# Patient Record
Sex: FEMALE | Race: WHITE | NOT HISPANIC OR LATINO | Employment: OTHER | URBAN - NONMETROPOLITAN AREA
[De-identification: names, ages, dates, MRNs, and addresses within clinical notes are randomized per-mention and may not be internally consistent; named-entity substitution may affect disease eponyms.]

---

## 2017-01-09 ENCOUNTER — OFFICE VISIT (OUTPATIENT)
Dept: FAMILY MEDICINE | Facility: OTHER | Age: 77
End: 2017-01-09
Attending: FAMILY MEDICINE
Payer: COMMERCIAL

## 2017-01-09 VITALS
DIASTOLIC BLOOD PRESSURE: 82 MMHG | HEART RATE: 70 BPM | RESPIRATION RATE: 14 BRPM | TEMPERATURE: 97.7 F | BODY MASS INDEX: 22.46 KG/M2 | SYSTOLIC BLOOD PRESSURE: 130 MMHG | WEIGHT: 115 LBS

## 2017-01-09 DIAGNOSIS — Z78.0 POST-MENOPAUSE: ICD-10-CM

## 2017-01-09 DIAGNOSIS — M41.35 THORACOGENIC SCOLIOSIS OF THORACOLUMBAR REGION: Primary | ICD-10-CM

## 2017-01-09 PROCEDURE — 72072 X-RAY EXAM THORAC SPINE 3VWS: CPT | Mod: TC

## 2017-01-09 PROCEDURE — 72100 X-RAY EXAM L-S SPINE 2/3 VWS: CPT | Mod: TC

## 2017-01-09 PROCEDURE — 99214 OFFICE O/P EST MOD 30 MIN: CPT | Performed by: FAMILY MEDICINE

## 2017-01-09 PROCEDURE — 99212 OFFICE O/P EST SF 10 MIN: CPT

## 2017-01-09 ASSESSMENT — ANXIETY QUESTIONNAIRES
7. FEELING AFRAID AS IF SOMETHING AWFUL MIGHT HAPPEN: NOT AT ALL
2. NOT BEING ABLE TO STOP OR CONTROL WORRYING: NOT AT ALL
6. BECOMING EASILY ANNOYED OR IRRITABLE: NOT AT ALL
3. WORRYING TOO MUCH ABOUT DIFFERENT THINGS: NOT AT ALL
1. FEELING NERVOUS, ANXIOUS, OR ON EDGE: NOT AT ALL
5. BEING SO RESTLESS THAT IT IS HARD TO SIT STILL: NOT AT ALL
GAD7 TOTAL SCORE: 0

## 2017-01-09 ASSESSMENT — PAIN SCALES - GENERAL: PAINLEVEL: NO PAIN (0)

## 2017-01-09 ASSESSMENT — PATIENT HEALTH QUESTIONNAIRE - PHQ9: 5. POOR APPETITE OR OVEREATING: NOT AT ALL

## 2017-01-09 NOTE — NURSING NOTE
Chief Complaint   Patient presents with     Scoliosis       Initial /82 mmHg  Pulse 70  Temp(Src) 97.7  F (36.5  C)  Resp 14  Wt 115 lb (52.164 kg) Estimated body mass index is 22.46 kg/(m^2) as calculated from the following:    Height as of 4/22/16: 5' (1.524 m).    Weight as of this encounter: 115 lb (52.164 kg).  BP completed using cuff size: jim Padgett

## 2017-01-09 NOTE — PROGRESS NOTES
SUBJECTIVE:  Demi Narayan, 76 year old, female is seen with scoliosis.  She has developed progressive spinal deformity over the last several years and this has been progressing.  She has a history of osteoarthritis and is postmenopausal.  There ar no traumas.  Danielle has a history of osteoarthritis and has had symptoms of sciatica.  She has been going Occupational Therapy Chiropractic with minimal improvement.  No recent DEXA scan or plain x rays of the thoracolumbar spine.    Denies fever, headache, visual disturbance, dizziness, focal weakness, numbness, tingling, paresthesias, tremor, or gait disturbance.        Current Outpatient Prescriptions   Medication Sig Dispense Refill     Pantoprazole Sodium (PROTONIX PO) Take 40 mg by mouth every morning (before breakfast)       traMADol (ULTRAM) 50 MG tablet TAKE ONE TO TWO TABLETS BY MOUTH EVERY 6 TO 8 HOURS AS NEEDED 120 tablet 0     PREMARIN 0.3 MG tablet TAKE ONE TABLET BY MOUTH ONCE DAILY 90 tablet 1     ciprofloxacin (CIPRO) 500 MG tablet TAKE TWO TABLETS BY MOUTH EVERY DAY AS NEEDED FRO DIVERTICULITIS 20 tablet 0     niacin 500 MG tablet Take 1 tablet by mouth daily.       aspirin 325 MG tablet Take 325 mg by mouth At Bedtime.       GLUCOSAMINE SULFATE PO Take  by mouth daily.       Multiple Vitamins-Minerals (MULTIVITAL PO) Take 1 tablet by mouth daily.       Calcium Carbonate-Vitamin D (CALCIUM + D PO) Take 600 mg by mouth.       Omega-3 Fatty Acids (OMEGA-3 FISH OIL PO) Take  by mouth daily.        No Known Allergies    Past Medical History   Diagnosis Date     Gastro-oesophageal reflux disease      Osteoarthritis      Diverticulitis      Diverticulitis, recurrent 2/6/2002     bowel resection     Dyslipidemia 6/20/2006     Fibrocystic breast disease 7/12/2011     Chronic/recurrent back pain 7/12/2011     Hormone replacement therapy, postmenopausal 7/12/2011     Hiatal hernia 2/10/2012     GERD 2/10/2012     Past Surgical History   Procedure Laterality  Date     Ent surgery       tonsilectomy     Gyn surgery       hysterectomy with bladder and rectal repair     Abdomen surgery       colon removed 2nd to diverticulitis     Appendectomy       Endoscopy upper with pancreatic stimulation       ------------other-------------       colonoscopy with biopsy - diverticulitis, hemorrhoids     ------------other-------------  4/19/1994     sigmoidoscopy - abdominal pain, diverticula     Egd with biopsy  2011     [facility] with ovarian preservation       Diverticulitis       bowel resection     Arthroscopy shoulder  11/20/2013     Procedure: ARTHROSCOPY SHOULDER;  Right Shoulder Arthroscopy Sub-Acromial Decompression Rotator Cuff Repair;  Surgeon: Lenny Trammell MD;  Location: [facility]     Orthopedic surgery  11/20/2013     right rotator cuff surgery     [facility]     Endoscopy upper, colonoscopy, combined  7/18/2014     Procedure: COMBINED ENDOSCOPY UPPER, COLONOSCOPY;  Surgeon: Israel Feliciano MD;  Location: [facility] OR     Family History   Problem Relation Age of Onset     CEREBROVASCULAR DISEASE Father      CVA     HEART DISEASE Father      heart disease     Hypertension Father      Myocardial Infarction Father      myocardial infarction     CEREBROVASCULAR DISEASE Mother      CVA     DIABETES Mother      HEART DISEASE Mother      heart disease     Hypertension Mother      HEART DISEASE Brother      heart disease     Hypertension Brother      Social History     Social History     Marital Status:      Spouse Name: N/A     Number of Children: N/A     Years of Education: N/A     Occupational History     Not on file.     Social History Main Topics     Smoking status: Former Smoker     Types: Cigarettes     Quit date: 05/06/1968     Smokeless tobacco: Never Used      Comment: no passive smoke exposure     Alcohol Use: 0.5 oz/week     1 Glasses of wine per week      Comment: daily with dinner     Drug Use: No     Sexual  Activity:     Partners: Male     Birth Control/ Protection: None     Other Topics Concern      Service No     Blood Transfusions Yes     Permits if needed     Caffeine Concern Yes     4 cups coffee daily     Occupational Exposure No     Hobby Hazards No     Sleep Concern No     Stress Concern No     Weight Concern No     Special Diet No     Back Care No     Exercise No     Seat Belt Yes     Self-Exams Yes     Parent/Sibling W/ Cabg, Mi Or Angioplasty Before 65f 55m? Yes     Father     Social History Narrative         Review Of Systems  Constitutional, HEENT, cardiovascular, pulmonary, gi and gu systems are negative, except as otherwise noted.    OBJECTIVE:  Vitals: B/P: 130/82, T: 97.7, P: 70, R: 14    Exam:  Physical Exam   Constitutional: She is oriented to person, place, and time. She appears well-developed and well-nourished. No distress.   HENT:   Head: Normocephalic.   Eyes: Conjunctivae and EOM are normal. Pupils are equal, round, and reactive to light.   Musculoskeletal:        Thoracic back: She exhibits decreased range of motion and deformity.        Lumbar back: She exhibits decreased range of motion and deformity. She exhibits no bony tenderness.   There is obvious right lumbar scoliosis with rotational deformity.   Neurological: She is alert and oriented to person, place, and time. She displays normal reflexes.   Skin: Skin is warm and dry.   Psychiatric: She has a normal mood and affect.     Other exam not repeated    Labs:  Office Visit on 08/05/2016   Component Date Value Ref Range Status     Lyme Disease Antibodies Serum 08/05/2016 2.51* 0.00 - 0.89 Final    Comment: Positive, Presence of detectable Borrelia burgdorferi antibodies. Sample will   be   sent to [facility] for a Western Blot assay for confirmation.       Lab Scanned Result 08/05/2016 LYME CONF IGG,IGM,WB-Scanned   Final-Edited       ASSESSMENT/PLAN:  Thoracogenic scoliosis of thoracolumbar region  X rays reviewed and show prodominant  lumbar rotoscoliosis with advanced degenerative changes.  Discussed etiologies.  CT scheduled to rule out compression fractures which may be amenable to treatment.  DEXA to assess bone mineral density.  Follow up post testing.  - Pantoprazole Sodium (PROTONIX PO); Take 40 mg by mouth every morning (before breakfast)  - XR THORACIC SPINE 3 VW (Clinic Performed)  - XR LUMBAR SPINE 2/3 VIEWS (Clinic Performed); Future  - XR LUMBAR SPINE 2/3 VIEWS (Clinic Performed)  - CT Lumbar Spine w/o Contrast; Future  - DX Hip/Pelvis/Spine  - CT Lumbar Spine w/o Contrast            Virgil Mackay MD

## 2017-01-09 NOTE — MR AVS SNAPSHOT
"              After Visit Summary   1/9/2017    Demi Narayan    MRN: [id number]           Patient Information     Date Of Birth          1940        Visit Information        Provider Department      1/9/2017 10:00 AM Virgil Mackay MD [facility] [place]        Today's Diagnoses     Thoracogenic scoliosis of thoracolumbar region    -  1       Care Instructions    Radiology will call to schedule CT and DEXA scans.          Follow-ups after your visit        Follow-up notes from your care team     Return in about 1 week (around 1/16/2017) for Test Results.      Your next 10 appointments already scheduled     Apr 24, 2017  9:00 AM   (Arrive by 8:40 AM)   PHYSICAL with Virgil Mackay MD   [facility] [place] ([facility])    [address]  [place] [address] [phone]              Who to contact     If you have questions or need follow up information about today's clinic visit or your schedule please contact [facility] [place] directly at [phone].  Normal or non-critical lab and imaging results will be communicated to you by Adnexushart, letter or phone within 4 business days after the clinic has received the results. If you do not hear from us within 7 days, please contact the clinic through Adnexushart or phone. If you have a critical or abnormal lab result, we will notify you by phone as soon as possible.  Submit refill requests through Medypal or call your pharmacy and they will forward the refill request to us. Please allow 3 business days for your refill to be completed.          Additional Information About Your Visit        Adnexushart Information     Medypal lets you send messages to your doctor, view your test results, renew your prescriptions, schedule appointments and more. To sign up, go to [email or web address] . Click on \"Log in\" on the left side of the screen, which will take you to the Welcome page. Then click on \"Sign up Now\" on the right side of the page. "     You will be asked to enter the access code listed below, as well as some personal information. Please follow the directions to create your username and password.     Your access code is: [email or web address]  Expires: 4/10/2017  5:58 AM     Your access code will  in 90 days. If you need help or a new code, please call your [place] clinic or [phone].        Care EveryWhere ID     This is your Care EveryWhere ID. This could be used by other organizations to access your [place] medical records  [id number]        Your Vitals Were     Pulse Temperature Respirations             70 97.7  F (36.5  C) 14          Blood Pressure from Last 3 Encounters:   17 130/82   16 132/66   16 118/78    Weight from Last 3 Encounters:   17 115 lb (52.164 kg)   16 115 lb (52.164 kg)   16 111 lb (50.349 kg)              We Performed the Following     CT Lumbar Spine w/o Contrast     DX Hip/Pelvis/Spine     XR LUMBAR SPINE 2/3 VIEWS (Clinic Performed)     XR THORACIC SPINE 3 VW (Clinic Performed)          Today's Medication Changes          These changes are accurate as of: 17 11:59 PM.  If you have any questions, ask your nurse or doctor.               Stop taking these medicines if you haven't already. Please contact your care team if you have questions.     calcium carbonate 500 MG chewable tablet   Commonly known as:  TUMS   Stopped by:  Virgil Mackay MD           carBAMazepine 200 MG tablet   Commonly known as:  TEGretol   Stopped by:  Virgil Mackay MD           oxyCODONE-acetaminophen 5-325 MG per tablet   Commonly known as:  PERCOCET   Stopped by:  Virgil Mackay MD                    Primary Care Provider Office Phone # Fax #    Virgil Mackay [phone]       [facility] [address]  [place] [address]        Thank you!     Thank you for choosing [facility]  for your care. Our goal is always to provide you with excellent care.  Hearing back from our patients is one way we can continue to improve our services. Please take a few minutes to complete the written survey that you may receive in the mail after your visit with us. Thank you!             Your Updated Medication List - Protect others around you: Learn how to safely use, store and throw away your medicines at www.disposemymeds.org.          This list is accurate as of: 1/9/17 11:59 PM.  Always use your most recent med list.                   Brand Name Dispense Instructions for use    aspirin 325 MG tablet      Take 325 mg by mouth At Bedtime.       CALCIUM + D PO      Take 600 mg by mouth.       ciprofloxacin 500 MG tablet    CIPRO    20 tablet    TAKE TWO TABLETS BY MOUTH EVERY DAY AS NEEDED FRO DIVERTICULITIS       GLUCOSAMINE SULFATE PO      Take  by mouth daily.       MULTIVITAL PO      Take 1 tablet by mouth daily.       niacin 500 MG tablet      Take 1 tablet by mouth daily.       OMEGA-3 FISH OIL PO      Take  by mouth daily.       PREMARIN 0.3 MG tablet   Generic drug:  estrogens (conjugated)     90 tablet    TAKE ONE TABLET BY MOUTH ONCE DAILY       PROTONIX PO      Take 40 mg by mouth every morning (before breakfast)       traMADol 50 MG tablet    ULTRAM    120 tablet    TAKE ONE TO TWO TABLETS BY MOUTH EVERY 6 TO 8 HOURS AS NEEDED

## 2017-01-10 ASSESSMENT — PATIENT HEALTH QUESTIONNAIRE - PHQ9: SUM OF ALL RESPONSES TO PHQ QUESTIONS 1-9: 0

## 2017-01-10 ASSESSMENT — ANXIETY QUESTIONNAIRES: GAD7 TOTAL SCORE: 0

## 2017-01-19 DIAGNOSIS — S33.8XXD SPRAIN OF OTHER PARTS OF LUMBAR SPINE AND PELVIS, SUBSEQUENT ENCOUNTER: Primary | ICD-10-CM

## 2017-01-20 RX ORDER — TRAMADOL HYDROCHLORIDE 50 MG/1
TABLET ORAL
Qty: 120 TABLET | Refills: 0 | Status: SHIPPED | OUTPATIENT
Start: 2017-01-20 | End: 2017-02-21

## 2017-01-25 ENCOUNTER — HOSPITAL ENCOUNTER (OUTPATIENT)
Dept: CT IMAGING | Facility: HOSPITAL | Age: 77
End: 2017-01-25
Attending: FAMILY MEDICINE
Payer: COMMERCIAL

## 2017-01-25 PROCEDURE — 72131 CT LUMBAR SPINE W/O DYE: CPT | Mod: TC

## 2017-02-03 ENCOUNTER — OFFICE VISIT (OUTPATIENT)
Dept: FAMILY MEDICINE | Facility: OTHER | Age: 77
End: 2017-02-03
Attending: FAMILY MEDICINE
Payer: COMMERCIAL

## 2017-02-03 ENCOUNTER — HOSPITAL ENCOUNTER (OUTPATIENT)
Dept: GENERAL RADIOLOGY | Facility: HOSPITAL | Age: 77
Discharge: HOME | End: 2017-02-03
Attending: FAMILY MEDICINE | Admitting: FAMILY MEDICINE
Payer: COMMERCIAL

## 2017-02-03 VITALS
SYSTOLIC BLOOD PRESSURE: 118 MMHG | OXYGEN SATURATION: 98 % | HEART RATE: 78 BPM | RESPIRATION RATE: 18 BRPM | WEIGHT: 115 LBS | BODY MASS INDEX: 22.46 KG/M2 | DIASTOLIC BLOOD PRESSURE: 64 MMHG

## 2017-02-03 DIAGNOSIS — M54.50 CHRONIC BILATERAL LOW BACK PAIN WITHOUT SCIATICA: Primary | ICD-10-CM

## 2017-02-03 DIAGNOSIS — G89.29 CHRONIC BILATERAL LOW BACK PAIN WITHOUT SCIATICA: Primary | ICD-10-CM

## 2017-02-03 DIAGNOSIS — M41.25 OTHER IDIOPATHIC SCOLIOSIS, THORACOLUMBAR REGION: ICD-10-CM

## 2017-02-03 PROCEDURE — 99214 OFFICE O/P EST MOD 30 MIN: CPT | Performed by: FAMILY MEDICINE

## 2017-02-03 PROCEDURE — 99212 OFFICE O/P EST SF 10 MIN: CPT | Mod: 25

## 2017-02-03 PROCEDURE — 77080 DXA BONE DENSITY AXIAL: CPT | Mod: TC

## 2017-02-03 NOTE — NURSING NOTE
Chief Complaint   Patient presents with     Results     follow up CT scan 1/25/17       Initial /64 mmHg  Pulse 78  Resp 18  Wt 115 lb (52.164 kg)  SpO2 98% Estimated body mass index is 22.46 kg/(m^2) as calculated from the following:    Height as of 4/22/16: 5' (1.524 m).    Weight as of this encounter: 115 lb (52.164 kg).  BP completed using cuff size: jim Ramirez

## 2017-02-03 NOTE — PROGRESS NOTES
SUBJECTIVE:  Demi Narayan, 76 year old, female is seen ***    Current Outpatient Prescriptions   Medication Sig Dispense Refill     traMADol (ULTRAM) 50 MG tablet TAKE ONE TO TWO TABLETS BY MOUTH EVERY 6 TO 8 HOURS AS NEEDED 120 tablet 0     Pantoprazole Sodium (PROTONIX PO) Take 40 mg by mouth every morning (before breakfast)       PREMARIN 0.3 MG tablet TAKE ONE TABLET BY MOUTH ONCE DAILY 90 tablet 1     ciprofloxacin (CIPRO) 500 MG tablet TAKE TWO TABLETS BY MOUTH EVERY DAY AS NEEDED FRO DIVERTICULITIS 20 tablet 0     niacin 500 MG tablet Take 1 tablet by mouth daily.       aspirin 325 MG tablet Take 325 mg by mouth At Bedtime.       GLUCOSAMINE SULFATE PO Take  by mouth daily.       Multiple Vitamins-Minerals (MULTIVITAL PO) Take 1 tablet by mouth daily.       Calcium Carbonate-Vitamin D (CALCIUM + D PO) Take 600 mg by mouth.       Omega-3 Fatty Acids (OMEGA-3 FISH OIL PO) Take  by mouth daily.        No Known Allergies    Past Medical History   Diagnosis Date     Gastro-oesophageal reflux disease      Osteoarthritis      Diverticulitis      Diverticulitis, recurrent 2/6/2002     bowel resection     Dyslipidemia 6/20/2006     Fibrocystic breast disease 7/12/2011     Chronic/recurrent back pain 7/12/2011     Hormone replacement therapy, postmenopausal 7/12/2011     Hiatal hernia 2/10/2012     GERD 2/10/2012     Past Surgical History   Procedure Laterality Date     Ent surgery       tonsilectomy     Gyn surgery       hysterectomy with bladder and rectal repair     Abdomen surgery       colon removed 2nd to diverticulitis     Appendectomy       Endoscopy upper with pancreatic stimulation       ------------other-------------       colonoscopy with biopsy - diverticulitis, hemorrhoids     ------------other-------------  4/19/1994     sigmoidoscopy - abdominal pain, diverticula     Egd with biopsy  2011     [facility] with ovarian preservation       Diverticulitis       bowel resection     Arthroscopy shoulder   11/20/2013     Procedure: ARTHROSCOPY SHOULDER;  Right Shoulder Arthroscopy Sub-Acromial Decompression Rotator Cuff Repair;  Surgeon: Lenny Trammell MD;  Location: [facility] OR     Orthopedic surgery  11/20/2013     right rotator cuff surgery     [facility]     Endoscopy upper, colonoscopy, combined  7/18/2014     Procedure: COMBINED ENDOSCOPY UPPER, COLONOSCOPY;  Surgeon: Israel Feliciano MD;  Location: [facility] OR     Family History   Problem Relation Age of Onset     CEREBROVASCULAR DISEASE Father      CVA     HEART DISEASE Father      heart disease     Hypertension Father      Myocardial Infarction Father      myocardial infarction     CEREBROVASCULAR DISEASE Mother      CVA     DIABETES Mother      HEART DISEASE Mother      heart disease     Hypertension Mother      HEART DISEASE Brother      heart disease     Hypertension Brother      Social History     Social History     Marital Status:      Spouse Name: N/A     Number of Children: N/A     Years of Education: N/A     Occupational History     Not on file.     Social History Main Topics     Smoking status: Former Smoker     Types: Cigarettes     Quit date: 05/06/1968     Smokeless tobacco: Never Used      Comment: no passive smoke exposure     Alcohol Use: 0.5 oz/week     1 Glasses of wine per week      Comment: daily with dinner     Drug Use: No     Sexual Activity:     Partners: Male     Birth Control/ Protection: None     Other Topics Concern      Service No     Blood Transfusions Yes     Permits if needed     Caffeine Concern Yes     4 cups coffee daily     Occupational Exposure No     Hobby Hazards No     Sleep Concern No     Stress Concern No     Weight Concern No     Special Diet No     Back Care No     Exercise No     Seat Belt Yes     Self-Exams Yes     Parent/Sibling W/ Cabg, Mi Or Angioplasty Before 65f 55m? Yes     Father     Social History Narrative         Review Of Systems  {ROS - pick  list:[id number]}    OBJECTIVE:  Vitals: B/P: 118/64, T: Data Unavailable, P: 78, R: 18    Exam:  Physical Exam  Other exam not repeated    Labs:  Office Visit on 08/05/2016   Component Date Value Ref Range Status     Lyme Disease Antibodies Serum 08/05/2016 2.51* 0.00 - 0.89 Final    Comment: Positive, Presence of detectable Borrelia burgdorferi antibodies. Sample will   be   sent to [facility] for a Western Blot assay for confirmation.       Lab Scanned Result 08/05/2016 LYME CONF IGG,IGM,WB-Scanned   Final-Edited       ASSESSMENT/PLAN:  ***          Virgil Mackay MD

## 2017-02-03 NOTE — MR AVS SNAPSHOT
After Visit Summary   2/3/2017    Demi Narayan    MRN: [id number]           Patient Information     Date Of Birth          1940        Visit Information        Provider Department      2/3/2017 11:20 AM Virgil Mackay MD [facility] [place]        Today's Diagnoses     Low back pain, chronic    -  1     Scoliosis, throracolumbar           Care Instructions    Appointment with [facility]  scheduled for evaluation and recommendations for further management.         Follow-ups after your visit        Additional Services     SPINE SURGERY REFERRAL       Please choose Medical Spine Specialist (unless patient was seen by a Medical Spine Specialist within the past 6 months).  Surgical Evaluation is advised if the patient presents with one or more of the following red flags:     **Cauda Equina Syndrome  **Evidence of Spinal Tumor  **Fracture  **Infection  **Loss of Bowel or Bladder Control  **Sudden or Progressive Weakness  **Any other documented emergent neurological condition resulting from a Lumbar Spinal Condition.    You have been referred to: [facility]      Please be aware that coverage of these services is subject to the terms and limitations of your health insurance plan.  Call member services at your health plan with any benefit or coverage questions.      Please bring the following to your appointment:    **Any x-rays, CTs or MRIs which have been performed.  Contact the facility where they were done to arrange for  prior to your scheduled appointment.    **List of current medications   **This referral request   **Any documents/labs given to you regarding this referral                  Follow-up notes from your care team     Return if symptoms worsen or fail to improve.      Your next 10 appointments already scheduled     Apr 24, 2017  9:00 AM   (Arrive by 8:40 AM)   PHYSICAL with MD Nico Tafoya ([facility] [place]  [facility])    [place] [address]   [phone]              Who to contact     If you have questions or need follow up information about today's clinic visit or your schedule please contact [facility] directly at [phone].  Normal or non-critical lab and imaging results will be communicated to you by MyChart, letter or phone within 4 business days after the clinic has received the results. If you do not hear from us within 7 days, please contact the clinic through MyChart or phone. If you have a critical or abnormal lab result, we will notify you by phone as soon as possible.  Submit refill requests through hdtMEDIA or call your pharmacy and they will forward the refill request to us. Please allow 3 business days for your refill to be completed.          Additional Information About Your Visit        MyChart Information     [email or web address] lets you send messages to your doctor, view your test results, renew your prescriptions, schedule appointments and more. To sign up, go to [email or web address] . Click on \"Log in\" on the left side of the screen, which will take you to the Welcome page. Then click on \"Sign up Now\" on the right side of the page.     You will be asked to enter the access code listed below, as well as some personal information. Please follow the directions to create your username and password.     Your access code is: [email or web address]  Expires: 4/10/2017  5:58 AM     Your access code will  in 90 days. If you need help or a new code, please call your [facility] or [phone].        Care EveryWhere ID     This is your Care EveryWhere ID. This could be used by other organizations to access your [place] medical records  [id number]        Your Vitals Were     Pulse Respirations Pulse Oximetry             78 18 98%          Blood Pressure from Last 3 Encounters:   17 118/64   17 130/82   16 132/66    Weight from Last 3 Encounters:   17 115 lb " (52.164 kg)   01/09/17 115 lb (52.164 kg)   08/05/16 115 lb (52.164 kg)              We Performed the Following     SPINE SURGERY REFERRAL        Primary Care Provider Office Phone # Fax #    Virgil Mackay [phone]       [facility] [address] [place] [address]        Thank you!     Thank you for choosing [facility]  for your care. Our goal is always to provide you with excellent care. Hearing back from our patients is one way we can continue to improve our services. Please take a few minutes to complete the written survey that you may receive in the mail after your visit with us. Thank you!             Your Updated Medication List - Protect others around you: Learn how to safely use, store and throw away your medicines at www.disposemymeds.org.          This list is accurate as of: 2/3/17 11:59 PM.  Always use your most recent med list.                   Brand Name Dispense Instructions for use    aspirin 325 MG tablet      Take 325 mg by mouth At Bedtime.       CALCIUM + D PO      Take 600 mg by mouth.       ciprofloxacin 500 MG tablet    CIPRO    20 tablet    TAKE TWO TABLETS BY MOUTH EVERY DAY AS NEEDED FRO DIVERTICULITIS       GLUCOSAMINE SULFATE PO      Take  by mouth daily.       MULTIVITAL PO      Take 1 tablet by mouth daily.       niacin 500 MG tablet      Take 1 tablet by mouth daily.       OMEGA-3 FISH OIL PO      Take  by mouth daily.       PREMARIN 0.3 MG tablet   Generic drug:  estrogens (conjugated)     90 tablet    TAKE ONE TABLET BY MOUTH ONCE DAILY       PROTONIX PO      Take 40 mg by mouth every morning (before breakfast)       traMADol 50 MG tablet    ULTRAM    120 tablet    TAKE ONE TO TWO TABLETS BY MOUTH EVERY 6 TO 8 HOURS AS NEEDED

## 2017-02-05 PROBLEM — M41.25 OTHER IDIOPATHIC SCOLIOSIS, THORACOLUMBAR REGION: Status: ACTIVE | Noted: 2017-02-05

## 2017-02-05 NOTE — PROGRESS NOTES
SUBJECTIVE:  Demi Narayan, 76 year old, female is seen with scoliosis.  She has developed progressive spinal deformity over the last several years and this has been progressing.  She has a history of osteoarthritis and is postmenopausal.  There ar no traumas.  Danielle has a history of osteoarthritis and has had symptoms of bilateral sciatica.  She has been going to Chiropractic with minimal improvement.  CT lumbar spine was performed which showed:    1.  SCOLIOTIC CURVATURE OF THE THORACOLUMBAR SPINE, CONVEX RIGHT WITH  THE APEX AT L2 OF APPROXIMATELY 20 DEGREES.     2.  OTHERWISE, PROMINENT DEGENERATIVE CHANGES CONTRIBUTE TO SEVERE  BILATERAL FORAMINAL STENOSIS AT L5-S1, RESULTING IN BILATERAL L5  GANGLIONIC COMPRESSION, ASYMMETRICALLY WORSE ON THE RIGHT.     3.  LARGE RIGHT LATERAL DISK OSTEOPHYTE COMPLEX CONTRIBUTES TO  MODERATELY SEVERE RIGHT FORAMINAL STENOSIS AT L4-L5 WITH RIGHT L4  GANGLIONIC IMPINGEMENT.     4.  CHRONIC BULGE AND ENDPLATE SPURRING CONTRIBUTE TO MODERATE  BILATERAL FORAMINAL STENOSIS AT L3-L4.     5.  MODERATE LEFT FORAMINAL STENOSIS AT L2-L3 SECONDARY TO DISK  OSTEOPHYTE COMPLEX AND HYPERTROPHIC CHANGES OF THE LEFT FACET JOINT.    In addition, She underwent DEXA scan which showed:    THE PATIENT IS OSTEOPENIC AND FRACTURE RISK IS MODERATE.    There was no evidence of compression fractures.  This is reviewed in detail.    Denies fever, headache, visual disturbance, dizziness, focal weakness, numbness, tingling, paresthesias, tremor, or gait disturbance.        Current Outpatient Prescriptions   Medication Sig Dispense Refill     traMADol (ULTRAM) 50 MG tablet TAKE ONE TO TWO TABLETS BY MOUTH EVERY 6 TO 8 HOURS AS NEEDED 120 tablet 0     Pantoprazole Sodium (PROTONIX PO) Take 40 mg by mouth every morning (before breakfast)       PREMARIN 0.3 MG tablet TAKE ONE TABLET BY MOUTH ONCE DAILY 90 tablet 1     ciprofloxacin (CIPRO) 500 MG tablet TAKE TWO TABLETS BY MOUTH EVERY DAY AS NEEDED FRO  DIVERTICULITIS 20 tablet 0     niacin 500 MG tablet Take 1 tablet by mouth daily.       aspirin 325 MG tablet Take 325 mg by mouth At Bedtime.       GLUCOSAMINE SULFATE PO Take  by mouth daily.       Multiple Vitamins-Minerals (MULTIVITAL PO) Take 1 tablet by mouth daily.       Calcium Carbonate-Vitamin D (CALCIUM + D PO) Take 600 mg by mouth.       Omega-3 Fatty Acids (OMEGA-3 FISH OIL PO) Take  by mouth daily.        No Known Allergies    Past Medical History   Diagnosis Date     Gastro-oesophageal reflux disease      Osteoarthritis      Diverticulitis      Diverticulitis, recurrent 2/6/2002     bowel resection     Dyslipidemia 6/20/2006     Fibrocystic breast disease 7/12/2011     Chronic/recurrent back pain 7/12/2011     Hormone replacement therapy, postmenopausal 7/12/2011     Hiatal hernia 2/10/2012     GERD 2/10/2012     Past Surgical History   Procedure Laterality Date     Ent surgery       tonsilectomy     Gyn surgery       hysterectomy with bladder and rectal repair     Abdomen surgery       colon removed 2nd to diverticulitis     Appendectomy       Endoscopy upper with pancreatic stimulation       ------------other-------------       colonoscopy with biopsy - diverticulitis, hemorrhoids     ------------other-------------  4/19/1994     sigmoidoscopy - abdominal pain, diverticula     Egd with biopsy  2011     Tvh with ovarian preservation       Diverticulitis       bowel resection     Arthroscopy shoulder  11/20/2013     Procedure: ARTHROSCOPY SHOULDER;  Right Shoulder Arthroscopy Sub-Acromial Decompression Rotator Cuff Repair;  Surgeon: Lenny Trammell MD;  Location: [facility]     Orthopedic surgery  11/20/2013     right rotator cuff surgery     [facility]     Endoscopy upper, colonoscopy, combined  7/18/2014     Procedure: COMBINED ENDOSCOPY UPPER, COLONOSCOPY;  Surgeon: Israel Feliciano MD;  Location: [facility]     Family History   Problem Relation Age of  Onset     CEREBROVASCULAR DISEASE Father      CVA     HEART DISEASE Father      heart disease     Hypertension Father      Myocardial Infarction Father      myocardial infarction     CEREBROVASCULAR DISEASE Mother      CVA     DIABETES Mother      HEART DISEASE Mother      heart disease     Hypertension Mother      HEART DISEASE Brother      heart disease     Hypertension Brother      Social History     Social History     Marital Status:      Spouse Name: N/A     Number of Children: N/A     Years of Education: N/A     Occupational History     Not on file.     Social History Main Topics     Smoking status: Former Smoker     Types: Cigarettes     Quit date: 05/06/1968     Smokeless tobacco: Never Used      Comment: no passive smoke exposure     Alcohol Use: 0.5 oz/week     1 Glasses of wine per week      Comment: daily with dinner     Drug Use: No     Sexual Activity:     Partners: Male     Birth Control/ Protection: None     Other Topics Concern      Service No     Blood Transfusions Yes     Permits if needed     Caffeine Concern Yes     4 cups coffee daily     Occupational Exposure No     Hobby Hazards No     Sleep Concern No     Stress Concern No     Weight Concern No     Special Diet No     Back Care No     Exercise No     Seat Belt Yes     Self-Exams Yes     Parent/Sibling W/ Cabg, Mi Or Angioplasty Before 65f 55m? Yes     Father     Social History Narrative         Review Of Systems  Constitutional, HEENT, cardiovascular, pulmonary, gi and gu systems are negative, except as otherwise noted.    OBJECTIVE:  Vitals: B/P: 130/82, T: 97.7, P: 70, R: 14    Exam:  Physical Exam   Constitutional: She is oriented to person, place, and time. She appears well-developed and well-nourished. No distress.   HENT:   Head: Normocephalic.   Eyes: Conjunctivae and EOM are normal. Pupils are equal, round, and reactive to light.   Musculoskeletal:        Thoracic back: She exhibits decreased range of motion and  deformity.        Lumbar back: She exhibits decreased range of motion and deformity. She exhibits no bony tenderness.   There is obvious right lumbar scoliosis with rotational deformity.   Neurological: She is alert and oriented to person, place, and time. She displays normal reflexes.   Skin: Skin is warm and dry.   Psychiatric: She has a normal mood and affect.     Other exam not repeated    Labs:  Office Visit on 08/05/2016   Component Date Value Ref Range Status     Lyme Disease Antibodies Serum 08/05/2016 2.51* 0.00 - 0.89 Final    Comment: Positive, Presence of detectable Borrelia burgdorferi antibodies. Sample will   be   sent to [facility] for a Western Blot assay for confirmation.       Lab Scanned Result 08/05/2016 LYME CONF IGG,IGM,WB-Scanned   Final-Edited       ASSESSMENT/PLAN:  Low back pain, chronic  Discussed potential etiologies  Her symptoms are intermittent and controlled.  - SPINE SURGERY REFERRAL    Scoliosis, throracolumbar  I am concerned about the degree and relatively rapid progression of her scoliosis and height loss.  Appointment with [facility]  scheduled for evaluation and recommendations for further management.   - SPINE SURGERY REFERRAL            Virgil Mackay MD

## 2017-02-05 NOTE — PATIENT INSTRUCTIONS
Appointment with [facility]  scheduled for evaluation and recommendations for further management.

## 2017-02-21 DIAGNOSIS — S33.8XXD SPRAIN OF OTHER PARTS OF LUMBAR SPINE AND PELVIS, SUBSEQUENT ENCOUNTER: ICD-10-CM

## 2017-02-21 RX ORDER — TRAMADOL HYDROCHLORIDE 50 MG/1
TABLET ORAL
Qty: 120 TABLET | Refills: 0 | Status: SHIPPED | OUTPATIENT
Start: 2017-02-21 | End: 2017-03-23

## 2017-03-20 ENCOUNTER — TELEPHONE (OUTPATIENT)
Dept: FAMILY MEDICINE | Facility: OTHER | Age: 77
End: 2017-03-20

## 2017-03-20 DIAGNOSIS — Z79.890 HORMONE REPLACEMENT THERAPY (HRT): ICD-10-CM

## 2017-03-20 NOTE — TELEPHONE ENCOUNTER
Reason for call:  Medication    1. Medication Name? Premarin 0.3mg tablets  2. Is this request for a refill? Yes  3. What Pharmacy do you use? [facility]  4. Have you contacted your pharmacy? No    5. If yes, when?  (Please note that the turn-around-time for prescriptions is 72 business hours; I am sending your request at this time. SEND TO  [facility]  )  Description: Patient's insurance will not be covering medication any longer and recommended Estrabiol tablets. Medication will need a PA. Patient has enough medication for 10 days.  Was an appointment offered for this a call? No   Preferred method for responding to this messageTelephone Call [phone]  If we cannot reach you directly, may we leave a detailed response at the number you provided? Yes  Can this message wait until your PCP/Provider returns if not available today? Yes

## 2017-03-21 RX ORDER — ESTRADIOL 0.5 MG/1
0.5 TABLET ORAL DAILY
Qty: 90 TABLET | Refills: 1 | Status: SHIPPED | OUTPATIENT
Start: 2017-03-21 | End: 2017-10-10

## 2017-03-21 NOTE — TELEPHONE ENCOUNTER
Patient's insurance will not be covering Premarin 0.3mg medication any longer and recommended Estradiol. Please send new prescription to the pharmacy.

## 2017-03-23 DIAGNOSIS — S33.8XXD SPRAIN OF OTHER PARTS OF LUMBAR SPINE AND PELVIS, SUBSEQUENT ENCOUNTER: ICD-10-CM

## 2017-03-23 RX ORDER — TRAMADOL HYDROCHLORIDE 50 MG/1
TABLET ORAL
Qty: 120 TABLET | Refills: 0 | Status: SHIPPED | OUTPATIENT
Start: 2017-03-23 | End: 2017-05-02

## 2017-03-23 NOTE — TELEPHONE ENCOUNTER
tramadol      Last Written Prescription Date: 2/21/17  Last Fill Quantity: 120,  # refills: 0   Last Office Visit with G, P or [facility] prescribing provider: 2/3/17

## 2017-04-04 ENCOUNTER — OFFICE VISIT (OUTPATIENT)
Dept: FAMILY MEDICINE | Facility: OTHER | Age: 77
End: 2017-04-04
Attending: FAMILY MEDICINE
Payer: COMMERCIAL

## 2017-04-04 VITALS
OXYGEN SATURATION: 99 % | DIASTOLIC BLOOD PRESSURE: 70 MMHG | HEART RATE: 88 BPM | TEMPERATURE: 98.5 F | RESPIRATION RATE: 16 BRPM | SYSTOLIC BLOOD PRESSURE: 124 MMHG | WEIGHT: 109 LBS | BODY MASS INDEX: 21.29 KG/M2
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
## 2017-04-04 PROCEDURE — 99213 OFFICE O/P EST LOW 20 MIN: CPT | Performed by: FAMILY MEDICINE

## 2017-04-04 PROCEDURE — 99212 OFFICE O/P EST SF 10 MIN: CPT

## 2017-04-04 RX ORDER — AZITHROMYCIN 250 MG/1
TABLET, FILM COATED ORAL
Qty: 6 TABLET | Refills: 0 | Status: SHIPPED | OUTPATIENT
Start: 2017-04-04 | End: 2017-04-25

## 2017-04-04 ASSESSMENT — PAIN SCALES - GENERAL: PAINLEVEL: MILD PAIN (2)

## 2017-04-04 NOTE — MR AVS SNAPSHOT
"              After Visit Summary   4/4/2017    Demi Narayan    MRN: [id number]           Patient Information     Date Of Birth          1940        Visit Information        Provider Department      4/4/2017 3:00 PM Virgil Mackay MD [facility]        Today's Diagnoses     Tick bite, initial encounter    -  1      Care Instructions    Take azithromycin (Zithromax) as directed.        Follow-ups after your visit        Follow-up notes from your care team     Return if symptoms worsen or fail to improve.      Your next 10 appointments already scheduled     Apr 25, 2017  9:00 AM CDT   (Arrive by 8:45 AM)   PHYSICAL with Virgil Mackay MD   [facility] ([facility])    [address]  [place] [address]   [phone]              Who to contact     If you have questions or need follow up information about today's clinic visit or your schedule please contact [facility] [place] directly at [phone].  Normal or non-critical lab and imaging results will be communicated to you by MyChart, letter or phone within 4 business days after the clinic has received the results. If you do not hear from us within 7 days, please contact the clinic through [employer] or phone. If you have a critical or abnormal lab result, we will notify you by phone as soon as possible.  Submit refill requests through [employer] or call your pharmacy and they will forward the refill request to us. Please allow 3 business days for your refill to be completed.          Additional Information About Your Visit        [employer] Information     [employer] lets you send messages to your doctor, view your test results, renew your prescriptions, schedule appointments and more. To sign up, go to [email or web address] . Click on \"Log in\" on the left side of the screen, which will take you to the Welcome page. Then click on \"Sign up Now\" on the right side of the page.     You will be asked to enter the access " code listed below, as well as some personal information. Please follow the directions to create your username and password.     Your access code is: [email or web address]  Expires: 4/10/2017  6:58 AM     Your access code will  in 90 days. If you need help or a new code, please call your [facility] or [phone].        Care EveryWhere ID     This is your Care EveryWhere ID. This could be used by other organizations to access your [place] medical records  [id number]        Your Vitals Were     Pulse Temperature Respirations Pulse Oximetry BMI (Body Mass Index)       88 98.5  F (36.9  C) (Tympanic) 16 99% 21.29 kg/m2        Blood Pressure from Last 3 Encounters:   17 124/70   17 118/64   17 130/82    Weight from Last 3 Encounters:   17 109 lb (49.4 kg)   17 115 lb (52.2 kg)   17 115 lb (52.2 kg)              Today, you had the following     No orders found for display         Today's Medication Changes          These changes are accurate as of: 17 11:59 PM.  If you have any questions, ask your nurse or doctor.               Start taking these medicines.        Dose/Directions    azithromycin 250 MG tablet   Commonly known as:  ZITHROMAX   Used for:  Tick bite, initial encounter   Started by:  Virgil Mackay MD        Two tablets first day, then one tablet daily for four days.   Quantity:  6 tablet   Refills:  0            Where to get your medicines      These medications were sent to [facility] - [place] - [address]  [address], [place] [address]     Phone:  [phone]     azithromycin 250 MG tablet                Primary Care Provider Office Phone # Fax #    Virgil Mackay [phone]       [facility] [address] [place] [address]  [place] [address]        Thank you!     Thank you for choosing [facility]  for your care. Our goal is always to provide you with excellent care. Hearing back from our patients is one way we  can continue to improve our services. Please take a few minutes to complete the written survey that you may receive in the mail after your visit with us. Thank you!             Your Updated Medication List - Protect others around you: Learn how to safely use, store and throw away your medicines at www.disposemymeds.org.          This list is accurate as of: 4/4/17 11:59 PM.  Always use your most recent med list.                   Brand Name Dispense Instructions for use    aspirin 325 MG tablet      Take 325 mg by mouth At Bedtime.       azithromycin 250 MG tablet    ZITHROMAX    6 tablet    Two tablets first day, then one tablet daily for four days.       CALCIUM + D PO      Take 600 mg by mouth.       ciprofloxacin 500 MG tablet    CIPRO    20 tablet    TAKE TWO TABLETS BY MOUTH EVERY DAY AS NEEDED FRO DIVERTICULITIS       estradiol 0.5 MG tablet    ESTRACE    90 tablet    Take 1 tablet (0.5 mg) by mouth daily       GLUCOSAMINE SULFATE PO      Take  by mouth daily.       MULTIVITAL PO      Take 1 tablet by mouth daily.       niacin 500 MG tablet      Take 1 tablet by mouth daily.       OMEGA-3 FISH OIL PO      Take  by mouth daily.       PREMARIN 0.3 MG tablet   Generic drug:  estrogens (conjugated)     90 tablet    TAKE ONE TABLET BY MOUTH ONCE DAILY       PROTONIX PO      Take 40 mg by mouth every morning (before breakfast)       traMADol 50 MG tablet    ULTRAM    120 tablet    TAKE ONE TO TWO TABLETS BY MOUTH EVERY 6 TO 8 HOURS AS NEEDED

## 2017-04-04 NOTE — PROGRESS NOTES
SUBJECTIVE:  Demi Narayan, 76 year old, female is seen with tick bite.  Present over the last few days.  Located on the arm region.  Some associated tenderness and erythema.  She brings the tick in for evaluation.  Danielle has a previous history of Lyme disease.    Denies fever, headache, visual disturbance, dizziness, focal weakness, numbness, tingling, paresthesias, tremor, or gait disturbance.      Current Outpatient Prescriptions   Medication Sig Dispense Refill     traMADol (ULTRAM) 50 MG tablet TAKE ONE TO TWO TABLETS BY MOUTH EVERY 6 TO 8 HOURS AS NEEDED 120 tablet 0     estradiol (ESTRACE) 0.5 MG tablet Take 1 tablet (0.5 mg) by mouth daily 90 tablet 1     Pantoprazole Sodium (PROTONIX PO) Take 40 mg by mouth every morning (before breakfast)       PREMARIN 0.3 MG tablet TAKE ONE TABLET BY MOUTH ONCE DAILY 90 tablet 1     ciprofloxacin (CIPRO) 500 MG tablet TAKE TWO TABLETS BY MOUTH EVERY DAY AS NEEDED FRO DIVERTICULITIS 20 tablet 0     niacin 500 MG tablet Take 1 tablet by mouth daily.       aspirin 325 MG tablet Take 325 mg by mouth At Bedtime.       GLUCOSAMINE SULFATE PO Take  by mouth daily.       Multiple Vitamins-Minerals (MULTIVITAL PO) Take 1 tablet by mouth daily.       Calcium Carbonate-Vitamin D (CALCIUM + D PO) Take 600 mg by mouth.       Omega-3 Fatty Acids (OMEGA-3 FISH OIL PO) Take  by mouth daily.        No Known Allergies    Past Medical History:   Diagnosis Date     Chronic/recurrent back pain 7/12/2011     Diverticulitis      Diverticulitis, recurrent 2/6/2002    bowel resection     Dyslipidemia 6/20/2006     Fibrocystic breast disease 7/12/2011     Gastro-oesophageal reflux disease      GERD 2/10/2012     Hiatal hernia 2/10/2012     Hormone replacement therapy, postmenopausal 7/12/2011     Osteoarthritis      Past Surgical History:   Procedure Laterality Date     ------------OTHER-------------      colonoscopy with biopsy - diverticulitis, hemorrhoids     ------------OTHER-------------   4/19/1994    sigmoidoscopy - abdominal pain, diverticula     ABDOMEN SURGERY      colon removed 2nd to diverticulitis     APPENDECTOMY       ARTHROSCOPY SHOULDER  11/20/2013    Procedure: ARTHROSCOPY SHOULDER;  Right Shoulder Arthroscopy Sub-Acromial Decompression Rotator Cuff Repair;  Surgeon: Lenny Trammell MD;  Location[facility]     [facility]     Divert[facility]itis      bowel resection     EGD with biopsy  2011     ENDOSCOPY UPPER WITH PANCREATIC STIMULATION       ENDOSCOPY UPPER, COLONOSCOPY, COMBINED  7/18/2014    Procedure: COMBINED ENDOSCOPY UPPER, COLONOSCOPY;  Surgeon: Israel Feliciano MD;  Location: [facility]     ENT SURGERY      tonsilectomy     GYN SURGERY      hysterectomy with bladder and rectal repair     ORTHOPEDIC SURGERY  11/20/2013    right rotator cuff surgery     TVH with ovarian preservation       Family History   Problem Relation Age of Onset     CEREBROVASCULAR DISEASE Father      CVA     HEART DISEASE Father      heart disease     Hypertension Father      Myocardial Infarction Father      myocardial infarction     CEREBROVASCULAR DISEASE Mother      CVA     DIABETES Mother      HEART DISEASE Mother      heart disease     Hypertension Mother      HEART DISEASE Brother      heart disease     Hypertension Brother      Social History     Social History     Marital status:      Spouse name: N/A     Number of children: N/A     Years of education: N/A     Occupational History     Not on file.     Social History Main Topics     Smoking status: Former Smoker     Types: Cigarettes     Quit date: 5/6/1968     Smokeless tobacco: Never Used      Comment: no passive smoke exposure     Alcohol use 0.5 oz/week     1 Glasses of wine per week      Comment: daily with dinner     Drug use: No     Sexual activity: Yes     Partners: Male     Birth control/ protection: None     Other Topics Concern      Service No     Blood Transfusions Yes      Permits if needed     Caffeine Concern Yes     4 cups coffee daily     Occupational Exposure No     Hobby Hazards No     Sleep Concern No     Stress Concern No     Weight Concern No     Special Diet No     Back Care No     Exercise No     Seat Belt Yes     Self-Exams Yes     Parent/Sibling W/ Cabg, Mi Or Angioplasty Before 65f 55m? Yes     Father     Social History Narrative         Review Of Systems  Constitutional, HEENT, cardiovascular, pulmonary, gi and gu systems are negative, except as otherwise noted.    OBJECTIVE:  Vitals: B/P: 124/70, T: 98.5, P: 88, R: 16    Exam:  Physical Exam   Constitutional: She is oriented to person, place, and time. She appears well-developed and well-nourished. No distress.   Neurological: She is alert and oriented to person, place, and time.   Skin: Skin is warm and dry.   There is an area of tenderness and mild erythema noted inner upper arm.   Psychiatric: She has a normal mood and affect.     Other exam not repeated    Labs:  Office Visit on 08/05/2016   Component Date Value Ref Range Status     Lyme Disease Antibodies Serum 08/05/2016 2.51* 0.00 - 0.89 Final    Comment: Positive, Presence of detectable Borrelia burgdorferi antibodies. Sample will   be   sent to [facility] for a Western Blot assay for confirmation.       Lab Scanned Result 08/05/2016 LYME CONF IGG,IGM,WB-Scanned   Final-Edited       ASSESSMENT/PLAN:  Tick bite, initial encounter  Not deer tick.  Associated cellulitis.  Azithromycin (Zithromax) as directed.  Warm packs.  Follow up with persistent symptoms.  - azithromycin (ZITHROMAX) 250 MG tablet; Two tablets first day, then one tablet daily for four days.            Virgil Mackay MD

## 2017-04-04 NOTE — NURSING NOTE
Chief Complaint   Patient presents with     Insect Bites     On left upper breast that she notied saturday.        Initial /70 (BP Location: Left arm, Patient Position: Chair, Cuff Size: Adult Regular)  Pulse 88  Temp 98.5  F (36.9  C) (Tympanic)  Resp 16  Wt 109 lb (49.4 kg)  SpO2 99%  BMI 21.29 kg/m2 Estimated body mass index is 21.29 kg/(m^2) as calculated from the following:    Height as of 4/22/16: 5' (1.524 m).    Weight as of this encounter: 109 lb (49.4 kg).  Medication Reconciliation: complete   Amber Mello

## 2017-04-13 ENCOUNTER — TRANSFERRED RECORDS (OUTPATIENT)
Dept: HEALTH INFORMATION MANAGEMENT | Facility: HOSPITAL | Age: 77
End: 2017-04-13

## 2017-04-21 ENCOUNTER — HOSPITAL ENCOUNTER (OUTPATIENT)
Dept: PHYSICAL THERAPY | Facility: HOSPITAL | Age: 77
Setting detail: THERAPIES SERIES
End: 2017-04-21
Attending: SPECIALIST
Payer: COMMERCIAL

## 2017-04-21 PROCEDURE — 97162 PT EVAL MOD COMPLEX 30 MIN: CPT | Mod: GP

## 2017-04-21 PROCEDURE — 40000718 ZZHC STATISTIC PT DEPARTMENT ORTHO VISIT

## 2017-04-21 NOTE — PROGRESS NOTES
04/21/17 1500   Oswestry Disability Index (TERESSA   [employer] 1980 Version 2.1a, All rights reserved)   Section 1 - Pain intensity 1   Section 2 - Personal care (washing, dressing, etc.)  0   Section 3 - Lifting 0   Section 4 - Walking 1   Section 5 - Sitting 2   Section 6 - Standing 2   Section 7 - Sleeping 2   Section 8 - Sex life (if applicable) 0   Section 9 - Social life 0   Section 10 - Traveling 1   Sum 9   Count 10   Oswestry Score (%) 18 %

## 2017-04-22 NOTE — PROGRESS NOTES
04/21/17 1335   General Information   Type of Visit Initial OP Ortho PT Evaluation   Start of Care Date 04/21/17   Referring Physician Dr. Kade Phipps   Patient/Family Goals Statement arrest condition at present. Improvement if possible   Orders Other   Orders Comment Abdominal/gluteal strengthening, aerobic conditioning, Back school, extension program, home program-develop, isometrics, lifting technique/posture, patient education, spine stabilization, strengthening-lower extremity, trunk strengthening. 1-2x/week for 4-6 weeks.    Date of Order 04/13/17   Insurance Type are   Insurance Comments/Visits Authorized Eval   Medical Diagnosis Scoliosis acquired   Surgical/Medical history reviewed Yes   General Information Comments PMH:post menopause, osteopenia, LBP chronic, OA multiple joints   Body Part(s)   Body Part(s) Lumbar Spine/SI   Presentation and Etiology   Pertinent history of current problem (include personal factors and/or comorbidities that impact the POC) Patient reports that prior to 5-7 years ago patient felt that she was standing up tall without deviation to side. Over time she notes more and more curving of the spine. Has history of sciatica mainly L LE, but ocassionally R LE. Patient saw Dr. Kade Phipps and surgical options were discussed but patient wants to pursue conservative management. Orders sent for PT services. Patient has 20 degree covex R curvature with apex at L2. Also has osteopenia. Overall, patient is very active and healthy. Patient perform cleaning and cooking tasks including vaccuming, dishes, making beds and meal prep. Patient exercises regularly with a floor exercise program. Patient volunteers with elementary. Goes on outdoor walks with  and dogs regularly. Patient does not want to slow down and she also does not want scoliosis to keep progressing. Patient has pain that limits and wakes her when sleeping. Patient has pain with prolonged sitting and standing. Patient keeps  "moving to decrease pain. When patient experiences L \"sciatica\" symptoms go from LB to anterior L thigh and knee occasionally all the day to the foot. Patient denies shoulder problems.    Impairments A. Pain;F. Decreased strength and endurance;H. Impaired gait   Functional Limitations perform activities of daily living;perform desired leisure / sports activities   Symptom Location LB with occasionally LE radicular symptoms.    How/Where did it occur From insidious onset   Onset date of current episode/exacerbation (5-7 years ago by patient report)   Chronicity Chronic   Pain rating (0-10 point scale) Best (/10);Worst (/10)   Best (/10) 2   Worst (/10) 8   Pain quality C. Aching;D. Burning   Frequency of pain/symptoms B. Intermittent   Pain/symptoms exacerbated by A. Sitting;K. Home tasks  (sleeping, prolonged standing )   Pain/symptoms eased by (Tramadol)   Progression of symptoms since onset: Worsened   Current / Previous Interventions   Diagnostic Tests: CT scan;Bone Scan   CT Results Results   CT results Scoliotic curvature of thoracolumbar spine convex R apex L2. 20 degrees. Bilateral foraminal stenosis L5-S1, R L4 formainal stenosis.   Bone Scan Results Results   Bone Scan results Osteopenia, moderate risk for fracture   Prior Level of Function   Functional Level Prior Comment Patient enjoys gardening, walks outdoors with  and dogs, cooking and cleaning tasks and performs recreational exercises.    Current Level of Function   Patient role/employment history F. Retired   Fall Risk Screen   Per patient - Fall 2 or more times in past year? No   Per patient - Fall with injury in past year? No   Is patient a fall risk? No   Lumbar Spine/SI Objective Findings   Observation Significant scoliosis   Posture Per CT patient has a convex R scoliotic curve with apex at L2. Note left lateral shift present as body compensates for curvature. Elevated L iliac crest present. Long L LE in supine compared to R. Promient L " lateral hip position from neutral.    Gait/Locomotion Mildly antalgic.    Balance/Proprioception (Single Leg Stance) Good single leg balance. Patient volunteers to perform bilateral single leg squats. Good tolerance noted. Genu valgus tendency on L LE.    Lumbar ROM Comment Functional ROM present. Structural changes prevent true normal ROM. Rotation and lateral flexion limitations present.    Transversus Abdominus Strength (Dashawn Leg Lowering-deg) Grade 2/5 for abdominal endurance   Hip Flexion (L2) Strength 5   Hip Abduction Strength 5   Hip Adduction Strength 5   Hip Extension Strength 5   Knee Flexion Strength 5   Knee Extension (L3) Strength 5   Ankle Dorsiflexion (L4) Strength 5   Ankle Plantar Flexion (S1) Strength 5   Lumbar/Hip/Knee/Foot Strength Comments Full and symmetrical stregnth present. Patient voulteers to perform floor transfer to stand. Execellent functional LE mobility and strength appreciated.   Hamstring Flexibility No significant HS tightness noted   Quadricep Flexibility No significant tightness appreciated.    Piriformis Flexibility No significant tightness appreciated.    SLR Positive L LE. SLR on R also produced symptoms on L.    Palpation Curvature of lumbar segments appreciated translating up thoracic segments as well.    Planned Therapy Interventions   Planned Therapy Interventions balance training;ROM;strengthening;neuromuscular re-education   Planned Therapy Interventions Comment Per orders and manual techniques to address somatic dyfunction including leg lengths   Clinical Impression   Criteria for Skilled Therapeutic Interventions Met yes, treatment indicated   PT Diagnosis Patient presents with significant scoliosis creating pain and internmittent sciatic symptoms.    Influenced by the following impairments Pain with sleep, prolonged walking, prolonged sitting, prolonged standing.    Functional limitations due to impairments Pain, scoliosis, core weakness, postural dysfunction,  leg length discrepancy.    Clinical Presentation Evolving/Changing   Clinical Presentation Rationale Possibility of progression of scoliotic curvature'   Clinical Decision Making (Complexity) Moderate complexity   Therapy Frequency (1-2x/week)   Predicted Duration of Therapy Intervention (days/wks) 6 weeks   Risk & Benefits of therapy have been explained Yes   Patient, Family & other staff in agreement with plan of care Yes   Clinical Impression Comments Trialed abdominal exercise #1 wood chop. Patient tolerated very well with good engagement. Discussed activity modification to decrease strong stressors on LB including avoiding shoveling, double leg jumps (patient hopes to return to gentle hopping jump rope which should be OK for patient to perform). Suggested use of hiking stick for long distance ambulation to decrease stressors to back and maintain a more upright position.     Education Assessment   Barriers to Learning No barriers   ORTHO GOALS   PT Ortho Eval Goals 1;2;3   Ortho Goal 1   Goal Identifier STG 1   Goal Description Patient will be independent and compliant with HEP.    Target Date 05/05/17   Ortho Goal 2   Goal Identifier LTG 1   Goal Description Patient will be able to consistently sleep throughout the night without significant limitation from pain in LB or LE symptoms.    Target Date 06/02/17   Ortho Goal 3   Goal Identifier LTG 2   Goal Description Patient will be able to perform household and outdoor activities of bending, lifting and twisting with good core engagement and good mechanics with less sypmtoms of back pain or LE symptoms during or after tasks.   Target Date 06/02/17   Total Evaluation Time   Total Evaluation Time 45   I certify the need for these services furnished under this plan of treatment and while under my care. (Physician co-signature of this document indicates review and certification of the therapy plan).      _____________________________     __________________________     ____________  Physician's Signature                 Date               Time

## 2017-04-25 ENCOUNTER — OFFICE VISIT (OUTPATIENT)
Dept: FAMILY MEDICINE | Facility: OTHER | Age: 77
End: 2017-04-25
Attending: FAMILY MEDICINE
Payer: COMMERCIAL

## 2017-04-25 ENCOUNTER — APPOINTMENT (OUTPATIENT)
Dept: LAB | Facility: OTHER | Age: 77
End: 2017-04-25
Attending: FAMILY MEDICINE
Payer: COMMERCIAL

## 2017-04-25 VITALS
OXYGEN SATURATION: 99 % | RESPIRATION RATE: 20 BRPM | BODY MASS INDEX: 22.26 KG/M2 | DIASTOLIC BLOOD PRESSURE: 72 MMHG | TEMPERATURE: 97 F | HEART RATE: 82 BPM | SYSTOLIC BLOOD PRESSURE: 110 MMHG | WEIGHT: 114 LBS

## 2017-04-25 DIAGNOSIS — M15.0 PRIMARY OSTEOARTHRITIS INVOLVING MULTIPLE JOINTS: ICD-10-CM

## 2017-04-25 DIAGNOSIS — K21.9 GASTROESOPHAGEAL REFLUX DISEASE WITHOUT ESOPHAGITIS: ICD-10-CM

## 2017-04-25 DIAGNOSIS — Z00.00 ROUTINE GENERAL MEDICAL EXAMINATION AT A HEALTH CARE FACILITY: Primary | ICD-10-CM

## 2017-04-25 DIAGNOSIS — E78.2 MIXED HYPERLIPIDEMIA: ICD-10-CM

## 2017-04-25 LAB
ALBUMIN SERPL-MCNC: 3.4 G/DL (ref 3.4–5)
ALP SERPL-CCNC: 52 U/L (ref 40–150)
ALT SERPL W P-5'-P-CCNC: 22 U/L (ref 0–50)
ANION GAP SERPL CALCULATED.3IONS-SCNC: 8 MMOL/L (ref 3–14)
AST SERPL W P-5'-P-CCNC: 19 U/L (ref 0–45)
BASOPHILS # BLD AUTO: 0 10E9/L (ref 0–0.2)
BASOPHILS NFR BLD AUTO: 0.6 %
BILIRUB SERPL-MCNC: 0.4 MG/DL (ref 0.2–1.3)
BUN SERPL-MCNC: 13 MG/DL (ref 7–30)
CALCIUM SERPL-MCNC: 8.9 MG/DL (ref 8.5–10.1)
CHLORIDE SERPL-SCNC: 106 MMOL/L (ref 94–109)
CHOLEST SERPL-MCNC: 239 MG/DL
CO2 SERPL-SCNC: 29 MMOL/L (ref 20–32)
CREAT SERPL-MCNC: 0.8 MG/DL (ref 0.52–1.04)
DIFFERENTIAL METHOD BLD: NORMAL
EOSINOPHIL # BLD AUTO: 0.1 10E9/L (ref 0–0.7)
EOSINOPHIL NFR BLD AUTO: 1.3 %
ERYTHROCYTE [DISTWIDTH] IN BLOOD BY AUTOMATED COUNT: 13.2 % (ref 10–15)
GFR SERPL CREATININE-BSD FRML MDRD: 69 ML/MIN/1.7M2
GLUCOSE SERPL-MCNC: 85 MG/DL (ref 70–99)
HCT VFR BLD AUTO: 42.4 % (ref 35–47)
HDLC SERPL-MCNC: 88 MG/DL
HGB BLD-MCNC: 14.5 G/DL (ref 11.7–15.7)
IMM GRANULOCYTES # BLD: 0 10E9/L (ref 0–0.4)
IMM GRANULOCYTES NFR BLD: 0.3 %
LDLC SERPL CALC-MCNC: 116 MG/DL
LYMPHOCYTES # BLD AUTO: 2.7 10E9/L (ref 0.8–5.3)
LYMPHOCYTES NFR BLD AUTO: 41.9 %
MCH RBC QN AUTO: 29.8 PG (ref 26.5–33)
MCHC RBC AUTO-ENTMCNC: 34.2 G/DL (ref 31.5–36.5)
MCV RBC AUTO: 87 FL (ref 78–100)
MONOCYTES # BLD AUTO: 0.5 10E9/L (ref 0–1.3)
MONOCYTES NFR BLD AUTO: 7.4 %
NEUTROPHILS # BLD AUTO: 3.1 10E9/L (ref 1.6–8.3)
NEUTROPHILS NFR BLD AUTO: 48.5 %
NONHDLC SERPL-MCNC: 151 MG/DL
NRBC # BLD AUTO: 0 10*3/UL
NRBC BLD AUTO-RTO: 0 /100
PLATELET # BLD AUTO: 252 10E9/L (ref 150–450)
POTASSIUM SERPL-SCNC: 3.8 MMOL/L (ref 3.4–5.3)
PROT SERPL-MCNC: 7 G/DL (ref 6.8–8.8)
RBC # BLD AUTO: 4.87 10E12/L (ref 3.8–5.2)
SODIUM SERPL-SCNC: 143 MMOL/L (ref 133–144)
TRIGL SERPL-MCNC: 177 MG/DL
TSH SERPL DL<=0.005 MIU/L-ACNC: 1.29 MU/L (ref 0.4–4)
WBC # BLD AUTO: 6.4 10E9/L (ref 4–11)

## 2017-04-25 PROCEDURE — 80053 COMPREHEN METABOLIC PANEL: CPT | Performed by: FAMILY MEDICINE

## 2017-04-25 PROCEDURE — 80061 LIPID PANEL: CPT | Performed by: FAMILY MEDICINE

## 2017-04-25 PROCEDURE — 82306 VITAMIN D 25 HYDROXY: CPT | Performed by: FAMILY MEDICINE

## 2017-04-25 PROCEDURE — 99000 SPECIMEN HANDLING OFFICE-LAB: CPT | Performed by: FAMILY MEDICINE

## 2017-04-25 PROCEDURE — 99397 PER PM REEVAL EST PAT 65+ YR: CPT | Performed by: FAMILY MEDICINE

## 2017-04-25 PROCEDURE — 84443 ASSAY THYROID STIM HORMONE: CPT | Performed by: FAMILY MEDICINE

## 2017-04-25 PROCEDURE — 85025 COMPLETE CBC W/AUTO DIFF WBC: CPT | Performed by: FAMILY MEDICINE

## 2017-04-25 PROCEDURE — 36415 COLL VENOUS BLD VENIPUNCTURE: CPT | Performed by: FAMILY MEDICINE

## 2017-04-25 ASSESSMENT — PAIN SCALES - GENERAL: PAINLEVEL: MILD PAIN (3)

## 2017-04-25 ASSESSMENT — ANXIETY QUESTIONNAIRES
GAD7 TOTAL SCORE: 0
5. BEING SO RESTLESS THAT IT IS HARD TO SIT STILL: NOT AT ALL
7. FEELING AFRAID AS IF SOMETHING AWFUL MIGHT HAPPEN: NOT AT ALL
2. NOT BEING ABLE TO STOP OR CONTROL WORRYING: NOT AT ALL
1. FEELING NERVOUS, ANXIOUS, OR ON EDGE: NOT AT ALL
6. BECOMING EASILY ANNOYED OR IRRITABLE: NOT AT ALL
3. WORRYING TOO MUCH ABOUT DIFFERENT THINGS: NOT AT ALL
IF YOU CHECKED OFF ANY PROBLEMS ON THIS QUESTIONNAIRE, HOW DIFFICULT HAVE THESE PROBLEMS MADE IT FOR YOU TO DO YOUR WORK, TAKE CARE OF THINGS AT HOME, OR GET ALONG WITH OTHER PEOPLE: NOT DIFFICULT AT ALL

## 2017-04-25 ASSESSMENT — PATIENT HEALTH QUESTIONNAIRE - PHQ9: 5. POOR APPETITE OR OVEREATING: NOT AT ALL

## 2017-04-25 NOTE — MR AVS SNAPSHOT
"              After Visit Summary   4/25/2017    Demi Narayan    MRN: [id number]           Patient Information     Date Of Birth          1940        Visit Information        Provider Department      4/25/2017 9:00 AM Virgil Mackay MD [facility]        Today's Diagnoses     Comprehensive Medical Examination    -  1    Dyslipidemia        GERD (gastroesophageal reflux)        Primary osteoarthritis involving multiple joints          Care Instructions    Continue same medications.          Follow-ups after your visit        Follow-up notes from your care team     Return in about 1 year (around 4/25/2018) for Comprehensive Exam.      Your next 10 appointments already scheduled     May 02, 2017 10:00 AM CDT   Treatment with Madison Lucero PT   [facility] ([facility] )    [address] [place] [id number]   [phone]              Who to contact     If you have questions or need follow up information about today's clinic visit or your schedule please contact [facility] directly at [phone].  Normal or non-critical lab and imaging results will be communicated to you by MyChart, letter or phone within 4 business days after the clinic has received the results. If you do not hear from us within 7 days, please contact the clinic through [facility] or phone. If you have a critical or abnormal lab result, we will notify you by phone as soon as possible.  Submit refill requests through [facility] or call your pharmacy and they will forward the refill request to us. Please allow 3 business days for your refill to be completed.          Additional Information About Your Visit        [facility]ulfill[employer] Information     [employer] lets you send messages to your doctor, view your test results, renew your prescriptions, schedule appointments and more. To sign up, go to [email or web address] . Click on \"Log in\" on the left side of the screen, which will take you to the Welcome " "page. Then click on \"Sign up Now\" on the right side of the page.     You will be asked to enter the access code listed below, as well as some personal information. Please follow the directions to create your username and password.     Your access code is: [email or web address]  Expires: 2017  6:15 AM     Your access code will  in 90 days. If you need help or a new code, please call your [place] clinic or [phone].        Care EveryWhere ID     This is your Care EveryWhere ID. This could be used by other organizations to access your [place] medical records  [id number]        Your Vitals Were     Pulse Temperature Respirations Pulse Oximetry BMI (Body Mass Index)       82 97  F (36.1  C) (Tympanic) 20 99% 22.26 kg/m2        Blood Pressure from Last 3 Encounters:   17 110/72   17 124/70   17 118/64    Weight from Last 3 Encounters:   17 114 lb (51.7 kg)   17 109 lb (49.4 kg)   17 115 lb (52.2 kg)              We Performed the Following     CBC with platelets differential     Comprehensive metabolic panel     Lipid Profile     TSH with free T4 reflex     Vitamin D Deficiency        Primary Care Provider Office Phone # Fax #    Virgil Mackay [phone]       [facility] [address] [facility] [address]        Thank you!     Thank you for choosing [facility]  for your care. Our goal is always to provide you with excellent care. Hearing back from our patients is one way we can continue to improve our services. Please take a few minutes to complete the written survey that you may receive in the mail after your visit with us. Thank you!             Your Updated Medication List - Protect others around you: Learn how to safely use, store and throw away your medicines at www.disposemymeds.org.          This list is accurate as of: 17 11:59 PM.  Always use your most recent med list.                   Brand Name Dispense Instructions for " use    aspirin 325 MG tablet      Take 325 mg by mouth At Bedtime.       CALCIUM + D PO      Take 600 mg by mouth.       ciprofloxacin 500 MG tablet    CIPRO    20 tablet    TAKE TWO TABLETS BY MOUTH EVERY DAY AS NEEDED FRO DIVERTICULITIS       estradiol 0.5 MG tablet    ESTRACE    90 tablet    Take 1 tablet (0.5 mg) by mouth daily       GLUCOSAMINE SULFATE PO      Take  by mouth daily.       MULTIVITAL PO      Take 1 tablet by mouth daily.       niacin 500 MG tablet      Take 1 tablet by mouth daily.       OMEGA-3 FISH OIL PO      Take  by mouth daily.       PREMARIN 0.3 MG tablet   Generic drug:  estrogens (conjugated)     90 tablet    TAKE ONE TABLET BY MOUTH ONCE DAILY       PROTONIX PO      Take 40 mg by mouth every morning (before breakfast)       traMADol 50 MG tablet    ULTRAM    120 tablet    TAKE ONE TO TWO TABLETS BY MOUTH EVERY 6 TO 8 HOURS AS NEEDED

## 2017-04-25 NOTE — LETTER
[facility] [place]  [address] [place] [address]  [phone]      April 26, 2017      Demi Narayan  [address] [place] [id number]        Dear Demi,    Below are the results of your current lab tests:  Results for orders placed or performed in visit on 04/25/17   CBC with platelets differential   Result Value Ref Range    WBC 6.4 4.0 - 11.0 10e9/L    RBC Count 4.87 3.8 - 5.2 10e12/L    Hemoglobin 14.5 11.7 - 15.7 g/dL    Hematocrit 42.4 35.0 - 47.0 %    MCV 87 78 - 100 fl    MCH 29.8 26.5 - 33.0 pg    MCHC 34.2 31.5 - 36.5 g/dL    RDW 13.2 10.0 - 15.0 %    Platelet Count 252 150 - 450 10e9/L    Diff Method Automated Method     % Neutrophils 48.5 %    % Lymphocytes 41.9 %    % Monocytes 7.4 %    % Eosinophils 1.3 %    % Basophils 0.6 %    % Immature Granulocytes 0.3 %    Nucleated RBCs 0 0 /100    Absolute Neutrophil 3.1 1.6 - 8.3 10e9/L    Absolute Lymphocytes 2.7 0.8 - 5.3 10e9/L    Absolute Monocytes 0.5 0.0 - 1.3 10e9/L    Absolute Eosinophils 0.1 0.0 - 0.7 10e9/L    Absolute Basophils 0.0 0.0 - 0.2 10e9/L    Abs Immature Granulocytes 0.0 0 - 0.4 10e9/L    Absolute Nucleated RBC 0.0    Lipid Profile   Result Value Ref Range    Cholesterol 239 (H) <200 mg/dL    Triglycerides 177 (H) <150 mg/dL    HDL Cholesterol 88 >49 mg/dL    LDL Cholesterol Calculated 116 (H) <100 mg/dL    Non HDL Cholesterol 151 (H) <130 mg/dL   Comprehensive metabolic panel   Result Value Ref Range    Sodium 143 133 - 144 mmol/L    Potassium 3.8 3.4 - 5.3 mmol/L    Chloride 106 94 - 109 mmol/L    Carbon Dioxide 29 20 - 32 mmol/L    Anion Gap 8 3 - 14 mmol/L    Glucose 85 70 - 99 mg/dL    Urea Nitrogen 13 7 - 30 mg/dL    Creatinine 0.80 0.52 - 1.04 mg/dL    GFR Estimate 69 >60 mL/min/1.7m2    GFR Estimate If Black 84 >60 mL/min/1.7m2    Calcium 8.9 8.5 - 10.1 mg/dL    Bilirubin Total 0.4 0.2 - 1.3 mg/dL    Albumin 3.4 3.4 - 5.0 g/dL    Protein Total 7.0 6.8 - 8.8 g/dL    Alkaline Phosphatase 52 40 - 150 U/L    ALT  22 0 - 50 U/L    AST 19 0 - 45 U/L   TSH with free T4 reflex   Result Value Ref Range    TSH 1.29 0.40 - 4.00 mU/L   Vitamin D Deficiency   Result Value Ref Range    Vitamin D Deficiency screening 41 20 - 75 ug/L     If you have any questions or concerns, please contact myself or my nurse at [phone].      Sincerely,      Virgil Mackay MD

## 2017-04-25 NOTE — PROGRESS NOTES
SUBJECTIVE:  Demi Narayan, 76 year old, female is seen for Comprehensive Medical Examination and review of medical problems.  She generally feels well.    Denies fever, shaking, chills, dysphagia, change in appetite, weight loss, nausea, vomiting, diarrhea, melena, or hematochezia.  No change in bowel habits.      Current Outpatient Prescriptions   Medication Sig Dispense Refill     azithromycin (ZITHROMAX) 250 MG tablet Two tablets first day, then one tablet daily for four days. 6 tablet 0     traMADol (ULTRAM) 50 MG tablet TAKE ONE TO TWO TABLETS BY MOUTH EVERY 6 TO 8 HOURS AS NEEDED 120 tablet 0     estradiol (ESTRACE) 0.5 MG tablet Take 1 tablet (0.5 mg) by mouth daily 90 tablet 1     Pantoprazole Sodium (PROTONIX PO) Take 40 mg by mouth every morning (before breakfast)       PREMARIN 0.3 MG tablet TAKE ONE TABLET BY MOUTH ONCE DAILY 90 tablet 1     ciprofloxacin (CIPRO) 500 MG tablet TAKE TWO TABLETS BY MOUTH EVERY DAY AS NEEDED FRO DIVERTICULITIS 20 tablet 0     niacin 500 MG tablet Take 1 tablet by mouth daily.       aspirin 325 MG tablet Take 325 mg by mouth At Bedtime.       GLUCOSAMINE SULFATE PO Take  by mouth daily.       Multiple Vitamins-Minerals (MULTIVITAL PO) Take 1 tablet by mouth daily.       Calcium Carbonate-Vitamin D (CALCIUM + D PO) Take 600 mg by mouth.       Omega-3 Fatty Acids (OMEGA-3 FISH OIL PO) Take  by mouth daily.        No Known Allergies    Past Medical History:   Diagnosis Date     Chronic/recurrent back pain 7/12/2011     Diverticulitis      Diverticulitis, recurrent 2/6/2002    bowel resection     Dyslipidemia 6/20/2006     Fibrocystic breast disease 7/12/2011     Gastro-oesophageal reflux disease      GERD 2/10/2012     Hiatal hernia 2/10/2012     Hormone replacement therapy, postmenopausal 7/12/2011     Osteoarthritis      Past Surgical History:   Procedure Laterality Date     ------------OTHER-------------      colonoscopy with biopsy - diverticulitis, hemorrhoids      ------------OTHER-------------  4/19/1994    sigmoidoscopy - abdominal pain, diverticula     ABDOMEN SURGERY      colon removed 2nd to diverticulitis     APPENDECTOMY       ARTHROSCOPY SHOULDER  11/20/2013    Procedure: ARTHROSCOPY SHOULDER;  Right Shoulder Arthroscopy Sub-Acromial Decompression Rotator Cuff Repair;  Surgeon: Lenny Trammell MD;  Location: [facility]     [facility]     Diverticulitis      bowel resection     EGD with biopsy  2011     ENDOSCOPY UPPER WITH PANCREATIC STIMULATION       ENDOSCOPY UPPER, COLONOSCOPY, COMBINED  7/18/2014    Procedure: COMBINED ENDOSCOPY UPPER, COLONOSCOPY;  Surgeon: Israel Feliciano MD;  Location: [facility]     ENT SURGERY      tonsilectomy     GYN SURGERY      hysterectomy with bladder and rectal repair     ORTHOPEDIC SURGERY  11/20/2013    right rotator cuff surgery     TVH with ovarian preservation       Family History   Problem Relation Age of Onset     CEREBROVASCULAR DISEASE Father      CVA     HEART DISEASE Father      heart disease     Hypertension Father      Myocardial Infarction Father      myocardial infarction     CEREBROVASCULAR DISEASE Mother      CVA     DIABETES Mother      HEART DISEASE Mother      heart disease     Hypertension Mother      HEART DISEASE Brother      heart disease     Hypertension Brother      Social History     Social History     Marital status:      Spouse name: N/A     Number of children: N/A     Years of education: N/A     Occupational History     Not on file.     Social History Main Topics     Smoking status: Former Smoker     Types: Cigarettes     Quit date: 5/6/1968     Smokeless tobacco: Never Used      Comment: no passive smoke exposure     Alcohol use 0.5 oz/week     1 Glasses of wine per week      Comment: daily with dinner     Drug use: No     Sexual activity: Yes     Partners: Male     Birth control/ protection: None     Other Topics Concern      Service No      Blood Transfusions Yes     Permits if needed     Caffeine Concern Yes     4 cups coffee daily     Occupational Exposure No     Hobby Hazards No     Sleep Concern No     Stress Concern No     Weight Concern No     Special Diet No     Back Care No     Exercise No     Seat Belt Yes     Self-Exams Yes     Parent/Sibling W/ Cabg, Mi Or Angioplasty Before 65f 55m? Yes     Father     Social History Narrative         Review Of Systems  Constitutional, neuro, ENT, endocrine, pulmonary, cardiac, gastrointestinal, genitourinary, musculoskeletal, integument and psychiatric systems are negative, except as otherwise noted.    OBJECTIVE:  Vitals: B/P: 128/68, T: 97.6, P: 81, R: 20    Exam:  Physical Exam   Constitutional: She is oriented to person, place, and time. She appears well-developed and well-nourished. No distress.   HENT:   Head: Normocephalic.   Right Ear: Hearing, tympanic membrane, external ear and ear canal normal.   Left Ear: Hearing, tympanic membrane, external ear and ear canal normal.   Nose: Nose normal.   Mouth/Throat: Oropharynx is clear and moist.   Eyes: Conjunctivae and EOM are normal. Pupils are equal, round, and reactive to light.   Neck: Neck supple. No JVD present. No thyromegaly present.   Cardiovascular: Normal rate, regular rhythm and intact distal pulses.  Exam reveals no gallop and no friction rub.    No murmur heard.  Pulmonary/Chest: Effort normal and breath sounds normal.   Abdominal: Soft. Bowel sounds are normal. She exhibits no mass. There is tenderness. Hernia confirmed negative in the right inguinal area and confirmed negative in the left inguinal area.   Genitourinary: Rectum normal, vagina normal and uterus normal. No breast swelling, tenderness or bleeding. Right adnexum displays no mass. Left adnexum displays no mass.   Musculoskeletal: Normal range of motion. She exhibits no edema.   Lymphadenopathy:     She has no cervical adenopathy.   Neurological: She is alert and oriented to  person, place, and time. She has normal reflexes.   Skin: Skin is warm and dry.   Psychiatric: She has a normal mood and affect.         Labs:  Orders Only on 04/22/2016   Component Date Value Ref Range Status     Sodium 04/22/2016 141  133 - 144 mmol/L Final     Potassium 04/22/2016 4.1  3.4 - 5.3 mmol/L Final     Chloride 04/22/2016 106  94 - 109 mmol/L Final     Carbon Dioxide 04/22/2016 29  20 - 32 mmol/L Final     Anion Gap 04/22/2016 6  3 - 14 mmol/L Final     Glucose 04/22/2016 84  70 - 99 mg/dL Final     Urea Nitrogen 04/22/2016 13  7 - 30 mg/dL Final     Creatinine 04/22/2016 0.88  0.52 - 1.04 mg/dL Final     GFR Estimate 04/22/2016 63  >60 mL/min/1.7m2 Final    Non  GFR Calc     GFR Estimate If Black 04/22/2016 76  >60 mL/min/1.7m2 Final    African American GFR Calc     Calcium 04/22/2016 8.8  8.5 - 10.1 mg/dL Final     Bilirubin Total 04/22/2016 0.3  0.2 - 1.3 mg/dL Final     Albumin 04/22/2016 3.6  3.4 - 5.0 g/dL Final     Protein Total 04/22/2016 7.3  6.8 - 8.8 g/dL Final     Alkaline Phosphatase 04/22/2016 51  40 - 150 U/L Final     ALT 04/22/2016 22  0 - 50 U/L Final     AST 04/22/2016 17  0 - 45 U/L Final     Cholesterol 04/22/2016 218* <200 mg/dL Final    Desirable:       <200 mg/dl     Triglycerides 04/22/2016 214* <150 mg/dL Final    Comment: Borderline high:  150-199 mg/dl   High:             200-499 mg/dl   Very high:       >499 mg/dl       HDL Cholesterol 04/22/2016 84  >49 mg/dL Final     LDL Cholesterol Calculated 04/22/2016 91  <100 mg/dL Final    Desirable:       <100 mg/dl     Non HDL Cholesterol 04/22/2016 134* <130 mg/dL Final    Comment: Above Desirable:  130-159 mg/dl   Borderline high:  160-189 mg/dl   High:             190-219 mg/dl   Very high:       >219 mg/dl       WBC 04/22/2016 6.0  4.0 - 11.0 10e9/L Final     RBC Count 04/22/2016 5.01  3.8 - 5.2 10e12/L Final     Hemoglobin 04/22/2016 14.9  11.7 - 15.7 g/dL Final     Hematocrit 04/22/2016 43.5  35.0 - 47.0 %  Final     MCV 04/22/2016 87  78 - 100 fl Final     MCH 04/22/2016 29.7  26.5 - 33.0 pg Final     MCHC 04/22/2016 34.3  31.5 - 36.5 g/dL Final     RDW 04/22/2016 14.1  10.0 - 15.0 % Final     Platelet Count 04/22/2016 273  150 - 450 10e9/L Final     Diff Method 04/22/2016 Automated Method   Final     % Neutrophils 04/22/2016 42.6   Final     % Lymphocytes 04/22/2016 47.9   Final     % Monocytes 04/22/2016 7.8   Final     % Eosinophils 04/22/2016 1.2   Final     % Basophils 04/22/2016 0.3   Final     % Immature Granulocytes 04/22/2016 0.2   Final     Nucleated RBCs 04/22/2016 0  0 /100 Final     Absolute Neutrophil 04/22/2016 2.6  1.6 - 8.3 10e9/L Final     Absolute Lymphocytes 04/22/2016 2.9  0.8 - 5.3 10e9/L Final     Absolute Monocytes 04/22/2016 0.5  0.0 - 1.3 10e9/L Final     Absolute Eosinophils 04/22/2016 0.1  0.0 - 0.7 10e9/L Final     Absolute Basophils 04/22/2016 0.0  0.0 - 0.2 10e9/L Final     Abs Immature Granulocytes 04/22/2016 0.0  0 - 0.4 10e9/L Final     Absolute Nucleated RBC 04/22/2016 0.0   Final     TSH 04/22/2016 1.72  0.40 - 4.00 mU/L Final     Color Urine 04/22/2016 Yellow   Final     Appearance Urine 04/22/2016 Clear   Final     Glucose Urine 04/22/2016 Negative  NEG mg/dL Final     Bilirubin Urine 04/22/2016 Negative  NEG Final     Ketones Urine 04/22/2016 Negative  NEG mg/dL Final     Specific Gravity Urine 04/22/2016 1.014  1.003 - 1.035 Final     Blood Urine 04/22/2016 Negative  NEG Final     pH Urine 04/22/2016 6.5  4.7 - 8.0 pH Final     Protein Albumin Urine 04/22/2016 Negative  NEG mg/dL Final     Urobilinogen mg/dL 04/22/2016 Normal  0.0 - 2.0 mg/dL Final     Nitrite Urine 04/22/2016 Negative  NEG Final     Leukocyte Esterase Urine 04/22/2016 Negative  NEG Final     Source 04/22/2016 Midstream Urine   Final     WBC Urine 04/22/2016 1  0 - 2 /HPF Final     RBC Urine 04/22/2016 1  0 - 2 /HPF Final     Squamous Epithelial /HPF Urine 04/22/2016 4* 0 - 1 /HPF Final     Mucous Urine  04/22/2016 Present* NEG /LPF Final       ASSESSMENT/PLAN:  Comprehensive Medical Examination  General medical exam completed.  Breast exam performed.  Pap deferred. Mammogram recommended. Screening labs drawn.  Colonoscopy and immunizations reviewed.  Discussed the importance of monthly breast self exam, aspirin use, and osteoporosis prevention . Follow up 1 year.      Dyslipidemia  Fasting labs reviewed.  Goals discussed.  Discussed the importance of diet, exercise, and weight reduction.  Follow up 12 months.      GERD (gastroesophageal reflux)  Discussed addition of proton pump inhibitor .     Primary osteoarthritis involving multiple joints  Continue symptomatic therapy            Virgil Mackay MD

## 2017-04-25 NOTE — NURSING NOTE
Chief Complaint   Patient presents with     Physical       Initial /72 (BP Location: Left arm, Cuff Size: Adult Large)  Pulse 82  Temp 97  F (36.1  C) (Tympanic)  Resp 20  Wt 114 lb (51.7 kg)  SpO2 99%  BMI 22.26 kg/m2 Estimated body mass index is 22.26 kg/(m^2) as calculated from the following:    Height as of 4/22/16: 5' (1.524 m).    Weight as of this encounter: 114 lb (51.7 kg).  Medication Reconciliation: complete   Fauzia Ramirez

## 2017-04-26 LAB — DEPRECATED CALCIDIOL+CALCIFEROL SERPL-MC: 41 UG/L (ref 20–75)

## 2017-04-26 ASSESSMENT — ANXIETY QUESTIONNAIRES: GAD7 TOTAL SCORE: 0

## 2017-04-26 ASSESSMENT — PATIENT HEALTH QUESTIONNAIRE - PHQ9: SUM OF ALL RESPONSES TO PHQ QUESTIONS 1-9: 3

## 2017-05-02 ENCOUNTER — HOSPITAL ENCOUNTER (OUTPATIENT)
Dept: PHYSICAL THERAPY | Facility: HOSPITAL | Age: 77
Setting detail: THERAPIES SERIES
End: 2017-05-02
Attending: SPECIALIST
Payer: COMMERCIAL

## 2017-05-02 DIAGNOSIS — S33.8XXD SPRAIN OF OTHER PARTS OF LUMBAR SPINE AND PELVIS, SUBSEQUENT ENCOUNTER: ICD-10-CM

## 2017-05-02 PROCEDURE — 40000718 ZZHC STATISTIC PT DEPARTMENT ORTHO VISIT

## 2017-05-02 PROCEDURE — 97112 NEUROMUSCULAR REEDUCATION: CPT | Mod: GP

## 2017-05-02 PROCEDURE — 97110 THERAPEUTIC EXERCISES: CPT | Mod: GP

## 2017-05-04 ENCOUNTER — TELEPHONE (OUTPATIENT)
Dept: FAMILY MEDICINE | Facility: OTHER | Age: 77
End: 2017-05-04

## 2017-05-04 DIAGNOSIS — K57.32 DIVERTICULITIS OF COLON: Primary | ICD-10-CM

## 2017-05-04 RX ORDER — TRAMADOL HYDROCHLORIDE 50 MG/1
TABLET ORAL
Qty: 120 TABLET | Refills: 0 | Status: SHIPPED | OUTPATIENT
Start: 2017-05-04 | End: 2017-06-08

## 2017-05-04 NOTE — TELEPHONE ENCOUNTER
Tramadol      Last Written Prescription Date:  3/23/17  Last Fill Quantity: 120,   # refills: 0  Last [facility] with [facility] prescribing provider: 4/25/17  Future Office visit:       Routing refill request to provider for review/approval because:  Drug not on the [facility] refill protocol or controlled substance

## 2017-05-04 NOTE — TELEPHONE ENCOUNTER
3:29 PM    Reason for Call: Phone Call    Description: Pt stated at her appt (04/25/17) provider stated he would send Cipro into pharmacy. They do not have this yet. Pt uses [facility] in [place]. If you have any questions please call her back at [phone]    Was an appointment offered for this call? No    Preferred method for responding to this message: Telephone Call    If we cannot reach you directly, may we leave a detailed response at the number you provided? Yes    Can this message wait until your PCP/provider returns, if available today? YES, pt aware provider out today    Nataliya Negrete

## 2017-05-05 ENCOUNTER — HOSPITAL ENCOUNTER (OUTPATIENT)
Dept: PHYSICAL THERAPY | Facility: HOSPITAL | Age: 77
Setting detail: THERAPIES SERIES
End: 2017-05-05
Attending: FAMILY MEDICINE
Payer: COMMERCIAL

## 2017-05-05 PROCEDURE — 97112 NEUROMUSCULAR REEDUCATION: CPT | Mod: GP

## 2017-05-05 PROCEDURE — 97110 THERAPEUTIC EXERCISES: CPT | Mod: GP

## 2017-05-05 PROCEDURE — 40000718 ZZHC STATISTIC PT DEPARTMENT ORTHO VISIT

## 2017-05-05 RX ORDER — CIPROFLOXACIN 500 MG/1
500 TABLET, FILM COATED ORAL 2 TIMES DAILY
Qty: 20 TABLET | Refills: 0 | Status: SHIPPED | OUTPATIENT
Start: 2017-05-05 | End: 2017-06-30

## 2017-05-12 ENCOUNTER — HOSPITAL ENCOUNTER (OUTPATIENT)
Dept: PHYSICAL THERAPY | Facility: HOSPITAL | Age: 77
Setting detail: THERAPIES SERIES
End: 2017-05-12
Attending: FAMILY MEDICINE
Payer: COMMERCIAL

## 2017-05-12 PROCEDURE — 40000718 ZZHC STATISTIC PT DEPARTMENT ORTHO VISIT

## 2017-05-12 PROCEDURE — 97112 NEUROMUSCULAR REEDUCATION: CPT | Mod: GP

## 2017-05-19 ENCOUNTER — HOSPITAL ENCOUNTER (OUTPATIENT)
Dept: PHYSICAL THERAPY | Facility: HOSPITAL | Age: 77
Setting detail: THERAPIES SERIES
End: 2017-05-19
Attending: FAMILY MEDICINE
Payer: COMMERCIAL

## 2017-05-19 PROCEDURE — 97110 THERAPEUTIC EXERCISES: CPT | Mod: GP

## 2017-05-19 PROCEDURE — 97530 THERAPEUTIC ACTIVITIES: CPT | Mod: GP

## 2017-05-19 PROCEDURE — 40000718 ZZHC STATISTIC PT DEPARTMENT ORTHO VISIT

## 2017-05-26 ENCOUNTER — HOSPITAL ENCOUNTER (OUTPATIENT)
Dept: PHYSICAL THERAPY | Facility: HOSPITAL | Age: 77
Setting detail: THERAPIES SERIES
End: 2017-05-26
Attending: FAMILY MEDICINE
Payer: COMMERCIAL

## 2017-05-26 PROCEDURE — 40000268 ZZH STATISTIC NO CHARGES

## 2017-05-26 NOTE — PROGRESS NOTES
05/26/17 1100   Oswestry Disability Index (TERESSA   [employer] 1980 Version 2.1a, All rights reserved)   Section 1 - Pain intensity 2   Section 2 - Personal care (washing, dressing, etc.)  0   Section 3 - Lifting 0   Section 4 - Walking 2   Section 5 - Sitting 0   Section 6 - Standing 3   Section 7 - Sleeping 1   Section 8 - Sex life (if applicable) 0   Section 9 - Social life 0   Section 10 - Traveling 2   Sum 10   Count 10   Oswestry Score (%) 20 %

## 2017-06-08 DIAGNOSIS — S33.8XXD SPRAIN OF OTHER PARTS OF LUMBAR SPINE AND PELVIS, SUBSEQUENT ENCOUNTER: ICD-10-CM

## 2017-06-09 RX ORDER — TRAMADOL HYDROCHLORIDE 50 MG/1
TABLET ORAL
Qty: 120 TABLET | Refills: 0 | Status: SHIPPED | OUTPATIENT
Start: 2017-06-09 | End: 2017-07-19

## 2017-06-09 NOTE — TELEPHONE ENCOUNTER
Tramadol      Last Written Prescription Date:  5/4/17  Last Fill Quantity: 120,   # refills: 0  Last Office Visit with [facility] prescribing provider: 4/25/17  Future Office visit:       Routing refill request to provider for review/approval because:  Drug not on the [facility] refill protocol or controlled substance

## 2017-06-30 ENCOUNTER — OFFICE VISIT (OUTPATIENT)
Dept: FAMILY MEDICINE | Facility: OTHER | Age: 77
End: 2017-06-30
Attending: FAMILY MEDICINE
Payer: COMMERCIAL

## 2017-06-30 VITALS
RESPIRATION RATE: 19 BRPM | WEIGHT: 114 LBS | SYSTOLIC BLOOD PRESSURE: 124 MMHG | OXYGEN SATURATION: 98 % | BODY MASS INDEX: 22.26 KG/M2 | HEART RATE: 75 BPM | DIASTOLIC BLOOD PRESSURE: 72 MMHG

## 2017-06-30 DIAGNOSIS — R10.13 ABDOMINAL PAIN, EPIGASTRIC: ICD-10-CM

## 2017-06-30 DIAGNOSIS — G89.4 CHRONIC PAIN SYNDROME: Primary | ICD-10-CM

## 2017-06-30 PROCEDURE — 80307 DRUG TEST PRSMV CHEM ANLYZR: CPT | Mod: ZL | Performed by: FAMILY MEDICINE

## 2017-06-30 PROCEDURE — 99212 OFFICE O/P EST SF 10 MIN: CPT

## 2017-06-30 PROCEDURE — 99214 OFFICE O/P EST MOD 30 MIN: CPT | Performed by: FAMILY MEDICINE

## 2017-06-30 PROCEDURE — 99000 SPECIMEN HANDLING OFFICE-LAB: CPT | Performed by: FAMILY MEDICINE

## 2017-06-30 ASSESSMENT — PAIN SCALES - GENERAL: PAINLEVEL: NO PAIN (0)

## 2017-06-30 NOTE — LETTER
[facility]    06/30/17    Patient: Demi Narayan  YOB: 1940  Medical Record Number: [id number]                                                                  Controlled Substance Agreement  I understand that my care provider has prescribed controlled substances (narcotics, tranquilizers, and/or stimulants) to help manage my condition(s).  I am taking this medicine to help me function or work.  I know that this is strong medicine.  It could have serious side effects and even cause a dependency on the drug.  If I stop these medicines suddenly, I could have severe withdrawal symptoms.    The risks, benefits, and side effects of these medication(s) were explained to me.  I agree that:  1. I will take part in other treatments as advised by my provider.  This may be psychiatry or counseling, physical therapy, behavioral therapy, group treatment, or a referral to a pain clinic.  I will reduce or stop my medicine when my provider tells me to do so.   2. I will take my medicines as prescribed.  I will not change the dose or schedule unless my provider tells me to.  There will be no refills if I  run out early.   I may be contacted at any time without warning and asked to complete a drug test or pill count.   3. I will keep all my appointments at the clinic.  If I miss appointments or fail to follow instructions, my provider may stop my medicine.  4. I will not ask other providers to prescribe controlled substances. And I will not accept controlled substances from other people. If I need another prescribed controlled substance for a new reason, I will notify my provider within one business day.  5. If I enroll in the [place] Medical Marijuana program, I will tell my provider.  I will also sign an agreement to share my medical records with my provider.  6. I will use one pharmacy to fill all of my controlled substance prescriptions.  If my prescription is mailed to my pharmacy, it may take 5 to  7 days for my medicine to be ready.  7. I understand that my provider, clinic care team, and pharmacy can track controlled substance prescriptions from other providers through a central database (prescription monitoring program).  8. I will bring in my list of medications (or my medicine bottles) each time I come to the clinic.  REV- 04/2016                                                                                                                                            Page 1 of 2      [facility]    06/30/17    Patient: Demi Narayan  YOB: 1940  Medical Record Number: [id number]    9. Refills of controlled substances will be made only during office hours.  It is up to me to make sure that I do not run out of my medicines on weekends or holidays.    10. I am responsible for my prescriptions.  If the medicine is lost or stolen, it will not be replaced.   I also agree not to share these medicines with anyone.  11. I agree to not use ANY illegal or recreational drugs.  This includes marijuana, cocaine, bath salts or other drugs.  I agree not to use alcohol unless my provider says I may.  I agree to give urine samples whenever asked.  If I fail to give a urine sample, the provider may stop my medicine.     12. I will tell my nurse or provider right away if I become pregnant or have a new medical problem treated outside of [facility].  13. I understand that this medicine can affect my thinking and judgment.  It may be unsafe for me to drive, use machinery and do dangerous tasks.  I will not do any of these things until I know how the medicine affects me.  If my dose changes, I will wait to see how it affects me.  I will contact my provider if I have concerns about medicine side effects.  I understand that if I do not follow any of the conditions above, my prescriptions or treatment may be stopped.    I agree that my provider, clinic care team, and pharmacy may work with any city,  state or federal law enforcement agency that investigates the misuse, sale, or other diversion of my controlled medicine. I will allow my provider to discuss my care with or share a copy of this agreement with any other treating provider, pharmacy or emergency room where I receive care.  I agree to give up (waive) any right of privacy or confidentiality with respect to these authorizations.   I have read this agreement and have asked questions about anything I did not understand.   ___________________________________    ___________________________  Patient Signature                                                           Date and Time  ___________________________________     ____________________________  Witness                                                                            Date and Time  ___________________________________  Virgil Mackay MD  REV-  04/2016                                                                                                                                                                 Page 2 of 2

## 2017-06-30 NOTE — MR AVS SNAPSHOT
After Visit Summary   6/30/2017    Demi Narayan    MRN: [id number]           Patient Information     Date Of Birth          1940        Visit Information        Provider Department      6/30/2017 10:40 AM Virgil Mackay MD [facility]        Today's Diagnoses     Chronic pain syndrome    -  1    Abdominal pain, epigastric          Care Instructions    Radiology will call to schedule CT scan abdomen and pelvis.  Appointment with General Surgery scheduled for evaluation and recommendations for further management. .            Follow-ups after your visit        Additional Services     GENERAL SURG ADULT REFERRAL       Your provider has referred you to: [facility]: [facility] [phone]   [email or web address]    Please be aware that coverage of these services is subject to the terms and limitations of your health insurance plan.  Call member services at your health plan with any benefit or coverage questions.      Please bring the following with you to your appointment:    (1) Any X-Rays, CTs or MRIs which have been performed.  Contact the facility where they were done to arrange for  prior to your scheduled appointment.   (2) List of current medications   (3) This referral request   (4) Any documents/labs given to you for this referral                  Follow-up notes from your care team     Return in about 3 months (around 9/30/2017) for Follow Up Visit.      Your next 10 appointments already scheduled     Jul 11, 2017 10:00 AM CDT   LAB with [facility]    [address] [place] [id number]   [phone]           Patient must bring picture ID.  Patient should be prepared to give a urine specimen  Please do not eat 10-12 hours before your appointment if you are coming in fasting for labs on lipids, cholesterol, or glucose (sugar).  Pregnant  women should follow their Care Team instructions. Water with medications is okay. Do not drink coffee or other fluids.   If you have concerns about taking  your medications, please ask at office or if scheduling via [employer], send a message by clicking on Secure Messaging, Message Your Care Team.            Jul 11, 2017 11:00 AM CDT   [facility]   [facility] )    [address] [id number] [phone]           Patient must bring picture ID.  Patient should be prepared to give a urine specimen  Please do not eat 10-12 hours before your appointment if you are coming in fasting for labs on lipids, cholesterol, or glucose (sugar).  Pregnant women should follow their Care Team instructions. Water with medications is okay. Do not drink coffee or other fluids.   If you have concerns about taking  your medications, please ask at office or if scheduling via Swarm64, send a message by clicking on Secure Messaging, Message Your Care Team.            Jul 11, 2017 12:30 PM CDT   Radiology with [facility]   [facility] ([facility] )    [address] [place] [id number] [phone]              Future tests that were ordered for you today     Open Future Orders        Priority Expected Expires Ordered    Creatinine STAT  7/3/2018 7/3/2017            Who to contact     If you have questions or need follow up information about today's clinic visit or your schedule please contact [facility] ([facility]) directly at [phone].  Normal or non-critical lab and imaging results will be communicated to you by MyChart, letter or phone within 4 business days after the clinic has received the results. If you do not hear from us within 7 days, please contact the clinic through MyChart or phone. If you have a critical or abnormal lab result, we will notify you by phone as soon as possible.  Submit refill requests through Sw[employer] or call your pharmacy and  "they will forward the refill request to us. Please allow 3 business days for your refill to be completed.          Additional Information About Your Visit        Top[employer]ed Information     [employer] lets you send messages to your doctor, view your test results, renew your prescriptions, schedule appointments and more. To sign up, go to [email or web address] . Click on \"Log in\" on the left side of the screen, which will take you to the Welcome page. Then click on \"Sign up Now\" on the right side of the page.     You will be asked to enter the access code listed below, as well as some personal information. Please follow the directions to create your username and password.     Your access code is: [email or web address]  Expires: 2017  6:15 AM     Your access code will  in 90 days. If you need help or a new code, please call your [place] clinic or [phone].        Care EveryWhere ID     This is your [facility] EveryWhere ID. This could be used by other organizations to access your [place] medical records  [id number]        Your Vitals Were     Pulse Respirations Pulse Oximetry Breastfeeding? BMI (Body Mass Index)       75 19 98% No 22.26 kg/m2        Blood Pressure from Last 3 Encounters:   17 124/72   17 110/72   17 124/70    Weight from Last 3 Encounters:   17 114 lb (51.7 kg)   17 114 lb (51.7 kg)   17 109 lb (49.4 kg)              We Performed the Following     GENERAL SURG ADULT REFERRAL     Pain Drug Scr UR W Rptd Meds        Primary Care Provider Office Phone # Fax #    Virgil Mackay [phone]       [facility] [address] [facility] [address]        Equal Access to Services     CROW STEVENS : Davion Ordoñez, eliot henry, qaannel kaalmamarialuisa gary, maria luisa nixon. So [facility] [phone].    ATENCIÓN: Si habla español, tiene a maldonado disposición servicios gratuitos de asistencia lingüística. Llame al " [phone].    We comply with applicable federal civil rights laws and [place] laws. We do not discriminate on the basis of race, color, national origin, age, disability sex, sexual orientation or gender identity.            Thank you!     Thank you for choosing [facility]  for your care. Our goal is always to provide you with excellent care. Hearing back from our patients is one way we can continue to improve our services. Please take a few minutes to complete the written survey that you may receive in the mail after your visit with us. Thank you!             Your Updated Medication List - Protect others around you: Learn how to safely use, store and throw away your medicines at www.disposemymeds.org.          This list is accurate as of: 6/30/17 11:59 PM.  Always use your most recent med list.                   Brand Name Dispense Instructions for use Diagnosis    aspirin 325 MG tablet      Take 325 mg by mouth At Bedtime.        CALCIUM + D PO      Take 600 mg by mouth.        ciprofloxacin 500 MG tablet    CIPRO    20 tablet    TAKE TWO TABLETS BY MOUTH EVERY DAY AS NEEDED FRO DIVERTICULITIS    Diverticulitis       estradiol 0.5 MG tablet    ESTRACE    90 tablet    Take 1 tablet (0.5 mg) by mouth daily    Hormone replacement therapy (HRT)       GLUCOSAMINE SULFATE PO      Take  by mouth daily.        MULTIVITAL PO      Take 1 tablet by mouth daily.        niacin 500 MG tablet      Take 1 tablet by mouth daily.        OMEGA-3 FISH OIL PO      Take  by mouth daily.        PROTONIX PO      Take 40 mg by mouth every morning (before breakfast)    Thoracogenic scoliosis of thoracolumbar region, Post-menopause       traMADol 50 MG tablet    ULTRAM    120 tablet    TAKE ONE TO TWO TABLETS BY MOUTH EVERY 6 TO 8 HOURS AS NEEDED    Sprain of other parts of lumbar spine and pelvis, subsequent encounter

## 2017-06-30 NOTE — PROGRESS NOTES
SUBJECTIVE:  Demi Narayan, 77 year old, female is seen with abdominal pain and fullness.  Present over the last several months and progressively worsening.  Located in the epigastrium and associated with a sense of bloating.  She has had previous EGD and has had a small hiatal hernia.  In addition, Danielle has had diverticulitis and underwent resection.  Denies fever, shaking, chills, dysphagia, change in appetite, weight loss, nausea, vomiting, diarrhea, melena, or hematochezia.  No change in bowel habits.     Follow up chronic low back pain and osteoarthritis.  Her symptoms are controlled with the current regimen of tramadol and acetaminophen.  Opioid use and chronic pain are reviewed in detail.      Current Outpatient Prescriptions   Medication Sig Dispense Refill     traMADol (ULTRAM) 50 MG tablet TAKE ONE TO TWO TABLETS BY MOUTH EVERY 6 TO 8 HOURS AS NEEDED 120 tablet 0     estradiol (ESTRACE) 0.5 MG tablet Take 1 tablet (0.5 mg) by mouth daily 90 tablet 1     Pantoprazole Sodium (PROTONIX PO) Take 40 mg by mouth every morning (before breakfast)       niacin 500 MG tablet Take 1 tablet by mouth daily.       aspirin 325 MG tablet Take 325 mg by mouth At Bedtime.       GLUCOSAMINE SULFATE PO Take  by mouth daily.       Multiple Vitamins-Minerals (MULTIVITAL PO) Take 1 tablet by mouth daily.       Calcium Carbonate-Vitamin D (CALCIUM + D PO) Take 600 mg by mouth.       Omega-3 Fatty Acids (OMEGA-3 FISH OIL PO) Take  by mouth daily.       ciprofloxacin (CIPRO) 500 MG tablet TAKE TWO TABLETS BY MOUTH EVERY DAY AS NEEDED FRO DIVERTICULITIS (Patient not taking: Reported on 4/25/2017) 20 tablet 0      No Known Allergies    Past Medical History:   Diagnosis Date     Chronic/recurrent back pain 7/12/2011     Diverticulitis      Diverticulitis, recurrent 2/6/2002    bowel resection     Dyslipidemia 6/20/2006     Fibrocystic breast disease 7/12/2011     Gastro-oesophageal reflux disease      GERD 2/10/2012     Hiatal  hernia 2/10/2012     Hormone replacement therapy, postmenopausal 7/12/2011     Osteoarthritis      Past Surgical History:   Procedure Laterality Date     ------------OTHER-------------      colonoscopy with biopsy - diverticulitis, hemorrhoids     ------------OTHER-------------  4/19/1994    sigmoidoscopy - abdominal pain, diverticula     ABDOMEN SURGERY      colon removed 2nd to diverticulitis     APPENDECTOMY       ARTHROSCOPY SHOULDER  11/20/2013    Procedure: ARTHROSCOPY SHOULDER;  Right Shoulder Arthroscopy Sub-Acromial Decompression Rotator Cuff Repair;  Surgeon: Lenny Trammell MD;  [facility]     [facility]     Diverticulitis      bowel resection     EGD with biopsy  2011     ENDOSCOPY UPPER WITH PANCREATIC STIMULATION       ENDOSCOPY UPPER, COLONOSCOPY, COMBINED  7/18/2014    Procedure: COMBINED ENDOSCOPY UPPER, COLONOSCOPY;  Surgeon: Israel Feliciano MD;  Location: [facility]     ENT SURGERY      tonsilectomy     GYN SURGERY      hysterectomy with bladder and rectal repair     ORTHOPEDIC SURGERY  11/20/2013    right rotator cuff surgery     TVH with ovarian preservation       Family History   Problem Relation Age of Onset     CEREBROVASCULAR DISEASE Father      CVA     HEART DISEASE Father      heart disease     Hypertension Father      Myocardial Infarction Father      myocardial infarction     CEREBROVASCULAR DISEASE Mother      CVA     DIABETES Mother      HEART DISEASE Mother      heart disease     Hypertension Mother      HEART DISEASE Brother      heart disease     Hypertension Brother      Social History     Social History     Marital status:      Spouse name: N/A     Number of children: N/A     Years of education: N/A     Occupational History     Not on file.     Social History Main Topics     Smoking status: Former Smoker     Types: Cigarettes     Quit date: 5/6/1968     Smokeless tobacco: Never Used      Comment: no passive smoke  exposure     Alcohol use 0.5 oz/week     1 Glasses of wine per week      Comment: daily with dinner     Drug use: No     Sexual activity: Yes     Partners: Male     Birth control/ protection: None     Other Topics Concern      Service No     Blood Transfusions Yes     Permits if needed     Caffeine Concern Yes     4 cups coffee daily     Occupational Exposure No     Hobby Hazards No     Sleep Concern No     Stress Concern No     Weight Concern No     Special Diet No     Back Care No     Exercise No     Seat Belt Yes     Self-Exams Yes     Parent/Sibling W/ Cabg, Mi Or Angioplasty Before 65f 55m? Yes     Father     Social History Narrative         Review Of Systems  Constitutional, HEENT, cardiovascular, pulmonary, gi and gu systems are negative, except as otherwise noted.    OBJECTIVE:  Vitals: B/P: 124/72, T: Data Unavailable, P: 75, R: 19    Exam:  Physical Exam   Constitutional: She is oriented to person, place, and time. She appears well-developed and well-nourished. No distress.   Abdominal: Soft. Bowel sounds are normal. She exhibits no distension, no abdominal bruit, no ascites and no mass. There is no hepatosplenomegaly. There is tenderness in the epigastric area. There is no CVA tenderness, no tenderness at McBurney's point and negative Reeder's sign. No hernia.   Neurological: She is alert and oriented to person, place, and time.   Psychiatric: She has a normal mood and affect.     Other exam not repeated    Labs:  Office Visit on 04/25/2017   Component Date Value Ref Range Status     WBC 04/25/2017 6.4  4.0 - 11.0 10e9/L Final     RBC Count 04/25/2017 4.87  3.8 - 5.2 10e12/L Final     Hemoglobin 04/25/2017 14.5  11.7 - 15.7 g/dL Final     Hematocrit 04/25/2017 42.4  35.0 - 47.0 % Final     MCV 04/25/2017 87  78 - 100 fl Final     MCH 04/25/2017 29.8  26.5 - 33.0 pg Final     MCHC 04/25/2017 34.2  31.5 - 36.5 g/dL Final     RDW 04/25/2017 13.2  10.0 - 15.0 % Final     Platelet Count 04/25/2017 252   150 - 450 10e9/L Final     Diff Method 04/25/2017 Automated Method   Final     % Neutrophils 04/25/2017 48.5  % Final     % Lymphocytes 04/25/2017 41.9  % Final     % Monocytes 04/25/2017 7.4  % Final     % Eosinophils 04/25/2017 1.3  % Final     % Basophils 04/25/2017 0.6  % Final     % Immature Granulocytes 04/25/2017 0.3  % Final     Nucleated RBCs 04/25/2017 0  0 /100 Final     Absolute Neutrophil 04/25/2017 3.1  1.6 - 8.3 10e9/L Final     Absolute Lymphocytes 04/25/2017 2.7  0.8 - 5.3 10e9/L Final     Absolute Monocytes 04/25/2017 0.5  0.0 - 1.3 10e9/L Final     Absolute Eosinophils 04/25/2017 0.1  0.0 - 0.7 10e9/L Final     Absolute Basophils 04/25/2017 0.0  0.0 - 0.2 10e9/L Final     Abs Immature Granulocytes 04/25/2017 0.0  0 - 0.4 10e9/L Final     Absolute Nucleated RBC 04/25/2017 0.0   Final     Cholesterol 04/25/2017 239* <200 mg/dL Final    Desirable:       <200 mg/dl     Triglycerides 04/25/2017 177* <150 mg/dL Final    Comment: Borderline high:  150-199 mg/dl   High:             200-499 mg/dl   Very high:       >499 mg/dl   Fasting specimen       HDL Cholesterol 04/25/2017 88  >49 mg/dL Final     LDL Cholesterol Calculated 04/25/2017 116* <100 mg/dL Final    Comment: Above desirable:  100-129 mg/dl   Borderline High:  130-159 mg/dL   High:             160-189 mg/dL   Very high:       >189 mg/dl       Non HDL Cholesterol 04/25/2017 151* <130 mg/dL Final    Comment: Above Desirable:  130-159 mg/dl   Borderline high:  160-189 mg/dl   High:             190-219 mg/dl   Very high:       >219 mg/dl       Sodium 04/25/2017 143  133 - 144 mmol/L Final     Potassium 04/25/2017 3.8  3.4 - 5.3 mmol/L Final     Chloride 04/25/2017 106  94 - 109 mmol/L Final     Carbon Dioxide 04/25/2017 29  20 - 32 mmol/L Final     Anion Gap 04/25/2017 8  3 - 14 mmol/L Final     Glucose 04/25/2017 85  70 - 99 mg/dL Final    Fasting specimen     Urea Nitrogen 04/25/2017 13  7 - 30 mg/dL Final     Creatinine 04/25/2017 0.80  0.52 -  1.04 mg/dL Final     GFR Estimate 04/25/2017 69  >60 mL/min/1.7m2 Final    Non  GFR Calc     GFR Estimate If Black 04/25/2017 84  >60 mL/min/1.7m2 Final    African American GFR Calc     Calcium 04/25/2017 8.9  8.5 - 10.1 mg/dL Final     Bilirubin Total 04/25/2017 0.4  0.2 - 1.3 mg/dL Final     Albumin 04/25/2017 3.4  3.4 - 5.0 g/dL Final     Protein Total 04/25/2017 7.0  6.8 - 8.8 g/dL Final     Alkaline Phosphatase 04/25/2017 52  40 - 150 U/L Final     ALT 04/25/2017 22  0 - 50 U/L Final     AST 04/25/2017 19  0 - 45 U/L Final     TSH 04/25/2017 1.29  0.40 - 4.00 mU/L Final     Vitamin D Deficiency screening 04/25/2017 41  20 - 75 ug/L Final    Comment: Season, race, dietary intake, and treatment affect the concentration of   25-hydroxy-Vitamin D. Values may decrease during winter months and increase   during summer months. Values 20-29 ug/L may indicate Vitamin D insufficiency   and values <20 ug/L may indicate Vitamin D deficiency.   Vitamin D determination is routinely performed by an immunoassay specific for   25 hydroxyvitamin D3.  If an individual is on vitamin D2 (ergocalciferol)   supplementation, please specify 25 OH vitamin D2 and D3 level determination   by   LCMSMS test [id number].         ASSESSMENT/PLAN:  Abdominal pain, epigastric  Reviewed previous EGD and GI consultation from 07/2014.  Suspect worsening hiatal hernia and GERD.  CT abdomen and pelvis scheduled.  Appointment with General Surgery scheduled for evaluation and recommendations for further management.   - CT Abdomen Pelvis w/o & w Contrast; Future  - CT Abdomen Pelvis w/o & w Contrast  - GENERAL SURG ADULT REFERRAL    Chronic pain syndrome  Reviewed pain contract and this is signed.  Urine drug screen taken.  Follow up 3 months.  - Pain Drug Scr UR W Rptd Meds            Virgil Mackay MD

## 2017-06-30 NOTE — NURSING NOTE
Chief Complaint   Patient presents with     Gastric Problem     doesn't have heart burn, feels like she is having muscle cramping where her stomach is.        Initial /72  Pulse 75  Resp 19  Wt 114 lb (51.7 kg)  SpO2 98%  Breastfeeding? No  BMI 22.26 kg/m2 Estimated body mass index is 22.26 kg/(m^2) as calculated from the following:    Height as of 4/22/16: 5' (1.524 m).    Weight as of this encounter: 114 lb (51.7 kg).  Medication Reconciliation: rodríguez Ramirez

## 2017-07-03 DIAGNOSIS — Z01.812 BLOOD TESTS PRIOR TO TREATMENT OR PROCEDURE: Primary | ICD-10-CM

## 2017-07-03 PROBLEM — G89.4 CHRONIC PAIN SYNDROME: Status: ACTIVE | Noted: 2017-07-03

## 2017-07-04 NOTE — PATIENT INSTRUCTIONS
Radiology will call to schedule CT scan abdomen and pelvis.  Appointment with General Surgery scheduled for evaluation and recommendations for further management. .

## 2017-07-09 LAB — PAIN DRUG SCR UR W RPTD MEDS: NORMAL

## 2017-07-10 DIAGNOSIS — R10.13 ABDOMINAL PAIN, EPIGASTRIC: Primary | ICD-10-CM

## 2017-07-11 ENCOUNTER — HOSPITAL ENCOUNTER (OUTPATIENT)
Dept: CT IMAGING | Facility: HOSPITAL | Age: 77
Discharge: HOME | End: 2017-07-11
Attending: FAMILY MEDICINE | Admitting: FAMILY MEDICINE
Payer: COMMERCIAL

## 2017-07-11 DIAGNOSIS — Z01.812 BLOOD TESTS PRIOR TO TREATMENT OR PROCEDURE: ICD-10-CM

## 2017-07-11 LAB
CREAT SERPL-MCNC: 0.84 MG/DL (ref 0.52–1.04)
GFR SERPL CREATININE-BSD FRML MDRD: 66 ML/MIN/1.7M2

## 2017-07-11 PROCEDURE — 74177 CT ABD & PELVIS W/CONTRAST: CPT | Mod: TC

## 2017-07-11 PROCEDURE — 36415 COLL VENOUS BLD VENIPUNCTURE: CPT | Performed by: FAMILY MEDICINE

## 2017-07-11 PROCEDURE — 82565 ASSAY OF CREATININE: CPT | Performed by: FAMILY MEDICINE

## 2017-07-11 RX ORDER — IOPAMIDOL 612 MG/ML
100 INJECTION, SOLUTION INTRAVASCULAR ONCE
Status: COMPLETED | OUTPATIENT
Start: 2017-07-11 | End: 2017-07-11

## 2017-07-11 RX ADMIN — IOPAMIDOL 100 ML: 612 INJECTION, SOLUTION INTRAVASCULAR at 12:41

## 2017-07-11 RX ADMIN — DIATRIZOATE MEGLUMINE AND DIATRIZOATE SODIUM 30 ML: 660; 100 SOLUTION ORAL; RECTAL at 12:41

## 2017-07-18 ENCOUNTER — TELEPHONE (OUTPATIENT)
Dept: FAMILY MEDICINE | Facility: OTHER | Age: 77
End: 2017-07-18

## 2017-07-18 DIAGNOSIS — R93.5 ABNORMAL CT OF THE ABDOMEN: Primary | ICD-10-CM

## 2017-07-18 DIAGNOSIS — K21.9 GASTROESOPHAGEAL REFLUX DISEASE WITHOUT ESOPHAGITIS: ICD-10-CM

## 2017-07-18 DIAGNOSIS — K44.9 DIAPHRAGMATIC HERNIA WITHOUT OBSTRUCTION AND WITHOUT GANGRENE: ICD-10-CM

## 2017-07-19 DIAGNOSIS — S33.8XXD SPRAIN OF OTHER PARTS OF LUMBAR SPINE AND PELVIS, SUBSEQUENT ENCOUNTER: ICD-10-CM

## 2017-07-20 RX ORDER — TRAMADOL HYDROCHLORIDE 50 MG/1
TABLET ORAL
Qty: 120 TABLET | Refills: 0 | Status: SHIPPED | OUTPATIENT
Start: 2017-07-20 | End: 2017-08-29

## 2017-07-20 NOTE — TELEPHONE ENCOUNTER
Tramadol       Last Written Prescription Date:  6/9/2017  Last Fill Quantity: 120,   # refills: 0  Last Office Visit with [facility] provider: 6/30/2017  Future Office visit:       Routing refill request to provider for review/approval because:  Drug not on the [facility] refill protocol or controlled substance

## 2017-08-03 DIAGNOSIS — Z12.31 VISIT FOR SCREENING MAMMOGRAM: Primary | ICD-10-CM

## 2017-08-14 ENCOUNTER — OFFICE VISIT (OUTPATIENT)
Dept: SURGERY | Facility: OTHER | Age: 77
End: 2017-08-14
Attending: FAMILY MEDICINE
Payer: COMMERCIAL

## 2017-08-14 VITALS
HEART RATE: 79 BPM | HEIGHT: 60 IN | SYSTOLIC BLOOD PRESSURE: 123 MMHG | BODY MASS INDEX: 22.38 KG/M2 | DIASTOLIC BLOOD PRESSURE: 73 MMHG | OXYGEN SATURATION: 97 % | TEMPERATURE: 96.8 F | WEIGHT: 114 LBS | RESPIRATION RATE: 18 BRPM

## 2017-08-14 DIAGNOSIS — K21.9 GASTROESOPHAGEAL REFLUX DISEASE WITHOUT ESOPHAGITIS: ICD-10-CM

## 2017-08-14 DIAGNOSIS — R93.5 ABNORMAL CT OF THE ABDOMEN: ICD-10-CM

## 2017-08-14 DIAGNOSIS — K44.9 DIAPHRAGMATIC HERNIA WITHOUT OBSTRUCTION AND WITHOUT GANGRENE: ICD-10-CM

## 2017-08-14 PROCEDURE — 99203 OFFICE O/P NEW LOW 30 MIN: CPT | Performed by: SURGERY

## 2017-08-14 PROCEDURE — 99212 OFFICE O/P EST SF 10 MIN: CPT

## 2017-08-14 ASSESSMENT — PAIN SCALES - GENERAL: PAINLEVEL: NO PAIN (0)

## 2017-08-14 NOTE — NURSING NOTE
Chief Complaint   Patient presents with     Consult     EGD consult/ hx of hernia/ causing pain       Initial /73 (BP Location: Right arm, Patient Position: Chair, Cuff Size: Adult Regular)  Pulse 79  Temp 96.8  F (36  C) (Tympanic)  Resp 18  Ht 5' (1.524 m)  Wt 114 lb (51.7 kg)  SpO2 97%  BMI 22.26 kg/m2 Estimated body mass index is 22.26 kg/(m^2) as calculated from the following:    Height as of this encounter: 5' (1.524 m).    Weight as of this encounter: 114 lb (51.7 kg).  Medication Reconciliation: rodríguez Ambrosio

## 2017-08-14 NOTE — MR AVS SNAPSHOT
After Visit Summary   8/14/2017    Demi Narayan    MRN: [id number]           Patient Information     Date Of Birth          1940        Visit Information        Provider Department      8/14/2017 3:30 PM Gallito Alvarado, DO [facility]        Today's Diagnoses     Abnormal CT of the abdomen        Gastroesophageal reflux disease without esophagitis        Diaphragmatic hernia without obstruction and without gangrene          Care Instructions    Thank you for allowing Dr Alvarado and our surgical team to participate in your care.  If you have a scheduling or an appointment question please contact Dari, our Health Unit Coordinator, at her direct line [phone].   ALL nursing questions or concerns can be directed to your surgical nurse at: [phone]    You are scheduled for a:  Esophagogastroduodenoscopy with possible biopsy.  Your procedure date is:     Nothing to eat or drink after midnight the night prior to your procedure.      HOW TO PREPARE-        You need a friend or family member available to drive you home AND stay with you for 4 hours after you leave the hospital. You will not be allowed to drive yourself. IF you need to take a taxi or the bus you MUST have a responsible person to ride with you. YOUR PROCEDURE WILL BE CANCELLED IF YOU DO NOT HAVE A RESPONSIBLE ADULT TO DRIVE YOU HOME.       You need to call our Surgery Education Nurses 1-2 weeks prior to your surgery date at [phone] or toll free [phone]. Please have you medication and allergy lists ready.       Stop your aspirin or other NSAIDs(Ibuprofen, Motrin, Aleve, Celebrex, Naproxen, etc...) 7 days before your surgery.      Hospital admitting will call you the day before your surgery with your arrival time. If you are scheduled on a Monday admitting will call you the Friday before.      Please call your primary care physician if you should become ill within 24 hours of scheduled  "surgery. (ex.vomiting, diarrhea, fever)            Follow-ups after your visit        Who to contact     If you have questions or need follow up information about today's clinic visit or your schedule please contact [facility] directly at [phone].  Normal or non-critical lab and imaging results will be communicated to you by MyChart, letter or phone within 4 business days after the clinic has received the results. If you do not hear from us within 7 days, please contact the clinic through Blippar[facility] or phone. If you have a critical or abnormal lab result, we will notify you by phone as soon as possible.  Submit refill requests through Medbox or call your pharmacy and they will forward the refill request to us. Please allow 3 business days for your refill to be completed.          Additional Information About Your Visit        MyCharMaster Route Information     Medbox lets you send messages to your doctor, view your test results, renew your prescriptions, schedule appointments and more. To sign up, go to [email or web address] . Click on \"Log in\" on the left side of the screen, which will take you to the Welcome page. Then click on \"Sign up Now\" on the right side of the page.     You will be asked to enter the access code listed below, as well as some personal information. Please follow the directions to create your username and password.     Your access code is: [email or web address]  Expires: 2017  3:31 PM     Your access code will  in 90 days. If you need help or a new code, please call your [facility] or [phone].        Care EveryWhere ID     This is your Care EveryWhere ID. This could be used by other organizations to access your [place] medical records  [id number]        Your Vitals Were     Pulse Temperature Respirations Height Pulse Oximetry BMI (Body Mass Index)    79 96.8  F (36  C) (Tympanic) 18 5' (1.524 m) 97% 22.26 kg/m2       Blood Pressure from Last 3 Encounters:   17 " 123/73   06/30/17 124/72   04/25/17 110/72    Weight from Last 3 Encounters:   08/14/17 114 lb (51.7 kg)   06/30/17 114 lb (51.7 kg)   04/25/17 114 lb (51.7 kg)              Today, you had the following     No orders found for display       Primary Care Provider Office Phone # Fax #    Virgil Mackay [phone]       [facility] [address] [address]        Equal Access to Services     [facility]ISSE : Hadii dru ku hadasho Soomaali, waaxda luqadaha, qaybta kaalmada adeegyada, maria luisa cespedes . So [facility] [phone].    ATENCIÓN: Si habla español, tiene a maldonado disposición servicios gratuitos de asistencia lingüística. Issac al [phone].    We comply with applicable federal civil rights laws and [place] laws. We do not discriminate on the basis of race, color, national origin, age, disability sex, sexual orientation or gender identity.            Thank you!     Thank you for choosing [facility]  for your care. Our goal is always to provide you with excellent care. Hearing back from our patients is one way we can continue to improve our services. Please take a few minutes to complete the written survey that you may receive in the mail after your visit with us. Thank you!             Your Updated Medication List - Protect others around you: Learn how to safely use, store and throw away your medicines at www.disposemymeds.org.          This list is accurate as of: 8/14/17  3:31 PM.  Always use your most recent med list.                   Brand Name Dispense Instructions for use Diagnosis    aspirin 325 MG tablet      Take 325 mg by mouth At Bedtime.        CALCIUM + D PO      Take 600 mg by mouth.        ciprofloxacin 500 MG tablet    CIPRO    20 tablet    TAKE TWO TABLETS BY MOUTH EVERY DAY AS NEEDED FRO DIVERTICULITIS    Diverticulitis       estradiol 0.5 MG tablet    ESTRACE    90 tablet    Take 1 tablet (0.5 mg) by mouth daily    Hormone  replacement therapy (HRT)       GLUCOSAMINE SULFATE PO      Take  by mouth daily.        MULTIVITAL PO      Take 1 tablet by mouth daily.        niacin 500 MG tablet      Take 1 tablet by mouth daily.        OMEGA-3 FISH OIL PO      Take  by mouth daily.        PROTONIX PO      Take 40 mg by mouth every morning (before breakfast)    Thoracogenic scoliosis of thoracolumbar region, Post-menopause       traMADol 50 MG tablet    ULTRAM    120 tablet    TAKE ONE TO TWO TABLETS BY MOUTH EVERY 6 TO 8 HOURS AS NEEDED    Sprain of other parts of lumbar spine and pelvis, subsequent encounter

## 2017-08-14 NOTE — PATIENT INSTRUCTIONS
Thank you for allowing Dr Alvarado and our surgical team to participate in your care.  If you have a scheduling or an appointment question please contact Dari, our Health Unit Coordinator, at her direct line [phone].   ALL nursing questions or concerns can be directed to your surgical nurse at: [phone]    You are scheduled for a:  Esophagogastroduodenoscopy with possible biopsy.  Your procedure date is: Wednesday, September 27, 2017    Nothing to eat or drink after midnight the night prior to your procedure.      HOW TO PREPARE-        You need a friend or family member available to drive you home AND stay with you for 4 hours after you leave the hospital. You will not be allowed to drive yourself. IF you need to take a taxi or the bus you MUST have a responsible person to ride with you. YOUR PROCEDURE WILL BE CANCELLED IF YOU DO NOT HAVE A RESPONSIBLE ADULT TO DRIVE YOU HOME.       You need to call our Surgery Education Nurses 1-2 weeks prior to your surgery date at [phone] or toll free [phone]. Please have you medication and allergy lists ready.       Stop your aspirin or other NSAIDs(Ibuprofen, Motrin, Aleve, Celebrex, Naproxen, etc...) 7 days before your surgery.      Hospital admitting will call you the day before your surgery with your arrival time. If you are scheduled on a Monday admitting will call you the Friday before.      Please call your primary care physician if you should become ill within 24 hours of scheduled surgery. (ex.vomiting, diarrhea, fever)

## 2017-08-29 DIAGNOSIS — S33.8XXD SPRAIN OF OTHER PARTS OF LUMBAR SPINE AND PELVIS, SUBSEQUENT ENCOUNTER: ICD-10-CM

## 2017-08-29 RX ORDER — TRAMADOL HYDROCHLORIDE 50 MG/1
TABLET ORAL
Qty: 120 TABLET | Refills: 0 | Status: SHIPPED | OUTPATIENT
Start: 2017-08-29 | End: 2017-10-02

## 2017-08-29 NOTE — TELEPHONE ENCOUNTER
Controlled Substance Refill Request for Ultram  Problem List Complete:  Yes  Details  Code: G89.4  Noted: 07/03/2017    Change Dx  Resolve        Overview  Edited:  Nicole Joseph RN  8/22/2017  Patient is followed by Virgil Mackay MD for ongoing prescription of pain medication. All refills should only be approved by this provider, or covering partner.     Medication(s): Ultram 50mg.   Maximum quantity per month: #120  Clinic visit frequency required: Q 3 months      Controlled substance agreement:  Encounter-Level CSA - 06/30/2017:            Controlled Substance Agreement - Scan on 7/5/2017 4:11 PM : CONTROLLED SUBSTANCE AGREEMENT (below)            Pain Clinic evaluation in the past: No     DIRE Total Score(s):  No flowsheet data found.     Last [facility] website verification: done on 5.4.17                 checked in past 6 months?  Yes 5.4.17

## 2017-08-29 NOTE — TELEPHONE ENCOUNTER
Tramadol      Last Written Prescription Date:  7/20/17  Last Fill Quantity: 120,   # refills: 0  Last Office Visit with [facility] prescribing provider: 6/30/17  Future Office visit:       Routing refill request to provider for review/approval because:  Drug not on the [facility] refill protocol or controlled substance

## 2017-09-11 ENCOUNTER — TELEPHONE (OUTPATIENT)
Dept: FAMILY MEDICINE | Facility: OTHER | Age: 77
End: 2017-09-11

## 2017-09-11 DIAGNOSIS — A69.20 LYME DISEASE: Primary | ICD-10-CM

## 2017-09-11 DIAGNOSIS — A69.20 LYME DISEASE: ICD-10-CM

## 2017-09-11 PROCEDURE — 99000 SPECIMEN HANDLING OFFICE-LAB: CPT | Performed by: FAMILY MEDICINE

## 2017-09-11 PROCEDURE — 86618 LYME DISEASE ANTIBODY: CPT | Mod: ZL | Performed by: FAMILY MEDICINE

## 2017-09-11 PROCEDURE — 36415 COLL VENOUS BLD VENIPUNCTURE: CPT | Mod: ZL | Performed by: FAMILY MEDICINE

## 2017-09-11 NOTE — TELEPHONE ENCOUNTER
8:46 AM    Reason for Call: Phone Call    Description: PT called and said she was seen last year for Lyme's Disease, and she is wondering if orders can be put in so she can be tested for this again, she said she has all the same symptoms as last time.   Please call her back and let her know if this is something she can do    Was an appointment offered for this call? No, Nothing available today    Preferred method for responding to this message: Telephone Call  What is your phone number ? [phone]    If we cannot reach you directly, may we leave a detailed response at the number you provided? Yes    Can this message wait until your PCP/provider returns, if available today? PCP is in    Gretchen Barahona

## 2017-09-13 LAB — B BURGDOR IGG+IGM SER QL: 0.32 (ref 0–0.89)

## 2017-09-14 ENCOUNTER — TELEPHONE (OUTPATIENT)
Dept: FAMILY MEDICINE | Facility: OTHER | Age: 77
End: 2017-09-14

## 2017-09-14 NOTE — TELEPHONE ENCOUNTER
10:55 AM    Reason for Call: Phone Call    Description: Patient would like the results of the Lyme's Disease that was performed on 9-11-17. Patient is aware that Dr Mackay is out of office.    Was an appointment offered for this call? Yes  If yes : Appointment type              Date    Preferred method for responding to this message: Telephone Call [phone]  What is your phone number ?    If we cannot reach you directly, may we leave a detailed response at the number you provided? Yes    Can this message wait until your PCP/provider returns, if available today? YES    Carolyn Wallis

## 2017-09-22 ENCOUNTER — OFFICE VISIT (OUTPATIENT)
Dept: FAMILY MEDICINE | Facility: OTHER | Age: 77
End: 2017-09-22
Attending: FAMILY MEDICINE
Payer: COMMERCIAL

## 2017-09-22 VITALS
RESPIRATION RATE: 18 BRPM | WEIGHT: 111 LBS | HEART RATE: 78 BPM | OXYGEN SATURATION: 98 % | SYSTOLIC BLOOD PRESSURE: 122 MMHG | TEMPERATURE: 97.5 F | DIASTOLIC BLOOD PRESSURE: 70 MMHG | BODY MASS INDEX: 21.68 KG/M2

## 2017-09-22 DIAGNOSIS — G89.29 CHRONIC BILATERAL LOW BACK PAIN WITH LEFT-SIDED SCIATICA: Primary | ICD-10-CM

## 2017-09-22 DIAGNOSIS — M54.42 CHRONIC BILATERAL LOW BACK PAIN WITH LEFT-SIDED SCIATICA: Primary | ICD-10-CM

## 2017-09-22 DIAGNOSIS — Z23 NEED FOR PROPHYLACTIC VACCINATION AND INOCULATION AGAINST COMBINATIONS OF DISEASE: ICD-10-CM

## 2017-09-22 PROCEDURE — 99214 OFFICE O/P EST MOD 30 MIN: CPT | Performed by: FAMILY MEDICINE

## 2017-09-22 PROCEDURE — 99212 OFFICE O/P EST SF 10 MIN: CPT | Mod: 25

## 2017-09-22 PROCEDURE — 90670 PCV13 VACCINE IM: CPT | Performed by: FAMILY MEDICINE

## 2017-09-22 PROCEDURE — G0009 ADMIN PNEUMOCOCCAL VACCINE: HCPCS | Performed by: FAMILY MEDICINE

## 2017-09-22 PROCEDURE — 90471 IMMUNIZATION ADMIN: CPT | Performed by: FAMILY MEDICINE

## 2017-09-22 RX ORDER — METHYLPREDNISOLONE 4 MG
TABLET, DOSE PACK ORAL
Qty: 21 TABLET | Refills: 0 | Status: SHIPPED | OUTPATIENT
Start: 2017-09-22 | End: 2017-10-03

## 2017-09-22 ASSESSMENT — ANXIETY QUESTIONNAIRES
4. TROUBLE RELAXING: NOT AT ALL
3. WORRYING TOO MUCH ABOUT DIFFERENT THINGS: NOT AT ALL
7. FEELING AFRAID AS IF SOMETHING AWFUL MIGHT HAPPEN: NOT AT ALL
6. BECOMING EASILY ANNOYED OR IRRITABLE: NOT AT ALL
GAD7 TOTAL SCORE: 0
2. NOT BEING ABLE TO STOP OR CONTROL WORRYING: NOT AT ALL
5. BEING SO RESTLESS THAT IT IS HARD TO SIT STILL: NOT AT ALL
1. FEELING NERVOUS, ANXIOUS, OR ON EDGE: NOT AT ALL

## 2017-09-22 ASSESSMENT — PAIN SCALES - GENERAL: PAINLEVEL: SEVERE PAIN (6)

## 2017-09-22 ASSESSMENT — PATIENT HEALTH QUESTIONNAIRE - PHQ9: SUM OF ALL RESPONSES TO PHQ QUESTIONS 1-9: 0

## 2017-09-22 NOTE — PROGRESS NOTES
SUBJECTIVE:  Demi Narayan, 77 year old, female is seen in follow up Lyme disease.  She developed recurrent hip pain bilaterally and now associated left sciatica.  This has worsened.  Danielle has a history of osteoarthritis as well as scoliosis.    Denies bowel dysfunction, bladder dysfunction, saddle anesthesia, nocturnal symptoms, focal weakness,  or trauma.      Current Outpatient Prescriptions   Medication Sig Dispense Refill     traMADol (ULTRAM) 50 MG tablet TAKE ONE TO TWO TABLETS BY MOUTH EVERY 6 TO 8 HOURS AS NEEDED 120 tablet 0     estradiol (ESTRACE) 0.5 MG tablet Take 1 tablet (0.5 mg) by mouth daily 90 tablet 1     Pantoprazole Sodium (PROTONIX PO) Take 40 mg by mouth every morning (before breakfast)       ciprofloxacin (CIPRO) 500 MG tablet TAKE TWO TABLETS BY MOUTH EVERY DAY AS NEEDED FRO DIVERTICULITIS 20 tablet 0     niacin 500 MG tablet Take 1 tablet by mouth daily.       aspirin 325 MG tablet Take 325 mg by mouth At Bedtime.       GLUCOSAMINE SULFATE PO Take  by mouth daily.       Multiple Vitamins-Minerals (MULTIVITAL PO) Take 1 tablet by mouth daily.       Calcium Carbonate-Vitamin D (CALCIUM + D PO) Take 600 mg by mouth.       Omega-3 Fatty Acids (OMEGA-3 FISH OIL PO) Take  by mouth daily.        No Known Allergies    Past Medical History:   Diagnosis Date     Chronic/recurrent back pain 7/12/2011     Diverticulitis      Diverticulitis, recurrent 2/6/2002    bowel resection     Dyslipidemia 6/20/2006     Fibrocystic breast disease 7/12/2011     Gastro-oesophageal reflux disease      GERD 2/10/2012     Hiatal hernia 2/10/2012     Hormone replacement therapy, postmenopausal 7/12/2011     Osteoarthritis      Past Surgical History:   Procedure Laterality Date     ------------OTHER-------------      colonoscopy with biopsy - diverticulitis, hemorrhoids     ------------OTHER-------------  4/19/1994    sigmoidoscopy - abdominal pain, diverticula     ABDOMEN SURGERY      colon removed 2nd to  diverticulitis     APPENDECTOMY       ARTHROSCOPY SHOULDER  11/20/2013    Procedure: ARTHROSCOPY SHOULDER;  Right Shoulder Arthroscopy Sub-Acromial Decompression Rotator Cuff Repair;  Surgeon: Lenny Trammell MD;  Location: [facility] OR     [facility]     Diverticulitis      bowel resection     EGD with biopsy  2011     ENDOSCOPY UPPER WITH PANCREATIC STIMULATION       ENDOSCOPY UPPER, COLONOSCOPY, COMBINED  7/18/2014    Procedure: COMBINED ENDOSCOPY UPPER, COLONOSCOPY;  Surgeon: Israel Feliciano MD;  Location: [facility] OR     ENT SURGERY      tonsilectomy     GYN SURGERY      hysterectomy with bladder and rectal repair     ORTHOPEDIC SURGERY  11/20/2013    right rotator cuff surgery     TVH with ovarian preservation       Family History   Problem Relation Age of Onset     CEREBROVASCULAR DISEASE Father      CVA     HEART DISEASE Father      heart disease     Hypertension Father      Myocardial Infarction Father      myocardial infarction     CEREBROVASCULAR DISEASE Mother      CVA     DIABETES Mother      HEART DISEASE Mother      heart disease     Hypertension Mother      HEART DISEASE Brother      heart disease     Hypertension Brother      Social History     Social History     Marital status:      Spouse name: N/A     Number of children: N/A     Years of education: N/A     Occupational History     Not on file.     Social History Main Topics     Smoking status: Former Smoker     Types: Cigarettes     Quit date: 5/6/1968     Smokeless tobacco: Never Used      Comment: no passive smoke exposure     Alcohol use 0.5 oz/week     1 Glasses of wine per week      Comment: daily with dinner     Drug use: No     Sexual activity: Yes     Partners: Male     Birth control/ protection: None     Other Topics Concern      Service No     Blood Transfusions Yes     Permits if needed     Caffeine Concern Yes     4 cups coffee daily     Occupational Exposure No     Hobby  Hazards No     Sleep Concern No     Stress Concern No     Weight Concern No     Special Diet No     Back Care No     Exercise No     Seat Belt Yes     Self-Exams Yes     Parent/Sibling W/ Cabg, Mi Or Angioplasty Before 65f 55m? Yes     Father     Social History Narrative         Review Of Systems  Constitutional, HEENT, cardiovascular, pulmonary, gi and gu systems are negative, except as otherwise noted.      OBJECTIVE:/70 (BP Location: Right arm, Patient Position: Sitting, Cuff Size: Adult Regular)  Pulse 78  Temp 97.5  F (36.4  C) (Tympanic)  Resp 18  Wt 111 lb (50.3 kg)  SpO2 98%  BMI 21.68 kg/m2      Exam:  Physical Exam   Constitutional: She is oriented to person, place, and time. She appears well-developed and well-nourished. No distress.   Musculoskeletal: Normal range of motion. She exhibits no edema.   Tenderness noted over the paralumbar musculature.  Straight leg raising negative.   Neurological: She is alert and oriented to person, place, and time. She has normal strength. She displays no atrophy. No sensory deficit.   Reflex Scores:       Patellar reflexes are 2+ on the right side and 2+ on the left side.       Achilles reflexes are 2+ on the right side and 2+ on the left side.  Skin: Skin is warm and dry.   Psychiatric: She has a normal mood and affect.     Other exam not repeated    Labs:  Orders Only on 09/11/2017   Component Date Value Ref Range Status     Lyme Disease Antibodies Serum 09/11/2017 0.32  0.00 - 0.89 Final    Comment: Negative, Absence of detectable Borrelia burdorferi antibodies. A negative   result does not exclude the possibility of Borrelia burgdorferi infection. If   early Lyme disease is suspected, a second sample should be collected and   tested 2 to 4 weeks later.         ASSESSMENT/PLAN:  Chronic bilateral low back pain with left-sided sciatica  Suspect radiculopathy.  MRI lumbar spine scheduled.  Medrol dosepak as written.  Follow up post testing.  - MR Lumbar  Spine w/o Contrast; Future  - methylPREDNISolone (MEDROL DOSEPAK) 4 MG tablet; Follow package instructions  - MR Lumbar Spine w/o Contrast    Need for prophylactic vaccination and inoculation against combinations of disease  Vaccine updated.  - PNEUMOCOCCAL CONJ VACCINE 13 VALENT IM  - ADMIN 1st VACCINE            Virgil Mackay MD      Body mass index is 21.68 kg/(m^2).  Weight management plan noted, stable and monitoring

## 2017-09-22 NOTE — NURSING NOTE
Chief Complaint   Patient presents with     Imm/Inj     wants to discuss cortisone injection and getting pneumonia vaccine       Initial /70 (BP Location: Right arm, Patient Position: Sitting, Cuff Size: Adult Regular)  Pulse 78  Temp 97.5  F (36.4  C) (Tympanic)  Resp 18  Wt 111 lb (50.3 kg)  SpO2 98%  BMI 21.68 kg/m2 Estimated body mass index is 21.68 kg/(m^2) as calculated from the following:    Height as of 8/14/17: 5' (1.524 m).    Weight as of this encounter: 111 lb (50.3 kg).  Medication Reconciliation: complete   Tatiana Griggs LPN

## 2017-09-22 NOTE — PROGRESS NOTES
Outpatient Physical Therapy Discharge Note     Patient: Demi Narayan  : 1940    Beginning/End Dates of Reporting Period:  17 to 2017    Referring Provider: Dr. Kade Phipps    Therapy Diagnosis: scoliosis acquired     Client Self Report: Patient reports that she has not been having much limitation with pain with daily tasks and recreational walking. Most of patient's symptoms occur when she first wakes in morning and seems more related to arthritis than positioning. Patient states wall sit exercise causes some issues with her hiatal herniation. Has stopped performing this exercise.     Goals:  Goal Identifier STG 1   Goal Description Patient will be independent and compliant with HEP.    Target Date 17   Date Met      Progress:     Goal Identifier LTG 1   Goal Description Patient will be able to consistently sleep throughout the night without significant limitation from pain in LB or LE symptoms.    Target Date 17   Date Met      Progress:     Goal Identifier LTG 2   Goal Description Patient will be able to perform household and outdoor activities of bending, lifting and twisting with good core engagement and good mechanics with less sypmtoms of back pain or LE symptoms during or after tasks.   Target Date 17   Date Met      Progress:     Goal Identifier     Goal Description     Target Date     Date Met      Progress:     Goal Identifier     Goal Description     Target Date     Date Met      Progress:     Goal Identifier     Goal Description     Target Date     Date Met      Progress:     Goal Identifier     Goal Description     Target Date     Date Met      Progress:     Goal Identifier     Goal Description     Target Date     Date Met      Progress:     Progress Toward Goals:   Progress this reporting period: Patient states that she has not noticed improvement in symptoms with exercises. Discussed this further and noted that patient is having pain with prolonged sitting and  when waking in the morning. These symptoms are very likely related to the degenerative changes and scoliosis in the thoracolumbar segments. Exercises have been initiated for core stabilization and LE strengthening. Patient is able to perform these appropriately. In all other respects, patient is very strong in terms of strength. Patient is still performing on toes push ups, regular walking and a vigorous exercise regiment. Patient has reached maximum benefit from PT services. Recommend continued performance of exercises given in PT except wall sit as patient felt this was aggravating to her. Discussed hold on PT services with patient. Patient's TERESSA score actually worsened compared to date of eval, but patient reports that she does not feel that her symptoms worsened. Just that she was paying more attention to the survey this time. Patient in agreement with hold on PT services.             Plan:  Discharge from therapy.    Discharge:    Reason for Discharge: No further expectation of progress.    Equipment Issued: HEP    Discharge Plan: Patient to continue home program.

## 2017-09-22 NOTE — MR AVS SNAPSHOT
After Visit Summary   9/22/2017    Demi Narayan    MRN: [id number]           Patient Information     Date Of Birth          1940        Visit Information        Provider Department      9/22/2017 11:00 AM Virgil Mackay MD [facility]        Today's Diagnoses     Chronic bilateral low back pain with left-sided sciatica    -  1    Need for prophylactic vaccination and inoculation against combinations of disease          Care Instructions    Radiology will call to schedule MRI lumbar spine.            Follow-ups after your visit        Follow-up notes from your care team     Return in about 1 week (around 9/29/2017) for Test Results.      Your next 10 appointments already scheduled     Sep 27, 2017   Procedure with Glalito Alvarado, DO   [facility] ([facility] )    [address] [address] [phone]            Sep 29, 2017  1:15 PM CDT   Radiology with [facility] MRI   [facility] ([facility] )    [address] [address] [phone]              Who to contact     If you have questions or need follow up information about today's clinic visit or your schedule please contact [facility] directly at [phone].  Normal or non-critical lab and imaging results will be communicated to you by MyChart, letter or phone within 4 business days after the clinic has received the results. If you do not hear from us within 7 days, please contact the clinic through MyChart or phone. If you have a critical or abnormal lab result, we will notify you by phone as soon as possible.  Submit refill requests through [employer] or call your pharmacy and they will forward the refill request to us. Please allow 3 business days for your refill to be completed.          Additional Information About Your Visit        MyChart Information     [employer] lets you send messages to your doctor, view your test results, renew your  "prescriptions, schedule appointments and more. To sign up, go to [email or web address] . Click on \"Log in\" on the left side of the screen, which will take you to the Welcome page. Then click on \"Sign up Now\" on the right side of the page.     You will be asked to enter the access code listed below, as well as some personal information. Please follow the directions to create your username and password.     Your access code is: [email or web address]  Expires: 2017  3:31 PM     Your access code will  in 90 days. If you need help or a new code, please call your [facility] clinic or [phone].        Care EveryWhere ID     This is your Care EveryWhere ID. This could be used by other organizations to access your [facility] medical records  [id number]        Your Vitals Were     Pulse Temperature Respirations Pulse Oximetry BMI (Body Mass Index)       78 97.5  F (36.4  C) (Tympanic) 18 98% 21.68 kg/m2        Blood Pressure from Last 3 Encounters:   17 122/70   17 123/73   17 124/72    Weight from Last 3 Encounters:   17 111 lb (50.3 kg)   17 114 lb (51.7 kg)   17 114 lb (51.7 kg)              We Performed the Following     ADMIN 1st VACCINE     PNEUMOCOCCAL CONJ VACCINE 13 VALENT IM          Today's Medication Changes          These changes are accurate as of: 17 11:59 PM.  If you have any questions, ask your nurse or doctor.               Start taking these medicines.        Dose/Directions    methylPREDNISolone 4 MG tablet   Commonly known as:  MEDROL DOSEPAK   Used for:  Chronic bilateral low back pain with left-sided sciatica   Started by:  Virgil Mackay MD        Follow package instructions   Quantity:  21 tablet   Refills:  0            Where to get your medicines      These medications were sent to [id number] - [place] - [address]  [address] [address]     Phone:  [phone]     methylPREDNISolone 4 MG tablet                Primary " Care Provider Office Phone # Fax #    Virgil Mackay [phone]       [facility] [address] [facility] [address]        Equal Access to Services     CROW STVEENS : Hadii dru pablo katherineo Sodeenaali, waaxda luqadaha, qaybta kaalmada adeanthonyda, maria luisa larose laBunnykiet nixon. So [facility] [phone].    ATENCIÓN: Si habla español, tiene a maldonado disposición servicios gratuitos de asistencia lingüística. Llame al [phone].    We comply with applicable federal civil rights laws and [place] laws. We do not discriminate on the basis of race, color, national origin, age, disability sex, sexual orientation or gender identity.            Thank you!     Thank you for choosing [facility]  for your care. Our goal is always to provide you with excellent care. Hearing back from our patients is one way we can continue to improve our services. Please take a few minutes to complete the written survey that you may receive in the mail after your visit with us. Thank you!             Your Updated Medication List - Protect others around you: Learn how to safely use, store and throw away your medicines at www.disposemymeds.org.          This list is accurate as of: 9/22/17 11:59 PM.  Always use your most recent med list.                   Brand Name Dispense Instructions for use Diagnosis    aspirin 325 MG tablet      Take 325 mg by mouth At Bedtime.        CALCIUM + D PO      Take 600 mg by mouth.        ciprofloxacin 500 MG tablet    CIPRO    20 tablet    TAKE TWO TABLETS BY MOUTH EVERY DAY AS NEEDED FRO DIVERTICULITIS    Diverticulitis       estradiol 0.5 MG tablet    ESTRACE    90 tablet    Take 1 tablet (0.5 mg) by mouth daily    Hormone replacement therapy (HRT)       GLUCOSAMINE SULFATE PO      Take  by mouth daily.        methylPREDNISolone 4 MG tablet    MEDROL DOSEPAK    21 tablet    Follow package instructions    Chronic bilateral low back pain with left-sided sciatica        MULTIVITAL PO      Take 1 tablet by mouth daily.        niacin 500 MG tablet      Take 1 tablet by mouth daily.        OMEGA-3 FISH OIL PO      Take  by mouth daily.        PROTONIX PO      Take 40 mg by mouth every morning (before breakfast)    Thoracogenic scoliosis of thoracolumbar region, Post-menopause       traMADol 50 MG tablet    ULTRAM    120 tablet    TAKE ONE TO TWO TABLETS BY MOUTH EVERY 6 TO 8 HOURS AS NEEDED    Sprain of other parts of lumbar spine and pelvis, subsequent encounter

## 2017-09-23 ASSESSMENT — ANXIETY QUESTIONNAIRES: GAD7 TOTAL SCORE: 0

## 2017-09-27 ENCOUNTER — HOSPITAL ENCOUNTER (OUTPATIENT)
Facility: HOSPITAL | Age: 77
Discharge: HOME | End: 2017-09-27
Attending: SURGERY | Admitting: SURGERY
Payer: COMMERCIAL

## 2017-09-27 ENCOUNTER — ANESTHESIA (OUTPATIENT)
Dept: SURGERY | Facility: HOSPITAL | Age: 77
End: 2017-09-27
Payer: COMMERCIAL

## 2017-09-27 ENCOUNTER — ANESTHESIA EVENT (OUTPATIENT)
Dept: SURGERY | Facility: HOSPITAL | Age: 77
End: 2017-09-27
Payer: COMMERCIAL

## 2017-09-27 ENCOUNTER — SURGERY (OUTPATIENT)
Age: 77
End: 2017-09-27

## 2017-09-27 VITALS
BODY MASS INDEX: 21.4 KG/M2 | HEIGHT: 60 IN | DIASTOLIC BLOOD PRESSURE: 68 MMHG | OXYGEN SATURATION: 99 % | RESPIRATION RATE: 18 BRPM | WEIGHT: 109 LBS | SYSTOLIC BLOOD PRESSURE: 131 MMHG | TEMPERATURE: 96.9 F

## 2017-09-27 DIAGNOSIS — K14.8 TONGUE LESION: Primary | ICD-10-CM

## 2017-09-27 PROCEDURE — 46230 REMOVAL OF ANAL TAGS: CPT | Performed by: ANESTHESIOLOGY

## 2017-09-27 PROCEDURE — 99100 ANES PT EXTEME AGE<1 YR&>70: CPT | Performed by: NURSE ANESTHETIST, CERTIFIED REGISTERED

## 2017-09-27 PROCEDURE — 36000050 ZZH SURGERY LEVEL 2 1ST 30 MIN: Performed by: SURGERY

## 2017-09-27 PROCEDURE — 88305 TISSUE EXAM BY PATHOLOGIST: CPT | Mod: TC | Performed by: SURGERY

## 2017-09-27 PROCEDURE — 46230 REMOVAL OF ANAL TAGS: CPT | Performed by: NURSE ANESTHETIST, CERTIFIED REGISTERED

## 2017-09-27 PROCEDURE — 25000125 ZZHC RX 250: Performed by: NURSE ANESTHETIST, CERTIFIED REGISTERED

## 2017-09-27 PROCEDURE — 27210794 ZZH OR GENERAL SUPPLY STERILE: Performed by: SURGERY

## 2017-09-27 PROCEDURE — 25000128 H RX IP 250 OP 636: Performed by: ANESTHESIOLOGY

## 2017-09-27 PROCEDURE — 71000027 ZZH RECOVERY PHASE 2 EACH 15 MINS: Performed by: SURGERY

## 2017-09-27 PROCEDURE — 43239 EGD BIOPSY SINGLE/MULTIPLE: CPT | Performed by: SURGERY

## 2017-09-27 PROCEDURE — 40000305 ZZH STATISTIC PRE PROC ASSESS I: Performed by: SURGERY

## 2017-09-27 PROCEDURE — 25000128 H RX IP 250 OP 636: Performed by: NURSE ANESTHETIST, CERTIFIED REGISTERED

## 2017-09-27 PROCEDURE — 37000008 ZZH ANESTHESIA TECHNICAL FEE, 1ST 30 MIN: Performed by: SURGERY

## 2017-09-27 RX ORDER — DEXAMETHASONE SODIUM PHOSPHATE 4 MG/ML
4 INJECTION, SOLUTION INTRA-ARTICULAR; INTRALESIONAL; INTRAMUSCULAR; INTRAVENOUS; SOFT TISSUE EVERY 10 MIN PRN
Status: CANCELLED | OUTPATIENT
Start: 2017-09-27

## 2017-09-27 RX ORDER — ONDANSETRON 2 MG/ML
4 INJECTION INTRAMUSCULAR; INTRAVENOUS EVERY 30 MIN PRN
Status: CANCELLED | OUTPATIENT
Start: 2017-09-27

## 2017-09-27 RX ORDER — FENTANYL CITRATE 50 UG/ML
25-50 INJECTION, SOLUTION INTRAMUSCULAR; INTRAVENOUS
Status: CANCELLED | OUTPATIENT
Start: 2017-09-27

## 2017-09-27 RX ORDER — LABETALOL HYDROCHLORIDE 5 MG/ML
10 INJECTION, SOLUTION INTRAVENOUS
Status: CANCELLED | OUTPATIENT
Start: 2017-09-27

## 2017-09-27 RX ORDER — ONDANSETRON 4 MG/1
4 TABLET, ORALLY DISINTEGRATING ORAL EVERY 30 MIN PRN
Status: CANCELLED | OUTPATIENT
Start: 2017-09-27

## 2017-09-27 RX ORDER — NALOXONE HYDROCHLORIDE 0.4 MG/ML
.1-.4 INJECTION, SOLUTION INTRAMUSCULAR; INTRAVENOUS; SUBCUTANEOUS
Status: CANCELLED | OUTPATIENT
Start: 2017-09-27 | End: 2017-09-28

## 2017-09-27 RX ORDER — MEPERIDINE HYDROCHLORIDE 25 MG/ML
12.5 INJECTION INTRAMUSCULAR; INTRAVENOUS; SUBCUTANEOUS
Status: CANCELLED | OUTPATIENT
Start: 2017-09-27

## 2017-09-27 RX ORDER — SODIUM CHLORIDE, SODIUM LACTATE, POTASSIUM CHLORIDE, CALCIUM CHLORIDE 600; 310; 30; 20 MG/100ML; MG/100ML; MG/100ML; MG/100ML
INJECTION, SOLUTION INTRAVENOUS CONTINUOUS
Status: DISCONTINUED | OUTPATIENT
Start: 2017-09-27 | End: 2017-09-27 | Stop reason: HOSPADM

## 2017-09-27 RX ORDER — ALBUTEROL SULFATE 0.83 MG/ML
2.5 SOLUTION RESPIRATORY (INHALATION) EVERY 4 HOURS PRN
Status: CANCELLED | OUTPATIENT
Start: 2017-09-27

## 2017-09-27 RX ORDER — LIDOCAINE HYDROCHLORIDE 20 MG/ML
INJECTION, SOLUTION INFILTRATION; PERINEURAL PRN
Status: DISCONTINUED | OUTPATIENT
Start: 2017-09-27 | End: 2017-09-27

## 2017-09-27 RX ORDER — PROMETHAZINE HYDROCHLORIDE 25 MG/ML
12.5 INJECTION, SOLUTION INTRAMUSCULAR; INTRAVENOUS
Status: CANCELLED | OUTPATIENT
Start: 2017-09-27

## 2017-09-27 RX ORDER — FENTANYL CITRATE 50 UG/ML
INJECTION, SOLUTION INTRAMUSCULAR; INTRAVENOUS PRN
Status: DISCONTINUED | OUTPATIENT
Start: 2017-09-27 | End: 2017-09-27

## 2017-09-27 RX ORDER — SODIUM CHLORIDE, SODIUM LACTATE, POTASSIUM CHLORIDE, CALCIUM CHLORIDE 600; 310; 30; 20 MG/100ML; MG/100ML; MG/100ML; MG/100ML
INJECTION, SOLUTION INTRAVENOUS CONTINUOUS
Status: CANCELLED | OUTPATIENT
Start: 2017-09-27

## 2017-09-27 RX ORDER — HYDRALAZINE HYDROCHLORIDE 20 MG/ML
2.5-5 INJECTION INTRAMUSCULAR; INTRAVENOUS EVERY 10 MIN PRN
Status: CANCELLED | OUTPATIENT
Start: 2017-09-27

## 2017-09-27 RX ORDER — PROPOFOL 10 MG/ML
INJECTION, EMULSION INTRAVENOUS PRN
Status: DISCONTINUED | OUTPATIENT
Start: 2017-09-27 | End: 2017-09-27

## 2017-09-27 RX ADMIN — SODIUM CHLORIDE, POTASSIUM CHLORIDE, SODIUM LACTATE AND CALCIUM CHLORIDE: 600; 310; 30; 20 INJECTION, SOLUTION INTRAVENOUS at 09:04

## 2017-09-27 RX ADMIN — MIDAZOLAM HYDROCHLORIDE 1 MG: 1 INJECTION, SOLUTION INTRAMUSCULAR; INTRAVENOUS at 09:43

## 2017-09-27 RX ADMIN — PROPOFOL 10 MG: 10 INJECTION, EMULSION INTRAVENOUS at 09:55

## 2017-09-27 RX ADMIN — PROPOFOL 10 MG: 10 INJECTION, EMULSION INTRAVENOUS at 09:50

## 2017-09-27 RX ADMIN — FENTANYL CITRATE 50 MCG: 50 INJECTION, SOLUTION INTRAMUSCULAR; INTRAVENOUS at 09:43

## 2017-09-27 RX ADMIN — LIDOCAINE HYDROCHLORIDE 40 MG: 20 INJECTION, SOLUTION INFILTRATION; PERINEURAL at 09:47

## 2017-09-27 RX ADMIN — PROPOFOL 10 MG: 10 INJECTION, EMULSION INTRAVENOUS at 09:58

## 2017-09-27 RX ADMIN — PROPOFOL 20 MG: 10 INJECTION, EMULSION INTRAVENOUS at 09:48

## 2017-09-27 RX ADMIN — PROPOFOL 10 MG: 10 INJECTION, EMULSION INTRAVENOUS at 09:57

## 2017-09-27 RX ADMIN — PROPOFOL 10 MG: 10 INJECTION, EMULSION INTRAVENOUS at 09:59

## 2017-09-27 RX ADMIN — PROPOFOL 10 MG: 10 INJECTION, EMULSION INTRAVENOUS at 09:52

## 2017-09-27 RX ADMIN — PROPOFOL 10 MG: 10 INJECTION, EMULSION INTRAVENOUS at 09:49

## 2017-09-27 ASSESSMENT — LIFESTYLE VARIABLES: TOBACCO_USE: 1

## 2017-09-27 NOTE — ANESTHESIA CARE TRANSFER NOTE
Patient: Demi Narayan    Procedure(s):  UPPER ENDOSCOPY WITH BIOPSY AND POLYPECTOMY - Wound Class: II-Clean Contaminated    Diagnosis: ABNORMAL CT OF THE ABDOMEN, REFLUX  Diagnosis Additional Information: No value filed.    Anesthesia Type:   MAC     Note:  Airway :Nasal Cannula  Patient transferred to:Phase II        Vitals: (Last set prior to Anesthesia Care Transfer)    CRNA VITALS  9/27/2017 0935 - 9/27/2017 1009      9/27/2017             Resp Rate (set): 8                Electronically Signed By: FREEDOM Tovar CRNA  September 27, 2017  10:09 AM

## 2017-09-27 NOTE — BRIEF OP NOTE
[facility] - Brief Operative Note    Pre-operative diagnosis: GERD   Post-operative diagnosis Irregular GE junction, hiatal hernia, gastric polyp   Procedure: esophagogastroduodenoscopy   Surgeon: Gallito Alvarado DO   Anesthesia: Monitor Anesthesia Care    Estimated blood loss: 0   Blood transfusion: No transfusion was given during surgery   Drains: 0   Specimens: Duodenum, gastric antrum, gastric polyp, GE junction   Findings: Small type 1 hiatal hernia, GE junction 33cm, small gastric polyp near GE junction   Complications: None   Condition: Stable   Comments: Details included in dictated operative note.

## 2017-09-27 NOTE — OR NURSING
Took roll and liquids w/o nausea. spoke with pt and family.Patient and responsible adult given discharge instructions with no questions regarding instructions. [place] score 20 Pain level 0/10.  Discharged from unit via walking. Patient discharged to home.

## 2017-09-27 NOTE — H&P
Surgery Consult Clinic Note      RE: Demi Narayan  : 1940        Chief Complaint:  Heartburn    History of Present Illness:  Dr. Alvarado originally saw Mrs. Macias on 2017 for evaluation of bilious reflux, occasional sensation of meds getting stuck, excessive phlegm and consideration for esophagogastroduodenoscopy.  She has been treated for reflux with Protonix.  Admits changes in voice.   She doesn't use tobacco, drinks 3-4 cups of coffee a day, one glass of wine daily. She is here today to update her H&P.  Demi is scheduled for esophagogastroduodenoscopy on 2017, which is outside the 30 day anesthesia clearance guidelines.  This was done because of scheduling availability.  She specifically denies fevers, chills, nausea, vomiting, chest pain, shortness of breath, palpitations, sore throat, cough, generalized feeling ill.        Medical history:  Past Medical History:   Diagnosis Date     Chronic/recurrent back pain 2011     Diverticulitis      Diverticulitis, recurrent 2002    bowel resection     Dyslipidemia 2006     Fibrocystic breast disease 2011     Gastro-oesophageal reflux disease      GERD 2/10/2012     Hiatal hernia 2/10/2012     Hormone replacement therapy, postmenopausal 2011     Osteoarthritis        Surgical history:  Past Surgical History:   Procedure Laterality Date     ------------OTHER-------------      colonoscopy with biopsy - diverticulitis, hemorrhoids     ------------OTHER-------------  1994    sigmoidoscopy - abdominal pain, diverticula     ABDOMEN SURGERY      colon removed 2nd to diverticulitis     APPENDECTOMY       ARTHROSCOPY SHOULDER  2013    Procedure: ARTHROSCOPY SHOULDER;  Right Shoulder Arthroscopy Sub-Acromial Decompression Rotator Cuff Repair;  Surgeon: Lenny Trammell MD;  [facility]     [facility]     Divert[facility]itis      bowel resection     EGD with biopsy   2011     ENDOSCOPY UPPER WITH PANCREATIC STIMULATION       ENDOSCOPY UPPER, COLONOSCOPY, COMBINED  7/18/2014    Procedure: COMBINED ENDOSCOPY UPPER, COLONOSCOPY;  Surgeon: Israel Feliciano MD;  Location: [facility]     ENT SURGERY      tonsilectomy     GYN SURGERY      hysterectomy with bladder and rectal repair     ORTHOPEDIC SURGERY  11/20/2013    right rotator cuff surgery     TVH with ovarian preservation         Family history:  Family History   Problem Relation Age of Onset     CEREBROVASCULAR DISEASE Father      CVA     HEART DISEASE Father      heart disease     Hypertension Father      Myocardial Infarction Father      myocardial infarction     CEREBROVASCULAR DISEASE Mother      CVA     DIABETES Mother      HEART DISEASE Mother      heart disease     Hypertension Mother      HEART DISEASE Brother      heart disease     Hypertension Brother        Medications:  Current Outpatient Prescriptions   Medication Sig Dispense Refill     traMADol (ULTRAM) 50 MG tablet TAKE ONE TO TWO TABLETS BY MOUTH EVERY 6 TO 8 HOURS AS NEEDED 120 tablet 0     estradiol (ESTRACE) 0.5 MG tablet Take 1 tablet (0.5 mg) by mouth daily 90 tablet 1     Pantoprazole Sodium (PROTONIX PO) Take 40 mg by mouth every morning (before breakfast)         niacin 500 MG tablet Take 1 tablet by mouth daily.         aspirin 325 MG tablet Take 325 mg by mouth At Bedtime.         GLUCOSAMINE SULFATE PO Take  by mouth daily.         Multiple Vitamins-Minerals (MULTIVITAL PO) Take 1 tablet by mouth daily.         Calcium Carbonate-Vitamin D (CALCIUM + D PO) Take 600 mg by mouth.         Omega-3 Fatty Acids (OMEGA-3 FISH OIL PO) Take  by mouth daily.         ciprofloxacin (CIPRO) 500 MG tablet TAKE TWO TABLETS BY MOUTH EVERY DAY AS NEEDED FRO DIVERTICULITIS (Patient not taking: Reported on 8/14/2017) 20 tablet 0             Current Facility-Administered Medications:      lactated ringers infusion, , Intravenous, Continuous, Shawn Heaton,  MD  Allergies:  The patienthas No Known Allergies.  .  Social history:  Social History   Substance Use Topics     Smoking status: Former Smoker     Types: Cigarettes     Quit date: 5/6/1968     Smokeless tobacco: Never Used      Comment: no passive smoke exposure     Alcohol use 0.5 oz/week     1 Glasses of wine per week      Comment: daily with dinner     Marital status: .      Review of Systems:  /72  Temp 96.9  F (36.1  C) (Oral)  Resp 18  Ht 1.524 m (5')  Wt 49.4 kg (109 lb)  SpO2 97%  BMI 21.29 kg/m2    Constitutional: Negative for fever, chills and weight loss.   HENT: Negative for ear pain, nosebleeds, congestion, sore throat, tinnitus and ear discharge.    Eyes: Negative for blurred vision, double vision, photophobia and pain.   Respiratory: Negative for cough, hemoptysis, shortness of breath, wheezing and stridor.    Cardiovascular: Negative for chest pain, palpitations and orthopnea.   Gastrointestinal: Negative for heartburn, nausea, vomiting, abdominal pain and blood in stool.   Genitourinary: Negative for urgency, frequency and hematuria.   Musculoskeletal: Negative for myalgias, back pain and joint pain.   Neurological: Negative for tingling, speech change and headaches.   Endo/Heme/Allergies: Does not bruise/bleed easily.   Psychiatric/Behavioral: Negative for depression, suicidal ideas and hallucinations. The patient is not nervous/anxious.    Physical Examination:  There were no vitals taken for this visit.    General: AAOx4, NAD, WN/WD, ambulating without assistance  HEENT:NCAT, EOMI, PERRL Sclerae anicteric; Trachea mideline, no JVD  Chest:   Clear to auscultation bilaterally.  Cardiac: S1S2 , regular rate and rhythm without additional sounds  Abdomen: Soft, non-tender, non-distended  Extremities: Cursory exam unremarkable.  No peripheral edema noted.  Skin: Warm, dry, < 2 sec cap refill  Neuro: CN 2-12 grossly intact, no focal deficit, GCS 15  Psych: Pleasant, calm, asks  appropriate questions    Assessment/Plan:  #1 Esophagogastroduodenoscopy     The indications, risks, benefits and technical aspects of esophagogastroduodenoscopy were reviewed, her questions were asked and answered. Antral biopsy for histologic examination will be performed and the place of H. pylori in gastritis was discussed. Patient has been NPO after midnight and a request made to hold aspirin containing agents one week prior to ameliorate antiplatelet effect. Will proceed as scheduled.      Nicole Stern [facility]  [address] [place] [address]    Referring Provider:  No referring provider defined for this encounter.     Primary Care Provider:  Virgil Mackay

## 2017-09-27 NOTE — ANESTHESIA POSTPROCEDURE EVALUATION
Patient: Demi Narayan    Procedure(s):  UPPER ENDOSCOPY WITH BIOPSY AND POLYPECTOMY - Wound Class: II-Clean Contaminated    Diagnosis:ABNORMAL CT OF THE ABDOMEN, REFLUX  Diagnosis Additional Information: No value filed.    Anesthesia Type:  MAC    Note:  Anesthesia Post Evaluation    Patient location during evaluation: Phase 2 and Bedside  Patient participation: Able to fully participate in evaluation  Level of consciousness: awake and alert  Pain management: adequate  Airway patency: patent  Cardiovascular status: acceptable  Respiratory status: acceptable  Hydration status: stable  PONV: none     Anesthetic complications: None          Last vitals:  Vitals:    09/27/17 1020 09/27/17 1025 09/27/17 1030   BP: 118/54 129/62 136/67   Resp: 18 18 18   Temp:      SpO2: 96% 94% (!) 91%         Electronically Signed By: Shawn Heaton MD  September 27, 2017  10:48 AM

## 2017-09-27 NOTE — IP AVS SNAPSHOT
[facility]/Phase II    [address] [address]    Phone:  [phone]                                       After Visit Summary   9/27/2017    Demi Narayan    MRN: [id number]           After Visit Summary Signature Page     I have received my discharge instructions, and my questions have been answered. I have discussed any challenges I see with this plan with the nurse or doctor.    ..........................................................................................................................................  Patient/Patient Representative Signature      ..........................................................................................................................................  Patient Representative Print Name and Relationship to Patient    ..................................................               ................................................  Date                                            Time    ..........................................................................................................................................  Reviewed by Signature/Title    ...................................................              ..............................................  Date                                                            Time

## 2017-09-27 NOTE — IP AVS SNAPSHOT
MRN:[id number]                      After Visit Summary   9/27/2017    Demi Narayan    MRN: [id number]           Thank you!     Thank you for choosing [facility] for your care. Our goal is always to provide you with excellent care. Hearing back from our patients is one way we can continue to improve our services. Please take a few minutes to complete the written survey that you may receive in the mail after you visit with us. Thank you!        Patient Information     Date Of Birth          1940        About your hospital stay     You were admitted on:  September 27, 2017 You last received care in the:  HI Preop/Phase II    You were discharged on:  September 27, 2017       Who to Call     For medical emergencies, please call 911.  For non-urgent questions about your medical care, please call your primary care provider or clinic, [phone]  For questions related to your surgery, please call your surgery clinic        Attending Provider     Provider Specialty    Gallito Alvarado, DO Surgery       Primary Care Provider Office Phone # Fax #    Virgil Mackay [phone]      Your next 10 appointments already scheduled     Sep 29, 2017  1:15 PM CDT   Radiology with HI MRI   HI MRI ([facility] )    [address] [place] [id number] [phone]              Further instructions from your care team           UPPER ENDOSCOPY    PURPOSE:  An Upper Endoscopy is a procedure in which the doctor passes a flexible, lighted tube called a gastroscope through your mouth into your stomach in order to:    See the lining of the esophagus, stomach, and duodenum (first part of the small intestine).    Look for bleeding, inflammation (swelling), abnormal tumors or tissue, or ulcers.    Take biopsy specimens.  (Biopsies do not hurt.)    TELL YOUR DOCTOR IF:     You have a heart condition, heart murmur, or have had heart valve surgery.    You have had angioplasty  with stents put in within the last year.    You are taking blood thinners - Aspirin, Anti-inflammatory pain pills (like Motrin, ibuprofen, Naproxen, Feldene, Advil, etc.), Coumadin, Plavix.    You have diabetes - contact your regular doctor for management of your insulin.    YOU NEED TO:    Have someone drive you home.  You are not allowed to drive until the next day.  (If you take a taxi, the person staying with you after surgery must ride with you in the taxi.  The  is not responsible for you).    Have someone stay with you for four (4) hours after you leave the hospital.    Know the time to be at admitting:  A nurse will call you with the time the afternoon before your procedure.  If your procedure is on Monday, you will be called on Friday.  If you have not been contacted with the time, call the Admitting Department at [phone] or [phone], ext. [phone] after 5 pm (Admitting is open 24 hours daily).    Talk with the Surgery Patient Education Nurses.  Please call them @ [phone] or toll free [phone] after 8:00 a.m. Monday through Friday.    TO PREPARE FOR THE PROCEDURE:      ONE WEEK BEFORE:    Stop Aspirin, anti-inflammatory pain pills (Motrin, ibuprofen, Naproxen, Feldene, Advil, etc.).  It is OK to take Tylenol (acetaminophen).    Your doctor will tell you what to do if you are on Coumadin or Plavix.     NIGHT BEFORE:    Do not eat or drink anything after midnight.  Your stomach needs to be empty.     DAY OF YOUR PROCEDURE:    Take your pills you were told to take.  Do not take diabetic pills.  If you are on Insulin, take the dose your doctor told you to take.    Wear loose, comfortable clothing and shoes.    Remove ALL jewelry including wedding rings.  Leave valuables at home.    Come to the hospital Admitting Department located at the [facility] on the lower level.    In Same Day surgery, you will be asked to change into an exam gown.    You will be asked your name, birth date,  and what you are having done by every person who is involved with your care.    Your health history, medications, and allergies will be reviewed and verified.    An IV (intravenous line) will be started in your hand.  DURING THE UPPER ENDOSCOPY:    Your doctor and a registered nurse will be with you throughout the procedure which takes about 30 minutes.    You will either lie on your left side or on your back.    Medications will be given to you to help you relax and reduce the gag reflex when swallowing the scope.    Your doctor may need to insert air into your stomach to see better.  This may cause fullness or a cramping sensation.  The air will be removed at the end of the exam.    AFTER THE PROCEDURE    You will return to Same Day Surgery to rest for about an hour before you go home.    The doctor will talk with you and your family.    A family member/friend may visit with you.    You may burp up any air remaining in your stomach.    You may feel dizzy or light-headed from the medicine.    Your nurse will go over the discharge instructions with you and your caregiver and answer any of your questions.      You will be contacted the next day to check on how you are doing.    If biopsies were taken, you will be contacted with the results usually within 3 days.  BACK AT HOME    Rest for an hour or two after you get home.    When your throat is no longer numb and you have a gag reflex, take a few sips of cool water.  If you can swallow comfortably, you may start eating again.    You may have a mild sore throat for the rest of the day.  WHAT TO WATCH FOR:  Problems rarely occur after the exam, but it is important to be aware of the early signs of a complication.  Call your doctor immediately if you have:    Difficulty swallowing or breathing    Unusual pain in your stomach or chest    Vomiting blood or dark material that looks like coffee grounds    Black or tarry stools    Temperature over 100.6 degrees    MORE  QUESTIONS?  Please ask your doctor or nurse before the exam begins  or call your doctor at the clinic.    IF YOU MUST CANCEL YOUR PROCEDURE THE EVENING/NIGHT BEFORE, PLEASE CALL HOSPITAL ADMITTING -[phone] OR TOLL FREE [phone], EXT. [phone].    Phone Numbers:  Hospital - [phone] [phone]  [facility] - [phone]  Surgery Patient Education - [phone] or toll free [phone]    Post-Anesthesia Patient Instructions    IMMEDIATELY FOLLOWING SURGERY:  Do not drive or operate machinery for the first twenty four hours after surgery.  Do not make any important decisions for twenty four hours after surgery or while taking narcotic pain medications or sedatives.  If you develop intractable nausea and vomiting or a severe headache please notify your doctor immediately.    FOLLOW-UP:  Please make an appointment with your surgeon as instructed. You do not need to follow up with anesthesia unless specifically instructed to do so.    WOUND CARE INSTRUCTIONS (if applicable):  Keep a dry clean dressing on the anesthesia/puncture wound site if there is drainage.  Once the wound has quit draining you may leave it open to air.  Generally you should leave the bandage intact for twenty four hours unless there is drainage.  If the epidural site drains for more than 36-48 hours please call the anesthesia department.    QUESTIONS?:  Please feel free to call your physician or the hospital  if you have any questions, and they will be happy to assist you.       Pending Results     No orders found from 9/25/2017 to 9/28/2017.            Admission Information     Date & Time Provider Department Dept. Phone    9/27/2017 Gallito Alvarado,  HI Preop/Phase [phone]      Your Vitals Were     Blood Pressure Temperature Respirations Height Weight Pulse Oximetry    122/72 96.9  F (36.1  C) (Oral) 18 1.524 m (5') 49.4 kg (109 lb) 97%    BMI (Body Mass Index)                   21.29 kg/m2          "  MyChart Information     [email or web address] lets you send messages to your doctor, view your test results, renew your prescriptions, schedule appointments and more. To sign up, go to [email or web address] . Click on \"Log in\" on the left side of the screen, which will take you to the Welcome page. Then click on \"Sign up Now\" on the right side of the page.     You will be asked to enter the access code listed below, as well as some personal information. Please follow the directions to create your username and password.     Your access code is: [email or web address]  Expires: 2017  3:31 PM     Your access code will  in 90 days. If you need help or a new code, please call your [facility] clinic or [phone].        Care EveryWhere ID     This is your Care EveryWhere ID. This could be used by other organizations to access your [facility] medical records  [id number]        Equal Access to Services     CROW STEVENS AH: Davion murphyo Soandree, waaxda luelvin, qaybta kaalmada adeyosi, maria luisa nixon. So [facility] [phone].    ATENCIÓN: Si albertola espshyanne, tiene a maldonado disposición servicios gratuitos de asistencia lingüística. Llame al [phone].    We comply with applicable federal civil rights laws and [place] laws. We do not discriminate on the basis of race, color, national origin, age, disability sex, sexual orientation or gender identity.               Review of your medicines      CONTINUE these medicines which have NOT CHANGED        Dose / Directions    aspirin 325 MG tablet        Dose:  325 mg   Take 325 mg by mouth At Bedtime.   Refills:  0       CALCIUM + D PO        Dose:  600 mg   Take 600 mg by mouth.   Refills:  0       ciprofloxacin 500 MG tablet   Commonly known as:  CIPRO   Used for:  Diverticulitis        TAKE TWO TABLETS BY MOUTH EVERY DAY AS NEEDED FRO DIVERTICULITIS   Quantity:  20 tablet   Refills:  0       estradiol 0.5 MG tablet   Commonly known as:  ESTRACE   Used " for:  Hormone replacement therapy (HRT)        Dose:  0.5 mg   Take 1 tablet (0.5 mg) by mouth daily   Quantity:  90 tablet   Refills:  1       GLUCOSAMINE SULFATE PO        Take  by mouth daily.   Refills:  0       methylPREDNISolone 4 MG tablet   Commonly known as:  MEDROL DOSEPAK   Used for:  Chronic bilateral low back pain with left-sided sciatica        Follow package instructions   Quantity:  21 tablet   Refills:  0       MULTIVITAL PO        Dose:  1 tablet   Take 1 tablet by mouth daily.   Refills:  0       niacin 500 MG tablet        Dose:  1 tablet   Take 1 tablet by mouth daily.   Refills:  0       OMEGA-3 FISH OIL PO        Take  by mouth daily.   Refills:  0       PROTONIX PO   Used for:  Thoracogenic scoliosis of thoracolumbar region, Post-menopause        Dose:  40 mg   Take 40 mg by mouth every morning (before breakfast)   Refills:  0       traMADol 50 MG tablet   Commonly known as:  ULTRAM   Used for:  Sprain of other parts of lumbar spine and pelvis, subsequent encounter        TAKE ONE TO TWO TABLETS BY MOUTH EVERY 6 TO 8 HOURS AS NEEDED   Quantity:  120 tablet   Refills:  0                Protect others around you: Learn how to safely use, store and throw away your medicines at www.disposemymeds.org.             Medication List: This is a list of all your medications and when to take them. Check marks below indicate your daily home schedule. Keep this list as a reference.      Medications           Morning Afternoon Evening Bedtime As Needed    aspirin 325 MG tablet   Take 325 mg by mouth At Bedtime.                                CALCIUM + D PO   Take 600 mg by mouth.                                ciprofloxacin 500 MG tablet   Commonly known as:  CIPRO   TAKE TWO TABLETS BY MOUTH EVERY DAY AS NEEDED FRO DIVERTICULITIS                                estradiol 0.5 MG tablet   Commonly known as:  ESTRACE   Take 1 tablet (0.5 mg) by mouth daily                                GLUCOSAMINE SULFATE PO    Take  by mouth daily.                                methylPREDNISolone 4 MG tablet   Commonly known as:  MEDROL DOSEPAK   Follow package instructions                                MULTIVITAL PO   Take 1 tablet by mouth daily.                                niacin 500 MG tablet   Take 1 tablet by mouth daily.                                OMEGA-3 FISH OIL PO   Take  by mouth daily.                                PROTONIX PO   Take 40 mg by mouth every morning (before breakfast)                                traMADol 50 MG tablet   Commonly known as:  ULTRAM   TAKE ONE TO TWO TABLETS BY MOUTH EVERY 6 TO 8 HOURS AS NEEDED

## 2017-09-27 NOTE — ANESTHESIA PREPROCEDURE EVALUATION
Anesthesia Evaluation     . Pt has had prior anesthetic.     No history of anesthetic complications          ROS/MED HX    ENT/Pulmonary:     (+)tobacco use, Past use quit 1968 packs/day  , . .    Neurologic:  - neg neurologic ROS     Cardiovascular:     (+) Dyslipidemia, ----. : . . . :. .       METS/Exercise Tolerance:     Hematologic:  - neg hematologic  ROS       Musculoskeletal:   (+) arthritis, , , other musculoskeletal- Chronic LBP, DJD, throracolumbar Scoliosis      GI/Hepatic:     (+) GERD hiatal hernia, Other GI/Hepatic Recurrent Diverticular Disease s/p partial colon resection       Renal/Genitourinary:  - ROS Renal section negative       Endo:  - neg endo ROS       Psychiatric:  - neg psychiatric ROS       Infectious Disease:   (+) Other Infectious Disease Current Lyme disease      Malignancy:      - no malignancy   Other:    (+) H/O Chronic Pain,H/O chronic opiod use ,                    Physical Exam  Normal systems: cardiovascular and pulmonary    Airway   Mallampati: III  TM distance: >3 FB  Neck ROM: full    Dental   Comment: bridgework    Cardiovascular   Rhythm and rate: regular and normal      Pulmonary    breath sounds clear to auscultation                    Anesthesia Plan      History & Physical Review  History and physical reviewed and following examination; no interval change.    ASA Status:  2 .    NPO Status:  > 8 hours    Plan for MAC with Intravenous and Propofol induction. Maintenance will be TIVA.  Reason for MAC:  Procedure to face, neck, head or breast, Other - see comments and Difficulty with conscious sedation (QS)  PONV prophylaxis:  Ondansetron (or other 5HT-3)  Surgeon requests deep sedation. Patient has advanced age >70, has a Mallampati 3 airway, and has chronic pain d/o treated with chronic opioids. Will provide MAC.      Postoperative Care  Postoperative pain management:  IV analgesics.      Consents  Anesthetic plan, risks, benefits and alternatives discussed with:   Patient..                          .

## 2017-09-27 NOTE — DISCHARGE INSTRUCTIONS
UPPER ENDOSCOPY    PURPOSE:  An Upper Endoscopy is a procedure in which the doctor passes a flexible, lighted tube called a gastroscope through your mouth into your stomach in order to:    See the lining of the esophagus, stomach, and duodenum (first part of the small intestine).    Look for bleeding, inflammation (swelling), abnormal tumors or tissue, or ulcers.    Take biopsy specimens.  (Biopsies do not hurt.)    TELL YOUR DOCTOR IF:     You have a heart condition, heart murmur, or have had heart valve surgery.    You have had angioplasty with stents put in within the last year.    You are taking blood thinners - Aspirin, Anti-inflammatory pain pills (like Motrin, ibuprofen, Naproxen, Feldene, Advil, etc.), Coumadin, Plavix.    You have diabetes - contact your regular doctor for management of your insulin.    YOU NEED TO:    Have someone drive you home.  You are not allowed to drive until the next day.  (If you take a taxi, the person staying with you after surgery must ride with you in the taxi.  The  is not responsible for you).    Have someone stay with you for four (4) hours after you leave the hospital.    Know the time to be at admitting:  A nurse will call you with the time the afternoon before your procedure.  If your procedure is on Monday, you will be called on Friday.  If you have not been contacted with the time, call the Admitting Department at [phone] or [phone], ext. [phone] after 5 pm (Admitting is open 24 hours daily).    Talk with the Surgery Patient Education Nurses.  Please call them @ [phone] or toll free [phone] after 8:00 a.m. Monday through Friday.    TO PREPARE FOR THE PROCEDURE:      ONE WEEK BEFORE:    Stop Aspirin, anti-inflammatory pain pills (Motrin, ibuprofen, Naproxen, Feldene, Advil, etc.).  It is OK to take Tylenol (acetaminophen).    Your doctor will tell you what to do if you are on Coumadin or Plavix.     NIGHT BEFORE:    Do not eat or drink  anything after midnight.  Your stomach needs to be empty.     DAY OF YOUR PROCEDURE:    Take your pills you were told to take.  Do not take diabetic pills.  If you are on Insulin, take the dose your doctor told you to take.    Wear loose, comfortable clothing and shoes.    Remove ALL jewelry including wedding rings.  Leave valuables at home.    Come to the hospital Admitting Department located at the [facility] on the lower level.    In Same Day surgery, you will be asked to change into an exam gown.    You will be asked your name, birth date, and what you are having done by every person who is involved with your care.    Your health history, medications, and allergies will be reviewed and verified.    An IV (intravenous line) will be started in your hand.  DURING THE UPPER ENDOSCOPY:    Your doctor and a registered nurse will be with you throughout the procedure which takes about 30 minutes.    You will either lie on your left side or on your back.    Medications will be given to you to help you relax and reduce the gag reflex when swallowing the scope.    Your doctor may need to insert air into your stomach to see better.  This may cause fullness or a cramping sensation.  The air will be removed at the end of the exam.    AFTER THE PROCEDURE    You will return to Same Day Surgery to rest for about an hour before you go home.    The doctor will talk with you and your family.    A family member/friend may visit with you.    You may burp up any air remaining in your stomach.    You may feel dizzy or light-headed from the medicine.    Your nurse will go over the discharge instructions with you and your caregiver and answer any of your questions.      You will be contacted the next day to check on how you are doing.    If biopsies were taken, you will be contacted with the results usually within 3 days.  BACK AT HOME    Rest for an hour or two after you get home.    When your throat is no longer numb and you have a  gag reflex, take a few sips of cool water.  If you can swallow comfortably, you may start eating again.    You may have a mild sore throat for the rest of the day.  WHAT TO WATCH FOR:  Problems rarely occur after the exam, but it is important to be aware of the early signs of a complication.  Call your doctor immediately if you have:    Difficulty swallowing or breathing    Unusual pain in your stomach or chest    Vomiting blood or dark material that looks like coffee grounds    Black or tarry stools    Temperature over 100.6 degrees    MORE QUESTIONS?  Please ask your doctor or nurse before the exam begins  or call your doctor at the clinic.    IF YOU MUST CANCEL YOUR PROCEDURE THE EVENING/NIGHT BEFORE, PLEASE CALL HOSPITAL ADMITTING -[phone] OR TOLL FREE [phone], [phone]. [phone].    Phone Numbers:  Hospital - [phone] [phone]  [facility] - [phone]  Surgery Patient Education - [phone] or toll free [phone]    Post-Anesthesia Patient Instructions    IMMEDIATELY FOLLOWING SURGERY:  Do not drive or operate machinery for the first twenty four hours after surgery.  Do not make any important decisions for twenty four hours after surgery or while taking narcotic pain medications or sedatives.  If you develop intractable nausea and vomiting or a severe headache please notify your doctor immediately.    FOLLOW-UP:  Please make an appointment with your surgeon as instructed. You do not need to follow up with anesthesia unless specifically instructed to do so.    WOUND CARE INSTRUCTIONS (if applicable):  Keep a dry clean dressing on the anesthesia/puncture wound site if there is drainage.  Once the wound has quit draining you may leave it open to air.  Generally you should leave the bandage intact for twenty four hours unless there is drainage.  If the epidural site drains for more than 36-48 hours please call the anesthesia department.    QUESTIONS?:  Please feel free to call your  physician or the hospital  if you have any questions, and they will be happy to assist you.

## 2017-09-27 NOTE — OP NOTE
DATE OF PROCEDURE:  09/27/2017      PREOPERATIVE DIAGNOSIS:  Gastroesophageal reflux disease.      POSTOPERATIVE DIAGNOSIS:  Irregular GE junction, hiatal hernia gastric polyp.      PROCEDURE:  Esophagogastroduodenoscopy.      INDICATION:  Demi Narayan is a 77-year-old female with longstanding history of reflux, complaining of heartburn, voice change, difficulty swallowing pills and CT scan demonstrating abnormal thickening of the gastric antrum, here for diagnostic endoscopy.      SURGEON:  Gallito Alvarado DO      DESCRIPTION OF PROCEDURE:  The patient was brought into the endoscopy suite and placed in the left lateral decubitus position.  After preprocedural pause and attended monitored anesthesia was administered, the endoscope was advanced through the oral bite block through the oropharynx, past the cricopharyngeus and easily intubated the esophagus.  Under direct visualization, the endoscope was advanced down the esophagus, through the stomach, past the pylorus into the distal portions of the duodenum.  The mucosa of the duodenum was thoroughly evaluated upon slow withdrawal of the endoscope, and biopsies were taken using cold biting forceps.  The endoscope then was withdrawn to the antrum of the stomach.  This was wholly unremarkable.  Biopsies were obtained using cold biting forceps and sent to pathology.  Retroflexion in the cardia fundus demonstrated a small type 1 hiatal hernia as well as a small gastric polyp in the fundus of the stomach near the GE junction.  This was removed using the cold biting forceps and sent to Pathology.  The endoscope then was withdrawn to the GE junction at 33 cm.  This was slightly irregular.  Biopsies were obtained using cold biting forceps and sent to Pathology.  The extra air was removed from the stomach and the endoscope was slowly withdrawn evaluating the entire length of the esophagus, which was unremarkable.  The endoscope then was completely withdrawn.  The patient  tolerated the procedure well and was taken to postanesthesia care unit.         KARISSA HANSEN DO             D: 2017 10:08   T: 2017 10:26   MT: harmeet      Name:     JED VELASQUEZ   MRN:      [id number]        Account:        [id number]   :      1940           Procedure Date: 2017      Document: [id number]

## 2017-09-28 LAB — COPATH REPORT: NORMAL

## 2017-09-29 ENCOUNTER — TELEPHONE (OUTPATIENT)
Dept: FAMILY MEDICINE | Facility: OTHER | Age: 77
End: 2017-09-29

## 2017-09-29 ENCOUNTER — HOSPITAL ENCOUNTER (OUTPATIENT)
Dept: MRI IMAGING | Facility: HOSPITAL | Age: 77
Discharge: HOME | End: 2017-09-29
Attending: FAMILY MEDICINE | Admitting: FAMILY MEDICINE
Payer: COMMERCIAL

## 2017-09-29 DIAGNOSIS — M41.35 THORACOGENIC SCOLIOSIS OF THORACOLUMBAR REGION: ICD-10-CM

## 2017-09-29 DIAGNOSIS — Z78.0 POST-MENOPAUSE: ICD-10-CM

## 2017-09-29 PROCEDURE — 72148 MRI LUMBAR SPINE W/O DYE: CPT | Mod: TC

## 2017-09-29 RX ORDER — PANTOPRAZOLE SODIUM 40 MG/1
40 TABLET, DELAYED RELEASE ORAL
Qty: 90 TABLET | Refills: 1 | Status: SHIPPED | OUTPATIENT
Start: 2017-09-29 | End: 2018-03-23

## 2017-09-29 NOTE — TELEPHONE ENCOUNTER
8:10 AM    Reason for Call: Phone Call    Description: Patient called and said she recently saw Dr. Feliciano and he had given her a prescription, and she is not sure if she should have a refill of this, and she was told to contact her PCP if you could call her back at [phone]    Was an appointment offered for this call? No  If yes : Appointment type              Date    Preferred method for responding to this message: Telephone Call  What is your phone number ?[phone]    If we cannot reach you directly, may we leave a detailed response at the number you provided? No    Can this message wait until your PCP/provider returns, if available today? PCP is in    Gretchen Barahona

## 2017-09-29 NOTE — TELEPHONE ENCOUNTER
Patient saw another provider and they said that you would refill this for her. Graciela pended it for you to sign or would you like to see her?

## 2017-10-02 DIAGNOSIS — S33.8XXD SPRAIN OF OTHER PARTS OF LUMBAR SPINE AND PELVIS, SUBSEQUENT ENCOUNTER: ICD-10-CM

## 2017-10-02 NOTE — TELEPHONE ENCOUNTER
Tramadol      Last Written Prescription Date:  8/29/17  Last Fill Quantity: 120,   # refills: 0  Last Office Visit with [facility] prescribing provider: 9/22/17  Future Office visit:       Routing refill request to provider for review/approval because:  Drug not on the [facility] refill protocol or controlled substance

## 2017-10-03 ENCOUNTER — HOSPITAL ENCOUNTER (EMERGENCY)
Facility: HOSPITAL | Age: 77
Discharge: HOME | End: 2017-10-03
Attending: NURSE PRACTITIONER | Admitting: NURSE PRACTITIONER
Payer: COMMERCIAL

## 2017-10-03 VITALS
TEMPERATURE: 98.7 F | OXYGEN SATURATION: 98 % | RESPIRATION RATE: 16 BRPM | SYSTOLIC BLOOD PRESSURE: 143 MMHG | DIASTOLIC BLOOD PRESSURE: 51 MMHG

## 2017-10-03 DIAGNOSIS — H66.001 ACUTE SUPPURATIVE OTITIS MEDIA OF RIGHT EAR WITHOUT SPONTANEOUS RUPTURE OF TYMPANIC MEMBRANE, RECURRENCE NOT SPECIFIED: ICD-10-CM

## 2017-10-03 DIAGNOSIS — H61.21 IMPACTED CERUMEN OF RIGHT EAR: ICD-10-CM

## 2017-10-03 DIAGNOSIS — H60.91 OTITIS EXTERNA OF RIGHT EAR, UNSPECIFIED CHRONICITY, UNSPECIFIED TYPE: ICD-10-CM

## 2017-10-03 DIAGNOSIS — H66.002 ACUTE SUPPURATIVE OTITIS MEDIA OF LEFT EAR WITHOUT SPONTANEOUS RUPTURE OF TYMPANIC MEMBRANE, RECURRENCE NOT SPECIFIED: ICD-10-CM

## 2017-10-03 PROCEDURE — 25000128 H RX IP 250 OP 636: Performed by: NURSE PRACTITIONER

## 2017-10-03 PROCEDURE — 99214 OFFICE O/P EST MOD 30 MIN: CPT | Mod: 25

## 2017-10-03 PROCEDURE — 99213 OFFICE O/P EST LOW 20 MIN: CPT | Performed by: NURSE PRACTITIONER

## 2017-10-03 PROCEDURE — 96372 THER/PROPH/DIAG INJ SC/IM: CPT

## 2017-10-03 RX ORDER — TRAMADOL HYDROCHLORIDE 50 MG/1
TABLET ORAL
Qty: 120 TABLET | Refills: 0 | Status: SHIPPED | OUTPATIENT
Start: 2017-10-03 | End: 2017-11-03

## 2017-10-03 RX ORDER — OFLOXACIN 3 MG/ML
5 SOLUTION AURICULAR (OTIC) 2 TIMES DAILY
Qty: 5 ML | Refills: 0 | Status: SHIPPED | OUTPATIENT
Start: 2017-10-03 | End: 2017-10-10

## 2017-10-03 RX ORDER — AMOXICILLIN 500 MG/1
500 TABLET, FILM COATED ORAL 3 TIMES DAILY
Qty: 30 TABLET | Refills: 0 | Status: SHIPPED | OUTPATIENT
Start: 2017-10-03 | End: 2017-10-06

## 2017-10-03 RX ORDER — KETOROLAC TROMETHAMINE 30 MG/ML
30 INJECTION, SOLUTION INTRAMUSCULAR; INTRAVENOUS ONCE
Status: COMPLETED | OUTPATIENT
Start: 2017-10-03 | End: 2017-10-03

## 2017-10-03 RX ADMIN — KETOROLAC TROMETHAMINE 30 MG: 30 INJECTION, SOLUTION INTRAMUSCULAR; INTRAVENOUS at 14:50

## 2017-10-03 ASSESSMENT — ENCOUNTER SYMPTOMS
NAUSEA: 0
DIARRHEA: 0
FEVER: 1
APPETITE CHANGE: 1
FATIGUE: 1
SINUS PAIN: 1
COUGH: 0
ABDOMINAL PAIN: 0
VOMITING: 0

## 2017-10-03 NOTE — ED AVS SNAPSHOT
[facility]    [address] [address]    Phone:  [phone]                                       Demi Narayan   MRN: [id number]    Department:  [facility]   Date of Visit:  10/3/2017           Patient Information     Date Of Birth          1940        Your diagnoses for this visit were:     Impacted cerumen of right ear     Acute suppurative otitis media of right ear without spontaneous rupture of tympanic membrane, recurrence not specified     Infective otitis externa, left        You were seen by Barbara Lewis NP.      Follow-up Information     Follow up with [facility].    Specialty:  EMERGENCY MEDICINE    Why:  As needed, If symptoms worsen, or concerns develop    Contact information:    [address] [address] [phone]    Additional information:    From [facility]: Take [address]. Turn left at [address]. Turn left at the first stoplight on [address]. At the first stop sign, take a right onto [address]. Take a left into the parking lot and continue through until you reach the North enterance of the building.       From [place]: Take [address]. Take the [address] ramp towards [place]. Turn left onto [address]. Take a slight right onto [address] [email or web address] Turn left at the first stoplight on [address]. At the first stop sign, take a right onto [address]. Take a left into the parking lot and continue through until you reach the North enterance of the building.       From [place]: Take [address]. Take a right at [address]. At the first stop sign, take a right onto [address]. Take a left into the parking lot and continue through until you reach the North enterance of the building.         Follow up with Virgil Mackay MD. Call on 10/3/2017.    Specialty:  Family Practice    Why:  to schedule an appointment for  follow up in 10-14 days    Contact information:    [facility]  [address] [place] [address]  [phone]        Discharge References/Attachments     OTITIS MEDIA, ANTIBIOTIC TREATMENT (ADULT) (ENGLISH)    IMPACTED EARWAX (ENGLISH)    EXTERNAL EAR INFECTION (ADULT) (ENGLISH)      Future Appointments        Provider Department Dept Phone Center    11/6/2017 11:15 AM Latanya Laird MD [facility] [phone] [place]         Review of your medicines      START taking        Dose / Directions Last dose taken    amoxicillin 500 MG tablet   Commonly known as:  AMOXIL   Dose:  500 mg   Quantity:  30 tablet        Take 1 tablet (500 mg) by mouth 3 times daily for 10 days   Refills:  0        ofloxacin 0.3 % otic solution   Commonly known as:  FLOXIN   Dose:  5 drop   Quantity:  5 mL        Place 5 drops in ear(s) 2 times daily for 7 days   Refills:  0          Our records show that you are taking the medicines listed below. If these are incorrect, please call your family doctor or clinic.        Dose / Directions Last dose taken    aspirin 325 MG tablet   Dose:  325 mg        Take 325 mg by mouth At Bedtime.   Refills:  0        CALCIUM + D PO   Dose:  600 mg        Take 600 mg by mouth.   Refills:  0        estradiol 0.5 MG tablet   Commonly known as:  ESTRACE   Dose:  0.5 mg   Quantity:  90 tablet        Take 1 tablet (0.5 mg) by mouth daily   Refills:  1        GLUCOSAMINE SULFATE PO        Take  by mouth daily.   Refills:  0        MULTIVITAL PO   Dose:  1 tablet        Take 1 tablet by mouth daily.   Refills:  0        niacin 500 MG tablet   Dose:  1 tablet        Take 1 tablet by mouth daily.   Refills:  0        OMEGA-3 FISH OIL PO        Take  by mouth daily.   Refills:  0        pantoprazole 40 MG EC tablet   Commonly known as:  PROTONIX   Dose:  40 mg   Quantity:  90 tablet        Take 1 tablet (40 mg) by mouth every morning (before breakfast)   Refills:  1        traMADol 50 MG  "tablet   Commonly known as:  ULTRAM   Quantity:  120 tablet        TAKE ONE TO TWO TABLETS BY MOUTH EVERY 6 TO 8 HOURS AS NEEDED   Refills:  0                Prescriptions were sent or printed at these locations (2 Prescriptions)                   [facility] [address] [address]    [address], [place] [address]    Telephone:  [phone]   Fax:  [phone]   Hours:                  E-Prescribed (2 of 2)         amoxicillin (AMOXIL) 500 MG tablet               ofloxacin (FLOXIN) 0.3 % otic solution                Orders Needing Specimen Collection     None      Pending Results     No orders found from 10/1/2017 to 10/4/2017.            Pending Culture Results     No orders found from 10/1/2017 to 10/4/2017.            Thank you for choosing [facility]       Thank you for choosing [facility] for your care. Our goal is always to provide you with excellent care. Hearing back from our patients is one way we can continue to improve our services. Please take a few minutes to complete the written survey that you may receive in the mail after you visit with us. Thank you!        8[employer] Information     [employer] lets you send messages to your doctor, view your test results, renew your prescriptions, schedule appointments and more. To sign up, go to [email or web address] . Click on \"Log in\" on the left side of the screen, which will take you to the Welcome page. Then click on \"Sign up Now\" on the right side of the page.     You will be asked to enter the access code listed below, as well as some personal information. Please follow the directions to create your username and password.     Your access code is: [email or web address]  Expires: 2017  3:31 PM     Your access code will  in 90 days. If you need help or a new code, please call your [facility] clinic or [phone].        Care EveryWhere ID     This is your Care EveryWhere ID. This could be used by other organizations to access your [facility] " medical records  [id number]        Equal Access to Services     CROW STEVENS : Davion Ordoñez, eliot henry, maria luisa leung. So [facility] [phone].    ATENCIÓN: Si habla español, tiene a maldonado disposición servicios gratuitos de asistencia lingüística. Llame al [phone].    We comply with applicable federal civil rights laws and [place] laws. We do not discriminate on the basis of race, color, national origin, age, disability, sex, sexual orientation, or gender identity.            After Visit Summary       This is your record. Keep this with you and show to your community pharmacist(s) and doctor(s) at your next visit.

## 2017-10-03 NOTE — ED PROVIDER NOTES
History     Chief Complaint   Patient presents with     Otalgia     rt ear pain     The history is provided by the patient. No  was used.     Demi Narayan is a 77 year old female who presents today with a CC of right ear pain x 3 days.  She notes that she takes Tramadol on a daily basis for pain, she has not been taking more for pain.  She tried a heating pad on day 1 with worsening of symptoms.  She notes decreased hearing in the right ear.  She denies history of otological surgery.  She denies history of perforated TM.  No fevers or chills.  She also reports nasal congestion and sinus pressure.      I have reviewed the Medications, Allergies, Past Medical and Surgical History, and Social History in the Epic system.    Allergies: No Known Allergies    No current facility-administered medications on file prior to encounter.   Current Outpatient Prescriptions on File Prior to Encounter:  traMADol (ULTRAM) 50 MG tablet TAKE ONE TO TWO TABLETS BY MOUTH EVERY 6 TO 8 HOURS AS NEEDED   pantoprazole (PROTONIX) 40 MG EC tablet Take 1 tablet (40 mg) by mouth every morning (before breakfast)   estradiol (ESTRACE) 0.5 MG tablet Take 1 tablet (0.5 mg) by mouth daily   niacin 500 MG tablet Take 1 tablet by mouth daily.   aspirin 325 MG tablet Take 325 mg by mouth At Bedtime.   GLUCOSAMINE SULFATE PO Take  by mouth daily.   Multiple Vitamins-Minerals (MULTIVITAL PO) Take 1 tablet by mouth daily.   Calcium Carbonate-Vitamin D (CALCIUM + D PO) Take 600 mg by mouth.   Omega-3 Fatty Acids (OMEGA-3 FISH OIL PO) Take  by mouth daily.     Patient Active Problem List   Diagnosis     H/O diverticulitis, S/P bowel resection     Dyslipidemia     Fibrocystic breast disease (FCBD)     Low back pain, chronic     GERD (gastroesophageal reflux)     Osteoarthritis, multiple joints     Comprehensive Medical Examination     Post-menopause     Diaphragmatic hernia     ACP (advance care planning)     Scoliosis,  throracolumbar     Chronic pain syndrome     Lyme disease       Review of Systems   Constitutional: Positive for appetite change, fatigue and fever.   HENT: Positive for congestion, ear pain, postnasal drip and sinus pain.    Respiratory: Negative for cough.    Gastrointestinal: Negative for abdominal pain, diarrhea, nausea and vomiting.       Physical Exam   BP: 143/51  Heart Rate: 101  Temp: 98.7  F (37.1  C)  Resp: 16  SpO2: 98 %    Physical Exam   Constitutional: She is oriented to person, place, and time. She appears well-developed and well-nourished.   HENT:   Head: Normocephalic and atraumatic.   Right Ear: No mastoid tenderness.   Left Ear: No mastoid tenderness.   Right ear cerumen impacted.  Gently irrigated right ear with warm water and peroxide mix.  Patient tolerated irrigation well.  Cerumen was removed after several gentle irrigations.  The right ear canal is erythematous with purple hematoma along the right wall of the canal.  The TM is intact, dark erythematous, retracted.  No active drainage or bleeding.    Left ear TM is intact, moderately erythematous and bulging, the canal has a small amount of cerumen that does not occlude the view of TM.  The canal is without erythema or edema, no drainage noted.     Eyes: Conjunctivae are normal.   Neck: Normal range of motion. Neck supple.   Cardiovascular: Normal rate.    Pulmonary/Chest: Effort normal.   Neurological: She is alert and oriented to person, place, and time.   Skin: Skin is warm and dry.   Psychiatric: She has a normal mood and affect. Her behavior is normal.   Nursing note and vitals reviewed.      ED Course     ED Course     Procedures      Assessments & Plan (with Medical Decision Making)     I have reviewed the nursing notes.    I have reviewed the findings, diagnosis, plan and need for follow up with the patient.  ASSESSMENT / PLAN:  (H61.21) Impacted cerumen of right ear  Comment: resolved    (H66.001) Acute suppurative otitis media of  right ear without spontaneous rupture of tympanic membrane, recurrence not specified  Comment: symptomatic  Plan:  Amoxicillin as prescribed   Return to ED/UC with fever not controlled < 102-103 with the use of ibuprofen     and tylenol, shortness of breath, persistent vomiting (unable to keep anything down)   Follow up with PCP if symptoms do not improve in 3-4 days of treatment   Patient verbally educated and given appropriate education sheets for each of their diagnoses and has no questions.   Take OTC motrin or tylenol as directed on the bottle as needed.   Increase fluids, rest, wash hands often.      (H66.002) Acute suppurative otitis media of left ear without spontaneous rupture of tympanic membrane, recurrence not specified  Comment: mildly symptomatic  Plan:  See above plan    (H60.91) Otitis externa of right ear, unspecified chronicity, unspecified type  Comment: symptomatic with purple hematoma right canal wall  Plan:  Ofloxacin as prescribed   Call next business day to schedule an appointment with PCP in 10-14 days for recheck   Strict water precautions until after re-check        Discharge Medication List as of 10/3/2017  3:28 PM      START taking these medications    Details   amoxicillin (AMOXIL) 500 MG tablet Take 1 tablet (500 mg) by mouth 3 times daily for 10 days, Disp-30 tablet, R-0, E-Prescribe      ofloxacin (FLOXIN) 0.3 % otic solution Place 5 drops in ear(s) 2 times daily for 7 days, Disp-5 mL, R-0, E-Prescribe             Final diagnoses:   Impacted cerumen of right ear   Acute suppurative otitis media of right ear without spontaneous rupture of tympanic membrane, recurrence not specified   Acute suppurative otitis media of left ear without spontaneous rupture of tympanic membrane, recurrence not specified   Otitis externa of right ear, unspecified chronicity, unspecified type       10/3/2017   [facility]     Barbara Lewis NP  10/05/17 1144

## 2017-10-03 NOTE — TELEPHONE ENCOUNTER
Controlled Substance Refill Request for Ultram  Problem List Complete:  Yes    Last Written Prescription Date:  See below      Clinic visit frequency required: Q 3 months     Controlled substance agreement on file: Yes:  Date 7.5.17.     Processing:  Fax Rx to [facility] pharmacy     checked in past 6 months?  Yes 5.4.17

## 2017-10-03 NOTE — ED AVS SNAPSHOT
[facility]    [address] [place] [address]    Phone:  [phone]                                       Demi Narayan   MRN: [id number]    Department:  [facility]   Date of Visit:  10/3/2017           After Visit Summary Signature Page     I have received my discharge instructions, and my questions have been answered. I have discussed any challenges I see with this plan with the nurse or doctor.    ..........................................................................................................................................  Patient/Patient Representative Signature      ..........................................................................................................................................  Patient Representative Print Name and Relationship to Patient    ..................................................               ................................................  Date                                            Time    ..........................................................................................................................................  Reviewed by Signature/Title    ...................................................              ..............................................  Date                                                            Time

## 2017-10-03 NOTE — ED NOTES
Pt presents today alone for c/o right ear pain for about 3 days now that keep getting worse and feels like the left is starting.

## 2017-10-06 ENCOUNTER — OFFICE VISIT (OUTPATIENT)
Dept: FAMILY MEDICINE | Facility: OTHER | Age: 77
End: 2017-10-06
Attending: FAMILY MEDICINE
Payer: COMMERCIAL

## 2017-10-06 ENCOUNTER — TELEPHONE (OUTPATIENT)
Dept: FAMILY MEDICINE | Facility: OTHER | Age: 77
End: 2017-10-06

## 2017-10-06 VITALS
WEIGHT: 111 LBS | SYSTOLIC BLOOD PRESSURE: 132 MMHG | HEART RATE: 79 BPM | DIASTOLIC BLOOD PRESSURE: 78 MMHG | BODY MASS INDEX: 21.68 KG/M2 | RESPIRATION RATE: 19 BRPM | OXYGEN SATURATION: 98 %

## 2017-10-06 DIAGNOSIS — H66.001 ACUTE SUPPURATIVE OTITIS MEDIA OF RIGHT EAR WITHOUT SPONTANEOUS RUPTURE OF TYMPANIC MEMBRANE, RECURRENCE NOT SPECIFIED: Primary | ICD-10-CM

## 2017-10-06 PROCEDURE — 99212 OFFICE O/P EST SF 10 MIN: CPT

## 2017-10-06 PROCEDURE — 99213 OFFICE O/P EST LOW 20 MIN: CPT | Performed by: FAMILY MEDICINE

## 2017-10-06 RX ORDER — LEVOFLOXACIN 500 MG/1
500 TABLET, FILM COATED ORAL DAILY
Qty: 10 TABLET | Refills: 0 | Status: SHIPPED | OUTPATIENT
Start: 2017-10-06 | End: 2017-11-06

## 2017-10-06 ASSESSMENT — PAIN SCALES - GENERAL: PAINLEVEL: MODERATE PAIN (4)

## 2017-10-06 NOTE — PROGRESS NOTES
SUBJECTIVE:  Demi Narayan, 77 year old, female is seen with the following medical problems.    Right ear pain.  Danielle was seen in Urgent Care and and was placed on amoxicillin as well as ofloxacin drops.  Her symptoms persist.  Mild nasal congestion and sore throat.    Denies fever, shaking, chills, nausea, vomiting, or diarrhea.  No household contacts are ill.    Current Outpatient Prescriptions   Medication Sig Dispense Refill     traMADol (ULTRAM) 50 MG tablet TAKE ONE TO TWO TABLETS BY MOUTH EVERY 6 TO 8 HOURS AS NEEDED 120 tablet 0     amoxicillin (AMOXIL) 500 MG tablet Take 1 tablet (500 mg) by mouth 3 times daily for 10 days 30 tablet 0     ofloxacin (FLOXIN) 0.3 % otic solution Place 5 drops in ear(s) 2 times daily for 7 days 5 mL 0     pantoprazole (PROTONIX) 40 MG EC tablet Take 1 tablet (40 mg) by mouth every morning (before breakfast) 90 tablet 1     estradiol (ESTRACE) 0.5 MG tablet Take 1 tablet (0.5 mg) by mouth daily 90 tablet 1     niacin 500 MG tablet Take 1 tablet by mouth daily.       aspirin 325 MG tablet Take 325 mg by mouth At Bedtime.       GLUCOSAMINE SULFATE PO Take  by mouth daily.       Multiple Vitamins-Minerals (MULTIVITAL PO) Take 1 tablet by mouth daily.       Calcium Carbonate-Vitamin D (CALCIUM + D PO) Take 600 mg by mouth.       Omega-3 Fatty Acids (OMEGA-3 FISH OIL PO) Take  by mouth daily.        No Known Allergies    Past Medical History:   Diagnosis Date     Chronic/recurrent back pain 7/12/2011     Diverticulitis      Diverticulitis, recurrent 2/6/2002    bowel resection     Dyslipidemia 6/20/2006     Fibrocystic breast disease 7/12/2011     Gastro-oesophageal reflux disease      GERD 2/10/2012     Hiatal hernia 2/10/2012     Hormone replacement therapy, postmenopausal 7/12/2011     Osteoarthritis      Past Surgical History:   Procedure Laterality Date     ------------OTHER-------------      colonoscopy with biopsy - diverticulitis, hemorrhoids      ------------OTHER-------------  4/19/1994    sigmoidoscopy - abdominal pain, diverticula     ABDOMEN SURGERY      colon removed 2nd to diverticulitis     APPENDECTOMY       ARTHROSCOPY SHOULDER  11/20/2013    Procedure: ARTHROSCOPY SHOULDER;  Right Shoulder Arthroscopy Sub-Acromial Decompression Rotator Cuff Repair;  Surgeon: Lenny Trammell MD;  Location: [facility]     [facility]     Diverticulitis      bowel resection     EGD with biopsy  2011     ENDOSCOPY UPPER WITH PANCREATIC STIMULATION       ENDOSCOPY UPPER, COLONOSCOPY, COMBINED  7/18/2014    Procedure: COMBINED ENDOSCOPY UPPER, COLONOSCOPY;  Surgeon: Israel Feliciano MD;  Location: [facility]     ENT SURGERY      tonsilectomy     ESOPHAGOSCOPY, GASTROSCOPY, DUODENOSCOPY (EGD), COMBINED N/A 9/27/2017    Procedure: COMBINED ESOPHAGOSCOPY, GASTROSCOPY, DUODENOSCOPY (EGD);  UPPER ENDOSCOPY WITH BIOPSY AND POLYPECTOMY;  Surgeon: Gallito Alvarado DO;  Location: [facility]     GYN SURGERY      hysterectomy with bladder and rectal repair     ORTHOPEDIC SURGERY  11/20/2013    right rotator cuff surgery     TVH with ovarian preservation       Family History   Problem Relation Age of Onset     CEREBROVASCULAR DISEASE Father      CVA     HEART DISEASE Father      heart disease     Hypertension Father      Myocardial Infarction Father      myocardial infarction     CEREBROVASCULAR DISEASE Mother      CVA     DIABETES Mother      HEART DISEASE Mother      heart disease     Hypertension Mother      HEART DISEASE Brother      heart disease     Hypertension Brother      Social History     Social History     Marital status:      Spouse name: N/A     Number of children: N/A     Years of education: N/A     Occupational History     Not on file.     Social History Main Topics     Smoking status: Former Smoker     Types: Cigarettes     Quit date: 5/6/1968     Smokeless tobacco: Never Used      Comment: no passive smoke exposure      Alcohol use 0.5 oz/week     1 Glasses of wine per week      Comment: daily with dinner     Drug use: No     Sexual activity: Yes     Partners: Male     Birth control/ protection: None     Other Topics Concern      Service No     Blood Transfusions Yes     Permits if needed     Caffeine Concern Yes     4 cups coffee daily     Occupational Exposure No     Hobby Hazards No     Sleep Concern No     Stress Concern No     Weight Concern No     Special Diet No     Back Care No     Exercise No     Seat Belt Yes     Self-Exams Yes     Parent/Sibling W/ Cabg, Mi Or Angioplasty Before 65f 55m? Yes     Father     Social History Narrative         Review Of Systems  Constitutional, HEENT, cardiovascular, pulmonary, gi and gu systems are negative, except as otherwise noted.      OBJECTIVE:/78  Pulse 79  Resp 19  Wt 111 lb (50.3 kg)  SpO2 98%  Breastfeeding? No  BMI 21.68 kg/m2      Exam:  Physical Exam   Constitutional: She is oriented to person, place, and time. She appears well-developed and well-nourished. No distress.   HENT:   Head: Normocephalic.   Right Ear: Hearing, external ear and ear canal normal. Tympanic membrane is erythematous and bulging. A middle ear effusion is present.   Left Ear: Hearing, tympanic membrane, external ear and ear canal normal.   Nose: Nose normal. Right sinus exhibits no maxillary sinus tenderness and no frontal sinus tenderness. Left sinus exhibits no maxillary sinus tenderness and no frontal sinus tenderness.   Mouth/Throat: Uvula is midline and oropharynx is clear and moist. No oropharyngeal exudate or posterior oropharyngeal erythema.   Eyes: Conjunctivae are normal.   Neck: Neck supple.   Pulmonary/Chest: Effort normal and breath sounds normal.   Lymphadenopathy:     She has no cervical adenopathy.   Neurological: She is alert and oriented to person, place, and time.   Skin: Skin is warm and dry.   Psychiatric: She has a normal mood and affect.     Other exam not  repeated    Labs:  Admission on 09/27/2017, Discharged on 09/27/2017   Component Date Value Ref Range Status     Copath Report 09/27/2017    Final                    Value:Patient Name: JED VELASQUEZ  MR#: [id number]  Specimen #: [id number]  Collected: 9/27/2017  Received: 9/27/2017  Reported: 9/28/2017 13:00  Ordering Phy(s): KARISSA HANSEN  Additional Phy(s): NEFTALI WALSH    For improved result formatting, select 'View Enhanced Report Format'  under Linked Documents section.    SPECIMEN(S):  A: Duodenal biopsy  B: Stomach biopsy, antrum  C: Stomach polyp, body  D: Gastroesophageal biopsy, junction    FINAL DIAGNOSIS:  A: Small bowel, duodenum, biopsy  - No pathologic diagnosis    B: Stomach, antrum, biopsy  - No pathologic diagnosis    C: Stomach, body, polypectomy  - Fundic gland polyp (see microscopic description)    D: Gastroesophageal junction, biopsy  - Findings associated with reflux    I have personally reviewed all specimens and/or slides, including the  listed special stains, and used them with my medical judgement to  determine or confirm the final diagnosis.    Electronically signed out by:    Malcolm Damon M.D.    CLINICAL HISTOR                          Y:  Abnormal CT of the abdomen, reflux; upper endoscopy with biopsy and  polypectomy.    GROSS:  A: There are three pieces of tan soft tissue which are 2 and 3 mm. (3 TE  in 1 block).    B: There are six pieces of tan soft tissue which are 2 and 3 mm each. (6  TE in 1 block).    C: There are two pieces of tan soft tissue which are 1 and 3 mm. (2 TE  in 1 block).    D: There are two pieces of tan soft tissue which are each 2 mm. (2 TE in  1 block). (Dictated by: Malcolm Damon MD 9/27/2017 04:42 PM)    MICROSCOPIC:  A: Microscopic sections show unremarkable small bowel.    B: Microscopic sections show several pieces of unremarkable stomach.    C: Microscopic sections show gastric mucosa.  There is also a piece of  colon.  It is probably of  contaminant.  The gastric mucosa has a few  cystically dilated fundic glands.    D: Microscopic sections show squamous and gastric mucosa.  The squamous  mucosa has focal basal cell hyperplasia and intracellular edema.    CPT Codes  A: 99086-WA7  B: 8                          8305-GM5  C: 96813-FJ4  D: 05387-UP4    TESTING LAB LOCATION:  [address] [address]  [phone]    COLLECTION SITE:  Client: [facility]  Location: [facility] ()           ASSESSMENT/PLAN:  Acute suppurative otitis media of right ear without spontaneous rupture of tympanic membrane, recurrence not specified  Switch to levofloxacin as written. Continue antihistamines and decongestants.  Follow up with persistent symptoms.  - levofloxacin (LEVAQUIN) 500 MG tablet; Take 1 tablet (500 mg) by mouth daily            Virgil Mackay MD

## 2017-10-06 NOTE — TELEPHONE ENCOUNTER
9:05 AM    Reason for Call: OVERBOOK    Patient is having the following symptoms: Urgent Care follow up/bilateral ear infection not getting better for 4 days.    The patient is requesting an appointment for today with Dr Mackay.    Was an appointment offered for this call? Yes  If yes : Appointment type short              Date 10/09/17    Preferred method for responding to this message: Telephone Call  What is your phone number ?   [phone]    If we cannot reach you directly, may we leave a detailed response at the number you provided? Yes    Can this message wait until your PCP/provider returns, if unavailable today? Not applicable    Nataliya Negrete

## 2017-10-06 NOTE — TELEPHONE ENCOUNTER
Please schedule patient for date/time: today at 140 for UC follow up    Have patient go to ER/Urgent Care Center. Urgent Care hours are 9:30 am to 8 pm, open 7 days a week. No.    Provider will call patient.No.    Other:

## 2017-10-06 NOTE — MR AVS SNAPSHOT
"              After Visit Summary   10/6/2017    Demi Narayan    MRN: [id number]           Patient Information     Date Of Birth          1940        Visit Information        Provider Department      10/6/2017 1:40 PM Virgil Mackay MD [facility] [place]        Today's Diagnoses     Acute suppurative otitis media of right ear without spontaneous rupture of tympanic membrane, recurrence not specified    -  1      Care Instructions    Take levofloxacin once daily          Follow-ups after your visit        Follow-up notes from your care team     Return if symptoms worsen or fail to improve.      Your next 10 appointments already scheduled     Nov 06, 2017 11:15 AM CST   (Arrive by 11:00 AM)   New Visit with Latanya Liard MD   [facility] [place] ([facility] )    [address]  [place] [address]   [phone]              Who to contact     If you have questions or need follow up information about today's clinic visit or your schedule please contact [facility] directly at [phone].  Normal or non-critical lab and imaging results will be communicated to you by Ixchelsishart, letter or phone within 4 business days after the clinic has received the results. If you do not hear from us within 7 days, please contact the clinic through Ixchelsishart or phone. If you have a critical or abnormal lab result, we will notify you by phone as soon as possible.  Submit refill requests through [employer] or call your pharmacy and they will forward the refill request to us. Please allow 3 business days for your refill to be completed.          Additional Information About Your Visit        Ixchelsishart Information     [employer] lets you send messages to your doctor, view your test results, renew your prescriptions, schedule appointments and more. To sign up, go to [email or web address] . Click on \"Log in\" on the left side of the screen, which will take you to the Welcome page. " "Then click on \"Sign up Now\" on the right side of the page.     You will be asked to enter the access code listed below, as well as some personal information. Please follow the directions to create your username and password.     Your access code is: [email or web address]  Expires: 2017  3:31 PM     Your access code will  in 90 days. If you need help or a new code, please call your [place] clinic or [phone].        Care EveryWhere ID     This is your Care EveryWhere ID. This could be used by other organizations to access your [place] medical records  [id number]        Your Vitals Were     Pulse Respirations Pulse Oximetry Breastfeeding? BMI (Body Mass Index)       79 19 98% No 21.68 kg/m2        Blood Pressure from Last 3 Encounters:   10/06/17 132/78   10/03/17 143/51   17 131/68    Weight from Last 3 Encounters:   10/06/17 111 lb (50.3 kg)   17 109 lb (49.4 kg)   17 111 lb (50.3 kg)              Today, you had the following     No orders found for display         Today's Medication Changes          These changes are accurate as of: 10/6/17 11:59 PM.  If you have any questions, ask your nurse or doctor.               Start taking these medicines.        Dose/Directions    levofloxacin 500 MG tablet   Commonly known as:  LEVAQUIN   Used for:  Acute suppurative otitis media of right ear without spontaneous rupture of tympanic membrane, recurrence not specified   Started by:  Virgil Mackay MD        Dose:  500 mg   Take 1 tablet (500 mg) by mouth daily   Quantity:  10 tablet   Refills:  0         Stop taking these medicines if you haven't already. Please contact your care team if you have questions.     amoxicillin 500 MG tablet   Commonly known as:  AMOXIL   Stopped by:  Virgil Mackay MD                Where to get your medicines      These medications were sent to [facility] [id number] [address]     Phone:  [phone]     " levofloxacin 500 MG tablet                Primary Care Provider Office Phone # Fax #    Virgil Mackay [phone]       [facility] [address] [place] [address]        Equal Access to Services     CROW STEVENS : Hadii dru pablo katherineo Sodeenaali, waaxda luqadaha, qaybta kaalmada adeegyada, maria luisa larose rubina nixon. So [facility] [phone].    ATENCIÓN: Si habla español, tiene a maldonado disposición servicios gratuitos de asistencia lingüística. Llame al [phone].    We comply with applicable federal civil rights laws and [place] laws. We do not discriminate on the basis of race, color, national origin, age, disability, sex, sexual orientation, or gender identity.            Thank you!     Thank you for choosing [facility]  for your care. Our goal is always to provide you with excellent care. Hearing back from our patients is one way we can continue to improve our services. Please take a few minutes to complete the written survey that you may receive in the mail after your visit with us. Thank you!             Your Updated Medication List - Protect others around you: Learn how to safely use, store and throw away your medicines at www.disposemymeds.org.          This list is accurate as of: 10/6/17 11:59 PM.  Always use your most recent med list.                   Brand Name Dispense Instructions for use Diagnosis    aspirin 325 MG tablet      Take 325 mg by mouth At Bedtime.        CALCIUM + D PO      Take 600 mg by mouth.        GLUCOSAMINE SULFATE PO      Take  by mouth daily.        levofloxacin 500 MG tablet    LEVAQUIN    10 tablet    Take 1 tablet (500 mg) by mouth daily    Acute suppurative otitis media of right ear without spontaneous rupture of tympanic membrane, recurrence not specified       MULTIVITAL PO      Take 1 tablet by mouth daily.        niacin 500 MG tablet      Take 1 tablet by mouth daily.        ofloxacin 0.3 % otic solution    FLOXIN    5  mL    Place 5 drops in ear(s) 2 times daily for 7 days        OMEGA-3 FISH OIL PO      Take  by mouth daily.        pantoprazole 40 MG EC tablet    PROTONIX    90 tablet    Take 1 tablet (40 mg) by mouth every morning (before breakfast)    Thoracogenic scoliosis of thoracolumbar region, Post-menopause       traMADol 50 MG tablet    ULTRAM    120 tablet    TAKE ONE TO TWO TABLETS BY MOUTH EVERY 6 TO 8 HOURS AS NEEDED    Sprain of other parts of lumbar spine and pelvis, subsequent encounter

## 2017-10-06 NOTE — NURSING NOTE
Chief Complaint   Patient presents with     Urgent Care     10/3/17 urgent care bilateral ear infection       Initial /78  Pulse 79  Resp 19  Wt 111 lb (50.3 kg)  SpO2 98%  Breastfeeding? No  BMI 21.68 kg/m2 Estimated body mass index is 21.68 kg/(m^2) as calculated from the following:    Height as of 9/27/17: 5' (1.524 m).    Weight as of this encounter: 111 lb (50.3 kg).  Medication Reconciliation: rodríguez Ramirez

## 2017-10-10 DIAGNOSIS — Z79.890 HORMONE REPLACEMENT THERAPY (HRT): ICD-10-CM

## 2017-10-10 NOTE — TELEPHONE ENCOUNTER
Estrace      Last Written Prescription Date: 3/21/17  Last Fill Quantity: 90, # refills: 1  Last Office Visit with G, P or [facility] prescribing provider: 10/6/17       BP Readings from Last 3 Encounters:   10/06/17 132/78   10/03/17 143/51   09/27/17 131/68     Date of last Breast Exam: 4/25/17

## 2017-10-13 RX ORDER — ESTRADIOL 0.5 MG/1
TABLET ORAL
Qty: 90 TABLET | Refills: 1 | Status: SHIPPED | OUTPATIENT
Start: 2017-10-13 | End: 2018-03-27

## 2017-11-03 DIAGNOSIS — S33.8XXD SPRAIN OF OTHER PARTS OF LUMBAR SPINE AND PELVIS, SUBSEQUENT ENCOUNTER: ICD-10-CM

## 2017-11-03 NOTE — TELEPHONE ENCOUNTER
Ultram      Last Written Prescription Date: 10/3/17  Last Fill Quantity: 120,  # refills: 0   Last Office Visit with G, [facility] or [facility] prescribing provider: 10/6/17

## 2017-11-06 ENCOUNTER — OFFICE VISIT (OUTPATIENT)
Dept: OTOLARYNGOLOGY | Facility: OTHER | Age: 77
End: 2017-11-06
Attending: SURGERY
Payer: COMMERCIAL

## 2017-11-06 VITALS
WEIGHT: 112 LBS | RESPIRATION RATE: 16 BRPM | HEIGHT: 60 IN | TEMPERATURE: 97.5 F | DIASTOLIC BLOOD PRESSURE: 74 MMHG | HEART RATE: 77 BPM | BODY MASS INDEX: 21.99 KG/M2 | SYSTOLIC BLOOD PRESSURE: 138 MMHG | OXYGEN SATURATION: 95 %

## 2017-11-06 DIAGNOSIS — J38.7 LESION OF LARYNX: Primary | ICD-10-CM

## 2017-11-06 PROCEDURE — 99213 OFFICE O/P EST LOW 20 MIN: CPT | Mod: 25

## 2017-11-06 PROCEDURE — 31575 DIAGNOSTIC LARYNGOSCOPY: CPT | Performed by: OTOLARYNGOLOGY

## 2017-11-06 PROCEDURE — 99203 OFFICE O/P NEW LOW 30 MIN: CPT | Mod: 25 | Performed by: OTOLARYNGOLOGY

## 2017-11-06 ASSESSMENT — PAIN SCALES - GENERAL: PAINLEVEL: NO PAIN (0)

## 2017-11-06 NOTE — PATIENT INSTRUCTIONS
Thank you for allowing Dr. Laird and our ENT team to participate in your care.  If you have a scheduling or an appointment question please contact [facility] Unit Coordinator at their direct line [phone].   ALL nursing questions or concerns can be directed to your ENT nurse at: [phone] - Yudy    Normal Exam  Follow up with ENT as needed

## 2017-11-06 NOTE — MR AVS SNAPSHOT
"              After Visit Summary   11/6/2017    Demi Narayan    MRN: [id number]           Patient Information     Date Of Birth          1940        Visit Information        Provider Department      11/6/2017 11:15 AM Latanya Laird MD [facility]        Today's Diagnoses     Tongue lesion          Care Instructions    Thank you for allowing Dr. Laird and our ENT team to participate in your care.  If you have a scheduling or an appointment question please contact [facility] Unit Coordinator at their direct line [phone].   ALL nursing questions or concerns can be directed to your ENT nurse at: [phone] - Yudy    Normal Exam  Follow up with ENT as needed          Follow-ups after your visit        Follow-up notes from your care team     Return if symptoms worsen or fail to improve.      Who to contact     If you have questions or need follow up information about today's clinic visit or your schedule please contact [facility] directly at [phone].  Normal or non-critical lab and imaging results will be communicated to you by MyChart, letter or phone within 4 business days after the clinic has received the results. If you do not hear from us within 7 days, please contact the clinic through iCrumzhart or phone. If you have a critical or abnormal lab result, we will notify you by phone as soon as possible.  Submit refill requests through [employer] or call your pharmacy and they will forward the refill request to us. Please allow 3 business days for your refill to be completed.          Additional Information About Your Visit        iCrumzhart Information     [employer] lets you send messages to your doctor, view your test results, renew your prescriptions, schedule appointments and more. To sign up, go to [email or web address] . Click on \"Log in\" on the left side of the screen, which will take you to the Welcome page. Then click on \"Sign up Now\" on the right " side of the page.     You will be asked to enter the access code listed below, as well as some personal information. Please follow the directions to create your username and password.     Your access code is: [email or web address]  Expires: 2017  2:31 PM     Your access code will  in 90 days. If you need help or a new code, please call your [place] clinic or [phone].        Care EveryWhere ID     This is your Care EveryWhere ID. This could be used by other organizations to access your [place] medical records  [id number]        Your Vitals Were     Pulse Temperature Respirations Height Pulse Oximetry BMI (Body Mass Index)    77 97.5  F (36.4  C) (Tympanic) 16 5' (1.524 m) 95% 21.87 kg/m2       Blood Pressure from Last 3 Encounters:   17 138/74   10/06/17 132/78   10/03/17 143/51    Weight from Last 3 Encounters:   17 112 lb (50.8 kg)   10/06/17 111 lb (50.3 kg)   17 109 lb (49.4 kg)              Today, you had the following     No orders found for display         Today's Medication Changes          These changes are accurate as of: 17 12:22 PM.  If you have any questions, ask your nurse or doctor.               Stop taking these medicines if you haven't already. Please contact your care team if you have questions.     levofloxacin 500 MG tablet   Commonly known as:  LEVAQUIN   Stopped by:  Latanya Laird MD                    Primary Care Provider Office Phone # Fax #    Virgil Mackay [phone]       [facility] [address]  [facility] [address]        Equal Access to Services     [facility] AH: Hadii dru garcia Soandree, waaxda luqadaha, qaybta kaalmada abdirahman, maria luisa nixon. So [facility] [phone].    ATENCIÓN: Si habla español, tiene a maldonado disposición servicios gratuitos de asistencia lingüística. Llame al [phone].    We comply with applicable federal civil rights laws and [place] laws. We do not discriminate  on the basis of race, color, national origin, age, disability, sex, sexual orientation, or gender identity.            Thank you!     Thank you for choosing [facility]  for your care. Our goal is always to provide you with excellent care. Hearing back from our patients is one way we can continue to improve our services. Please take a few minutes to complete the written survey that you may receive in the mail after your visit with us. Thank you!             Your Updated Medication List - Protect others around you: Learn how to safely use, store and throw away your medicines at www.disposemymeds.org.          This list is accurate as of: 11/6/17 12:22 PM.  Always use your most recent med list.                   Brand Name Dispense Instructions for use Diagnosis    aspirin 325 MG tablet      Take 325 mg by mouth At Bedtime.        CALCIUM + D PO      Take 600 mg by mouth.        estradiol 0.5 MG tablet    ESTRACE    90 tablet    TAKE ONE TABLET BY MOUTH ONCE DAILY    Hormone replacement therapy (HRT)       GLUCOSAMINE SULFATE PO      Take  by mouth daily.        MULTIVITAL PO      Take 1 tablet by mouth daily.        niacin 500 MG tablet      Take 1 tablet by mouth daily.        OMEGA-3 FISH OIL PO      Take  by mouth daily.        pantoprazole 40 MG EC tablet    PROTONIX    90 tablet    Take 1 tablet (40 mg) by mouth every morning (before breakfast)    Thoracogenic scoliosis of thoracolumbar region, Post-menopause       traMADol 50 MG tablet    ULTRAM    120 tablet    TAKE ONE TO TWO TABLETS BY MOUTH EVERY 6 TO 8 HOURS AS NEEDED    Sprain of other parts of lumbar spine and pelvis, subsequent encounter

## 2017-11-06 NOTE — NURSING NOTE
Chief Complaint   Patient presents with     Consult     Consult regarding a tongue lesion per Dr Alvarado.        Initial /74  Pulse 77  Temp 97.5  F (36.4  C) (Tympanic)  Resp 16  Ht 5' (1.524 m)  Wt 112 lb (50.8 kg)  SpO2 95%  BMI 21.87 kg/m2 Estimated body mass index is 21.87 kg/(m^2) as calculated from the following:    Height as of this encounter: 5' (1.524 m).    Weight as of this encounter: 112 lb (50.8 kg).  Medication Reconciliation: complete   Barbara Hdez

## 2017-11-06 NOTE — PROGRESS NOTES
Otolaryngology Consultation    Patient: Demi Narayan  : 1940    Patient presents with:  Consult: Consult regarding a tongue lesion per Dr Alvarado.       HPI:  Demi Narayan is a 77 year old female seen today for tongue lesion, referred by Dr. Alvarado. It was incidentally found 17 when Dr. Alvarado was performing an EGD. Demi was not aware of this lesion. No tongue pain. No oral/tongue trauma.  No other oral lesions.   There is no dypshagia, dysphonia, otoalgia, fever or chills  She has had some slow sustained weight loss over several years which Demi relays to her persistant GI discomfort.    I reviewed the photos from the EGD and there is a prominent left arytenoid photographed.       No tobacco use other than very distant history of tobacco use > 45 years ago.     Current Outpatient Rx   Medication Sig Dispense Refill     estradiol (ESTRACE) 0.5 MG tablet TAKE ONE TABLET BY MOUTH ONCE DAILY 90 tablet 1     traMADol (ULTRAM) 50 MG tablet TAKE ONE TO TWO TABLETS BY MOUTH EVERY 6 TO 8 HOURS AS NEEDED 120 tablet 0     pantoprazole (PROTONIX) 40 MG EC tablet Take 1 tablet (40 mg) by mouth every morning (before breakfast) 90 tablet 1     niacin 500 MG tablet Take 1 tablet by mouth daily.       aspirin 325 MG tablet Take 325 mg by mouth At Bedtime.       GLUCOSAMINE SULFATE PO Take  by mouth daily.       Multiple Vitamins-Minerals (MULTIVITAL PO) Take 1 tablet by mouth daily.       Calcium Carbonate-Vitamin D (CALCIUM + D PO) Take 600 mg by mouth.       Omega-3 Fatty Acids (OMEGA-3 FISH OIL PO) Take  by mouth daily.         Allergies: Review of patient's allergies indicates no known allergies.     Past Medical History:   Diagnosis Date     Chronic/recurrent back pain 2011     Diverticulitis      Diverticulitis, recurrent 2002    bowel resection     Dyslipidemia 2006     Fibrocystic breast disease 2011     Gastro-oesophageal reflux disease      GERD 2/10/2012     Hiatal  hernia 2/10/2012     Hormone replacement therapy, postmenopausal 7/12/2011     Osteoarthritis        Past Surgical History:   Procedure Laterality Date     ------------OTHER-------------      colonoscopy with biopsy - diverticulitis, hemorrhoids     ------------OTHER-------------  4/19/1994    sigmoidoscopy - abdominal pain, diverticula     ABDOMEN SURGERY      colon removed 2nd to diverticulitis     APPENDECTOMY       ARTHROSCOPY SHOULDER  11/20/2013    Procedure: ARTHROSCOPY SHOULDER;  Right Shoulder Arthroscopy Sub-Acromial Decompression Rotator Cuff Repair;  Surgeon: Lenny Trammell MD;  Location: [facility] OR     [facility]     Diverticulitis      bowel resection     EGD with biopsy  2011     ENDOSCOPY UPPER WITH PANCREATIC STIMULATION       ENDOSCOPY UPPER, COLONOSCOPY, COMBINED  7/18/2014    Procedure: COMBINED ENDOSCOPY UPPER, COLONOSCOPY;  Surgeon: Israel Feliciano MD;  Location: [facility] OR     ENT SURGERY      tonsilectomy     ESOPHAGOSCOPY, GASTROSCOPY, DUODENOSCOPY (EGD), COMBINED N/A 9/27/2017    Procedure: COMBINED ESOPHAGOSCOPY, GASTROSCOPY, DUODENOSCOPY (EGD);  UPPER ENDOSCOPY WITH BIOPSY AND POLYPECTOMY;  Surgeon: Gallito Alvarado DO;  Location: [facility] OR     GYN SURGERY      hysterectomy with bladder and rectal repair     ORTHOPEDIC SURGERY  11/20/2013    right rotator cuff surgery     TVH with ovarian preservation         ENT family history reviewed    Social History   Substance Use Topics     Smoking status: Former Smoker     Types: Cigarettes     Quit date: 5/6/1968     Smokeless tobacco: Never Used      Comment: no passive smoke exposure     Alcohol use 0.5 oz/week     1 Glasses of wine per week      Comment: daily with dinner       Review of Systems  ROS: 10 point ROS neg other than the symptoms noted above in the HPI and gastroesophageal reflux disease      Physical Exam  /74  Pulse 77  Temp 97.5  F (36.4  C) (Tympanic)  Resp 16  Ht 5'  (1.524 m)  Wt 112 lb (50.8 kg)  SpO2 95%  BMI 21.87 kg/m2  General - The patient is well nourished and well developed, and appears to have good nutritional status.  Alert and oriented to person and place, answers questions and cooperates with examination appropriately.   Head and Face - Normocephalic and atraumatic, with no gross asymmetry noted.  The facial nerve is intact, with strong symmetric movements.  Voice and Breathing - The patient was breathing comfortably without the use of accessory muscles. There was no wheezing, stridor, or stertor.  The patients voice was clear and strong, and had appropriate pitch and quality.  Ears -The external auditory canals are patent, the tympanic membranes are intact without effusion, retraction or mass.  Bony landmarks are intact.  Eyes - Extraocular movements intact, and the pupils were reactive to light.  Sclera were not icteric or injected, conjunctiva were pink and moist.  Mouth - Examination of the oral cavity showed pink, healthy oral mucosa. No lesions or ulcerations noted.  The tongue was mobile and midline, and the dentition were in good condition.    Throat - The walls of the oropharynx were smooth, pink, moist, symmetric, and had no lesions or ulcerations.  The tonsillar pillars and soft palate were symmetric.  The uvula was midline on elevation.  Tonsillar fossa  Clear.  Bimanual limited due to gag reflex.   Neck - Normal midline excursion of the laryngotracheal complex during swallowing.  Full range of motion on passive movement.  Palpation of the occipital, submental, submandibular, internal jugular chain, and supraclavicular nodes did not demonstrate any abnormal lymph nodes or masses.  Palpation of the thyroid was soft and smooth, with no nodules or goiter appreciated.  The trachea was mobile and midline.  Nose - External contour is symmetric, no gross deflection or scars.  Nasal mucosa is pink and moist with no abnormal mucus.  The septum and turbinates  were evaluated: irregular but not obstructive septum.  No polyps, masses, or purulence noted on examination.    Attempts at mirror laryngoscopy were not possible due to gag reflex.  Therefore I proceeded with a fiberoptic examination after informed consent.  First I sprayed both sides of the nose with a mixture of lidocaine and neosynephrine.  I then passed the scope through the nasal cavity.     The nasopharynx was mucosally covered and symmetric.  The eustachian tube openings were unobstructed.  Going further down I had a clear view of the base of tongue which had normal appearing lingual tonsillar tissue.  The base of tongue was free of lesions, and the vallecula was open.  The epiglottis was smooth and mucosally covered.  The supraglottic larynx was then clearly visualized.  The vocal cords moved smoothly and symmetrically and were pearly white.  The pyriform sinuses were open and without penelope mass or pooling of secretions upon valsalva, and the limited view of the postcricoid region did not show any lesions.  There is a prominent thyroid ala cartilage at the right lateral posterior oropharyngeal wall.  The patient tolerated the procedure well.        Impression and Plan- Demi Narayan is a 77 year old female with:    ICD-10-CM    1. Lesion of larynx J38.7      Reassured normal exam  Prominent but normal laryngeal cartilage noted on endoscopy today  RTC as needed  Encouraged to contact Dr. Alvarado regarding persistent GI discomfort        Latanya Laird D.O.  Otolaryngology/Head and Neck Surgery  Allergy

## 2017-11-07 RX ORDER — TRAMADOL HYDROCHLORIDE 50 MG/1
TABLET ORAL
Qty: 120 TABLET | Refills: 0 | Status: SHIPPED | OUTPATIENT
Start: 2017-11-07 | End: 2017-12-05

## 2017-12-05 DIAGNOSIS — S33.8XXD SPRAIN OF OTHER PARTS OF LUMBAR SPINE AND PELVIS, SUBSEQUENT ENCOUNTER: ICD-10-CM

## 2017-12-07 NOTE — TELEPHONE ENCOUNTER
Controlled Substance Refill Request for Tramadol  Problem List Complete:  Yes    Last Written Prescription Date:  11/7/17  Last Fill Quantity: 120,   # refills: 0    Last Office Visit with [facility] primary care provider: 10/6/17    Controlled substance agreement on file: Yes:  Date 7/5/17.     Processing:  Fax Rx to [facility] pharmacy    PCP Codie Agarwal pended.  Thank you.

## 2017-12-08 RX ORDER — TRAMADOL HYDROCHLORIDE 50 MG/1
TABLET ORAL
Qty: 120 TABLET | Refills: 0 | Status: SHIPPED | OUTPATIENT
Start: 2017-12-08 | End: 2018-01-18

## 2018-01-18 DIAGNOSIS — S33.8XXD SPRAIN OF OTHER PARTS OF LUMBAR SPINE AND PELVIS, SUBSEQUENT ENCOUNTER: ICD-10-CM

## 2018-01-18 NOTE — TELEPHONE ENCOUNTER
tramadol      Last Written Prescription Date:  12/8/17  Last Fill Quantity: 120,   # refills: 0  Last Office Visit: 10/6/17  Future Office visit:  none

## 2018-01-18 NOTE — TELEPHONE ENCOUNTER
Controlled Substance Refill Request for Tramadol  Problem List Complete:  Yes    Last Written Prescription Date:  12/8/17  Last Fill Quantity: 120,   # refills: 0    Last Office Visit with [facility] primary care provider: 10/6/17      Controlled substance agreement on file: Yes:  Date 7/5/17.     Processing:  Fax Rx to [facility] pharmacy  PCP Codie.  Thank you.

## 2018-01-19 RX ORDER — TRAMADOL HYDROCHLORIDE 50 MG/1
TABLET ORAL
Qty: 120 TABLET | Refills: 0 | Status: SHIPPED | OUTPATIENT
Start: 2018-01-19 | End: 2018-02-22

## 2018-01-25 ENCOUNTER — TELEPHONE (OUTPATIENT)
Dept: FAMILY MEDICINE | Facility: OTHER | Age: 78
End: 2018-01-25

## 2018-01-25 DIAGNOSIS — M54.42 CHRONIC BILATERAL LOW BACK PAIN WITH LEFT-SIDED SCIATICA: Primary | ICD-10-CM

## 2018-01-25 DIAGNOSIS — G89.29 CHRONIC BILATERAL LOW BACK PAIN WITH LEFT-SIDED SCIATICA: Primary | ICD-10-CM

## 2018-01-25 RX ORDER — METHYLPREDNISOLONE 4 MG
TABLET, DOSE PACK ORAL
Qty: 21 TABLET | Refills: 0 | Status: SHIPPED | OUTPATIENT
Start: 2018-01-25 | End: 2018-02-20

## 2018-01-25 NOTE — TELEPHONE ENCOUNTER
11:03 AM    Reason for Call: Phone Call    Description: Demi would like Dr. Mackay to  Prescribe the prednisone that she was on a few months ago for her sciatica. She can feel the symptoms coming back. Can this be done ? Please call Demi to advise    Was an appointment offered for this call   No    Preferred method for responding to this message: [phone]    If we cannot reach you directly, may we leave a detailed response at the number you provided   No    Can this message wait until your provider returns ? Yes    Marcia Abraham

## 2018-01-25 NOTE — TELEPHONE ENCOUNTER
No answer on number provided. Left message on cell number in chart that this was done.  Carrie Wood LPN

## 2018-02-20 ENCOUNTER — OFFICE VISIT (OUTPATIENT)
Dept: FAMILY MEDICINE | Facility: OTHER | Age: 78
End: 2018-02-20
Attending: FAMILY MEDICINE
Payer: COMMERCIAL

## 2018-02-20 VITALS
WEIGHT: 110 LBS | DIASTOLIC BLOOD PRESSURE: 68 MMHG | HEIGHT: 60 IN | HEART RATE: 68 BPM | SYSTOLIC BLOOD PRESSURE: 132 MMHG | BODY MASS INDEX: 21.6 KG/M2 | TEMPERATURE: 97.8 F | OXYGEN SATURATION: 98 %

## 2018-02-20 DIAGNOSIS — M54.42 CHRONIC BILATERAL LOW BACK PAIN WITH LEFT-SIDED SCIATICA: Primary | ICD-10-CM

## 2018-02-20 DIAGNOSIS — G89.29 CHRONIC BILATERAL LOW BACK PAIN WITH LEFT-SIDED SCIATICA: Primary | ICD-10-CM

## 2018-02-20 PROCEDURE — 99213 OFFICE O/P EST LOW 20 MIN: CPT | Performed by: FAMILY MEDICINE

## 2018-02-20 PROCEDURE — G0463 HOSPITAL OUTPT CLINIC VISIT: HCPCS

## 2018-02-20 ASSESSMENT — ANXIETY QUESTIONNAIRES
3. WORRYING TOO MUCH ABOUT DIFFERENT THINGS: NOT AT ALL
GAD7 TOTAL SCORE: 0
6. BECOMING EASILY ANNOYED OR IRRITABLE: NOT AT ALL
1. FEELING NERVOUS, ANXIOUS, OR ON EDGE: NOT AT ALL
5. BEING SO RESTLESS THAT IT IS HARD TO SIT STILL: NOT AT ALL
2. NOT BEING ABLE TO STOP OR CONTROL WORRYING: NOT AT ALL
IF YOU CHECKED OFF ANY PROBLEMS ON THIS QUESTIONNAIRE, HOW DIFFICULT HAVE THESE PROBLEMS MADE IT FOR YOU TO DO YOUR WORK, TAKE CARE OF THINGS AT HOME, OR GET ALONG WITH OTHER PEOPLE: NOT DIFFICULT AT ALL
7. FEELING AFRAID AS IF SOMETHING AWFUL MIGHT HAPPEN: NOT AT ALL

## 2018-02-20 ASSESSMENT — PATIENT HEALTH QUESTIONNAIRE - PHQ9: 5. POOR APPETITE OR OVEREATING: NOT AT ALL

## 2018-02-20 ASSESSMENT — PAIN SCALES - GENERAL: PAINLEVEL: MODERATE PAIN (5)

## 2018-02-20 NOTE — PROGRESS NOTES
SUBJECTIVE:  Demi Narayan, 77 year old, female is seen with the following medical problems.    Current Outpatient Prescriptions   Medication Sig Dispense Refill     traMADol (ULTRAM) 50 MG tablet TAKE ONE TO TWO TABLETS BY MOUTH EVERY 6 TO 8 HOURS AS NEEDED 120 tablet 0     estradiol (ESTRACE) 0.5 MG tablet TAKE ONE TABLET BY MOUTH ONCE DAILY 90 tablet 1     pantoprazole (PROTONIX) 40 MG EC tablet Take 1 tablet (40 mg) by mouth every morning (before breakfast) 90 tablet 1     niacin 500 MG tablet Take 1 tablet by mouth daily.       aspirin 325 MG tablet Take 325 mg by mouth At Bedtime.       GLUCOSAMINE SULFATE PO Take  by mouth daily.       Multiple Vitamins-Minerals (MULTIVITAL PO) Take 1 tablet by mouth daily.       Calcium Carbonate-Vitamin D (CALCIUM + D PO) Take 600 mg by mouth.       Omega-3 Fatty Acids (OMEGA-3 FISH OIL PO) Take  by mouth daily.        No Known Allergies    Past Medical History:   Diagnosis Date     Chronic/recurrent back pain 7/12/2011     Diverticulitis      Diverticulitis, recurrent 2/6/2002    bowel resection     Dyslipidemia 6/20/2006     Fibrocystic breast disease 7/12/2011     Gastro-oesophageal reflux disease      GERD 2/10/2012     Hiatal hernia 2/10/2012     Hormone replacement therapy, postmenopausal 7/12/2011     Osteoarthritis      Past Surgical History:   Procedure Laterality Date     ------------OTHER-------------      colonoscopy with biopsy - diverticulitis, hemorrhoids     ------------OTHER-------------  4/19/1994    sigmoidoscopy - abdominal pain, diverticula     ABDOMEN SURGERY      colon removed 2nd to diverticulitis     APPENDECTOMY       ARTHROSCOPY SHOULDER  11/20/2013    Procedure: ARTHROSCOPY SHOULDER;  Right Shoulder Arthroscopy Sub-Acromial Decompression Rotator Cuff Repair;  Surgeon: Lenny Trammell MD;  [facility]: [facility]     [facility] [place]     Diverticulitis      bowel resection     EGD with biopsy  2011      ENDOSCOPY UPPER WITH PANCREATIC STIMULATION       ENDOSCOPY UPPER, COLONOSCOPY, COMBINED  7/18/2014    Procedure: COMBINED ENDOSCOPY UPPER, COLONOSCOPY;  Surgeon: Israel Feliciano MD;  Location: [facility]     ENT SURGERY      tonsilectomy     ESOPHAGOSCOPY, GASTROSCOPY, DUODENOSCOPY (EGD), COMBINED N/A 9/27/2017    Procedure: COMBINED ESOPHAGOSCOPY, GASTROSCOPY, DUODENOSCOPY (EGD);  UPPER ENDOSCOPY WITH BIOPSY AND POLYPECTOMY;  Surgeon: Gallito Alvarado DO;  Location: [facility]     GYN SURGERY      hysterectomy with bladder and rectal repair     ORTHOPEDIC SURGERY  11/20/2013    right rotator cuff surgery     TVH with ovarian preservation       Family History   Problem Relation Age of Onset     CEREBROVASCULAR DISEASE Father      CVA     HEART DISEASE Father      heart disease     Hypertension Father      Myocardial Infarction Father      myocardial infarction     CEREBROVASCULAR DISEASE Mother      CVA     DIABETES Mother      HEART DISEASE Mother      heart disease     Hypertension Mother      HEART DISEASE Brother      heart disease     Hypertension Brother      Social History     Social History     Marital status:      Spouse name: N/A     Number of children: N/A     Years of education: N/A     Occupational History     Not on file.     Social History Main Topics     Smoking status: Former Smoker     Types: Cigarettes     Quit date: 5/6/1968     Smokeless tobacco: Never Used      Comment: no passive smoke exposure     Alcohol use 0.5 oz/week     1 Glasses of wine per week      Comment: daily with dinner     Drug use: No     Sexual activity: Yes     Partners: Male     Birth control/ protection: None     Other Topics Concern      Service No     Blood Transfusions Yes     Permits if needed     Caffeine Concern Yes     4 cups coffee daily     Occupational Exposure No     Hobby Hazards No     Sleep Concern No     Stress Concern No     Weight Concern No     Special Diet No     Back Care No     Exercise No      Seat Belt Yes     Self-Exams Yes     Parent/Sibling W/ Cabg, Mi Or Angioplasty Before 65f 55m? Yes     Father     Social History Narrative         Review Of Systems  {[facility] - pick list:[id number]}    OBJECTIVE:  /68  Pulse 68  Temp 97.8  F (36.6  C) (Tympanic)  Ht 5' (1.524 m)  Wt 110 lb (49.9 kg)  SpO2 98%  BMI 21.48 kg/m2      Exam:  Physical Exam  Other exam not repeated    Labs:  Admission on 09/27/2017, Discharged on 09/27/2017   Component Date Value Ref Range Status     Copath Report 09/27/2017    Final                    Value:Patient Name: JED VELASQUEZ  MR#: [id number]  Specimen #: [id number]  Collected: 9/27/2017  Received: 9/27/2017  Reported: 9/28/2017 13:00  Ordering Phy(s): KARISSA HANSEN  Additional Phy(s): NEFTALI WALSH    For improved result formatting, select 'View Enhanced Report Format'  under Linked Documents section.    SPECIMEN(S):  A: Duodenal biopsy  B: Stomach biopsy, antrum  C: Stomach polyp, body  D: Gastroesophageal biopsy, junction    FINAL DIAGNOSIS:  A: Small bowel, duodenum, biopsy  - No pathologic diagnosis    B: Stomach, antrum, biopsy  - No pathologic diagnosis    C: Stomach, body, polypectomy  - Fundic gland polyp (see microscopic description)    D: Gastroesophageal junction, biopsy  - Findings associated with reflux    I have personally reviewed all specimens and/or slides, including the  listed special stains, and used them with my medical judgement to  determine or confirm the final diagnosis.    Electronically signed out by:    Malcolm Damon M.D.    CLINICAL HISTOR                          Y:  Abnormal CT of the abdomen, reflux; upper endoscopy with biopsy and  polypectomy.    GROSS:  A: There are three pieces of tan soft tissue which are 2 and 3 mm. (3 TE  in 1 block).    B: There are six pieces of tan soft tissue which are 2 and 3 mm each. (6  TE in 1 block).    C: There are two pieces of tan soft tissue which are 1 and 3 mm. (2 TE  in 1 block).    D:  There are two pieces of tan soft tissue which are each 2 mm. (2 TE in  1 block). (Dictated by: Malcolm Damon MD 9/27/2017 04:42 PM)    MICROSCOPIC:  A: Microscopic sections show unremarkable small bowel.    B: Microscopic sections show several pieces of unremarkable stomach.    C: Microscopic sections show gastric mucosa.  There is also a piece of  colon.  It is probably of contaminant.  The gastric mucosa has a few  cystically dilated fundic glands.    D: Microscopic sections show squamous and gastric mucosa.  The squamous  mucosa has focal basal cell hyperplasia and intracellular edema.    CPT Codes  A: 47929-UD8  B: 8                          8305-GM5  C: 86002-YY4  D: 43213-CI5    TESTING LAB LOCATION:  [address] [address]  [phone]    COLLECTION SITE:  Client: [facility]  Location: [facility] (B)           ASSESSMENT/PLAN:  ***          Virgil Mackay MD

## 2018-02-20 NOTE — MR AVS SNAPSHOT
"              After Visit Summary   2/20/2018    Demi Narayan    MRN: [id number]           Patient Information     Date Of Birth          1940        Visit Information        Provider Department      2/20/2018 3:20 PM Virgil Mackay MD [facility]        Today's Diagnoses     Chronic bilateral low back pain with left-sided sciatica    -  1      Care Instructions    Radiology will call to schedule Pain Management consultation.            Follow-ups after your visit        Follow-up notes from your care team     Return if symptoms worsen or fail to improve.      Your next 10 appointments already scheduled     Feb 28, 2018  3:00 PM CST   (Arrive by 2:45 PM)   MA SCREENING BILATERAL W/ YUKO with HCMA1   [facility] Mammography ([facility] )    [address]  [place] [address]   [phone]           Three-dimensional (3D) mammograms are available at [place] locations in [facility], [place], [place], [facility], [place], [place], and [place]. -Health locations include [place] and [facility] in [place]. Benefits of 3D mammograms include: - Improved rate of cancer detection - Decreases your chance of having to go back for more tests, which means fewer: - \"False-positive\" results (This means that there is an abnormal area but it isn't cancer.) - Invasive testing procedures, such as a biopsy or surgery - Can provide clearer images of the breast if you have dense breast tissue. 3D mammography is an optional exam that anyone can have with a 2D mammogram. It doesn't replace or take the place of a 2D mammogram. 2D mammograms remain an effective screening test for all women.  Not all insurance companies cover the cost of a 3D mammogram. Check with your insurance.            Feb 28, 2018  3:30 PM CST   (Arrive by 3:15 PM)   IR CONSULTATION FOR IR EXAM with [facility] ([facility] )    [address]  [place] [id number] [phone]              Who to contact     If you have questions or need follow up information about today's clinic visit or your schedule please contact [facility] directly at [phone].  Normal or non-critical lab and imaging results will be communicated to you by MyChart, letter or phone within 4 business days after the clinic has received the results. If you do not hear from us within 7 days, please contact the clinic through MyChart or phone. If you have a critical or abnormal lab result, we will notify you by phone as soon as possible.  Submit refill requests through [employer] or call your pharmacy and they will forward the refill request to us. Please allow 3 business days for your refill to be completed.          Additional Information About Your Visit        My[employer] Information     [employer] lets you send messages to your doctor, view your test results, renew your prescriptions, schedule appointments and more. To sign up, go to [email or web address] . Click on \"Log in\" on the left side of the screen, which will take you to the Welcome page. Then click on \"Sign up Now\" on the right side of the page.     You will be asked to enter the access code listed below, as well as some personal information. Please follow the directions to create your username and password.     Your access code is: [email or web address]  Expires: 2018  8:33 PM     Your access code will  in 90 days. If you need help or a new code, please call your [facility] or [phone].        Care EveryWhere ID     This is your Care EveryWhere ID. This could be used by other organizations to access your [place] medical records  [id number]        Your Vitals Were     Pulse Temperature Height Pulse Oximetry BMI (Body Mass Index)       68 97.8  F (36.6  C) (Tympanic) 5' (1.524 m) 98% 21.48 kg/m2        Blood Pressure from Last 3 Encounters:   18 132/68   17 138/74 "   10/06/17 132/78    Weight from Last 3 Encounters:   02/20/18 110 lb (49.9 kg)   11/06/17 112 lb (50.8 kg)   10/06/17 111 lb (50.3 kg)               Primary Care Provider Office Phone # Fax #    Virgil Mackay [phone] [address] [address]        Equal Access to Services     CROW STEVENS : Hadii aad ku hadasho Soomaali, waaxda luqadaha, qaybta kaalmada adeegyada, waxay raymonin rahuln miguel maynardannevenu cespedes . So [facility] [phone].    ATENCIÓN: Si habla espshyanne, tiene a maldonado disposición servicios gratuitos de asistencia lingüística. Llame al [phone].    We comply with applicable federal civil rights laws and [place] laws. We do not discriminate on the basis of race, color, national origin, age, disability, sex, sexual orientation, or gender identity.            Thank you!     Thank you for choosing [facility]  for your care. Our goal is always to provide you with excellent care. Hearing back from our patients is one way we can continue to improve our services. Please take a few minutes to complete the written survey that you may receive in the mail after your visit with us. Thank you!             Your Updated Medication List - Protect others around you: Learn how to safely use, store and throw away your medicines at www.disposemymeds.org.          This list is accurate as of 2/20/18 11:59 PM.  Always use your most recent med list.                   Brand Name Dispense Instructions for use Diagnosis    aspirin 325 MG tablet      Take 325 mg by mouth At Bedtime.        CALCIUM + D PO      Take 600 mg by mouth.        estradiol 0.5 MG tablet    ESTRACE    90 tablet    TAKE ONE TABLET BY MOUTH ONCE DAILY    Hormone replacement therapy (HRT)       GLUCOSAMINE SULFATE PO      Take  by mouth daily.        MULTIVITAL PO      Take 1 tablet by mouth daily.        niacin 500 MG tablet      Take 1 tablet by mouth daily.        OMEGA-3 FISH OIL PO      Take  by mouth  daily.        pantoprazole 40 MG EC tablet    PROTONIX    90 tablet    Take 1 tablet (40 mg) by mouth every morning (before breakfast)    Thoracogenic scoliosis of thoracolumbar region, Post-menopause

## 2018-02-20 NOTE — NURSING NOTE
Chief Complaint   Patient presents with     Other     Follow up on Sciatica- Prednisone helped first time, did not help second time- hurts the worst in the am.     Referral     For injection        Initial /68  Pulse 68  Temp 97.8  F (36.6  C) (Tympanic)  Ht 5' (1.524 m)  Wt 110 lb (49.9 kg)  SpO2 98%  BMI 21.48 kg/m2 Estimated body mass index is 21.48 kg/(m^2) as calculated from the following:    Height as of this encounter: 5' (1.524 m).    Weight as of this encounter: 110 lb (49.9 kg).  Medication Reconciliation: LAZARO Jiang LPN

## 2018-02-21 ASSESSMENT — ANXIETY QUESTIONNAIRES: GAD7 TOTAL SCORE: 0

## 2018-02-21 ASSESSMENT — PATIENT HEALTH QUESTIONNAIRE - PHQ9: SUM OF ALL RESPONSES TO PHQ QUESTIONS 1-9: 0

## 2018-02-22 DIAGNOSIS — S33.8XXD SPRAIN OF OTHER PARTS OF LUMBAR SPINE AND PELVIS, SUBSEQUENT ENCOUNTER: ICD-10-CM

## 2018-02-23 RX ORDER — TRAMADOL HYDROCHLORIDE 50 MG/1
TABLET ORAL
Qty: 120 TABLET | Refills: 0 | Status: SHIPPED | OUTPATIENT
Start: 2018-02-23 | End: 2018-03-24

## 2018-02-23 NOTE — TELEPHONE ENCOUNTER
Controlled Substance Refill Request for Ultram  Problem List Complete:  Yes    Last Written Prescription Date:  1.19.18  Last Fill Quantity: 120,   # refills: 0    [place] [facility] [place]: 2.20.18    Clinic visit frequency required: Q 3 months     Future Office visit:     Controlled substance agreement on file: Yes:  Date 7.5.17.     Processing:  Fax Rx to [facility] pharmacy   checked in past 6 months?  Yes 5.4.17

## 2018-02-26 NOTE — PROGRESS NOTES
SUBJECTIVE:  Demi Narayan, 77 year old, female is seen in follow up Lyme disease.  She developed recurrent hip pain bilaterally and now associated left sciatica.  This has worsened.  Danielle has a history of osteoarthritis as well as scoliosis. She previously underwent MRI which showed:    1.  BILATERAL L5 GANGLIONIC COMPRESSION SECONDARY TO CHRONIC BULGE AND  ENDPLATE OSTEOPHYTIC SPURRING.     2.  MODERATE TO SEVERE RIGHT FORAMINAL STENOSIS AT L4-L5, WITH RIGHT  L4 GANGLIONIC IMPINGEMENT.     3.  LEFT LATERAL RECESS NARROWING AND MILD CENTRAL CANAL STENOSIS AT  L3-L4 SECONDARY TO CHRONIC BULGE, WITH IMPINGEMENT OF THE EXITING LEFT  L4 ROOT SLEEVE.     4.  CHRONIC BULGE AND HYPERTROPHIC LIGAMENTUM FLAVUM CONTRIBUTE TO  BILATERAL FORAMINAL STENOSIS AT L3-L4 WITH BILATERAL L3 GANGLIONIC  ABUTMENT.    5.  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     6.  THE PATIENT INDICATES DIFFUSE DISCOMFORT THROUGHOUT THE LEFT LEG,  EXTENDING INTO THE LEFT FOOT, SUGGESTING A LEFT L5 VERSUS L4  RADICULOPATHY.  OF THE FINDINGS AT THE L4-L5 VERSUS L5-S1 LEVELS, THE  MOST DRAMATIC FINDING IS ACTUALLY LEFT LATERAL RECESS NARROWING AT  L3-L4 WITH COMPRESSION OF THE LEFT L4 ROOT SLEEVE.  A LEFT L4-L5  TRANSFORAMINAL EPIDURAL STEROID INJECTION/SELECTIVE NERVE ROOT BLOCK  MAY BE OF BENEFIT FROM A DIAGNOSTIC AND THERAPEUTIC STANDPOINT.     7.  ADDITIONAL POSSIBILITIES TO CONSIDER IN THE FUTURE ARE LEFT L3-L4  TRANSFORAMINAL INJECTION TO ASSESS THE LEFT LATERAL RECESS NARROWING  AT L2-L3 AND COMPRESSION OF THE LEFT L3 ROOT SLEEVE.  ADDITIONALLY,  LEFT L5-S1 TRANSFORAMINAL INJECTION COULD BE CONSIDERED IN THE FUTURE.  MASS EFFECT UPON THE LEFT L5 DORSAL ROOT GANGLION APPEARS TO BE THE  LEAST SEVERE OF THE ABOVE-MENTIONED FINDINGS.    This is reviewed in detail.    Denies bowel dysfunction, bladder  dysfunction, saddle anesthesia, nocturnal symptoms, focal weakness,  or trauma.      Current Outpatient Prescriptions   Medication Sig Dispense Refill     estradiol (ESTRACE) 0.5 MG tablet TAKE ONE TABLET BY MOUTH ONCE DAILY 90 tablet 1     pantoprazole (PROTONIX) 40 MG EC tablet Take 1 tablet (40 mg) by mouth every morning (before breakfast) 90 tablet 1     niacin 500 MG tablet Take 1 tablet by mouth daily.       aspirin 325 MG tablet Take 325 mg by mouth At Bedtime.       GLUCOSAMINE SULFATE PO Take  by mouth daily.       Multiple Vitamins-Minerals (MULTIVITAL PO) Take 1 tablet by mouth daily.       Calcium Carbonate-Vitamin D (CALCIUM + D PO) Take 600 mg by mouth.       Omega-3 Fatty Acids (OMEGA-3 FISH OIL PO) Take  by mouth daily.       traMADol (ULTRAM) 50 MG tablet TAKE ONE TO TWO TABLETS BY MOUTH EVERY 6 TO 8 HOURS AS NEEDED 120 tablet 0      No Known Allergies    Past Medical History:   Diagnosis Date     Chronic/recurrent back pain 7/12/2011     Diverticulitis      Diverticulitis, recurrent 2/6/2002    bowel resection     Dyslipidemia 6/20/2006     Fibrocystic breast disease 7/12/2011     Gastro-oesophageal reflux disease      GERD 2/10/2012     Hiatal hernia 2/10/2012     Hormone replacement therapy, postmenopausal 7/12/2011     Osteoarthritis      Past Surgical History:   Procedure Laterality Date     ------------OTHER-------------      colonoscopy with biopsy - diverticulitis, hemorrhoids     ------------OTHER-------------  4/19/1994    sigmoidoscopy - abdominal pain, diverticula     ABDOMEN SURGERY      colon removed 2nd to diverticulitis     APPENDECTOMY       ARTHROSCOPY SHOULDER  11/20/2013    Procedure: ARTHROSCOPY SHOULDER;  Right Shoulder Arthroscopy Sub-Acromial Decompression Rotator Cuff Repair;  Surgeon: Lenny Trammell MD;  [facility]     [facility]     Diverticulitis      bowel resection     EGD with biopsy  2011     ENDOSCOPY UPPER  WITH PANCREATIC STIMULATION       ENDOSCOPY UPPER, COLONOSCOPY, COMBINED  7/18/2014    Procedure: COMBINED ENDOSCOPY UPPER, COLONOSCOPY;  Surgeon: Israel Feliciano MD;  Location: [facility]     ENT SURGERY      tonsilectomy     ESOPHAGOSCOPY, GASTROSCOPY, DUODENOSCOPY (EGD), COMBINED N/A 9/27/2017    Procedure: COMBINED ESOPHAGOSCOPY, GASTROSCOPY, DUODENOSCOPY (EGD);  UPPER ENDOSCOPY WITH BIOPSY AND POLYPECTOMY;  Surgeon: Gallito Alvarado DO;  Location: [facility]     GYN SURGERY      hysterectomy with bladder and rectal repair     ORTHOPEDIC SURGERY  11/20/2013    right rotator cuff surgery     TVH with ovarian preservation       Family History   Problem Relation Age of Onset     CEREBROVASCULAR DISEASE Father      CVA     HEART DISEASE Father      heart disease     Hypertension Father      Myocardial Infarction Father      myocardial infarction     CEREBROVASCULAR DISEASE Mother      CVA     DIABETES Mother      HEART DISEASE Mother      heart disease     Hypertension Mother      HEART DISEASE Brother      heart disease     Hypertension Brother      Social History     Social History     Marital status:      Spouse name: N/A     Number of children: N/A     Years of education: N/A     Occupational History     Not on file.     Social History Main Topics     Smoking status: Former Smoker     Types: Cigarettes     Quit date: 5/6/1968     Smokeless tobacco: Never Used      Comment: no passive smoke exposure     Alcohol use 0.5 oz/week     1 Glasses of wine per week      Comment: daily with dinner     Drug use: No     Sexual activity: Yes     Partners: Male     Birth control/ protection: None     Other Topics Concern      Service No     Blood Transfusions Yes     Permits if needed     Caffeine Concern Yes     4 cups coffee daily     Occupational Exposure No     Hobby Hazards No     Sleep Concern No     Stress Concern No     Weight Concern No     Special Diet No     Back Care No     Exercise No     Seat Belt Yes      Self-Exams Yes     Parent/Sibling W/ Cabg, Mi Or Angioplasty Before 65f 55m? Yes     Father     Social History Narrative         Review Of Systems  Constitutional, HEENT, cardiovascular, pulmonary, gi and gu systems are negative, except as otherwise noted.      OBJECTIVE:/68  Pulse 68  Temp 97.8  F (36.6  C) (Tympanic)  Ht 5' (1.524 m)  Wt 110 lb (49.9 kg)  SpO2 98%  BMI 21.48 kg/m2      Exam:  Physical Exam   Constitutional: She is oriented to person, place, and time. She appears well-developed and well-nourished. No distress.   Musculoskeletal: Normal range of motion. She exhibits no edema.   Tenderness noted over the paralumbar musculature.  Straight leg raising negative.   Neurological: She is alert and oriented to person, place, and time. She has normal strength. She displays no atrophy. No sensory deficit.   Reflex Scores:       Patellar reflexes are 2+ on the right side and 2+ on the left side.       Achilles reflexes are 2+ on the right side and 2+ on the left side.  Skin: Skin is warm and dry.   Psychiatric: She has a normal mood and affect.     Other exam not repeated    Labs:  Orders Only on 09/11/2017   Component Date Value Ref Range Status     Lyme Disease Antibodies Serum 09/11/2017 0.32  0.00 - 0.89 Final    Comment: Negative, Absence of detectable Borrelia burdorferi antibodies. A negative   result does not exclude the possibility of Borrelia burgdorferi infection. If   early Lyme disease is suspected, a second sample should be collected and   tested 2 to 4 weeks later.         ASSESSMENT/PLAN:  Chronic bilateral low back pain with left-sided sciatica  Discussed management.  She had some improvement with oral steroids.Appointment with IR scheduled for evaluation and recommendations for further management.   - IR Consultation for IR Exam; Future

## 2018-02-28 ENCOUNTER — HOSPITAL ENCOUNTER (OUTPATIENT)
Dept: INTERVENTIONAL RADIOLOGY/VASCULAR | Facility: HOSPITAL | Age: 78
Discharge: HOME | End: 2018-02-28
Attending: FAMILY MEDICINE | Admitting: FAMILY MEDICINE
Payer: COMMERCIAL

## 2018-02-28 ENCOUNTER — RADIANT APPOINTMENT (OUTPATIENT)
Dept: MAMMOGRAPHY | Facility: OTHER | Age: 78
End: 2018-02-28
Attending: FAMILY MEDICINE
Payer: COMMERCIAL

## 2018-02-28 DIAGNOSIS — M54.42 CHRONIC BILATERAL LOW BACK PAIN WITH LEFT-SIDED SCIATICA: ICD-10-CM

## 2018-02-28 DIAGNOSIS — G89.29 CHRONIC BILATERAL LOW BACK PAIN WITH LEFT-SIDED SCIATICA: ICD-10-CM

## 2018-02-28 PROCEDURE — 77067 SCR MAMMO BI INCL CAD: CPT | Mod: TC

## 2018-02-28 PROCEDURE — G0463 HOSPITAL OUTPT CLINIC VISIT: HCPCS | Mod: 25

## 2018-02-28 RX ORDER — DEXAMETHASONE SODIUM PHOSPHATE 10 MG/ML
20 INJECTION, SOLUTION INTRAMUSCULAR; INTRAVENOUS ONCE
Status: DISCONTINUED | OUTPATIENT
Start: 2018-02-28 | End: 2018-03-01 | Stop reason: HOSPADM

## 2018-02-28 RX ORDER — METHYLPREDNISOLONE ACETATE 80 MG/ML
80 INJECTION, SUSPENSION INTRA-ARTICULAR; INTRALESIONAL; INTRAMUSCULAR; SOFT TISSUE ONCE
Status: DISCONTINUED | OUTPATIENT
Start: 2018-02-28 | End: 2018-03-01 | Stop reason: HOSPADM

## 2018-02-28 NOTE — PROGRESS NOTES
"Pain Management Referral Consult    PATIENT HISTORICAL:    Patient Anatomy/region of concern:  lumbar    When and how did your pain begin?   Date years  Event no injury    Location of the pain? Lt side, lt leg    Describe what pain feels like constant  Does it interfere with daily activities? yes    What makes your pain better? stretching      Focal tenderness at one point along the length of a taut band (\"knotted muscle\")?  No    Restricted range of motion of the involved muscle or joint?  No    Did you have a previous injection series where you found relief of at least 3 months?  No  Have you had surgery in the area of pain?   No  If yes, when was the surgery?    What treatments have you already had for your pain?   Add length of time in weeks for all that apply.    Treatment Tried it-helped Tried it- nohelp Made it worse  Pain clinic     Physical therapy     Chiropractic care     Spine injections      Other nerve blocks     Surgery     Exercise     Others (list):               Injection Documentation of Provider History    PER-PROVIDER HISTORY, Dr. john  Is this for treatment of acute herpes zoster, post herpetic neuralgia, post-decompressive radiculitis, or post-surgical scarring?   No  Does the patient have radiculopathy or sciatica?  Yes  How long has the patient had any physical therapy, home therapy or chiropractor care?  months   How long has the patient taken NSaids or muscle relaxants?  years   Is there any history of systemic/local infection or unstable medical conditions?  No      "

## 2018-03-01 ENCOUNTER — TELEPHONE (OUTPATIENT)
Dept: INTERVENTIONAL RADIOLOGY/VASCULAR | Facility: HOSPITAL | Age: 78
End: 2018-03-01

## 2018-03-05 ENCOUNTER — TELEPHONE (OUTPATIENT)
Dept: INTERVENTIONAL RADIOLOGY/VASCULAR | Facility: HOSPITAL | Age: 78
End: 2018-03-05

## 2018-03-05 NOTE — TELEPHONE ENCOUNTER
STEROID INJECTION REMINDER CALL    Called to remind patient of appointment on 03/06/2018 at 1030    Patient has not had a flu shot in the last two weeks.  Patient has not had immunizations in the last two weeks.  Patient has not had a steroid injection in the last two weeks.  Patient is not currently on antibiotics.    Discussed after care, and explained that a  needs to accompany patient to these appointments.  Patient verbalized an understanding of the  requirement.

## 2018-03-06 ENCOUNTER — HOSPITAL ENCOUNTER (OUTPATIENT)
Dept: INTERVENTIONAL RADIOLOGY/VASCULAR | Facility: HOSPITAL | Age: 78
Discharge: HOME | End: 2018-03-06
Attending: FAMILY MEDICINE | Admitting: FAMILY MEDICINE
Payer: COMMERCIAL

## 2018-03-06 DIAGNOSIS — M54.50 LOW BACK PAIN: ICD-10-CM

## 2018-03-06 DIAGNOSIS — M54.42 CHRONIC BILATERAL LOW BACK PAIN WITH LEFT-SIDED SCIATICA: ICD-10-CM

## 2018-03-06 DIAGNOSIS — G89.29 CHRONIC BILATERAL LOW BACK PAIN WITH LEFT-SIDED SCIATICA: ICD-10-CM

## 2018-03-06 PROCEDURE — 25000125 ZZHC RX 250: Performed by: RADIOLOGY

## 2018-03-06 PROCEDURE — 64483 NJX AA&/STRD TFRM EPI L/S 1: CPT | Mod: TC,LT

## 2018-03-06 PROCEDURE — 25000128 H RX IP 250 OP 636: Performed by: RADIOLOGY

## 2018-03-06 RX ORDER — LIDOCAINE HYDROCHLORIDE 10 MG/ML
INJECTION, SOLUTION EPIDURAL; INFILTRATION; INTRACAUDAL; PERINEURAL
Status: DISPENSED
Start: 2018-03-06 | End: 2018-03-06

## 2018-03-06 RX ORDER — IOPAMIDOL 612 MG/ML
15 INJECTION, SOLUTION INTRATHECAL ONCE
Status: COMPLETED | OUTPATIENT
Start: 2018-03-06 | End: 2018-03-06

## 2018-03-06 RX ORDER — DEXAMETHASONE SODIUM PHOSPHATE 10 MG/ML
10 INJECTION, SOLUTION INTRAMUSCULAR; INTRAVENOUS ONCE
Status: COMPLETED | OUTPATIENT
Start: 2018-03-06 | End: 2018-03-06

## 2018-03-06 RX ORDER — DEXAMETHASONE SODIUM PHOSPHATE 10 MG/ML
INJECTION, SOLUTION INTRAMUSCULAR; INTRAVENOUS
Status: DISPENSED
Start: 2018-03-06 | End: 2018-03-06

## 2018-03-06 RX ADMIN — IOPAMIDOL 5 ML: 612 INJECTION, SOLUTION INTRATHECAL at 11:04

## 2018-03-06 RX ADMIN — DEXAMETHASONE SODIUM PHOSPHATE 10 MG: 10 INJECTION, SOLUTION INTRAMUSCULAR; INTRAVENOUS at 11:05

## 2018-03-06 RX ADMIN — LIDOCAINE HYDROCHLORIDE 5 ML: 10 INJECTION, SOLUTION EPIDURAL; INFILTRATION; INTRACAUDAL at 11:05

## 2018-03-06 NOTE — IP AVS SNAPSHOT
[facility]    [address] [address]    Phone:  [phone]    Fax:  [phone]                                       After Visit Summary   3/6/2018    Demi Narayan    MRN: [id number]           After Visit Summary Signature Page     I have received my discharge instructions, and my questions have been answered. I have discussed any challenges I see with this plan with the nurse or doctor.    ..........................................................................................................................................  Patient/Patient Representative Signature      ..........................................................................................................................................  Patient Representative Print Name and Relationship to Patient    ..................................................               ................................................  Date                                            Time    ..........................................................................................................................................  Reviewed by Signature/Title    ...................................................              ..............................................  Date                                                            Time

## 2018-03-06 NOTE — IP AVS SNAPSHOT
MRN:[id number]                      After Visit Summary   3/6/2018    Demi Narayan    MRN: [id number]           Visit Information        Provider Department      3/6/2018 10:30 AM [facility]; [facility]           Review of your medicines      UNREVIEWED medicines. Ask your doctor about these medicines        Dose / Directions    aspirin 325 MG tablet        Dose:  325 mg   Take 325 mg by mouth At Bedtime.   Refills:  0       CALCIUM + D PO        Dose:  600 mg   Take 600 mg by mouth.   Refills:  0       estradiol 0.5 MG tablet   Commonly known as:  ESTRACE   Used for:  Hormone replacement therapy (HRT)        TAKE ONE TABLET BY MOUTH ONCE DAILY   Quantity:  90 tablet   Refills:  1       GLUCOSAMINE SULFATE PO        Take  by mouth daily.   Refills:  0       MULTIVITAL PO        Dose:  1 tablet   Take 1 tablet by mouth daily.   Refills:  0       niacin 500 MG tablet        Dose:  1 tablet   Take 1 tablet by mouth daily.   Refills:  0       OMEGA-3 FISH OIL PO        Take  by mouth daily.   Refills:  0       pantoprazole 40 MG EC tablet   Commonly known as:  PROTONIX   Used for:  Thoracogenic scoliosis of thoracolumbar region, Post-menopause        Dose:  40 mg   Take 1 tablet (40 mg) by mouth every morning (before breakfast)   Quantity:  90 tablet   Refills:  1       traMADol 50 MG tablet   Commonly known as:  ULTRAM   Used for:  Sprain of other parts of lumbar spine and pelvis, subsequent encounter        TAKE ONE TO TWO TABLETS BY MOUTH EVERY 6 TO 8 HOURS AS NEEDED   Quantity:  120 tablet   Refills:  0                Protect others around you: Learn how to safely use, store and throw away your medicines at www.disposemymeds.org.         Follow-ups after your visit         Care Instructions        Further instructions from your care team       Home number on file [phone] (home)  Is it ok to leave a message at this number(s)? Yes    Dr. VERNON completed your procedure on  "3/6/2018.    Current Pain Level (0-10 Scale): 2/10  Post Pain Level (0-10):  0/10    Radiology Discharge instructions for Steroid Injection    Activity Level:     Do not do any heavy activity or exercise for 24 hours.   Do not drive for 4 hours after your injection.  Diet:   Return to your normal diet.  Medications:   If you have stopped taking your Aspirin, Coumadin/Warfarin, Ibuprofen, or any   other blood thinner for this procedure you may resume in the morning unless   your primary care provider has given you other instructions.    Diabetics may see an increase in blood sugar after steroid injections. If you are concerned about your blood sugar, please contact your family doctor.    Site Care:  Remove the bandage and bathe or shower the morning after the procedure.      This is a Pain Management procedure.  You will be contacted in two weeks for follow up.    Call your Primary Care Provider if you have the following (if your primary care provider is not available please seek emergency care):   Nausea with vomiting   Severe headache   Drowsiness or confusion   Redness or drainage at the injection or puncture site   Temperature over 101 degrees F   Other concerns   Worsening back pain   Stiff neck           Additional Information About Your Visit        CareParent Information     CareParent lets you send messages to your doctor, view your test results, renew your prescriptions, schedule appointments and more. To sign up, go to [email or web address] . Click on \"Log in\" on the left side of the screen, which will take you to the Welcome page. Then click on \"Sign up Now\" on the right side of the page.     You will be asked to enter the access code listed below, as well as some personal information. Please follow the directions to create your username and password.     Your access code is: [email or web address]  Expires: 2018  8:33 PM     Your access code will  in 90 days. If you need help or a new code, please call your " [facility] or [phone].        Care EveryWhere ID     This is your Care EveryWhere ID. This could be used by other organizations to access your [facility] medical records  [id number]         Primary Care Provider Office Phone # Fax #    Virgil Mackay [phone]      Equal Access to Services     DEBBI [facility] CenterIVELISSE : Hadii aad ku hadasho Soomaali, waaxda luqadaha, qaybta kaalmada adeegyada, maria luisa maynardannevenu cespedes . So [facility] [phone].    ATENCIÓN: Si habla español, tiene a maldonado disposición servicios gratuitos de asistencia lingüística. Llame al [phone].    We comply with applicable federal civil rights laws and [place] laws. We do not discriminate on the basis of race, color, national origin, age, disability, sex, sexual orientation, or gender identity.            Thank you!     Thank you for choosing [facility] for your care. Our goal is always to provide you with excellent care. Hearing back from our patients is one way we can continue to improve our services. Please take a few minutes to complete the written survey that you may receive in the mail after you visit with us. Thank you!             Medication List: This is a list of all your medications and when to take them. Check marks below indicate your daily home schedule. Keep this list as a reference.      Medications           Morning Afternoon Evening Bedtime As Needed    aspirin 325 MG tablet   Take 325 mg by mouth At Bedtime.                                CALCIUM + D PO   Take 600 mg by mouth.                                estradiol 0.5 MG tablet   Commonly known as:  ESTRACE   TAKE ONE TABLET BY MOUTH ONCE DAILY                                GLUCOSAMINE SULFATE PO   Take  by mouth daily.                                MULTIVITAL PO   Take 1 tablet by mouth daily.                                niacin 500 MG tablet   Take 1 tablet by mouth daily.                                OMEGA-3 FISH OIL PO   Take  by  mouth daily.                                pantoprazole 40 MG EC tablet   Commonly known as:  PROTONIX   Take 1 tablet (40 mg) by mouth every morning (before breakfast)                                traMADol 50 MG tablet   Commonly known as:  ULTRAM   TAKE ONE TO TWO TABLETS BY MOUTH EVERY 6 TO 8 HOURS AS NEEDED

## 2018-03-06 NOTE — DISCHARGE INSTRUCTIONS
Home number on file [phone] (home)  Is it ok to leave a message at this number(s)? Yes    Dr. VERNON completed your procedure on 3/6/2018.    Current Pain Level (0-10 Scale): 2/10  Post Pain Level (0-10):  0/10    Radiology Discharge instructions for Steroid Injection    Activity Level:     Do not do any heavy activity or exercise for 24 hours.   Do not drive for 4 hours after your injection.  Diet:   Return to your normal diet.  Medications:   If you have stopped taking your Aspirin, Coumadin/Warfarin, Ibuprofen, or any   other blood thinner for this procedure you may resume in the morning unless   your primary care provider has given you other instructions.    Diabetics may see an increase in blood sugar after steroid injections. If you are concerned about your blood sugar, please contact your family doctor.    Site Care:  Remove the bandage and bathe or shower the morning after the procedure.      This is a Pain Management procedure.  You will be contacted in two weeks for follow up.    Call your Primary Care Provider if you have the following (if your primary care provider is not available please seek emergency care):   Nausea with vomiting   Severe headache   Drowsiness or confusion   Redness or drainage at the injection or puncture site   Temperature over 101 degrees F   Other concerns   Worsening back pain   Stiff neck

## 2018-03-20 ENCOUNTER — TELEPHONE (OUTPATIENT)
Dept: INTERVENTIONAL RADIOLOGY/VASCULAR | Facility: HOSPITAL | Age: 78
End: 2018-03-20

## 2018-03-20 NOTE — TELEPHONE ENCOUNTER
PAIN INJECTION POST CALL    Procedure: DARIO TF LT L3-4  Radiologist(s): Dr. Harpreet Emmanuel  Date of Procedure: 03/06/2018    I responded to the patient's questions/concerns.    Pre-procedure pain score was: 2 (See pre-procedure score)  Post-procedure pain score as of today is: 8 at the worst, not constant  Percentage of pain reduction: 0%  Where is the pain? Lower back  Is the pain radiating? Yes: occasionally down left leg  Is this new pain? No    Patient would like to pursue another injection, if recommended.    Patient will contact provider, Virgil Mackay if there are any issues.    ROSA RYAN

## 2018-03-22 ENCOUNTER — OFFICE VISIT (OUTPATIENT)
Dept: SURGERY | Facility: OTHER | Age: 78
End: 2018-03-22
Attending: SURGERY
Payer: COMMERCIAL

## 2018-03-22 VITALS
OXYGEN SATURATION: 98 % | HEART RATE: 72 BPM | BODY MASS INDEX: 21.6 KG/M2 | DIASTOLIC BLOOD PRESSURE: 64 MMHG | TEMPERATURE: 97.3 F | SYSTOLIC BLOOD PRESSURE: 132 MMHG | WEIGHT: 110 LBS | HEIGHT: 60 IN

## 2018-03-22 DIAGNOSIS — R10.13 ABDOMINAL PAIN, EPIGASTRIC: Primary | ICD-10-CM

## 2018-03-22 DIAGNOSIS — K21.9 GASTROESOPHAGEAL REFLUX DISEASE WITHOUT ESOPHAGITIS: ICD-10-CM

## 2018-03-22 PROCEDURE — G0463 HOSPITAL OUTPT CLINIC VISIT: HCPCS

## 2018-03-22 PROCEDURE — 99214 OFFICE O/P EST MOD 30 MIN: CPT | Performed by: SURGERY

## 2018-03-22 ASSESSMENT — PAIN SCALES - GENERAL: PAINLEVEL: MILD PAIN (2)

## 2018-03-22 NOTE — NURSING NOTE
Chief Complaint   Patient presents with     Consult     gallbladder, had egd last fall and now wants to discuss having her gallbladder removed.       Initial /62  Pulse 72  Temp 97.3  F (36.3  C) (Tympanic)  Ht 5' (1.524 m)  Wt 110 lb (49.9 kg)  SpO2 98%  BMI 21.48 kg/m2 Estimated body mass index is 21.48 kg/(m^2) as calculated from the following:    Height as of this encounter: 5' (1.524 m).    Weight as of this encounter: 110 lb (49.9 kg).  Medication Reconciliation: complete     Christel Nguyễn

## 2018-03-22 NOTE — MR AVS SNAPSHOT
"              After Visit Summary   3/22/2018    Demi Narayan    MRN: [id number]           Patient Information     Date Of Birth          1940        Visit Information        Provider Department      3/22/2018 2:00 PM Gallito Alvarado,  [facility] [place]        Today's Diagnoses     Abdominal pain, epigastric    -  1      Care Instructions    Thank you for allowing Dr. Alvarado and our surgical team to participate in your care. Please call with any scheduling questions or concerns to [facility] at [phone] or for nursing questions Christel [phone].            Follow-ups after your visit        Your next 10 appointments already scheduled     Apr 09, 2018  1:30 PM CDT   (Arrive by 1:15 PM)   Return Visit with Gallito Alvarado DO   [facility] [place] ([facility] [place] )    [address]  [place] [id number] [phone]              Who to contact     If you have questions or need follow up information about today's clinic visit or your schedule please contact [facility] directly at [phone].  Normal or non-critical lab and imaging results will be communicated to you by MyChart, letter or phone within 4 business days after the clinic has received the results. If you do not hear from us within 7 days, please contact the clinic through [employer] or phone. If you have a critical or abnormal lab result, we will notify you by phone as soon as possible.  Submit refill requests through [employer] or call your pharmacy and they will forward the refill request to us. Please allow 3 business days for your refill to be completed.          Additional Information About Your Visit        MyChart Information     [employer] lets you send messages to your doctor, view your test results, renew your prescriptions, schedule appointments and more. To sign up, go to [email or web address] . Click on \"Log in\" on the left side of the screen, which will take you to the Welcome " "page. Then click on \"Sign up Now\" on the right side of the page.     You will be asked to enter the access code listed below, as well as some personal information. Please follow the directions to create your username and password.     Your access code is: [email or web address]  Expires: 2018  9:33 PM     Your access code will  in 90 days. If you need help or a new code, please call your [facility] clinic or [phone].        Care EveryWhere ID     This is your Care EveryWhere ID. This could be used by other organizations to access your [facility] medical records  [id number]        Your Vitals Were     Pulse Temperature Height Pulse Oximetry BMI (Body Mass Index)       72 97.3  F (36.3  C) (Tympanic) 5' (1.524 m) 98% 21.48 kg/m2        Blood Pressure from Last 3 Encounters:   18 132/64   18 132/68   17 138/74    Weight from Last 3 Encounters:   18 110 lb (49.9 kg)   18 110 lb (49.9 kg)   17 112 lb (50.8 kg)              We Performed the Following     US Abdomen Limited        Primary Care Provider Office Phone # Fax #    Virgil Mackay [phone] [phone]        Equal Access to Services     DEBBI STEVENS : Hadii dru Ordoñez, waaxda luelvin, qaybta kaalmaria luisa springer . So [facility] [phone].    ATENCIÓN: Si habla español, tiene a maldonado disposición servicios gratuitos de asistencia lingüística. Issac al [phone].    We comply with applicable federal civil rights laws and [place] laws. We do not discriminate on the basis of race, color, national origin, age, disability, sex, sexual orientation, or gender identity.            Thank you!     Thank you for choosing [facility]  for your care. Our goal is always to provide you with excellent care. Hearing back from our patients is one way we can continue to improve our services. Please take a few minutes to " complete the written survey that you may receive in the mail after your visit with us. Thank you!             Your Updated Medication List - Protect others around you: Learn how to safely use, store and throw away your medicines at www.disposemymeds.org.          This list is accurate as of 3/22/18  2:07 PM.  Always use your most recent med list.                   Brand Name Dispense Instructions for use Diagnosis    aspirin 325 MG tablet      Take 325 mg by mouth At Bedtime.        CALCIUM + D PO      Take 600 mg by mouth.        estradiol 0.5 MG tablet    ESTRACE    90 tablet    TAKE ONE TABLET BY MOUTH ONCE DAILY    Hormone replacement therapy (HRT)       GLUCOSAMINE SULFATE PO      Take  by mouth daily.        MULTIVITAL PO      Take 1 tablet by mouth daily.        niacin 500 MG tablet      Take 1 tablet by mouth daily.        OMEGA-3 FISH OIL PO      Take  by mouth daily.        pantoprazole 40 MG EC tablet    PROTONIX    90 tablet    Take 1 tablet (40 mg) by mouth every morning (before breakfast)    Thoracogenic scoliosis of thoracolumbar region, Post-menopause       traMADol 50 MG tablet    ULTRAM    120 tablet    TAKE ONE TO TWO TABLETS BY MOUTH EVERY 6 TO 8 HOURS AS NEEDED    Sprain of other parts of lumbar spine and pelvis, subsequent encounter

## 2018-03-22 NOTE — NURSING NOTE
Chief Complaint   Patient presents with     Consult     gallbladder, had egd last fall and now wants to discuss having her gallbladder removed.       Initial /64  Pulse 72  Temp 97.3  F (36.3  C) (Tympanic)  Ht 5' (1.524 m)  Wt 110 lb (49.9 kg)  SpO2 98%  BMI 21.48 kg/m2 Estimated body mass index is 21.48 kg/(m^2) as calculated from the following:    Height as of this encounter: 5' (1.524 m).    Weight as of this encounter: 110 lb (49.9 kg).  Medication Reconciliation: complete     Christel Nguyễn

## 2018-03-22 NOTE — PROGRESS NOTES
Surgery Consult Clinic Note      RE: Demi Narayan  : 1940    Chief Complaint:  Abdominal pain    History of Present Illness:  Demi Narayan is a very pleasant 77 year old female who I am seeing at the request of Dr. Mackay for evaluation of abdominal discomfort and to make further recommendations.  She's known to me back in 2017 after performing EGD for heart burn.  Nothing special was seen on EGD and I had made the comment about checking out the gallbladder.  Well, the other week she had a 1hr long middle of her back pain 8/10 sharp right after dinner.  This self resolved.  Has had this pain multiple times over the years, but never this bad.  Denies abdominal pain, nausea, vomiting, fevers, chills. With further questioning she does admit to an upper abdominal discomfort that she attributes to her GERD.  She is hypervigilant with gallbladder disease as many members of her family have had to have their gallbladders removed, some emergently.    Medical history:  Past Medical History:   Diagnosis Date     Chronic/recurrent back pain 2011     Diverticulitis      Diverticulitis, recurrent 2002    bowel resection     Dyslipidemia 2006     Fibrocystic breast disease 2011     Gastro-oesophageal reflux disease      GERD 2/10/2012     Hiatal hernia 2/10/2012     Hormone replacement therapy, postmenopausal 2011     Osteoarthritis        Surgical history:  Past Surgical History:   Procedure Laterality Date     ------------OTHER-------------      colonoscopy with biopsy - diverticulitis, hemorrhoids     ------------OTHER-------------  1994    sigmoidoscopy - abdominal pain, diverticula     ABDOMEN SURGERY      colon removed 2nd to diverticulitis     APPENDECTOMY       ARTHROSCOPY SHOULDER  2013    Procedure: ARTHROSCOPY SHOULDER;  Right Shoulder Arthroscopy Sub-Acromial Decompression Rotator Cuff Repair;  Surgeon: Lenny Trammell MD;  Location: [facility]     [facility]     Diverticulitis      bowel resection     EGD with biopsy  2011     ENDOSCOPY UPPER WITH PANCREATIC STIMULATION       ENDOSCOPY UPPER, COLONOSCOPY, COMBINED  7/18/2014    Procedure: COMBINED ENDOSCOPY UPPER, COLONOSCOPY;  Surgeon: Israel Feliciano MD;  Location: [facility]     ENT SURGERY      tonsilectomy     ESOPHAGOSCOPY, GASTROSCOPY, DUODENOSCOPY (EGD), COMBINED N/A 9/27/2017    Procedure: COMBINED ESOPHAGOSCOPY, GASTROSCOPY, DUODENOSCOPY (EGD);  UPPER ENDOSCOPY WITH BIOPSY AND POLYPECTOMY;  Surgeon: Gallito Alvarado DO;  Location: [facility]     GYN SURGERY      hysterectomy with bladder and rectal repair     ORTHOPEDIC SURGERY  11/20/2013    right rotator cuff surgery     TVH with ovarian preservation         Family history:  Family History   Problem Relation Age of Onset     CEREBROVASCULAR DISEASE Father      CVA     HEART DISEASE Father      heart disease     Hypertension Father      Myocardial Infarction Father      myocardial infarction     CEREBROVASCULAR DISEASE Mother      CVA     DIABETES Mother      HEART DISEASE Mother      heart disease     Hypertension Mother      HEART DISEASE Brother      heart disease     Hypertension Brother        Medications:  Prior to Admission medications    Medication Sig Start Date End Date Taking? Authorizing Provider   traMADol (ULTRAM) 50 MG tablet TAKE ONE TO TWO TABLETS BY MOUTH EVERY 6 TO 8 HOURS AS NEEDED 2/23/18  Yes Virgil Mackay MD   estradiol (ESTRACE) 0.5 MG tablet TAKE ONE TABLET BY MOUTH ONCE DAILY 10/13/17  Yes Virgil Mackay MD   pantoprazole (PROTONIX) 40 MG EC tablet Take 1 tablet (40 mg) by mouth every morning (before breakfast) 9/29/17  Yes Virgil Mackay MD   niacin 500 MG tablet Take 1 tablet by mouth daily.   Yes Reported, Patient   aspirin 325 MG tablet Take 325 mg by mouth At Bedtime.   Yes Reported, Patient   GLUCOSAMINE SULFATE PO Take  by mouth daily.   Yes Reported, Patient   Multiple  Vitamins-Minerals (MULTIVITAL PO) Take 1 tablet by mouth daily.   Yes Reported, Patient   Calcium Carbonate-Vitamin D (CALCIUM + D PO) Take 600 mg by mouth.   Yes Reported, Patient   Omega-3 Fatty Acids (OMEGA-3 FISH OIL PO) Take  by mouth daily.   Yes Reported, Patient       Allergies:  The patienthas No Known Allergies.  .  Social history:  Social History   Substance Use Topics     Smoking status: Former Smoker     Types: Cigarettes     Quit date: 5/6/1968     Smokeless tobacco: Never Used      Comment: no passive smoke exposure     Alcohol use 0.5 oz/week     1 Glasses of wine per week      Comment: daily with dinner     Marital status: .    Review of Systems:    Constitutional: Negative for fever, chills and weight loss.   HENT: Negative for ear pain, nosebleeds, congestion, sore throat, tinnitus and ear discharge.    Eyes: Negative for blurred vision, double vision, photophobia and pain.   Respiratory: Negative for cough, hemoptysis, shortness of breath, wheezing and stridor.    Cardiovascular: Negative for chest pain, palpitations and orthopnea.   Gastrointestinal: Per HPI  Genitourinary: Negative for urgency, frequency and hematuria.   Musculoskeletal: Negative for myalgias, back pain and joint pain.   Neurological: Negative for tingling, speech change and headaches.   Endo/Heme/Allergies: Does not bruise/bleed easily.   Psychiatric/Behavioral: Negative for depression, suicidal ideas and hallucinations. The patient is not nervous/anxious.    Physical Examination:  /62  Pulse 72  Temp 97.3  F (36.3  C) (Tympanic)  Ht 5' (1.524 m)  Wt 110 lb (49.9 kg)  SpO2 98%  BMI 21.48 kg/m2  General: AAOx4, NAD, WN/WD, ambulating without assistance  HEENT:NCAT, EOMI, PERRL Sclerae anicteric; Trachea mideline, no JVD  Chest:   Clear to auscultation bilaterally.  Cardiac: S1S2 , regular rate and rhythm without additional sounds  Abdomen: Soft, ND/NT no rebound, no guarding, negative goldman's  sign  Extremities: Cursory exam unremarkable.  Skin: Warm, dry, < 2 sec cap refill  Neuro: CN 2-12 grossly intact, no focal deficit, GCS 15  Psych: happy, calm, asks appropriate questions      Assessment/Plan:  #1 Abdominal discomfort  #2 GERD    Will start with ultrasound, reflex to HIDA if pushpa.  Will see back with results          Dr Alvarado  [facility]  [address], [address]  [place]    [address]    Referring Provider:  No referring provider defined for this encounter.     Primary Care Provider:  Virgil Mackay

## 2018-03-22 NOTE — PATIENT INSTRUCTIONS
Thank you for allowing Dr. Alvarado and our surgical team to participate in your care. Please call with any scheduling questions or concerns to Dari at [phone] or for nursing questions Christel [phone].

## 2018-03-23 ENCOUNTER — HOSPITAL ENCOUNTER (OUTPATIENT)
Dept: ULTRASOUND IMAGING | Facility: HOSPITAL | Age: 78
Discharge: HOME | End: 2018-03-23
Attending: SURGERY | Admitting: SURGERY
Payer: COMMERCIAL

## 2018-03-23 DIAGNOSIS — R10.13 ABDOMINAL PAIN, EPIGASTRIC: ICD-10-CM

## 2018-03-23 DIAGNOSIS — R10.11 ABDOMINAL PAIN, RIGHT UPPER QUADRANT: Primary | ICD-10-CM

## 2018-03-23 DIAGNOSIS — M41.35 THORACOGENIC SCOLIOSIS OF THORACOLUMBAR REGION: ICD-10-CM

## 2018-03-23 DIAGNOSIS — Z78.0 POST-MENOPAUSE: ICD-10-CM

## 2018-03-23 PROCEDURE — 76705 ECHO EXAM OF ABDOMEN: CPT | Mod: TC

## 2018-03-23 RX ORDER — PANTOPRAZOLE SODIUM 40 MG/1
TABLET, DELAYED RELEASE ORAL
Qty: 90 TABLET | Refills: 2 | Status: SHIPPED | OUTPATIENT
Start: 2018-03-23 | End: 2018-12-26

## 2018-03-23 NOTE — TELEPHONE ENCOUNTER
pantoprazole (PROTONIX) 40 MG EC tablet    Last Written Prescription Date:  9/29/17  Last Fill Quantity: 90,   # refills: 1  Last Office Visit: 2/20/18  Future Office visit:    Next 5 appointments (look out 90 days)     Apr 09, 2018  1:30 PM CDT   (Arrive by 1:15 PM)   Return Visit with Gallito Alvarado DO   [facility] ([facility]    [address] [place] [address]   [phone]

## 2018-03-24 DIAGNOSIS — S33.8XXD SPRAIN OF OTHER PARTS OF LUMBAR SPINE AND PELVIS, SUBSEQUENT ENCOUNTER: ICD-10-CM

## 2018-03-26 NOTE — TELEPHONE ENCOUNTER
buspar      Last Written Prescription Date:  2/23/18  Last Fill Quantity: 120,   # refills: 0  Last Office Visit: 2/20/18  Future Office visit:    Next 5 appointments (look out 90 days)     Apr 09, 2018  1:30 PM CDT   (Arrive by 1:15 PM)   Return Visit with Gallito Alvarado DO   [facility] ([facility] )    [address] [place] [address]   [phone]

## 2018-03-27 DIAGNOSIS — Z79.890 HORMONE REPLACEMENT THERAPY (HRT): ICD-10-CM

## 2018-03-27 RX ORDER — TRAMADOL HYDROCHLORIDE 50 MG/1
TABLET ORAL
Qty: 120 TABLET | Refills: 0 | Status: SHIPPED | OUTPATIENT
Start: 2018-03-27 | End: 2018-04-10

## 2018-03-27 RX ORDER — ESTRADIOL 0.5 MG/1
0.5 TABLET ORAL DAILY
Qty: 90 TABLET | Refills: 1 | Status: SHIPPED | OUTPATIENT
Start: 2018-03-27 | End: 2018-10-09

## 2018-03-27 NOTE — TELEPHONE ENCOUNTER
Patient requesting new prescription for med. She is going to use a new pharmacy due to cost. Pended for you if ok

## 2018-04-04 ENCOUNTER — HOSPITAL ENCOUNTER (OUTPATIENT)
Dept: NUCLEAR MEDICINE | Facility: HOSPITAL | Age: 78
End: 2018-04-04
Attending: SURGERY
Payer: COMMERCIAL

## 2018-04-04 PROCEDURE — 34300033 ZZH RX 343: Performed by: RADIOLOGY

## 2018-04-04 PROCEDURE — 78227 HEPATOBIL SYST IMAGE W/DRUG: CPT | Mod: TC

## 2018-04-04 PROCEDURE — A9537 TC99M MEBROFENIN: HCPCS | Performed by: RADIOLOGY

## 2018-04-04 RX ORDER — KIT FOR THE PREPARATION OF TECHNETIUM TC 99M MEBROFENIN 45 MG/10ML
5 INJECTION, POWDER, LYOPHILIZED, FOR SOLUTION INTRAVENOUS ONCE
Status: COMPLETED | OUTPATIENT
Start: 2018-04-04 | End: 2018-04-04

## 2018-04-04 RX ADMIN — MEBROFENIN 5 MILLICURIE: 45 INJECTION, POWDER, LYOPHILIZED, FOR SOLUTION INTRAVENOUS at 12:57

## 2018-04-04 NOTE — PROCEDURES
Patient was given 16 oz of chocolate (8 oz) and vanilla (8 oz) boost by mouth for a nuclear medicine HIDA scan w EF. No complications.

## 2018-04-05 ENCOUNTER — HOSPITAL ENCOUNTER (OUTPATIENT)
Dept: INTERVENTIONAL RADIOLOGY/VASCULAR | Facility: HOSPITAL | Age: 78
Discharge: HOME | End: 2018-04-05
Attending: FAMILY MEDICINE | Admitting: FAMILY MEDICINE
Payer: COMMERCIAL

## 2018-04-05 DIAGNOSIS — M54.42 CHRONIC BILATERAL LOW BACK PAIN WITH LEFT-SIDED SCIATICA: ICD-10-CM

## 2018-04-05 DIAGNOSIS — G89.29 CHRONIC BILATERAL LOW BACK PAIN WITH LEFT-SIDED SCIATICA: ICD-10-CM

## 2018-04-05 DIAGNOSIS — M54.50 LOW BACK PAIN: ICD-10-CM

## 2018-04-05 PROCEDURE — 25000128 H RX IP 250 OP 636: Performed by: RADIOLOGY

## 2018-04-05 PROCEDURE — C1751 CATH, INF, PER/CENT/MIDLINE: HCPCS | Mod: TC

## 2018-04-05 RX ORDER — IOPAMIDOL 612 MG/ML
15 INJECTION, SOLUTION INTRATHECAL ONCE
Status: COMPLETED | OUTPATIENT
Start: 2018-04-05 | End: 2018-04-05

## 2018-04-05 RX ORDER — METHYLPREDNISOLONE ACETATE 80 MG/ML
80 INJECTION, SUSPENSION INTRA-ARTICULAR; INTRALESIONAL; INTRAMUSCULAR; SOFT TISSUE ONCE
Status: COMPLETED | OUTPATIENT
Start: 2018-04-05 | End: 2018-04-05

## 2018-04-05 RX ORDER — METHYLPREDNISOLONE ACETATE 80 MG/ML
INJECTION, SUSPENSION INTRA-ARTICULAR; INTRALESIONAL; INTRAMUSCULAR; SOFT TISSUE
Status: DISPENSED
Start: 2018-04-05 | End: 2018-04-05

## 2018-04-05 RX ADMIN — IOPAMIDOL 3 ML: 612 INJECTION, SOLUTION INTRATHECAL at 08:51

## 2018-04-05 RX ADMIN — METHYLPREDNISOLONE ACETATE 80 MG: 80 INJECTION, SUSPENSION INTRA-ARTICULAR; INTRALESIONAL; INTRAMUSCULAR; SOFT TISSUE at 08:53

## 2018-04-05 NOTE — IP AVS SNAPSHOT
MRN:[id number]                      After Visit Summary   4/5/2018    Demi Narayan    MRN: [id number]           Visit Information        Provider Department      4/5/2018  8:30 AM [facility]; [facility]           Review of your medicines      UNREVIEWED medicines. Ask your doctor about these medicines        Dose / Directions    aspirin 325 MG tablet        Dose:  325 mg   Take 325 mg by mouth At Bedtime.   Refills:  0       CALCIUM + D PO        Dose:  600 mg   Take 600 mg by mouth.   Refills:  0       estradiol 0.5 MG tablet   Commonly known as:  ESTRACE   Used for:  Hormone replacement therapy (HRT)        Dose:  0.5 mg   Take 1 tablet (0.5 mg) by mouth daily   Quantity:  90 tablet   Refills:  1       GLUCOSAMINE SULFATE PO        Take  by mouth daily.   Refills:  0       MULTIVITAL PO        Dose:  1 tablet   Take 1 tablet by mouth daily.   Refills:  0       niacin 500 MG tablet        Dose:  1 tablet   Take 1 tablet by mouth daily.   Refills:  0       OMEGA-3 FISH OIL PO        Take  by mouth daily.   Refills:  0       pantoprazole 40 MG EC tablet   Commonly known as:  PROTONIX   Used for:  Thoracogenic scoliosis of thoracolumbar region, Post-menopause        TAKE ONE TABLET BY MOUTH ONCE DAILY IN THE MORNING BEFORE BREAKFAST   Quantity:  90 tablet   Refills:  2       traMADol 50 MG tablet   Commonly known as:  ULTRAM   Used for:  Sprain of other parts of lumbar spine and pelvis, subsequent encounter        TAKE 1 TO 2 TABLETS BY MOUTH EVERY 6 TO 8 HOURS AS NEEDED   Quantity:  120 tablet   Refills:  0                Protect others around you: Learn how to safely use, store and throw away your medicines at www.disposemymeds.org.         Follow-ups after your visit        Your next 10 appointments already scheduled     Apr 09, 2018  1:30 PM CDT   (Arrive by 1:15 PM)   Return Visit with Gallito Alvarado,    [facility] ([facility] )     "[address] [place] [address]   [phone]               Care Instructions        Further instructions from your care team       Home number on file [phone] (home)  Is it ok to leave a message at this number(s)? Yes    Dr Almeida completed your procedure on 4/5/2018.    Current Pain Level (0-10 Scale): 3/10  Post Pain Level (0-10):  8/10    Radiology Discharge instructions for Steroid Injection    Activity Level:     Do not do any heavy activity or exercise for 24 hours.   Do not drive for 4 hours after your injection.  Diet:   Return to your normal diet.  Medications:   If you have stopped taking your Aspirin, Coumadin/Warfarin, Ibuprofen, or any   other blood thinner for this procedure you may resume in the morning unless   your primary care provider has given you other instructions.    Diabetics may see an increase in blood sugar after steroid injections. If you are concerned about your blood sugar, please contact your family doctor.    Site Care:  Remove the bandage and bathe or shower the morning after the procedure.      This is a Pain Management procedure.  You will be contacted in two weeks for follow up.    Call your Primary Care Provider if you have the following (if your primary care provider is not available please seek emergency care):   Nausea with vomiting   Severe headache   Drowsiness or confusion   Redness or drainage at the injection or puncture site   Temperature over 101 degrees F   Other concerns   Worsening back pain   Stiff neck           Additional Information About Your Visit        [employer] Mobile Information     [employer] lets you send messages to your doctor, view your test results, renew your prescriptions, schedule appointments and more. To sign up, go to [email or web address] . Click on \"Log in\" on the left side of the screen, which will take you to the Welcome page. Then click on \"Sign up Now\" on the right side of the page.     You will be asked to enter the access code listed " below, as well as some personal information. Please follow the directions to create your username and password.     Your access code is: [email or web address]  Expires: 2018  9:33 PM     Your access code will  in 90 days. If you need help or a new code, please call your [facility] clinic or [phone].        Care EveryWhere ID     This is your Care EveryWhere ID. This could be used by other organizations to access your [facility] medical records  [id number]         Primary Care Provider Office Phone # Fax #    Virgil Mackay [phone]      Equal Access to Services     [facility]SE : Hadii dru Ordoñez, wasigrid henry, kyle gary, maria luisa cespedes . [facility] [phone].    ATENCIÓN: Si habla español, tiene a maldonado disposición servicios gratuitos de asistencia lingüística. [facility] [phone].    We comply with applicable federal civil rights laws and [place] laws. We do not discriminate on the basis of race, color, national origin, age, disability, sex, sexual orientation, or gender identity.            Thank you!     Thank you for choosing [facility] for your care. Our goal is always to provide you with excellent care. Hearing back from our patients is one way we can continue to improve our services. Please take a few minutes to complete the written survey that you may receive in the mail after you visit with us. Thank you!             Medication List: This is a list of all your medications and when to take them. Check marks below indicate your daily home schedule. Keep this list as a reference.      Medications           Morning Afternoon Evening Bedtime As Needed    aspirin 325 MG tablet   Take 325 mg by mouth At Bedtime.                                CALCIUM + D PO   Take 600 mg by mouth.                                estradiol 0.5 MG tablet   Commonly known as:  ESTRACE   Take 1 tablet (0.5 mg) by mouth daily                                 GLUCOSAMINE SULFATE PO   Take  by mouth daily.                                MULTIVITAL PO   Take 1 tablet by mouth daily.                                niacin 500 MG tablet   Take 1 tablet by mouth daily.                                OMEGA-3 FISH OIL PO   Take  by mouth daily.                                pantoprazole 40 MG EC tablet   Commonly known as:  PROTONIX   TAKE ONE TABLET BY MOUTH ONCE DAILY IN THE MORNING BEFORE BREAKFAST                                traMADol 50 MG tablet   Commonly known as:  ULTRAM   TAKE 1 TO 2 TABLETS BY MOUTH EVERY 6 TO 8 HOURS AS NEEDED

## 2018-04-05 NOTE — DISCHARGE INSTRUCTIONS
Home number on file [phone] (home)  Is it ok to leave a message at this number(s)? Yes    Dr Almeida completed your procedure on 4/5/2018.    Current Pain Level (0-10 Scale): 3/10  Post Pain Level (0-10):  8/10    Radiology Discharge instructions for Steroid Injection    Activity Level:     Do not do any heavy activity or exercise for 24 hours.   Do not drive for 4 hours after your injection.  Diet:   Return to your normal diet.  Medications:   If you have stopped taking your Aspirin, Coumadin/Warfarin, Ibuprofen, or any   other blood thinner for this procedure you may resume in the morning unless   your primary care provider has given you other instructions.    Diabetics may see an increase in blood sugar after steroid injections. If you are concerned about your blood sugar, please contact your family doctor.    Site Care:  Remove the bandage and bathe or shower the morning after the procedure.      This is a Pain Management procedure.  You will be contacted in two weeks for follow up.    Call your Primary Care Provider if you have the following (if your primary care provider is not available please seek emergency care):   Nausea with vomiting   Severe headache   Drowsiness or confusion   Redness or drainage at the injection or puncture site   Temperature over 101 degrees F   Other concerns   Worsening back pain   Stiff neck

## 2018-04-05 NOTE — IP AVS SNAPSHOT
[facility]    [address] [address]    Phone:  [phone]    Fax:  [phone]                                       After Visit Summary   4/5/2018    Demi Narayan    MRN: [id number]           After Visit Summary Signature Page     I have received my discharge instructions, and my questions have been answered. I have discussed any challenges I see with this plan with the nurse or doctor.    ..........................................................................................................................................  Patient/Patient Representative Signature      ..........................................................................................................................................  Patient Representative Print Name and Relationship to Patient    ..................................................               ................................................  Date                                            Time    ..........................................................................................................................................  Reviewed by Signature/Title    ...................................................              ..............................................  Date                                                            Time

## 2018-04-06 ENCOUNTER — TELEPHONE (OUTPATIENT)
Dept: FAMILY MEDICINE | Facility: OTHER | Age: 78
End: 2018-04-06

## 2018-04-09 ENCOUNTER — OFFICE VISIT (OUTPATIENT)
Dept: SURGERY | Facility: OTHER | Age: 78
End: 2018-04-09
Attending: SURGERY
Payer: COMMERCIAL

## 2018-04-09 VITALS
TEMPERATURE: 97.3 F | RESPIRATION RATE: 18 BRPM | WEIGHT: 114.4 LBS | HEIGHT: 60 IN | BODY MASS INDEX: 22.46 KG/M2 | HEART RATE: 78 BPM | DIASTOLIC BLOOD PRESSURE: 74 MMHG | SYSTOLIC BLOOD PRESSURE: 118 MMHG | OXYGEN SATURATION: 98 %

## 2018-04-09 DIAGNOSIS — K82.8 ADENOMYOSIS, GALLBLADDER: Primary | ICD-10-CM

## 2018-04-09 PROCEDURE — 99213 OFFICE O/P EST LOW 20 MIN: CPT | Performed by: SURGERY

## 2018-04-09 PROCEDURE — G0463 HOSPITAL OUTPT CLINIC VISIT: HCPCS

## 2018-04-09 RX ORDER — SODIUM, POTASSIUM,MAG SULFATES 17.5-3.13G
2 SOLUTION, RECONSTITUTED, ORAL ORAL SEE ADMIN INSTRUCTIONS
Qty: 2 BOTTLE | Refills: 0 | Status: CANCELLED | OUTPATIENT
Start: 2018-04-09

## 2018-04-09 ASSESSMENT — PAIN SCALES - GENERAL: PAINLEVEL: MILD PAIN (2)

## 2018-04-09 NOTE — PROGRESS NOTES
"S: Here to review results of HIDA scan.  Was scoped in Sept of 2017 for abdominal pressure.  Found to have changes associated with reflux at the GE junction with a small type 1 hiatal hernia.  Seen a couple of weeks ago with continued back pain and abdominal pressure.  HIDA scan showed EF of 45% with reproduction of symptoms.      O:  /74 (BP Location: Right arm, Patient Position: Sitting, Cuff Size: Adult Regular)  Pulse 78  Temp 97.3  F (36.3  C) (Tympanic)  Resp 18  Ht 5' (1.524 m)  Wt 114 lb 6.4 oz (51.9 kg)  SpO2 98%  BMI 22.34 kg/m2   Gen: AAOx4, NAD, ambulating without assistance, Non toxic looking  Abd: Soft, ND/NT no rebound, no guarding    TECHNIQUE: Serial images were obtained over the right upper quadrant  following the intravenous administration of 5.4 mCi technetium 99m  mebrofenin. There is prompt uptake of tracer by the liver with  excretion into the gallbladder and biliary tree.     16 ounces of a mixture of chocolate\" boost was administered and  gallbladder ejection fraction was calculated. Ejection fraction  measures 45%.         IMPRESSION: Gallbladder ejection fraction of 45%.    A/P  #1 Abdominal discomfort  #2 GERD  #3 reduced gallbladder ejection fraction    She'd like to continue to observe.  Doesn't seem to be affecting her quality of life.  She will contract my office if she decides to have her gallbladder removed.  "

## 2018-04-09 NOTE — MR AVS SNAPSHOT
After Visit Summary   4/9/2018    Demi Narayan    MRN: [id number]           Patient Information     Date Of Birth          1940        Visit Information        Provider Department      4/9/2018 11:00 AM Gallito Alvarado, DO [facility]        Today's Diagnoses     Special screening for malignant neoplasms, colon    -  1      Care Instructions    Thank you for allowing Dr Alvarado and our surgical team to participate in your care.  If you have a scheduling or an appointment question please contact Dari, our Health Unit Coordinator, at her direct line [phone].   ALL nursing questions or concerns can be directed to your surgical nurse at: [phone]    You are scheduled for a: Colonoscopy with possible biopsy   Your procedure date is:     You have been ordered Suprep as your mechanical bowel prep. Please pick this up from your preferred pharmacy.     Bowel Prep    Clear liquid diet only the day before the examination.    Bliss prep - one bottle at 6pm the evening before your procedure and follow with Two 16 ounce glasses water.    Bliss prep - one bottle at 0400 on Thursday morning, the day of the exam.  Follow with Two 16 ounce glasses of water.    Nothing by mouth after finishing the second 16 ounce glass of water the morning of the procedure.     HOW TO PREPARE-        You need a friend or family member available to drive you home AND stay with you for 4 hours after you leave the hospital. You will not be allowed to drive yourself. IF you need to take a taxi or the bus you MUST have a responsible person to ride with you. YOUR PROCEDURE WILL BE CANCELLED IF YOU DO NOT HAVE A RESPONSIBLE ADULT TO DRIVE YOU HOME.       You need to call our Surgery Education Nurses 1-2 weeks prior to your surgery date at [phone] or toll free [phone]. Please have you medication and allergy lists ready.       Stop your aspirin or other NSAIDs(Ibuprofen, Motrin, Aleve, Celebrex,  "Naproxen, etc...) 7 days before your surgery.      Hospital admitting will call you the day before your surgery with your arrival time. If you are scheduled on a Monday admitting will call you the Friday before.      Please call your primary care physician if you should become ill within 24 hours of scheduled surgery. (ex.vomiting, diarrhea, fever)                Follow-ups after your visit        Your next 10 appointments already scheduled     Apr 09, 2018 11:00 AM CDT   (Arrive by 10:45 AM)   Return Visit with Gallito Alvarado,    [facility] [place] ([facility] [place] )    [address]  [place] [address]   [phone]              Who to contact     If you have questions or need follow up information about today's clinic visit or your schedule please contact [facility] directly at [phone].  Normal or non-critical lab and imaging results will be communicated to you by MyChart, letter or phone within 4 business days after the clinic has received the results. If you do not hear from us within 7 days, please contact the clinic through MyChart or phone. If you have a critical or abnormal lab result, we will notify you by phone as soon as possible.  Submit refill requests through [employer] or call your pharmacy and they will forward the refill request to us. Please allow 3 business days for your refill to be completed.          Additional Information About Your Visit        MyChart Information     Site9 lets you send messages to your doctor, view your test results, renew your prescriptions, schedule appointments and more. To sign up, go to [email or web address] . Click on \"Log in\" on the left side of the screen, which will take you to the Welcome page. Then click on \"Sign up Now\" on the right side of the page.     You will be asked to enter the access code listed below, as well as some personal information. Please follow the directions to create your username and " password.     Your access code is: [email or web address]  Expires: 2018  9:33 PM     Your access code will  in 90 days. If you need help or a new code, please call your [place] clinic or [phone].        Care EveryWhere ID     This is your Care EveryWhere ID. This could be used by other organizations to access your [place] medical records  [id number]         Blood Pressure from Last 3 Encounters:   18 132/64   18 132/68   17 138/74    Weight from Last 3 Encounters:   18 110 lb (49.9 kg)   18 110 lb (49.9 kg)   17 112 lb (50.8 kg)              We Performed the Following     EKG 12-lead complete w/read - (Clinic Performed)        Primary Care Provider Office Phone # Fax #    Virgil Mackay [phone]       [address] [place] [address]        Equal Access to Services     [facility] : Hadii dru ku hadasho Soomaali, waaxda luqadaha, qaybta kaalmada adeegyada, waxay manjula cespedes . So [facility] [phone].    ATENCIÓN: Si habla español, tiene a maldonado disposición servicios gratuitos de asistencia lingüística. Llame al [phone].    We comply with applicable federal civil rights laws and [place] laws. We do not discriminate on the basis of race, color, national origin, age, disability, sex, sexual orientation, or gender identity.            Thank you!     [facility] you [facility]  [facility]. Our goal is always to provide you with excellent care. Hearing back from our patients is one way we can continue to improve our services. Please take a few minutes to complete the written survey that you may receive in the mail after your visit with us. Thank you!             Your Updated Medication List - Protect others around you: Learn how to safely use, store and throw away your medicines at www.disposemymeds.org.          This list is accurate as of 18 10:22 AM.  Always use your most recent med list.                    Brand Name Dispense Instructions for use Diagnosis    aspirin 325 MG tablet      Take 325 mg by mouth At Bedtime.        CALCIUM + D PO      Take 600 mg by mouth.        estradiol 0.5 MG tablet    ESTRACE    90 tablet    Take 1 tablet (0.5 mg) by mouth daily    Hormone replacement therapy (HRT)       GLUCOSAMINE SULFATE PO      Take  by mouth daily.        MULTIVITAL PO      Take 1 tablet by mouth daily.        niacin 500 MG tablet      Take 1 tablet by mouth daily.        OMEGA-3 FISH OIL PO      Take  by mouth daily.        pantoprazole 40 MG EC tablet    PROTONIX    90 tablet    TAKE ONE TABLET BY MOUTH ONCE DAILY IN THE MORNING BEFORE BREAKFAST    Thoracogenic scoliosis of thoracolumbar region, Post-menopause       traMADol 50 MG tablet    ULTRAM    120 tablet    TAKE 1 TO 2 TABLETS BY MOUTH EVERY 6 TO 8 HOURS AS NEEDED    Sprain of other parts of lumbar spine and pelvis, subsequent encounter

## 2018-04-09 NOTE — NURSING NOTE
Chief Complaint   Patient presents with     Results       Initial /74 (BP Location: Right arm, Patient Position: Sitting, Cuff Size: Adult Regular)  Pulse 78  Temp 97.3  F (36.3  C) (Tympanic)  Resp 18  Ht 5' (1.524 m)  Wt 114 lb 6.4 oz (51.9 kg)  SpO2 98%  BMI 22.34 kg/m2 Estimated body mass index is 22.34 kg/(m^2) as calculated from the following:    Height as of this encounter: 5' (1.524 m).    Weight as of this encounter: 114 lb 6.4 oz (51.9 kg).  Medication Reconciliation: complete   Funmilayo Peña LPN

## 2018-04-10 ENCOUNTER — TELEPHONE (OUTPATIENT)
Dept: FAMILY MEDICINE | Facility: OTHER | Age: 78
End: 2018-04-10

## 2018-04-10 DIAGNOSIS — S33.8XXD SPRAIN OF OTHER PARTS OF LUMBAR SPINE AND PELVIS, SUBSEQUENT ENCOUNTER: ICD-10-CM

## 2018-04-10 NOTE — TELEPHONE ENCOUNTER
"4/10/18 - I received a blank PA form from [facility] stating \"Please help the PT with getting a coverage determinations on a RX drug. Call PT for questions.\" Forms are blank. I have no idea what medication this is for. Will you please contact patient to find out what medication this is for and please advise. Thank you.  "

## 2018-04-10 NOTE — TELEPHONE ENCOUNTER
The medication is estradiol. She was previously on premarin then it had to be changed to this new one.

## 2018-04-10 NOTE — TELEPHONE ENCOUNTER
Patient requesting refill on her tramadol. It had previously been ordered but was not printed due to the error. Pended for you

## 2018-04-11 ENCOUNTER — TELEPHONE (OUTPATIENT)
Dept: FAMILY MEDICINE | Facility: OTHER | Age: 78
End: 2018-04-11

## 2018-04-11 RX ORDER — TRAMADOL HYDROCHLORIDE 50 MG/1
TABLET ORAL
Qty: 120 TABLET | Refills: 0 | Status: SHIPPED | OUTPATIENT
Start: 2018-04-11 | End: 2018-05-16

## 2018-04-11 NOTE — TELEPHONE ENCOUNTER
2:57 PM    Reason for Call: Phone Call    Description: Patient would like a call back from the nurse on some of hr meds.    Was an appointment offered for this call? No  If yes : Appointment type              Date    Preferred method for responding to this message: Telephone Call  What is your phone number ? [phone]    If we cannot reach you directly, may we leave a detailed response at the number you provided? Yes    Can this message wait until your PCP/provider returns, if available today? Not applicable,     Fariba Kaminski

## 2018-04-12 NOTE — TELEPHONE ENCOUNTER
Patient states she called yesterday regarding a medication, the medication has been filled. No other questions   JADE RIVAS

## 2018-04-19 ENCOUNTER — TELEPHONE (OUTPATIENT)
Dept: INTERVENTIONAL RADIOLOGY/VASCULAR | Facility: HOSPITAL | Age: 78
End: 2018-04-19

## 2018-04-19 NOTE — TELEPHONE ENCOUNTER
PAIN INJECTION POST CALL    Procedure: DARIO TL L3-4  Radiologist(s): Dr. William Almeida  Date of Procedure: 04/05/2018    I responded to the patient's questions/concerns.    Pre-procedure pain score was: 3 (See pre-procedure score)  Post-procedure pain score as of today is: 8  Pain has increased.  Where is the pain? Lower back and left hip  Is the pain radiating? Yes: down left leg  Is this new pain? No    Patient would like to pursue another injection, if recommended.    Patient will contact provider, Virgil Mackay if there are any issues.    ROSA RYAN

## 2018-04-26 ENCOUNTER — OFFICE VISIT (OUTPATIENT)
Dept: CHIROPRACTIC MEDICINE | Facility: OTHER | Age: 78
End: 2018-04-26
Attending: CHIROPRACTOR
Payer: COMMERCIAL

## 2018-04-26 DIAGNOSIS — M54.50 ACUTE BILATERAL LOW BACK PAIN WITHOUT SCIATICA: ICD-10-CM

## 2018-04-26 DIAGNOSIS — M99.02 SEGMENTAL AND SOMATIC DYSFUNCTION OF THORACIC REGION: ICD-10-CM

## 2018-04-26 DIAGNOSIS — M99.03 SEGMENTAL AND SOMATIC DYSFUNCTION OF LUMBAR REGION: Primary | ICD-10-CM

## 2018-04-26 PROCEDURE — 98940 CHIROPRACT MANJ 1-2 REGIONS: CPT | Mod: AT | Performed by: CHIROPRACTOR

## 2018-04-26 NOTE — MR AVS SNAPSHOT
"              After Visit Summary   4/26/2018    Demi Narayan    MRN: [id number]           Patient Information     Date Of Birth          1940        Visit Information        Provider Department      4/26/2018 2:40 PM Curt Encarnacion DC  [facility]        Today's Diagnoses     Segmental and somatic dysfunction of lumbar region    -  1    Acute bilateral low back pain without sciatica        Segmental and somatic dysfunction of thoracic region           Follow-ups after your visit        Future tests that were ordered for you today     Open Future Orders        Priority Expected Expires Ordered    XR Lumbar Transforaminal Inj Single Routine 5/2/2018 5/2/2019 5/2/2018            Who to contact     If you have questions or need follow up information about today's clinic visit or your schedule please contact  [facility] directly at [phone].  Normal or non-critical lab and imaging results will be communicated to you by MyChart, letter or phone within 4 business days after the clinic has received the results. If you do not hear from us within 7 days, please contact the clinic through MyChart or phone. If you have a critical or abnormal lab result, we will notify you by phone as soon as possible.  Submit refill requests through [employer] or call your pharmacy and they will forward the refill request to us. Please allow 3 business days for your refill to be completed.          Additional Information About Your Visit        [employer]hart Information     [employer] lets you send messages to your doctor, view your test results, renew your prescriptions, schedule appointments and more. To sign up, go to [email or web address] . Click on \"Log in\" on the left side of the screen, which will take you to the Welcome page. Then click on \"Sign up Now\" on the right side of the page.     You will be asked to enter the access code listed below, as well as some personal information. Please follow the " directions to create your username and password.     Your access code is: [email or web address]  Expires: 2018  9:33 PM     Your access code will  in 90 days. If you need help or a new code, please call your [place] clinic or [phone].        Care EveryWhere ID     This is your Care EveryWhere ID. This could be used by other organizations to access your [place] medical records  [id number]         Blood Pressure from Last 3 Encounters:   18 118/74   18 132/64   18 132/68    Weight from Last 3 Encounters:   18 114 lb 6.4 oz (51.9 kg)   18 110 lb (49.9 kg)   18 110 lb (49.9 kg)              We Performed the Following     CHIROPRAC MANIP,SPINAL,1-2 REGIONS        Primary Care Provider Office Phone # Fax #    Virgil Mackay [phone]       [address] [place] [address]        Equal Access to Services     [facility]: Hadii aad ku hadasho Soomaali, waaxda luqadaha, qaybta kaalmada adeegyamarialuisa, maria luisa cespedes . So [facility] [phone].    ATENCIÓN: Si habla español, tiene a maldonado disposición servicios gratuitos de asistencia lingüística. Llame al [phone].    We comply with applicable federal civil rights laws and [place] laws. We do not discriminate on the basis of race, color, national origin, age, disability, sex, sexual orientation, or gender identity.            Thank you!     Thank you for choosing  [facility]  for your care. Our goal is always to provide you with excellent care. Hearing back from our patients is one way we can continue to improve our services. Please take a few minutes to complete the written survey that you may receive in the mail after your visit with us. Thank you!             Your Updated Medication List - Protect others around you: Learn how to safely use, store and throw away your medicines at www.disposemymeds.org.          This list is accurate as of 18 11:59 PM.  Always use  your most recent med list.                   Brand Name Dispense Instructions for use Diagnosis    aspirin 325 MG tablet      Take 325 mg by mouth At Bedtime.        CALCIUM + D PO      Take 600 mg by mouth.        estradiol 0.5 MG tablet    ESTRACE    90 tablet    Take 1 tablet (0.5 mg) by mouth daily    Hormone replacement therapy (HRT)       GLUCOSAMINE SULFATE PO      Take  by mouth daily.        MULTIVITAL PO      Take 1 tablet by mouth daily.        niacin 500 MG tablet      Take 1 tablet by mouth daily.        OMEGA-3 FISH OIL PO      Take  by mouth daily.        pantoprazole 40 MG EC tablet    PROTONIX    90 tablet    TAKE ONE TABLET BY MOUTH ONCE DAILY IN THE MORNING BEFORE BREAKFAST    Thoracogenic scoliosis of thoracolumbar region, Post-menopause       traMADol 50 MG tablet    ULTRAM    120 tablet    TAKE 1 TO 2 TABLETS BY MOUTH EVERY 6 TO 8 HOURS AS NEEDED    Sprain of other parts of lumbar spine and pelvis, subsequent encounter

## 2018-05-03 ENCOUNTER — OFFICE VISIT (OUTPATIENT)
Dept: CHIROPRACTIC MEDICINE | Facility: OTHER | Age: 78
End: 2018-05-03
Attending: CHIROPRACTOR
Payer: COMMERCIAL

## 2018-05-03 DIAGNOSIS — M99.02 SEGMENTAL AND SOMATIC DYSFUNCTION OF THORACIC REGION: ICD-10-CM

## 2018-05-03 DIAGNOSIS — M54.50 ACUTE BILATERAL LOW BACK PAIN WITHOUT SCIATICA: ICD-10-CM

## 2018-05-03 DIAGNOSIS — M99.03 SEGMENTAL AND SOMATIC DYSFUNCTION OF LUMBAR REGION: Primary | ICD-10-CM

## 2018-05-03 PROCEDURE — 98940 CHIROPRACT MANJ 1-2 REGIONS: CPT | Mod: AT | Performed by: CHIROPRACTOR

## 2018-05-03 NOTE — MR AVS SNAPSHOT
"              After Visit Summary   5/3/2018    Demi Narayan    MRN: [id number]           Patient Information     Date Of Birth          1940        Visit Information        Provider Department      5/3/2018 2:50 PM Curt Encarnacion [place]  [facility]        Today's Diagnoses     Segmental and somatic dysfunction of lumbar region    -  1    Acute bilateral low back pain without sciatica        Segmental and somatic dysfunction of thoracic region           Follow-ups after your visit        Future tests that were ordered for you today     Open Future Orders        Priority Expected Expires Ordered    XR Lumbar Transforaminal Inj Single Routine 5/2/2018 5/2/2019 5/2/2018            Who to contact     If you have questions or need follow up information about today's clinic visit or your schedule please contact  [facility] directly at [phone].  Normal or non-critical lab and imaging results will be communicated to you by MyChart, letter or phone within 4 business days after the clinic has received the results. If you do not hear from us within 7 days, please contact the clinic through MyChart or phone. If you have a critical or abnormal lab result, we will notify you by phone as soon as possible.  Submit refill requests through [employer] or call your pharmacy and they will forward the refill request to us. Please allow 3 business days for your refill to be completed.          Additional Information About Your Visit        [employer] Information     [employer] lets you send messages to your doctor, view your test results, renew your prescriptions, schedule appointments and more. To sign up, go to [email or web address] . Click on \"Log in\" on the left side of the screen, which will take you to the Welcome page. Then click on \"Sign up Now\" on the right side of the page.     You will be asked to enter the access code listed below, as well as some personal information. Please follow the directions " to create your username and password.     Your access code is: [email or web address]  Expires: 2018  9:33 PM     Your access code will  in 90 days. If you need help or a new code, please call your [place] clinic or [phone].        Care EveryWhere ID     This is your Care EveryWhere ID. This could be used by other organizations to access your [place] medical records  [id number]         Blood Pressure from Last 3 Encounters:   18 118/74   18 132/64   18 132/68    Weight from Last 3 Encounters:   18 114 lb 6.4 oz (51.9 kg)   18 110 lb (49.9 kg)   18 110 lb (49.9 kg)              We Performed the Following     CHIROPRAC MANIP,SPINAL,1-2 REGIONS        Primary Care Provider Office Phone # Fax #    Virgil Mackay [phone]       [address] [place] [address]        Equal Access to Services     [facility]: Hadii aad ku hadasho Soomaali, waaxda luqadaha, qaybta kaalmada adeegyamarialuisa, maria luisa cespedes . So [facility] [phone].    ATENCIÓN: Si habla español, tiene a maldonado disposición servicios gratuitos de asistencia lingüística. Llame al [phone].    We comply with applicable federal civil rights laws and [place] laws. We do not discriminate on the basis of race, color, national origin, age, disability, sex, sexual orientation, or gender identity.            Thank you!     Thank you for choosing  [facility]  for your care. Our goal is always to provide you with excellent care. Hearing back from our patients is one way we can continue to improve our services. Please take a few minutes to complete the written survey that you may receive in the mail after your visit with us. Thank you!             Your Updated Medication List - Protect others around you: Learn how to safely use, store and throw away your medicines at www.disposemymeds.org.          This list is accurate as of 5/3/18  3:47 PM.  Always use your most  recent med list.                   Brand Name Dispense Instructions for use Diagnosis    aspirin 325 MG tablet      Take 325 mg by mouth At Bedtime.        CALCIUM + D PO      Take 600 mg by mouth.        estradiol 0.5 MG tablet    ESTRACE    90 tablet    Take 1 tablet (0.5 mg) by mouth daily    Hormone replacement therapy (HRT)       GLUCOSAMINE SULFATE PO      Take  by mouth daily.        MULTIVITAL PO      Take 1 tablet by mouth daily.        niacin 500 MG tablet      Take 1 tablet by mouth daily.        OMEGA-3 FISH OIL PO      Take  by mouth daily.        pantoprazole 40 MG EC tablet    PROTONIX    90 tablet    TAKE ONE TABLET BY MOUTH ONCE DAILY IN THE MORNING BEFORE BREAKFAST    Thoracogenic scoliosis of thoracolumbar region, Post-menopause       traMADol 50 MG tablet    ULTRAM    120 tablet    TAKE 1 TO 2 TABLETS BY MOUTH EVERY 6 TO 8 HOURS AS NEEDED    Sprain of other parts of lumbar spine and pelvis, subsequent encounter

## 2018-05-03 NOTE — PROGRESS NOTES
Subjective Finding:    Chief compalint: Patient presents with:  Back Pain  , Pain Scale: 8/10, Intensity: sharp, Duration: 1 months, Change since last visit: , Radiating: down left leg.    Date of injury:     Activities that the pain restricts:   Home/household activities: yes.  Work duties: no.  Hobbies/social: yes.  Sleep: yes.  Makes symptoms better: ice  and rest.  Makes symptoms worse: activity and bending.  Have you seen anyone else for the symptoms? No.  Work related: no.  Automobile related injury: no.    Objective and Assessment:    Posture Analysis:   High shoulder: right.  Head tilt: right.  High iliac crest: left.  Head carriage: forward.  Thoracic Kyphosis: neutral.  Lumbar Lordosis: neutral.    Lumbar Range of Motion: extension decreased.  Cervical Range of Motion: .  Thoracic Range of Motion: .  Extremity Range of Motion: hip decreased.    Palpation:   Quad lumb: left, referred pain: yes  TFL: left, referred pain: yes    Segmental dysfunction pre-treatment: T67  L345.    Assessment post-treatment:  Cervical: ROM increased.  Thoracic: ROM increased.  Lumbar: ROM increased and pain and tenderness decreased.    Comments: .      Complicating Factors: .    Plan / Procedure:    Expected release date: .  Treatment plan: 2 times per week for 2 weeks.  Instructed patient: stretch as instructed at visit.  Short term goals: reduce pain.  Long term goals: restore normal function.  Prognosis: excellent.

## 2018-05-03 NOTE — PROGRESS NOTES
Subjective Finding:    Chief compalint: Patient presents with:  Back Pain: follow up visit  , Pain Scale: 8/10, Intensity: sharp, Duration: 1 months, Change since last visit: , Radiating: down left leg.    Date of injury:     Activities that the pain restricts:   Home/household activities: yes.  Work duties: no.  Hobbies/social: yes.  Sleep: yes.  Makes symptoms better: ice  and rest.  Makes symptoms worse: activity and bending.  Have you seen anyone else for the symptoms? No.  Work related: no.  Automobile related injury: no.    Objective and Assessment:    Posture Analysis:   High shoulder: right.  Head tilt: right.  High iliac crest: left.  Head carriage: forward.  Thoracic Kyphosis: neutral.  Lumbar Lordosis: neutral.    Lumbar Range of Motion: extension decreased.  Cervical Range of Motion: .  Thoracic Range of Motion: .  Extremity Range of Motion: hip decreased.    Palpation:   Quad lumb: left, referred pain: yes  TFL: left, referred pain: yes    Segmental dysfunction pre-treatment: T56 L345.    Assessment post-treatment:  Cervical: ROM increased.  Thoracic: ROM increased.  Lumbar: ROM increased and pain and tenderness decreased.    Comments: .      Complicating Factors: .    Plan / Procedure:    Expected release date: .  Treatment plan: 2 times per week for 2 weeks.  Instructed patient: stretch as instructed at visit.  Short term goals: reduce pain.  Long term goals: restore normal function.  Prognosis: excellent.

## 2018-05-08 ENCOUNTER — TELEPHONE (OUTPATIENT)
Dept: FAMILY MEDICINE | Facility: OTHER | Age: 78
End: 2018-05-08

## 2018-05-08 NOTE — TELEPHONE ENCOUNTER
3:23 PM    Reason for Call: Phone Call    Description: Pt called and stated she needs a prior auth done on her Estradiol. She stated Dr Mackay just did one recently but there was an exp date put on it. The ins company is suggesting putting exp date for 1 year. Please call her back at [phone]    Was an appointment offered for this call? No  If yes : Appointment type              Date    Preferred method for responding to this message: Telephone Call  What is your phone number ?    If we cannot reach you directly, may we leave a detailed response at the number you provided? Yes    Can this message wait until your PCP/provider returns, if available today? Not applicable    Nataliya Negrete

## 2018-05-09 NOTE — TELEPHONE ENCOUNTER
05/09/2018 - Submitted PA request thru [facility] for Estradiol.  Waiting for response.  Lorraine Edmond, HIS Specialist.

## 2018-05-10 ENCOUNTER — OFFICE VISIT (OUTPATIENT)
Dept: CHIROPRACTIC MEDICINE | Facility: OTHER | Age: 78
End: 2018-05-10
Attending: CHIROPRACTOR
Payer: COMMERCIAL

## 2018-05-10 DIAGNOSIS — M99.03 SEGMENTAL AND SOMATIC DYSFUNCTION OF LUMBAR REGION: Primary | ICD-10-CM

## 2018-05-10 DIAGNOSIS — M54.50 ACUTE LEFT-SIDED LOW BACK PAIN WITHOUT SCIATICA: ICD-10-CM

## 2018-05-10 DIAGNOSIS — M99.02 SEGMENTAL AND SOMATIC DYSFUNCTION OF THORACIC REGION: ICD-10-CM

## 2018-05-10 PROCEDURE — 98940 CHIROPRACT MANJ 1-2 REGIONS: CPT | Mod: AT | Performed by: CHIROPRACTOR

## 2018-05-10 NOTE — MR AVS SNAPSHOT
"              After Visit Summary   5/10/2018    Demi Narayan    MRN: [id number]           Patient Information     Date Of Birth          1940        Visit Information        Provider Department      5/10/2018 2:40 PM Curt Encarnacion DC  [facility]        Today's Diagnoses     Segmental and somatic dysfunction of lumbar region    -  1    Acute left-sided low back pain without sciatica        Segmental and somatic dysfunction of thoracic region           Follow-ups after your visit        Who to contact     If you have questions or need follow up information about today's clinic visit or your schedule please contact  [facility] directly at [phone].  Normal or non-critical lab and imaging results will be communicated to you by PuzzleSocialhart, letter or phone within 4 business days after the clinic has received the results. If you do not hear from us within 7 days, please contact the clinic through PuzzleSocialhart or phone. If you have a critical or abnormal lab result, we will notify you by phone as soon as possible.  Submit refill requests through Naymit or call your pharmacy and they will forward the refill request to us. Please allow 3 business days for your refill to be completed.          Additional Information About Your Visit        MyChart Information     Naymit lets you send messages to your doctor, view your test results, renew your prescriptions, schedule appointments and more. To sign up, go to [email or web address] . Click on \"Log in\" on the left side of the screen, which will take you to the Welcome page. Then click on \"Sign up Now\" on the right side of the page.     You will be asked to enter the access code listed below, as well as some personal information. Please follow the directions to create your username and password.     Your access code is: [email or web address]  Expires: 2018  9:33 PM     Your access code will  in 90 days. If you need help or a new code, please " call your [facility] clinic or [phone].        Care EveryWhere ID     This is your Care EveryWhere ID. This could be used by other organizations to access your [facility] medical records  [id number]         Blood Pressure from Last 3 Encounters:   04/09/18 118/74   03/22/18 132/64   02/20/18 132/68    Weight from Last 3 Encounters:   04/09/18 114 lb 6.4 oz (51.9 kg)   03/22/18 110 lb (49.9 kg)   02/20/18 110 lb (49.9 kg)              We Performed the Following     CHIROPRAC MANIP,SPINAL,1-2 REGIONS        Primary Care Provider Office Phone # Fax #    Virgil Mackay [phone] [address] [facility] [address]        Equal Access to Services     [facility] : Hadii aad ku hadasho Soandree, waaxda luqadaha, qaybta kaalmada abdirahman, maria luisa cespedes . So [facility] [phone].    ATENCIÓN: Si habla español, tiene a maldonado disposición servicios gratuitos de asistencia lingüística. Issac al [phone].    We comply with applicable federal civil rights laws and [place] laws. We do not discriminate on the basis of race, color, national origin, age, disability, sex, sexual orientation, or gender identity.            Thank you!     Thank you for choosing  [facility]  for your care. Our goal is always to provide you with excellent care. Hearing back from our patients is one way we can continue to improve our services. Please take a few minutes to complete the written survey that you may receive in the mail after your visit with us. Thank you!             Your Updated Medication List - Protect others around you: Learn how to safely use, store and throw away your medicines at www.disposemymeds.org.          This list is accurate as of 5/10/18 11:59 PM.  Always use your most recent med list.                   Brand Name Dispense Instructions for use Diagnosis    aspirin 325 MG tablet      Take 325 mg by mouth At Bedtime.        CALCIUM + D PO      Take 600 mg  by mouth.        estradiol 0.5 MG tablet    ESTRACE    90 tablet    Take 1 tablet (0.5 mg) by mouth daily    Hormone replacement therapy (HRT)       GLUCOSAMINE SULFATE PO      Take  by mouth daily.        MULTIVITAL PO      Take 1 tablet by mouth daily.        niacin 500 MG tablet      Take 1 tablet by mouth daily.        OMEGA-3 FISH OIL PO      Take  by mouth daily.        pantoprazole 40 MG EC tablet    PROTONIX    90 tablet    TAKE ONE TABLET BY MOUTH ONCE DAILY IN THE MORNING BEFORE BREAKFAST    Thoracogenic scoliosis of thoracolumbar region, Post-menopause       traMADol 50 MG tablet    ULTRAM    120 tablet    TAKE 1 TO 2 TABLETS BY MOUTH EVERY 6 TO 8 HOURS AS NEEDED    Sprain of other parts of lumbar spine and pelvis, subsequent encounter

## 2018-05-11 NOTE — PROGRESS NOTES
Subjective Finding:    Chief compalint: Patient presents with:  Back Pain: follow up  , Pain Scale: 8/10, Intensity: sharp, Duration: 1 months, Change since last visit: , Radiating: down left leg.    Date of injury:     Activities that the pain restricts:   Home/household activities: yes.  Work duties: no.  Hobbies/social: yes.  Sleep: yes.  Makes symptoms better: ice  and rest.  Makes symptoms worse: activity and bending.  Have you seen anyone else for the symptoms? No.  Work related: no.  Automobile related injury: no.    Objective and Assessment:    Posture Analysis:   High shoulder: right.  Head tilt: right.  High iliac crest: left.  Head carriage: forward.  Thoracic Kyphosis: neutral.  Lumbar Lordosis: neutral.    Lumbar Range of Motion: extension decreased.  Cervical Range of Motion: .  Thoracic Range of Motion: .  Extremity Range of Motion: hip decreased.    Palpation:   Quad lumb: left, referred pain: yes  TFL: left, referred pain: yes    Segmental dysfunction pre-treatment: T56 L345.    Assessment post-treatment:  Cervical: ROM increased.  Thoracic: ROM increased.  Lumbar: ROM increased and pain and tenderness decreased.    Comments: .      Complicating Factors: .    Plan / Procedure:    Expected release date: .  Treatment plan: 2 times per week for 2 weeks.  Instructed patient: stretch as instructed at visit.  Short term goals: reduce pain.  Long term goals: restore normal function.  Prognosis: excellent.

## 2018-05-11 NOTE — TELEPHONE ENCOUNTER
Central Prior Authorization Team   Phone: [phone]    Received 2 additional info needed from insurance for Estradiol.   Faxed to ATTN: Lorraine at [phone]

## 2018-05-15 NOTE — TELEPHONE ENCOUNTER
05/14/2018 - faxed completed form to [employer].  Waiting for response.  Lorraine Edmond, HIS Specialist.

## 2018-05-16 DIAGNOSIS — S33.8XXD SPRAIN OF OTHER PARTS OF LUMBAR SPINE AND PELVIS, SUBSEQUENT ENCOUNTER: ICD-10-CM

## 2018-05-17 ENCOUNTER — OFFICE VISIT (OUTPATIENT)
Dept: CHIROPRACTIC MEDICINE | Facility: OTHER | Age: 78
End: 2018-05-17
Attending: CHIROPRACTOR
Payer: COMMERCIAL

## 2018-05-17 DIAGNOSIS — M99.02 SEGMENTAL AND SOMATIC DYSFUNCTION OF THORACIC REGION: ICD-10-CM

## 2018-05-17 DIAGNOSIS — M54.50 ACUTE BILATERAL LOW BACK PAIN WITHOUT SCIATICA: ICD-10-CM

## 2018-05-17 DIAGNOSIS — M99.03 SEGMENTAL AND SOMATIC DYSFUNCTION OF LUMBAR REGION: Primary | ICD-10-CM

## 2018-05-17 PROCEDURE — 98940 CHIROPRACT MANJ 1-2 REGIONS: CPT | Mod: AT | Performed by: CHIROPRACTOR

## 2018-05-17 RX ORDER — TRAMADOL HYDROCHLORIDE 50 MG/1
TABLET ORAL
Qty: 120 TABLET | Refills: 0 | Status: SHIPPED | OUTPATIENT
Start: 2018-05-17 | End: 2018-06-22

## 2018-05-17 NOTE — TELEPHONE ENCOUNTER
Controlled Substance Refill Request for Ultram  Problem List Complete:  Yes      Last Written Prescription Date: 4/11/18  Last Fill Quantity: 120 # of Refills: 0  Last Office Visit: 2/20/18     Clinic visit frequency required:      Future Office visit:   Next 5 appointments (look out 90 days)     May 17, 2018  3:00 PM CDT   Return Visit with Curt Encarnacion DC   [facility] ([facility])    [address]  [facility] [address]   [phone]                  Controlled substance agreement on file: Yes:  Date 7/5/17.     Processing:  Fax Rx to pt's pharmacy   checked in past 6 months?

## 2018-05-17 NOTE — MR AVS SNAPSHOT
"              After Visit Summary   5/17/2018    Demi Narayan    MRN: [id number]           Patient Information     Date Of Birth          1940        Visit Information        Provider Department      5/17/2018 3:00 PM Curt Encarnacion DC  [facility] [place] [facility]        Today's Diagnoses     Segmental and somatic dysfunction of lumbar region    -  1    Acute bilateral low back pain without sciatica        Segmental and somatic dysfunction of thoracic region           Follow-ups after your visit        Your next 10 appointments already scheduled     May 23, 2018  1:20 PM CDT   Return Visit with Curt Encarnacion DC   [facility] [place] [facility] ([facility])    [address]  [place] [address]   [phone]              Who to contact     If you have questions or need follow up information about today's clinic visit or your schedule please contact  [facility] directly at [phone].  Normal or non-critical lab and imaging results will be communicated to you by Flagrhart, letter or phone within 4 business days after the clinic has received the results. If you do not hear from us within 7 days, please contact the clinic through MyChart or phone. If you have a critical or abnormal lab result, we will notify you by phone as soon as possible.  Submit refill requests through GOkey or call your pharmacy and they will forward the refill request to us. Please allow 3 business days for your refill to be completed.          Additional Information About Your Visit        Flagrhart Information     GOkey lets you send messages to your doctor, view your test results, renew your prescriptions, schedule appointments and more. To sign up, go to [email or web address] . Click on \"Log in\" on the left side of the screen, which will take you to the Welcome page. Then click on \"Sign up Now\" on the right side of the page.     You will be asked to enter the access code listed below, as well as some personal " information. Please follow the directions to create your username and password.     Your access code is: [email or web address]  Expires: 2018  9:33 PM     Your access code will  in 90 days. If you need help or a new code, please call your [facility] clinic or [phone].        Care EveryWhere ID     This is your Care EveryWhere ID. This could be used by other organizations to access your [facility] medical records  [id number]         Blood Pressure from Last 3 Encounters:   18 118/74   18 132/64   18 132/68    Weight from Last 3 Encounters:   18 114 lb 6.4 oz (51.9 kg)   18 110 lb (49.9 kg)   18 110 lb (49.9 kg)              We Performed the Following     CHIROPRAC MANIP,SPINAL,1-2 REGIONS        Primary Care Provider Office Phone # Fax #    Virgil Mackay [phone]       [facility] [place] [id number]        Equal Access to Services     [facility]: Hadii aad ku hadasho Soandree, waaxda luqadaha, qaybta kaalmada abdirahman, maria luisa cespedes . So [facility] [phone].    ATENCIÓN: Si habla español, tiene a maldonado disposición servicios gratuitos de asistencia lingüística. [facility] [phone].    We comply with applicable federal civil rights laws and [place] laws. We do not discriminate on the basis of race, color, national origin, age, disability, sex, sexual orientation, or gender identity.            Thank you!     Thank you for choosing  [facility]  for your care. Our goal is always to provide you with excellent care. Hearing back from our patients is one way we can continue to improve our services. Please take a few minutes to complete the written survey that you may receive in the mail after your visit with us. Thank you!             Your Updated Medication List - Protect others around you: Learn how to safely use, store and throw away your medicines at www.disposemymeds.org.          This list is accurate as of  5/17/18 11:59 PM.  Always use your most recent med list.                   Brand Name Dispense Instructions for use Diagnosis    aspirin 325 MG tablet      Take 325 mg by mouth At Bedtime.        CALCIUM + D PO      Take 600 mg by mouth.        estradiol 0.5 MG tablet    ESTRACE    90 tablet    Take 1 tablet (0.5 mg) by mouth daily    Hormone replacement therapy (HRT)       GLUCOSAMINE SULFATE PO      Take  by mouth daily.        MULTIVITAL PO      Take 1 tablet by mouth daily.        niacin 500 MG tablet      Take 1 tablet by mouth daily.        OMEGA-3 FISH OIL PO      Take  by mouth daily.        pantoprazole 40 MG EC tablet    PROTONIX    90 tablet    TAKE ONE TABLET BY MOUTH ONCE DAILY IN THE MORNING BEFORE BREAKFAST    Thoracogenic scoliosis of thoracolumbar region, Post-menopause       traMADol 50 MG tablet    ULTRAM    120 tablet    TAKE 1 TO 2 TABLETS BY MOUTH EVERY 6 TO 8 HOURS AS NEEDED    Sprain of other parts of lumbar spine and pelvis, subsequent encounter

## 2018-05-17 NOTE — TELEPHONE ENCOUNTER
Tramadol  Last Written Prescription Date: 4/11/18  Last Fill Quantity: 120 # of Refills: 0  Last Office Visit: 2/20/18

## 2018-05-18 NOTE — TELEPHONE ENCOUNTER
DENIAL - 05/18/2018 - received DENIAL from [facility] for estradiol.  DENIAL reason: not a covered diagnosis.  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  Pharmacy advised.  Forms scanned to Epic.  Lorraine Edmond, HIS Specialist.

## 2018-05-21 NOTE — PROGRESS NOTES
Subjective Finding:    Chief compalint: Patient presents with:  Back Pain: getting some relief with the treatments  , Pain Scale: 8/10, Intensity: sharp, Duration: 1 months, Change since last visit: , Radiating: down left leg.    Date of injury:     Activities that the pain restricts:   Home/household activities: yes.  Work duties: no.  Hobbies/social: yes.  Sleep: yes.  Makes symptoms better: ice  and rest.  Makes symptoms worse: activity and bending.  Have you seen anyone else for the symptoms? No.  Work related: no.  Automobile related injury: no.    Objective and Assessment:    Posture Analysis:   High shoulder: right.  Head tilt: right.  High iliac crest: left.  Head carriage: forward.  Thoracic Kyphosis: neutral.  Lumbar Lordosis: neutral.    Lumbar Range of Motion: extension decreased.  Cervical Range of Motion: .  Thoracic Range of Motion: .  Extremity Range of Motion: hip decreased.    Palpation:   Quad lumb: left, referred pain: yes  TFL: left, referred pain: yes    Segmental dysfunction pre-treatment: T56 L345.    Assessment post-treatment:  Cervical: ROM increased.  Thoracic: ROM increased.  Lumbar: ROM increased and pain and tenderness decreased.    Comments: .      Complicating Factors: .    Plan / Procedure:    Expected release date: .  Treatment plan: 2 times per week for 2 weeks.  Instructed patient: stretch as instructed at visit.  Short term goals: reduce pain.  Long term goals: restore normal function.  Prognosis: excellent.

## 2018-05-23 ENCOUNTER — OFFICE VISIT (OUTPATIENT)
Dept: CHIROPRACTIC MEDICINE | Facility: OTHER | Age: 78
End: 2018-05-23
Attending: CHIROPRACTOR
Payer: COMMERCIAL

## 2018-05-23 DIAGNOSIS — M99.03 SEGMENTAL AND SOMATIC DYSFUNCTION OF LUMBAR REGION: Primary | ICD-10-CM

## 2018-05-23 DIAGNOSIS — M54.50 ACUTE BILATERAL LOW BACK PAIN WITHOUT SCIATICA: ICD-10-CM

## 2018-05-23 DIAGNOSIS — M99.02 SEGMENTAL AND SOMATIC DYSFUNCTION OF THORACIC REGION: ICD-10-CM

## 2018-05-23 PROCEDURE — 98940 CHIROPRACT MANJ 1-2 REGIONS: CPT | Mod: AT | Performed by: CHIROPRACTOR

## 2018-05-23 NOTE — MR AVS SNAPSHOT
After Visit Summary   5/23/2018    Demi Narayan    MRN: [id number]           Patient Information     Date Of Birth          1940        Visit Information        Provider Department      5/23/2018 1:20 PM Curt Encarnacion DC  [facility]        Today's Diagnoses     Segmental and somatic dysfunction of lumbar region    -  1    Acute bilateral low back pain without sciatica        Segmental and somatic dysfunction of thoracic region           Follow-ups after your visit        Who to contact     If you have questions or need follow up information about today's clinic visit or your schedule please contact  [facility] directly at [phone].  Normal or non-critical lab and imaging results will be communicated to you by MyChart, letter or phone within 4 business days after the clinic has received the results. If you do not hear from us within 7 days, please contact the clinic through MyChart or phone. If you have a critical or abnormal lab result, we will notify you by phone as soon as possible.  Submit refill requests through ProRadis or call your pharmacy and they will forward the refill request to us. Please allow 3 business days for your refill to be completed.          Additional Information About Your Visit        Care EveryWhere ID     This is your Care EveryWhere ID. This could be used by other organizations to access your [facility] medical records  [id number]         Blood Pressure from Last 3 Encounters:   04/09/18 118/74   03/22/18 132/64   02/20/18 132/68    Weight from Last 3 Encounters:   04/09/18 114 lb 6.4 oz (51.9 kg)   03/22/18 110 lb (49.9 kg)   02/20/18 110 lb (49.9 kg)              We Performed the Following     CHIROPRAC MANIP,SPINAL,1-2 REGIONS        Primary Care Provider Office Phone # Fax #    Virgil Mackay [phone] [address] [facility] [address]        Equal Access to Services     [facility]OW [facility] AH: Davion pablo  jose Ordoñez, wawojciechda satishmeliza, qaybta kairena gary, maria luisa raymonin hayaan isabellevignesh caesarpeterson laBunnykiet tiffani. So [facility] [phone].    ATENCIÓN: Si albertola arminda, tiene a maldonado disposición servicios gratuitos de asistencia lingüística. Issac al [phone].    We comply with applicable federal civil rights laws and [place] laws. We do not discriminate on the basis of race, color, national origin, age, disability, sex, sexual orientation, or gender identity.            Thank you!     Thank you for choosing  [facility]  for your care. Our goal is always to provide you with excellent care. Hearing back from our patients is one way we can continue to improve our services. Please take a few minutes to complete the written survey that you may receive in the mail after your visit with us. Thank you!             Your Updated Medication List - Protect others around you: Learn how to safely use, store and throw away your medicines at www.disposemymeds.org.          This list is accurate as of 5/23/18 11:59 PM.  Always use your most recent med list.                   Brand Name Dispense Instructions for use Diagnosis    aspirin 325 MG tablet      Take 325 mg by mouth At Bedtime.        CALCIUM + D PO      Take 600 mg by mouth.        estradiol 0.5 MG tablet    ESTRACE    90 tablet    Take 1 tablet (0.5 mg) by mouth daily    Hormone replacement therapy (HRT)       GLUCOSAMINE SULFATE PO      Take  by mouth daily.        MULTIVITAL PO      Take 1 tablet by mouth daily.        niacin 500 MG tablet      Take 1 tablet by mouth daily.        OMEGA-3 FISH OIL PO      Take  by mouth daily.        pantoprazole 40 MG EC tablet    PROTONIX    90 tablet    TAKE ONE TABLET BY MOUTH ONCE DAILY IN THE MORNING BEFORE BREAKFAST    Thoracogenic scoliosis of thoracolumbar region, Post-menopause       traMADol 50 MG tablet    ULTRAM    120 tablet    TAKE 1 TO 2 TABLETS BY MOUTH EVERY 6 TO 8 HOURS AS NEEDED    Sprain of other parts of lumbar  spine and pelvis, subsequent encounter

## 2018-05-29 NOTE — PROGRESS NOTES
Subjective Finding:    Chief compalint: Patient presents with:  Back Pain: some relief with treatments  , Pain Scale: 8/10, Intensity: sharp, Duration: 1 months, Change since last visit: , Radiating: down left leg.    Date of injury:     Activities that the pain restricts:   Home/household activities: yes.  Work duties: no.  Hobbies/social: yes.  Sleep: yes.  Makes symptoms better: ice  and rest.  Makes symptoms worse: activity and bending.  Have you seen anyone else for the symptoms? No.  Work related: no.  Automobile related injury: no.    Objective and Assessment:    Posture Analysis:   High shoulder: right.  Head tilt: right.  High iliac crest: left.  Head carriage: forward.  Thoracic Kyphosis: neutral.  Lumbar Lordosis: neutral.    Lumbar Range of Motion: extension decreased.  Cervical Range of Motion: .  Thoracic Range of Motion: .  Extremity Range of Motion: hip decreased.    Palpation:   Quad lumb: left, referred pain: yes  TFL: left, referred pain: yes    Segmental dysfunction pre-treatment: T56 L345.    Assessment post-treatment:  Cervical: ROM increased.  Thoracic: ROM increased.  Lumbar: ROM increased and pain and tenderness decreased.    Comments: .      Complicating Factors: .    Plan / Procedure:    Expected release date: .  Treatment plan: 2 times per week for 2 weeks.  Instructed patient: stretch as instructed at visit.  Short term goals: reduce pain.  Long term goals: restore normal function.  Prognosis: excellent.

## 2018-06-22 DIAGNOSIS — S33.8XXD SPRAIN OF OTHER PARTS OF LUMBAR SPINE AND PELVIS, SUBSEQUENT ENCOUNTER: ICD-10-CM

## 2018-06-22 RX ORDER — TRAMADOL HYDROCHLORIDE 50 MG/1
TABLET ORAL
Qty: 120 TABLET | Refills: 0 | Status: SHIPPED | OUTPATIENT
Start: 2018-06-22 | End: 2018-06-28

## 2018-06-22 NOTE — TELEPHONE ENCOUNTER
Controlled Substance Refill Request for ultram  Problem List Complete:  Yes    Last Written Prescription Date:  5/17/18  Last Fill Quantity: 120,   # refills: 0    [place] [facility] [place] [facility] [place]: 2/20/18    Clinic visit frequency required:      Future Office visit:     Controlled substance agreement on file: Yes:  Date 7/5/17.     Processing:  Fax Rx to pt's pharmacy   checked in past 6 months?

## 2018-06-28 DIAGNOSIS — S33.8XXD SPRAIN OF OTHER PARTS OF LUMBAR SPINE AND PELVIS, SUBSEQUENT ENCOUNTER: ICD-10-CM

## 2018-06-29 RX ORDER — TRAMADOL HYDROCHLORIDE 50 MG/1
TABLET ORAL
Qty: 120 TABLET | Refills: 0 | Status: SHIPPED | OUTPATIENT
Start: 2018-06-29 | End: 2018-07-30

## 2018-06-29 NOTE — TELEPHONE ENCOUNTER
Controlled Substance Refill Request for Ultram  Problem List Complete:  Yes    Last Written Prescription Date:  6.22.18  Last Fill Quantity: 120,   # refills: 0    Last Office Visit with [facility] primary care provider: 2.20.18    Clinic visit frequency required: Q 3 months     Future Office visit:     Controlled substance agreement on file: Yes:  Date 7.5.17.     Processing:  Fax Rx to   pharmacy   checked in past 3 months?  Yes 5.4.17

## 2018-06-29 NOTE — TELEPHONE ENCOUNTER
Pt called to check on status of med. Stated she called this in on Monday. Advised pt we just received request yesterday. She is asking this be done ASAP. Advised pt of 72 business hour turn around

## 2018-07-30 DIAGNOSIS — S33.8XXD SPRAIN OF OTHER PARTS OF LUMBAR SPINE AND PELVIS, SUBSEQUENT ENCOUNTER: ICD-10-CM

## 2018-07-30 DIAGNOSIS — G89.4 CHRONIC PAIN SYNDROME: ICD-10-CM

## 2018-07-31 RX ORDER — TRAMADOL HYDROCHLORIDE 50 MG/1
TABLET ORAL
Qty: 120 TABLET | Refills: 0 | Status: SHIPPED | OUTPATIENT
Start: 2018-07-31 | End: 2018-09-04

## 2018-07-31 NOTE — TELEPHONE ENCOUNTER
Controlled Substance Refill Request for Tramadol  Problem List Complete:  Yes    Last Written Prescription Date:  6/29/18  Last Fill Quantity: 120,   # refills: 0    Last Office Visit with [facility] primary care provider: 2/20/18  Chronic pain syndrome         Problem Detail      Noted:  7/3/2017      Priority:  Medium      Overview Addendum 8/22/2017  4:42 PM by Nicole Joseph RN     Patient is followed by Virgil Mackay MD for ongoing prescription of pain medication.  All refills should only be approved by this provider, or covering partner.     Medication(s): Ultram 50mg.   Maximum quantity per month: #120  Clinic visit frequency required: Q 3 months      Controlled substance agreement:  Encounter-Level CSA - 06/30/2017:            Controlled Substance Agreement - Scan on 7/5/2017  4:11 PM : CONTROLLED SUBSTANCE AGREEMENT (below)            Pain Clinic evaluation in the past: No     DIRE Total Score(s):  No flowsheet data found.     Last [facility] website verification:  done on 5.4.17   [email or web address]       Future Office visit:   Next 5 appointments (look out 90 days)     Aug 15, 2018  9:00 AM CDT   (Arrive by 8:45 AM)   PHYSICAL with Virgil Mackay MD   [facility] ([facility]    [address] [place] [address]   [phone]                   Processing:  Fax Rx to [facility] pharmacy

## 2018-08-15 ENCOUNTER — OFFICE VISIT (OUTPATIENT)
Dept: FAMILY MEDICINE | Facility: OTHER | Age: 78
End: 2018-08-15
Attending: FAMILY MEDICINE
Payer: COMMERCIAL

## 2018-08-15 ENCOUNTER — APPOINTMENT (OUTPATIENT)
Dept: LAB | Facility: OTHER | Age: 78
End: 2018-08-15
Attending: FAMILY MEDICINE
Payer: COMMERCIAL

## 2018-08-15 VITALS
WEIGHT: 115 LBS | SYSTOLIC BLOOD PRESSURE: 126 MMHG | HEIGHT: 58 IN | HEART RATE: 80 BPM | TEMPERATURE: 98.7 F | BODY MASS INDEX: 24.14 KG/M2 | DIASTOLIC BLOOD PRESSURE: 70 MMHG | OXYGEN SATURATION: 98 %

## 2018-08-15 DIAGNOSIS — K21.9 GASTROESOPHAGEAL REFLUX DISEASE WITHOUT ESOPHAGITIS: ICD-10-CM

## 2018-08-15 DIAGNOSIS — Z00.00 ROUTINE GENERAL MEDICAL EXAMINATION AT A HEALTH CARE FACILITY: Primary | ICD-10-CM

## 2018-08-15 DIAGNOSIS — M15.0 PRIMARY OSTEOARTHRITIS INVOLVING MULTIPLE JOINTS: ICD-10-CM

## 2018-08-15 DIAGNOSIS — E78.2 MIXED HYPERLIPIDEMIA: ICD-10-CM

## 2018-08-15 DIAGNOSIS — Z78.0 POST-MENOPAUSE: ICD-10-CM

## 2018-08-15 LAB
ALBUMIN SERPL-MCNC: 3.9 G/DL (ref 3.4–5)
ALP SERPL-CCNC: 53 U/L (ref 40–150)
ALT SERPL W P-5'-P-CCNC: 22 U/L (ref 0–50)
ANION GAP SERPL CALCULATED.3IONS-SCNC: 7 MMOL/L (ref 3–14)
AST SERPL W P-5'-P-CCNC: 23 U/L (ref 0–45)
BASOPHILS # BLD AUTO: 0 10E9/L (ref 0–0.2)
BASOPHILS NFR BLD AUTO: 0.5 %
BILIRUB SERPL-MCNC: 0.4 MG/DL (ref 0.2–1.3)
BUN SERPL-MCNC: 10 MG/DL (ref 7–30)
CALCIUM SERPL-MCNC: 8.6 MG/DL (ref 8.5–10.1)
CHLORIDE SERPL-SCNC: 105 MMOL/L (ref 94–109)
CHOLEST SERPL-MCNC: 254 MG/DL
CO2 SERPL-SCNC: 29 MMOL/L (ref 20–32)
CREAT SERPL-MCNC: 0.79 MG/DL (ref 0.52–1.04)
DIFFERENTIAL METHOD BLD: NORMAL
EOSINOPHIL # BLD AUTO: 0.1 10E9/L (ref 0–0.7)
EOSINOPHIL NFR BLD AUTO: 1.1 %
ERYTHROCYTE [DISTWIDTH] IN BLOOD BY AUTOMATED COUNT: 13 % (ref 10–15)
GFR SERPL CREATININE-BSD FRML MDRD: 71 ML/MIN/1.7M2
GLUCOSE SERPL-MCNC: 84 MG/DL (ref 70–99)
HCT VFR BLD AUTO: 43.7 % (ref 35–47)
HDLC SERPL-MCNC: 79 MG/DL
HGB BLD-MCNC: 15 G/DL (ref 11.7–15.7)
IMM GRANULOCYTES # BLD: 0 10E9/L (ref 0–0.4)
IMM GRANULOCYTES NFR BLD: 0.2 %
LDLC SERPL CALC-MCNC: 138 MG/DL
LYMPHOCYTES # BLD AUTO: 2.7 10E9/L (ref 0.8–5.3)
LYMPHOCYTES NFR BLD AUTO: 41.1 %
MCH RBC QN AUTO: 30.5 PG (ref 26.5–33)
MCHC RBC AUTO-ENTMCNC: 34.3 G/DL (ref 31.5–36.5)
MCV RBC AUTO: 89 FL (ref 78–100)
MONOCYTES # BLD AUTO: 0.4 10E9/L (ref 0–1.3)
MONOCYTES NFR BLD AUTO: 6.6 %
NEUTROPHILS # BLD AUTO: 3.3 10E9/L (ref 1.6–8.3)
NEUTROPHILS NFR BLD AUTO: 50.5 %
NONHDLC SERPL-MCNC: 175 MG/DL
NRBC # BLD AUTO: 0 10*3/UL
NRBC BLD AUTO-RTO: 0 /100
PLATELET # BLD AUTO: 257 10E9/L (ref 150–450)
POTASSIUM SERPL-SCNC: 3.9 MMOL/L (ref 3.4–5.3)
PROT SERPL-MCNC: 7.6 G/DL (ref 6.8–8.8)
RBC # BLD AUTO: 4.92 10E12/L (ref 3.8–5.2)
SODIUM SERPL-SCNC: 141 MMOL/L (ref 133–144)
TRIGL SERPL-MCNC: 185 MG/DL
TSH SERPL DL<=0.005 MIU/L-ACNC: 1.86 MU/L (ref 0.4–4)
WBC # BLD AUTO: 6.5 10E9/L (ref 4–11)

## 2018-08-15 PROCEDURE — 85025 COMPLETE CBC W/AUTO DIFF WBC: CPT | Mod: ZL | Performed by: FAMILY MEDICINE

## 2018-08-15 PROCEDURE — 36415 COLL VENOUS BLD VENIPUNCTURE: CPT | Mod: ZL | Performed by: FAMILY MEDICINE

## 2018-08-15 PROCEDURE — 80061 LIPID PANEL: CPT | Mod: ZL | Performed by: FAMILY MEDICINE

## 2018-08-15 PROCEDURE — 84443 ASSAY THYROID STIM HORMONE: CPT | Mod: ZL | Performed by: FAMILY MEDICINE

## 2018-08-15 PROCEDURE — 80053 COMPREHEN METABOLIC PANEL: CPT | Mod: ZL | Performed by: FAMILY MEDICINE

## 2018-08-15 PROCEDURE — 99397 PER PM REEVAL EST PAT 65+ YR: CPT | Performed by: FAMILY MEDICINE

## 2018-08-15 ASSESSMENT — ANXIETY QUESTIONNAIRES
6. BECOMING EASILY ANNOYED OR IRRITABLE: NOT AT ALL
1. FEELING NERVOUS, ANXIOUS, OR ON EDGE: NOT AT ALL
4. TROUBLE RELAXING: NOT AT ALL
GAD7 TOTAL SCORE: 0
7. FEELING AFRAID AS IF SOMETHING AWFUL MIGHT HAPPEN: NOT AT ALL
3. WORRYING TOO MUCH ABOUT DIFFERENT THINGS: NOT AT ALL
5. BEING SO RESTLESS THAT IT IS HARD TO SIT STILL: NOT AT ALL
2. NOT BEING ABLE TO STOP OR CONTROL WORRYING: NOT AT ALL

## 2018-08-15 ASSESSMENT — PAIN SCALES - GENERAL: PAINLEVEL: MODERATE PAIN (5)

## 2018-08-15 NOTE — MR AVS SNAPSHOT
After Visit Summary   8/15/2018    Demi Narayan    MRN: [id number]           Patient Information     Date Of Birth          1940        Visit Information        Provider Department      8/15/2018 9:00 AM Virgil Mackay MD [facility]        Today's Diagnoses     Comprehensive Medical Examination    -  1    Dyslipidemia        Post-menopause        GERD (gastroesophageal reflux)        Primary osteoarthritis involving multiple joints          Care Instructions      Preventive Health Recommendations  Female Ages 65 +    Yearly exam:     See your health care provider every year in order to  o Review health changes.   o Discuss preventive care.    o Review your medicines if your doctor has prescribed any.      You no longer need a yearly Pap test unless you've had an abnormal Pap test in the past 10 years. If you have vaginal symptoms, such as bleeding or discharge, be sure to talk with your provider about a Pap test.      Every 1 to 2 years, have a mammogram.  If you are over 69, talk with your health care provider about whether or not you want to continue having screening mammograms.      Every 10 years, have a colonoscopy. Or, have a yearly FIT test (stool test). These exams will check for colon cancer.       Have a cholesterol test every 5 years, or more often if your doctor advises it.       Have a diabetes test (fasting glucose) every three years. If you are at risk for diabetes, you should have this test more often.       At age 65, have a bone density scan (DEXA) to check for osteoporosis (brittle bone disease).    Shots:    Get a flu shot each year.    Get a tetanus shot every 10 years.    Talk to your doctor about your pneumonia vaccines. There are now two you should receive - Pneumovax (PPSV 23) and Prevnar (PCV 13).    Talk to your pharmacist about the shingles vaccine.    Talk to your doctor about the hepatitis B vaccine.    Nutrition:     Eat at least 5 servings of  "fruits and vegetables each day.      Eat whole-grain bread, whole-wheat pasta and brown rice instead of white grains and rice.      Get adequate about Calcium and Vitamin D.     Lifestyle    Exercise at least 150 minutes a week (30 minutes a day, 5 days a week). This will help you control your weight and prevent disease.      Limit alcohol to one drink per day.      No smoking.       Wear sunscreen to prevent skin cancer.       See your dentist twice a year for an exam and cleaning.      See your eye doctor every 1 to 2 years to screen for conditions such as glaucoma, macular degeneration, cataracts, etc           Follow-ups after your visit        Follow-up notes from your care team     Return in about 1 year (around 8/15/2019) for Comprehensive Exam.      Who to contact     If you have questions or need follow up information about today's clinic visit or your schedule please contact [facility] directly at [phone].  Normal or non-critical lab and imaging results will be communicated to you by MyChart, letter or phone within 4 business days after the clinic has received the results. If you do not hear from us within 7 days, please contact the clinic through MyChart or phone. If you have a critical or abnormal lab result, we will notify you by phone as soon as possible.  Submit refill requests through [employer] or call your pharmacy and they will forward the refill request to us. Please allow 3 business days for your refill to be completed.          Additional Information About Your Visit        Care EveryWhere ID     This is your Care EveryWhere ID. This could be used by other organizations to access your [place] medical records  [id number]        Your Vitals Were     Pulse Temperature Height Pulse Oximetry BMI (Body Mass Index)       80 98.7  F (37.1  C) (Tympanic) 4' 10.25\" (1.48 m) 98% 23.83 kg/m2        Blood Pressure from Last 3 Encounters:   08/15/18 126/70   04/09/18 118/74   03/22/18 " 132/64    Weight from Last 3 Encounters:   08/15/18 115 lb (52.2 kg)   04/09/18 114 lb 6.4 oz (51.9 kg)   03/22/18 110 lb (49.9 kg)              We Performed the Following     CBC with platelets differential     Comprehensive metabolic panel     Lipid Profile     TSH with free T4 reflex        Primary Care Provider Office Phone # Fax #    Virgil Mackay [phone] [phone]        Equal Access to Services     CROW STEVENS : Hadii aad ku hadasho Soomaali, waaxda luqadaha, qaybta kaalmada adeegyada, waxay raymonin haykiet cespedes . So [facility] [phone].    ATENCIÓN: Si habla español, tiene a maldonado disposición servicios gratuitos de asistencia lingüística. [facility] [phone].    We comply with applicable federal civil rights laws and [place] laws. We do not discriminate on the basis of race, color, national origin, age, disability, sex, sexual orientation, or gender identity.            Thank you!     Thank you for choosing [facility]  for your care. Our goal is always to provide you with excellent care. Hearing back from our patients is one way we can continue to improve our services. Please take a few minutes to complete the written survey that you may receive in the mail after your visit with us. Thank you!             Your Updated Medication List - Protect others around you: Learn how to safely use, store and throw away your medicines at www.disposemymeds.org.          This list is accurate as of 8/15/18 11:59 PM.  Always use your most recent med list.                   Brand Name Dispense Instructions for use Diagnosis    aspirin 325 MG tablet      Take 325 mg by mouth At Bedtime.        CALCIUM + D PO      Take 600 mg by mouth.        estradiol 0.5 MG tablet    ESTRACE    90 tablet    Take 1 tablet (0.5 mg) by mouth daily    Hormone replacement therapy (HRT)       GLUCOSAMINE SULFATE PO      Take  by mouth daily.        MULTIVITAL  PO      Take 1 tablet by mouth daily.        niacin 500 MG tablet      Take 1 tablet by mouth daily.        OMEGA-3 FISH OIL PO      Take  by mouth daily.        pantoprazole 40 MG EC tablet    PROTONIX    90 tablet    TAKE ONE TABLET BY MOUTH ONCE DAILY IN THE MORNING BEFORE BREAKFAST    Thoracogenic scoliosis of thoracolumbar region, Post-menopause       traMADol 50 MG tablet    ULTRAM    120 tablet    TAKE 1 TO 2 TABLETS BY MOUTH EVERY 6 TO 8 HOURS AS NEEDED    Sprain of other parts of lumbar spine and pelvis, subsequent encounter

## 2018-08-15 NOTE — LETTER
August 16, 2018      Demi Narayan  [address] [facility] [address]        Dear ,    We are writing to inform you of your test results.    Your test results fall within the expected range(s) or remain unchanged from previous results.  Please continue with current treatment plan.    Resulted Orders   CBC with platelets differential   Result Value Ref Range    WBC 6.5 4.0 - 11.0 10e9/L    RBC Count 4.92 3.8 - 5.2 10e12/L    Hemoglobin 15.0 11.7 - 15.7 g/dL    Hematocrit 43.7 35.0 - 47.0 %    MCV 89 78 - 100 fl    MCH 30.5 26.5 - 33.0 pg    MCHC 34.3 31.5 - 36.5 g/dL    RDW 13.0 10.0 - 15.0 %    Platelet Count 257 150 - 450 10e9/L    Diff Method Automated Method     % Neutrophils 50.5 %    % Lymphocytes 41.1 %    % Monocytes 6.6 %    % Eosinophils 1.1 %    % Basophils 0.5 %    % Immature Granulocytes 0.2 %    Nucleated RBCs 0 0 /100    Absolute Neutrophil 3.3 1.6 - 8.3 10e9/L    Absolute Lymphocytes 2.7 0.8 - 5.3 10e9/L    Absolute Monocytes 0.4 0.0 - 1.3 10e9/L    Absolute Eosinophils 0.1 0.0 - 0.7 10e9/L    Absolute Basophils 0.0 0.0 - 0.2 10e9/L    Abs Immature Granulocytes 0.0 0 - 0.4 10e9/L    Absolute Nucleated RBC 0.0    Lipid Profile   Result Value Ref Range    Cholesterol 254 (H) <200 mg/dL      Comment:      Desirable:       <200 mg/dl    Triglycerides 185 (H) <150 mg/dL      Comment:      Borderline high:  150-199 mg/dl  High:             200-499 mg/dl  Very high:       >499 mg/dl      HDL Cholesterol 79 >49 mg/dL    LDL Cholesterol Calculated 138 (H) <100 mg/dL      Comment:      Above desirable:  100-129 mg/dl  Borderline High:  130-159 mg/dL  High:             160-189 mg/dL  Very high:       >189 mg/dl      Non HDL Cholesterol 175 (H) <130 mg/dL      Comment:      Above Desirable:  130-159 mg/dl  Borderline high:  160-189 mg/dl  High:             190-219 mg/dl  Very high:       >219 mg/dl     Comprehensive metabolic panel   Result Value Ref Range    Sodium 141 133 - 144 mmol/L    Potassium  3.9 3.4 - 5.3 mmol/L    Chloride 105 94 - 109 mmol/L    Carbon Dioxide 29 20 - 32 mmol/L    Anion Gap 7 3 - 14 mmol/L    Glucose 84 70 - 99 mg/dL    Urea Nitrogen 10 7 - 30 mg/dL    Creatinine 0.79 0.52 - 1.04 mg/dL    GFR Estimate 71 >60 mL/min/1.7m2      Comment:      Non  GFR Calc    GFR Estimate If Black 85 >60 mL/min/1.7m2      Comment:       GFR Calc    Calcium 8.6 8.5 - 10.1 mg/dL    Bilirubin Total 0.4 0.2 - 1.3 mg/dL    Albumin 3.9 3.4 - 5.0 g/dL    Protein Total 7.6 6.8 - 8.8 g/dL    Alkaline Phosphatase 53 40 - 150 U/L    ALT 22 0 - 50 U/L    AST 23 0 - 45 U/L   TSH with free T4 reflex   Result Value Ref Range    TSH 1.86 0.40 - 4.00 mU/L       If you have any questions or concerns, please call the clinic at the number listed above.       Sincerely,        Virgil Mackay MD

## 2018-08-15 NOTE — PROGRESS NOTES
SUBJECTIVE:   Demi Narayan is a 78 year old female who presents for Preventive Visit.      Are you in the first 12 months of your Medicare Part B coverage?  No    Healthy Habits:    Do you get at least three servings of calcium containing foods daily (dairy, green leafy vegetables, etc.)? yes    Amount of exercise or daily activities, outside of work: 7 day(s) per week    Problems taking medications regularly No    Medication side effects: No    Have you had an eye exam in the past two years? yes    Do you see a dentist twice per year? yes    Do you have sleep apnea, excessive snoring or daytime drowsiness?no      Ability to successfully perform activities of daily living: Yes, no assistance needed    Home safety:  none identified     Hearing impairment: No    Fall risk:  Fallen 2 or more times in the past year?: No  Any fall with injury in the past year?: No        COGNITIVE SCREEN  1) Repeat 3 items (Leader, Season, Table)    2) Clock draw: NORMAL  3) 3 item recall: Recalls 3 objects  Results: NORMAL clock, 3 items recalled: COGNITIVE IMPAIRMENT LESS LIKELY    Mini-CogTM Copyright FEMI Guzman. Licensed by the author for use in [employer]; reprinted with permission (lela@[facility]). All rights reserved.            Hyperlipidemia Follow-Up      Rate your low fat/cholesterol diet?: good    Taking statin?   on Niacin    Other lipid medications/supplements?:  Fish oil/Omega 3, dose 1000mg without side effects    Chronic Pain Follow-Up       Type / Location of Pain: lower back   Analgesia/pain control:       Recent changes:  same      Overall control: Tolerable with discomfort  Activity level/function:      Daily activities:  Able to do light housework, cooking    Work:  not applicable  Adverse effects:  No  Adherance    Taking medication as directed?  Yes    Participating in other treatments: not applicable  Risk Factors:    Sleep:  Good    Mood/anxiety:  controlled    Recent family or social stressors:   none noted    Other aggravating factors: prolonged standing  PHQ-9 SCORE 9/22/2017 2/20/2018 8/15/2018   Total Score - - -   Total Score 0 0 0     ADONIS-7 SCORE 9/22/2017 2/20/2018 8/15/2018   Total Score 0 0 0     Encounter-Level CSA - 06/30/2017:          Controlled Substance Agreement - Scan on 7/5/2017  4:11 PM : CONTROLLED SUBSTANCE AGREEMENT (below)                Amount of exercise or physical activity: 6-7 days/week for an average of 45-60 minutes    Problems taking medications regularly: No    Medication side effects: none    Diet: regular (no restrictions)         Reviewed and updated as needed this visit by clinical staff         Reviewed and updated as needed this visit by Provider        Social History   Substance Use Topics     Smoking status: Former Smoker     Types: Cigarettes     Quit date: 5/6/1968     Smokeless tobacco: Never Used      Comment: no passive smoke exposure     Alcohol use 0.5 oz/week     1 Glasses of wine per week      Comment: daily with dinner       If you drink alcohol do you typically have >3 drinks per day or >7 drinks per week? No                        Today's PHQ-2 Score:   PHQ-2 ( 1999 [employer]) 5/6/2014 6/14/2013   Q1: Little interest or pleasure in doing things 0 0   Q2: Feeling down, depressed or hopeless 0 0   PHQ-2 Score 0 0       Do you feel safe in your environment - Yes    Do you have a Health Care Directive?: No: Advance care planning reviewed with patient; information given to patient to review.    Current providers sharing in care for this patient include:   Patient Care Team:  Virgil Mackay MD as PCP - Nicole Clifton RN as Registered Nurse    The following health maintenance items are reviewed in Epic and correct as of today:  Health Maintenance   Topic Date Due     ADONIS QUESTIONNAIRE 6 MONTHS  08/20/2018     PHQ-9 Q6 MONTHS  08/20/2018     INFLUENZA VACCINE (1) 09/01/2018     FALL RISK ASSESSMENT  04/09/2019     MAMMO Q2 YR  02/28/2020     ADVANCE  DIRECTIVE PLANNING Q5 YRS  08/05/2021     LIPID SCREEN Q5 YR FEMALE (SYSTEM ASSIGNED)  04/25/2022     COLONOSCOPY Q10 YR  07/18/2024     TETANUS IMMUNIZATION (SYSTEM ASSIGNED)  04/22/2026     DEXA SCAN SCREENING (SYSTEM ASSIGNED)  Completed     PNEUMOCOCCAL  Addressed     Patient Active Problem List   Diagnosis     H/O diverticulitis, S/P bowel resection     Dyslipidemia     Fibrocystic breast disease (FCBD)     Low back pain, chronic     GERD (gastroesophageal reflux)     Osteoarthritis, multiple joints     Comprehensive Medical Examination     Post-menopause     Diaphragmatic hernia     ACP (advance care planning)     Scoliosis, throracolumbar     Chronic pain syndrome     Lyme disease     Past Surgical History:   Procedure Laterality Date     ------------OTHER-------------      colonoscopy with biopsy - diverticulitis, hemorrhoids     ------------OTHER-------------  4/19/1994    sigmoidoscopy - abdominal pain, diverticula     ABDOMEN SURGERY      colon removed 2nd to diverticulitis     APPENDECTOMY       ARTHROSCOPY SHOULDER  11/20/2013    Procedure: ARTHROSCOPY SHOULDER;  Right Shoulder Arthroscopy Sub-Acromial Decompression Rotator Cuff Repair;  Surgeon: Lenny Trammell MD;  Location: [facility]     [facility]     Divert[facility]itis      bowel resection     EGD with biopsy  2011     ENDOSCOPY UPPER WITH PANCREATIC STIMULATION       ENDOSCOPY UPPER, COLONOSCOPY, COMBINED  7/18/2014    Procedure: COMBINED ENDOSCOPY UPPER, COLONOSCOPY;  Surgeon: Israel Feliciano MD;  Location: [facility] OR     ENT SURGERY      tonsilectomy     ESOPHAGOSCOPY, GASTROSCOPY, DUODENOSCOPY (EGD), COMBINED N/A 9/27/2017    Procedure: COMBINED ESOPHAGOSCOPY, GASTROSCOPY, DUODENOSCOPY (EGD);  UPPER ENDOSCOPY WITH BIOPSY AND POLYPECTOMY;  Surgeon: Gallito Alvarado DO;  Location: [facility] OR     GYN SURGERY      hysterectomy with bladder and rectal repair     ORTHOPEDIC SURGERY  11/20/2013    right  rotator cuff surgery     TVH with ovarian preservation         Social History   Substance Use Topics     Smoking status: Former Smoker     Packs/day: 0.50     Years: 5.00     Types: Cigarettes     Quit date: 5/6/1968     Smokeless tobacco: Never Used      Comment: no passive smoke exposure     Alcohol use 0.5 oz/week     1 Glasses of wine per week      Comment: daily with dinner     Family History   Problem Relation Age of Onset     Cerebrovascular Disease Father      CVA     HEART DISEASE Father      heart disease     Hypertension Father      Myocardial Infarction Father      myocardial infarction     Cerebrovascular Disease Mother      CVA     Diabetes Mother      HEART DISEASE Mother      heart disease     Hypertension Mother      HEART DISEASE Brother      heart disease     Hypertension Brother          Current Outpatient Prescriptions   Medication Sig Dispense Refill     aspirin 325 MG tablet Take 325 mg by mouth At Bedtime.       Calcium Carbonate-Vitamin D (CALCIUM + D PO) Take 600 mg by mouth.       estradiol (ESTRACE) 0.5 MG tablet Take 1 tablet (0.5 mg) by mouth daily 90 tablet 1     GLUCOSAMINE SULFATE PO Take  by mouth daily.       Multiple Vitamins-Minerals (MULTIVITAL PO) Take 1 tablet by mouth daily.       niacin 500 MG tablet Take 1 tablet by mouth daily.       Omega-3 Fatty Acids (OMEGA-3 FISH OIL PO) Take  by mouth daily.       pantoprazole (PROTONIX) 40 MG EC tablet TAKE ONE TABLET BY MOUTH ONCE DAILY IN THE MORNING BEFORE BREAKFAST 90 tablet 2     traMADol (ULTRAM) 50 MG tablet TAKE 1 TO 2 TABLETS BY MOUTH EVERY 6 TO 8 HOURS AS NEEDED 120 tablet 0     No Known Allergies    ROS:  Constitutional, HEENT, cardiovascular, pulmonary, gi and gu systems are negative, except as otherwise noted.    OBJECTIVE:   There were no vitals taken for this visit. Estimated body mass index is 22.34 kg/(m^2) as calculated from the following:    Height as of 4/9/18: 5' (1.524 m).    Weight as of 4/9/18: 114 lb 6.4 oz  (51.9 kg).  EXAM:   Physical Exam   Constitutional: She is oriented to person, place, and time. She appears well-developed and well-nourished. No distress.   HENT:   Head: Normocephalic.   Right Ear: Hearing, tympanic membrane, external ear and ear canal normal.   Left Ear: Hearing, tympanic membrane, external ear and ear canal normal.   Nose: Nose normal.   Mouth/Throat: Oropharynx is clear and moist.   Eyes: Conjunctivae and EOM are normal. Pupils are equal, round, and reactive to light.   Neck: Neck supple. No JVD present. No thyromegaly present.   Cardiovascular: Normal rate, regular rhythm and intact distal pulses.  Exam reveals no gallop and no friction rub.    No murmur heard.  Pulmonary/Chest: Effort normal and breath sounds normal.   Abdominal: Soft. Bowel sounds are normal. She exhibits no mass. There is no tenderness.   Genitourinary: No breast swelling, tenderness or bleeding.   Musculoskeletal: Normal range of motion. She exhibits no edema.   Lymphadenopathy:     She has no cervical adenopathy.   Neurological: She is alert and oriented to person, place, and time. She has normal reflexes.   Skin: Skin is warm and dry.   Psychiatric: She has a normal mood and affect.         Diagnostic Test Results:  Results for orders placed or performed in visit on 08/15/18 (from the past 24 hour(s))   CBC with platelets differential   Result Value Ref Range    WBC 6.5 4.0 - 11.0 10e9/L    RBC Count 4.92 3.8 - 5.2 10e12/L    Hemoglobin 15.0 11.7 - 15.7 g/dL    Hematocrit 43.7 35.0 - 47.0 %    MCV 89 78 - 100 fl    MCH 30.5 26.5 - 33.0 pg    MCHC 34.3 31.5 - 36.5 g/dL    RDW 13.0 10.0 - 15.0 %    Platelet Count 257 150 - 450 10e9/L    Diff Method Automated Method     % Neutrophils 50.5 %    % Lymphocytes 41.1 %    % Monocytes 6.6 %    % Eosinophils 1.1 %    % Basophils 0.5 %    % Immature Granulocytes 0.2 %    Nucleated RBCs 0 0 /100    Absolute Neutrophil 3.3 1.6 - 8.3 10e9/L    Absolute Lymphocytes 2.7 0.8 - 5.3 10e9/L     Absolute Monocytes 0.4 0.0 - 1.3 10e9/L    Absolute Eosinophils 0.1 0.0 - 0.7 10e9/L    Absolute Basophils 0.0 0.0 - 0.2 10e9/L    Abs Immature Granulocytes 0.0 0 - 0.4 10e9/L    Absolute Nucleated RBC 0.0    Lipid Profile   Result Value Ref Range    Cholesterol 254 (H) <200 mg/dL    Triglycerides 185 (H) <150 mg/dL    HDL Cholesterol 79 >49 mg/dL    LDL Cholesterol Calculated 138 (H) <100 mg/dL    Non HDL Cholesterol 175 (H) <130 mg/dL   Comprehensive metabolic panel   Result Value Ref Range    Sodium 141 133 - 144 mmol/L    Potassium 3.9 3.4 - 5.3 mmol/L    Chloride 105 94 - 109 mmol/L    Carbon Dioxide 29 20 - 32 mmol/L    Anion Gap 7 3 - 14 mmol/L    Glucose 84 70 - 99 mg/dL    Urea Nitrogen 10 7 - 30 mg/dL    Creatinine 0.79 0.52 - 1.04 mg/dL    GFR Estimate 71 >60 mL/min/1.7m2    GFR Estimate If Black 85 >60 mL/min/1.7m2    Calcium 8.6 8.5 - 10.1 mg/dL    Bilirubin Total 0.4 0.2 - 1.3 mg/dL    Albumin 3.9 3.4 - 5.0 g/dL    Protein Total 7.6 6.8 - 8.8 g/dL    Alkaline Phosphatase 53 40 - 150 U/L    ALT 22 0 - 50 U/L    AST 23 0 - 45 U/L   TSH with free T4 reflex   Result Value Ref Range    TSH 1.86 0.40 - 4.00 mU/L       ASSESSMENT / PLAN:   1. Comprehensive Medical Examination  General medical exam completed.  Breast exam performed.  Pap deferred. Mammogram reviewed. Screening labs drawn.  Colonoscopy and immunizations reviewed.  Discussed the importance of monthly breast self exam, aspirin use, and osteoporosis prevention . Follow up 1 year.      2. Dyslipidemia  Fasting labs reviewed.  Goals discussed.  Discussed the importance of diet, exercise, and weight reduction.  Follow up 12 months.   - CBC with platelets differential  - Lipid Profile  - Comprehensive metabolic panel  - TSH with free T4 reflex    3. Post-menopause  Stable    4. GERD (gastroesophageal reflux)  Stable    5. Primary osteoarthritis involving multiple joints  Stable      End of Life Planning:  Patient currently has an advanced  directive: No.  I have verified the patient's ablity to prepare an advanced directive/make health care decisions.  Literature was provided to assist patient in preparing an advanced directive.    COUNSELING:  Reviewed preventive health counseling, as reflected in patient instructions  Special attention given to:       Regular exercise       Healthy diet/nutrition    BP Readings from Last 1 Encounters:   04/09/18 118/74     Estimated body mass index is 22.34 kg/(m^2) as calculated from the following:    Height as of 4/9/18: 5' (1.524 m).    Weight as of 4/9/18: 114 lb 6.4 oz (51.9 kg).         reports that she quit smoking about 50 years ago. Her smoking use included Cigarettes. She has never used smokeless tobacco.      Appropriate preventive services were discussed with this patient, including applicable screening as appropriate for cardiovascular disease, diabetes, osteopenia/osteoporosis, and glaucoma.  As appropriate for age/gender, discussed screening for colorectal cancer, prostate cancer, breast cancer, and cervical cancer. Checklist reviewing preventive services available has been given to the patient.    Reviewed patients plan of care and provided an AVS. The Basic Care Plan (routine screening as documented in Health Maintenance) for Demi meets the Care Plan requirement. This Care Plan has been established and reviewed with the Patient.    Counseling Resources:  ATP IV Guidelines  Pooled Cohorts Equation Calculator  Breast Cancer Risk Calculator  FRAX Risk Assessment  ICSI Preventive Guidelines  Dietary Guidelines for Americans, 2010  USDA's MyPlate  ASA Prophylaxis  Lung CA Screening    Virgil Mackay MD  [facility]

## 2018-08-15 NOTE — NURSING NOTE
"Chief Complaint   Patient presents with     Physical       Initial /70 (BP Location: Left arm, Patient Position: Chair, Cuff Size: Adult Regular)  Pulse 80  Temp 98.7  F (37.1  C) (Tympanic)  Ht 4' 10.25\" (1.48 m)  Wt 115 lb (52.2 kg)  SpO2 98%  BMI 23.83 kg/m2 Estimated body mass index is 23.83 kg/(m^2) as calculated from the following:    Height as of this encounter: 4' 10.25\" (1.48 m).    Weight as of this encounter: 115 lb (52.2 kg).  Medication Reconciliation: complete    Laura Malone MA  "

## 2018-08-16 ASSESSMENT — ANXIETY QUESTIONNAIRES: GAD7 TOTAL SCORE: 0

## 2018-08-16 ASSESSMENT — PATIENT HEALTH QUESTIONNAIRE - PHQ9: SUM OF ALL RESPONSES TO PHQ QUESTIONS 1-9: 0

## 2018-08-29 ENCOUNTER — OFFICE VISIT (OUTPATIENT)
Dept: CHIROPRACTIC MEDICINE | Facility: OTHER | Age: 78
End: 2018-08-29
Attending: CHIROPRACTOR
Payer: COMMERCIAL

## 2018-08-29 DIAGNOSIS — M99.03 SEGMENTAL AND SOMATIC DYSFUNCTION OF LUMBAR REGION: Primary | ICD-10-CM

## 2018-08-29 DIAGNOSIS — M99.02 SEGMENTAL AND SOMATIC DYSFUNCTION OF THORACIC REGION: ICD-10-CM

## 2018-08-29 DIAGNOSIS — M54.50 ACUTE BILATERAL LOW BACK PAIN WITHOUT SCIATICA: ICD-10-CM

## 2018-08-29 PROCEDURE — 98940 CHIROPRACT MANJ 1-2 REGIONS: CPT | Mod: AT | Performed by: CHIROPRACTOR

## 2018-08-29 NOTE — MR AVS SNAPSHOT
After Visit Summary   8/29/2018    Demi Narayan    MRN: [id number]           Patient Information     Date Of Birth          1940        Visit Information        Provider Department      8/29/2018 1:10 PM Curt Encarnacion DC  [facility] [place] [facility]        Today's Diagnoses     Segmental and somatic dysfunction of lumbar region    -  1    Acute bilateral low back pain without sciatica        Segmental and somatic dysfunction of thoracic region           Follow-ups after your visit        Who to contact     If you have questions or need follow up information about today's clinic visit or your schedule please contact  [facility] directly at [phone].  Normal or non-critical lab and imaging results will be communicated to you by MyChart, letter or phone within 4 business days after the clinic has received the results. If you do not hear from us within 7 days, please contact the clinic through MyChart or phone. If you have a critical or abnormal lab result, we will notify you by phone as soon as possible.  Submit refill requests through [employer] or call your pharmacy and they will forward the refill request to us. Please allow 3 business days for your refill to be completed.          Additional Information About Your Visit        Care EveryWhere ID     This is your Care EveryWhere ID. This could be used by other organizations to access your [place] medical records  [id number]         Blood Pressure from Last 3 Encounters:   08/15/18 126/70   04/09/18 118/74   03/22/18 132/64    Weight from Last 3 Encounters:   08/15/18 115 lb (52.2 kg)   04/09/18 114 lb 6.4 oz (51.9 kg)   03/22/18 110 lb (49.9 kg)              We Performed the Following     CHIROPRAC MANIP,SPINAL,1-2 REGIONS        Primary Care Provider Office Phone # Fax #    Virgil Mackay [phone] [address] [facility] [address]        Equal Access to Services     CROW STEVENS AH: Davion pablo  jose Ordoñez, wawojciechda satishmeliza, qaybta kairena gary, maria luisa raymonin hayaan isabellevignesh caesarpeterson laBunnykiet tiffani. So [facility] [place] [facility] [phone].    ATENCIÓN: Si albertola arminda, tiene a maldonado disposición servicios gratuitos de asistencia lingüística. Issac al [phone].    We comply with applicable federal civil rights laws and [place] laws. We do not discriminate on the basis of race, color, national origin, age, disability, sex, sexual orientation, or gender identity.            Thank you!     Thank you for choosing  [facility]  for your care. Our goal is always to provide you with excellent care. Hearing back from our patients is one way we can continue to improve our services. Please take a few minutes to complete the written survey that you may receive in the mail after your visit with us. Thank you!             Your Updated Medication List - Protect others around you: Learn how to safely use, store and throw away your medicines at www.disposemymeds.org.          This list is accurate as of 8/29/18  4:51 PM.  Always use your most recent med list.                   Brand Name Dispense Instructions for use Diagnosis    aspirin 325 MG tablet      Take 325 mg by mouth At Bedtime.        CALCIUM + D PO      Take 600 mg by mouth.        estradiol 0.5 MG tablet    ESTRACE    90 tablet    Take 1 tablet (0.5 mg) by mouth daily    Hormone replacement therapy (HRT)       GLUCOSAMINE SULFATE PO      Take  by mouth daily.        MULTIVITAL PO      Take 1 tablet by mouth daily.        niacin 500 MG tablet      Take 1 tablet by mouth daily.        OMEGA-3 FISH OIL PO      Take  by mouth daily.        pantoprazole 40 MG EC tablet    PROTONIX    90 tablet    TAKE ONE TABLET BY MOUTH ONCE DAILY IN THE MORNING BEFORE BREAKFAST    Thoracogenic scoliosis of thoracolumbar region, Post-menopause       traMADol 50 MG tablet    ULTRAM    120 tablet    TAKE 1 TO 2 TABLETS BY MOUTH EVERY 6 TO 8 HOURS AS NEEDED    Sprain of other parts of lumbar  spine and pelvis, subsequent encounter

## 2018-08-29 NOTE — PROGRESS NOTES
Subjective Finding:    Chief compalint: Patient presents with:  Back Pain: DDD of l spine  , Pain Scale: 8/10, Intensity: sharp, Duration: 1 months, Change since last visit: , Radiating: down left leg.    Date of injury:     Activities that the pain restricts:   Home/household activities: yes.  Work duties: no.  Hobbies/social: yes.  Sleep: yes.  Makes symptoms better: ice  and rest.  Makes symptoms worse: activity and bending.  Have you seen anyone else for the symptoms? No.  Work related: no.  Automobile related injury: no.    Objective and Assessment:    Posture Analysis:   High shoulder: right.  Head tilt: right.  High iliac crest: left.  Head carriage: forward.  Thoracic Kyphosis: neutral.  Lumbar Lordosis: neutral.    Lumbar Range of Motion: extension decreased.  Cervical Range of Motion: .  Thoracic Range of Motion: .  Extremity Range of Motion: hip decreased.    Palpation:   Quad lumb: left, referred pain: yes  TFL: left, referred pain: yes    Segmental dysfunction pre-treatment: T56 L345.    Assessment post-treatment:  Cervical: ROM increased.  Thoracic: ROM increased.  Lumbar: ROM increased and pain and tenderness decreased.    Comments: .      Complicating Factors: .    Plan / Procedure:    Expected release date: .  Treatment plan: 2 times per week for 2 weeks.  Instructed patient: stretch as instructed at visit.  Short term goals: reduce pain.  Long term goals: restore normal function.  Prognosis: excellent.

## 2018-09-04 DIAGNOSIS — S33.8XXD SPRAIN OF OTHER PARTS OF LUMBAR SPINE AND PELVIS, SUBSEQUENT ENCOUNTER: ICD-10-CM

## 2018-09-04 NOTE — TELEPHONE ENCOUNTER
Controlled Substance Refill Request for Ultram  Problem List Complete:  Yes    Last Written Prescription Date:  7/31/18 by Dr. Mackay  Last Fill Quantity: 120,   # refills: 0    Last Office Visit with [facility] primary care provider: 8/15/18    Clinic visit frequency required: Q 3 months     Future Office visit:     Controlled substance agreement on file: Yes:  Date 7/5/17.     Processing:  [facility] [place]   checked in past 3 months?  Yes 7/31/18

## 2018-09-05 RX ORDER — TRAMADOL HYDROCHLORIDE 50 MG/1
TABLET ORAL
Qty: 120 TABLET | Refills: 0 | Status: SHIPPED | OUTPATIENT
Start: 2018-09-05 | End: 2018-10-04

## 2018-09-07 ENCOUNTER — OFFICE VISIT (OUTPATIENT)
Dept: CHIROPRACTIC MEDICINE | Facility: OTHER | Age: 78
End: 2018-09-07
Attending: CHIROPRACTOR
Payer: COMMERCIAL

## 2018-09-07 DIAGNOSIS — M99.03 SEGMENTAL AND SOMATIC DYSFUNCTION OF LUMBAR REGION: Primary | ICD-10-CM

## 2018-09-07 DIAGNOSIS — M99.02 SEGMENTAL AND SOMATIC DYSFUNCTION OF THORACIC REGION: ICD-10-CM

## 2018-09-07 DIAGNOSIS — M54.50 ACUTE BILATERAL LOW BACK PAIN WITHOUT SCIATICA: ICD-10-CM

## 2018-09-07 PROCEDURE — 98940 CHIROPRACT MANJ 1-2 REGIONS: CPT | Mod: AT | Performed by: CHIROPRACTOR

## 2018-09-07 NOTE — MR AVS SNAPSHOT
After Visit Summary   9/7/2018    Demi Narayan    MRN: [id number]           Patient Information     Date Of Birth          1940        Visit Information        Provider Department      9/7/2018 10:30 AM Curt Encarnacion DC  [facility]        Today's Diagnoses     Segmental and somatic dysfunction of lumbar region    -  1    Acute bilateral low back pain without sciatica        Segmental and somatic dysfunction of thoracic region           Follow-ups after your visit        Who to contact     If you have questions or need follow up information about today's clinic visit or your schedule please contact  [facility] directly at [phone].  Normal or non-critical lab and imaging results will be communicated to you by MyChart, letter or phone within 4 business days after the clinic has received the results. If you do not hear from us within 7 days, please contact the clinic through MyChart or phone. If you have a critical or abnormal lab result, we will notify you by phone as soon as possible.  Submit refill requests through [employer] or call your pharmacy and they will forward the refill request to us. Please allow 3 business days for your refill to be completed.          Additional Information About Your Visit        Care EveryWhere ID     This is your Care EveryWhere ID. This could be used by other organizations to access your [place] medical records  [id number]         Blood Pressure from Last 3 Encounters:   08/15/18 126/70   04/09/18 118/74   03/22/18 132/64    Weight from Last 3 Encounters:   08/15/18 115 lb (52.2 kg)   04/09/18 114 lb 6.4 oz (51.9 kg)   03/22/18 110 lb (49.9 kg)              We Performed the Following     CHIROPRAC MANIP,SPINAL,1-2 REGIONS        Primary Care Provider Office Phone # Fax #    Virgil Mackay [phone] [address] [facility] [address]        Equal Access to Services     CROW STEVENS AH: Davion pablo  jose Ordoñez, wawojciechda satishmeliza, qamirita kairena gary, maria luisa raymonin hayaan isabellevignesh caesarpeterson laBunnykiet tiffani. So [facility] [phone].    ATENCIÓN: Si albertola arminda, tiene a maldonado disposición servicios gratuitos de asistencia lingüística. Issac al [phone].    We comply with applicable federal civil rights laws and [place] laws. We do not discriminate on the basis of race, color, national origin, age, disability, sex, sexual orientation, or gender identity.            Thank you!     Thank you for choosing  [facility]  for your care. Our goal is always to provide you with excellent care. Hearing back from our patients is one way we can continue to improve our services. Please take a few minutes to complete the written survey that you may receive in the mail after your visit with us. Thank you!             Your Updated Medication List - Protect others around you: Learn how to safely use, store and throw away your medicines at www.disposemymeds.org.          This list is accurate as of 9/7/18 11:59 PM.  Always use your most recent med list.                   Brand Name Dispense Instructions for use Diagnosis    aspirin 325 MG tablet      Take 325 mg by mouth At Bedtime.        CALCIUM + D PO      Take 600 mg by mouth.        estradiol 0.5 MG tablet    ESTRACE    90 tablet    Take 1 tablet (0.5 mg) by mouth daily    Hormone replacement therapy (HRT)       GLUCOSAMINE SULFATE PO      Take  by mouth daily.        MULTIVITAL PO      Take 1 tablet by mouth daily.        niacin 500 MG tablet      Take 1 tablet by mouth daily.        OMEGA-3 FISH OIL PO      Take  by mouth daily.        pantoprazole 40 MG EC tablet    PROTONIX    90 tablet    TAKE ONE TABLET BY MOUTH ONCE DAILY IN THE MORNING BEFORE BREAKFAST    Thoracogenic scoliosis of thoracolumbar region, Post-menopause       traMADol 50 MG tablet    ULTRAM    120 tablet    TAKE 1 TO 2 TABLETS BY MOUTH EVERY 6 TO 8 HOURS AS NEEDED    Sprain of other parts of lumbar  spine and pelvis, subsequent encounter

## 2018-09-10 ENCOUNTER — OFFICE VISIT (OUTPATIENT)
Dept: CHIROPRACTIC MEDICINE | Facility: OTHER | Age: 78
End: 2018-09-10
Attending: CHIROPRACTOR
Payer: COMMERCIAL

## 2018-09-10 DIAGNOSIS — M99.03 SEGMENTAL AND SOMATIC DYSFUNCTION OF LUMBAR REGION: ICD-10-CM

## 2018-09-10 DIAGNOSIS — M54.50 ACUTE BILATERAL LOW BACK PAIN WITHOUT SCIATICA: ICD-10-CM

## 2018-09-10 DIAGNOSIS — M99.02 SEGMENTAL AND SOMATIC DYSFUNCTION OF THORACIC REGION: Primary | ICD-10-CM

## 2018-09-10 PROCEDURE — 98940 CHIROPRACT MANJ 1-2 REGIONS: CPT | Mod: AT | Performed by: CHIROPRACTOR

## 2018-09-10 NOTE — MR AVS SNAPSHOT
After Visit Summary   9/10/2018    Demi Narayan    MRN: [id number]           Patient Information     Date Of Birth          1940        Visit Information        Provider Department      9/10/2018 2:40 PM Curt Encarnacion DC [facility]        Today's Diagnoses     Segmental and somatic dysfunction of thoracic region    -  1    Acute bilateral low back pain without sciatica        Segmental and somatic dysfunction of lumbar region           Follow-ups after your visit        Your next 10 appointments already scheduled     Sep 24, 2018  2:30 PM CDT   [facility]    [address]  [place] [address]   [phone]              Who to contact     If you have questions or need follow up information about today's clinic visit or your schedule please contact  [facility] directly at [phone].  Normal or non-critical lab and imaging results will be communicated to you by MyChart, letter or phone within 4 business days after the clinic has received the results. If you do not hear from us within 7 days, please contact the clinic through MyChart or phone. If you have a critical or abnormal lab result, we will notify you by phone as soon as possible.  Submit refill requests through [employer] or call your pharmacy and they will forward the refill request to us. Please allow 3 business days for your refill to be completed.          Additional Information About Your Visit        Care EveryWhere ID     This is your Care EveryWhere ID. This could be used by other organizations to access your [place] medical records  [id number]         Blood Pressure from Last 3 Encounters:   08/15/18 126/70   04/09/18 118/74   03/22/18 132/64    Weight from Last 3 Encounters:   08/15/18 115 lb (52.2 kg)   04/09/18 114 lb 6.4 oz (51.9 kg)   03/22/18 110 lb (49.9 kg)              We Performed the Following     CHIROPRAC  MANIP,SPINAL,1-2 REGIONS        Primary Care Provider Office Phone # Fax #    Virgil Mackay [phone] [address] [facility] [address]        Equal Access to Services     CROW STEVENS : Davion pablo jose Ordoñez, waaxda luqadaha, qaybta kaalmada abdirahman, maria luisa kaymarlen waltonvignesh larose rubina nixon. So [facility] [phone].    ATENCIÓN: Si habla español, tiene a maldonado disposición servicios gratuitos de asistencia lingüística. Llame al [phone].    We comply with applicable federal civil rights laws and [place] laws. We do not discriminate on the basis of race, color, national origin, age, disability, sex, sexual orientation, or gender identity.            Thank you!     Thank you for choosing  [facility]  for your care. Our goal is always to provide you with excellent care. Hearing back from our patients is one way we can continue to improve our services. Please take a few minutes to complete the written survey that you may receive in the mail after your visit with us. Thank you!             Your Updated Medication List - Protect others around you: Learn how to safely use, store and throw away your medicines at www.disposemymeds.org.          This list is accurate as of 9/10/18 11:59 PM.  Always use your most recent med list.                   Brand Name Dispense Instructions for use Diagnosis    aspirin 325 MG tablet      Take 325 mg by mouth At Bedtime.        CALCIUM + D PO      Take 600 mg by mouth.        estradiol 0.5 MG tablet    ESTRACE    90 tablet    Take 1 tablet (0.5 mg) by mouth daily    Hormone replacement therapy (HRT)       GLUCOSAMINE SULFATE PO      Take  by mouth daily.        MULTIVITAL PO      Take 1 tablet by mouth daily.        niacin 500 MG tablet      Take 1 tablet by mouth daily.        OMEGA-3 FISH OIL PO      Take  by mouth daily.        pantoprazole 40 MG EC tablet    PROTONIX    90 tablet    TAKE ONE TABLET BY MOUTH ONCE DAILY IN THE  MORNING BEFORE BREAKFAST    Thoracogenic scoliosis of thoracolumbar region, Post-menopause       traMADol 50 MG tablet    ULTRAM    120 tablet    TAKE 1 TO 2 TABLETS BY MOUTH EVERY 6 TO 8 HOURS AS NEEDED    Sprain of other parts of lumbar spine and pelvis, subsequent encounter

## 2018-09-10 NOTE — PROGRESS NOTES
Subjective Finding:    Chief compalint: Patient presents with:  Back Pain  , Pain Scale: 8/10, Intensity: sharp, Duration: 1 months, Change since last visit: , Radiating: down left leg.    Date of injury:     Activities that the pain restricts:   Home/household activities: yes.  Work duties: no.  Hobbies/social: yes.  Sleep: yes.  Makes symptoms better: ice  and rest.  Makes symptoms worse: activity and bending.  Have you seen anyone else for the symptoms? No.  Work related: no.  Automobile related injury: no.    Objective and Assessment:    Posture Analysis:   High shoulder: right.  Head tilt: right.  High iliac crest: left.  Head carriage: forward.  Thoracic Kyphosis: neutral.  Lumbar Lordosis: neutral.    Lumbar Range of Motion: extension decreased.  Cervical Range of Motion: .  Thoracic Range of Motion: .  Extremity Range of Motion: hip decreased.    Palpation:   Quad lumb: left, referred pain: yes  TFL: left, referred pain: yes    Segmental dysfunction pre-treatment: T56 L345.    Assessment post-treatment:  Cervical: ROM increased.  Thoracic: ROM increased.  Lumbar: ROM increased and pain and tenderness decreased.    Comments: .      Complicating Factors: .    Plan / Procedure:    Expected release date: .  Treatment plan: 2 times per week for 2 weeks.  Instructed patient: stretch as instructed at visit.  Short term goals: reduce pain.  Long term goals: restore normal function.  Prognosis: excellent.

## 2018-09-11 NOTE — PROGRESS NOTES
Subjective Finding:    Chief compalint: Patient presents with:  Back Pain  Neck Pain  , Pain Scale: 8/10, Intensity: sharp, Duration: 1 months, Change since last visit: , Radiating: down left leg.    Date of injury:     Activities that the pain restricts:   Home/household activities: yes.  Work duties: no.  Hobbies/social: yes.  Sleep: yes.  Makes symptoms better: ice  and rest.  Makes symptoms worse: activity and bending.  Have you seen anyone else for the symptoms? No.  Work related: no.  Automobile related injury: no.    Objective and Assessment:    Posture Analysis:   High shoulder: right.  Head tilt: right.  High iliac crest: left.  Head carriage: forward.  Thoracic Kyphosis: neutral.  Lumbar Lordosis: neutral.    Lumbar Range of Motion: extension decreased.  Cervical Range of Motion: .  Thoracic Range of Motion: .  Extremity Range of Motion: hip decreased.    Palpation:   Quad lumb: left, referred pain: yes  TFL: left, referred pain: yes    Segmental dysfunction pre-treatment: T56 L345.    Assessment post-treatment:  Cervical: ROM increased.  Thoracic: ROM increased.  Lumbar: ROM increased and pain and tenderness decreased.    Comments: .      Complicating Factors: .    Plan / Procedure:    Expected release date: .  Treatment plan: 2 times per week for 2 weeks.  Instructed patient: stretch as instructed at visit.  Short term goals: reduce pain.  Long term goals: restore normal function.  Prognosis: excellent.

## 2018-09-21 ENCOUNTER — OFFICE VISIT (OUTPATIENT)
Dept: CHIROPRACTIC MEDICINE | Facility: OTHER | Age: 78
End: 2018-09-21
Attending: CHIROPRACTOR
Payer: COMMERCIAL

## 2018-09-21 DIAGNOSIS — M54.50 ACUTE BILATERAL LOW BACK PAIN WITHOUT SCIATICA: ICD-10-CM

## 2018-09-21 DIAGNOSIS — M99.03 SEGMENTAL AND SOMATIC DYSFUNCTION OF LUMBAR REGION: Primary | ICD-10-CM

## 2018-09-21 DIAGNOSIS — M99.02 SEGMENTAL AND SOMATIC DYSFUNCTION OF THORACIC REGION: ICD-10-CM

## 2018-09-21 PROCEDURE — 98940 CHIROPRACT MANJ 1-2 REGIONS: CPT | Mod: AT | Performed by: CHIROPRACTOR

## 2018-09-21 NOTE — MR AVS SNAPSHOT
After Visit Summary   9/21/2018    Demi Narayan    MRN: [id number]           Patient Information     Date Of Birth          1940        Visit Information        Provider Department      9/21/2018 10:30 AM Curt Encarnacion DC [facility]        Today's Diagnoses     Segmental and somatic dysfunction of lumbar region    -  1    Acute bilateral low back pain without sciatica        Segmental and somatic dysfunction of thoracic region           Follow-ups after your visit        Your next 10 appointments already scheduled     Oct 05, 2018 10:30 AM CDT   [facility]    [address]  [place] [address]   [phone]              Who to contact     If you have questions or need follow up information about today's clinic visit or your schedule please contact  [facility] directly at [phone].  Normal or non-critical lab and imaging results will be communicated to you by MyChart, letter or phone within 4 business days after the clinic has received the results. If you do not hear from us within 7 days, please contact the clinic through MyChart or phone. If you have a critical or abnormal lab result, we will notify you by phone as soon as possible.  Submit refill requests through [employer] or call your pharmacy and they will forward the refill request to us. Please allow 3 business days for your refill to be completed.          Additional Information About Your Visit        Care EveryWhere ID     This is your Care EveryWhere ID. This could be used by other organizations to access your [place] medical records  [id number]         Blood Pressure from Last 3 Encounters:   08/15/18 126/70   04/09/18 118/74   03/22/18 132/64    Weight from Last 3 Encounters:   08/15/18 115 lb (52.2 kg)   04/09/18 114 lb 6.4 oz (51.9 kg)   03/22/18 110 lb (49.9 kg)              We Performed the Following     CHIROPRAC  MANIP,SPINAL,1-2 REGIONS        Primary Care Provider Office Phone # Fax #    Virgil Mackay [phone] [address] [facility] [address]        Equal Access to Services     CROW STEVENS : Davion pablo jose Ordoñez, waaxda luqadaha, qaybta kaalmada abdirahman, maria luisa kaymarlen waltonvignesh larose rubina nixon. So [facility] [phone].    ATENCIÓN: Si habla español, tiene a maldonado disposición servicios gratuitos de asistencia lingüística. Llame al [phone].    We comply with applicable federal civil rights laws and [place] laws. We do not discriminate on the basis of race, color, national origin, age, disability, sex, sexual orientation, or gender identity.            Thank you!     Thank you for choosing  [facility]  for your care. Our goal is always to provide you with excellent care. Hearing back from our patients is one way we can continue to improve our services. Please take a few minutes to complete the written survey that you may receive in the mail after your visit with us. Thank you!             Your Updated Medication List - Protect others around you: Learn how to safely use, store and throw away your medicines at www.disposemymeds.org.          This list is accurate as of 9/21/18 11:59 PM.  Always use your most recent med list.                   Brand Name Dispense Instructions for use Diagnosis    aspirin 325 MG tablet      Take 325 mg by mouth At Bedtime.        CALCIUM + D PO      Take 600 mg by mouth.        estradiol 0.5 MG tablet    ESTRACE    90 tablet    Take 1 tablet (0.5 mg) by mouth daily    Hormone replacement therapy (HRT)       GLUCOSAMINE SULFATE PO      Take  by mouth daily.        MULTIVITAL PO      Take 1 tablet by mouth daily.        niacin 500 MG tablet      Take 1 tablet by mouth daily.        OMEGA-3 FISH OIL PO      Take  by mouth daily.        pantoprazole 40 MG EC tablet    PROTONIX    90 tablet    TAKE ONE TABLET BY MOUTH ONCE DAILY IN THE  MORNING BEFORE BREAKFAST    Thoracogenic scoliosis of thoracolumbar region, Post-menopause       traMADol 50 MG tablet    ULTRAM    120 tablet    TAKE 1 TO 2 TABLETS BY MOUTH EVERY 6 TO 8 HOURS AS NEEDED    Sprain of other parts of lumbar spine and pelvis, subsequent encounter

## 2018-10-04 DIAGNOSIS — S33.8XXD SPRAIN OF OTHER PARTS OF LUMBAR SPINE AND PELVIS, SUBSEQUENT ENCOUNTER: ICD-10-CM

## 2018-10-05 ENCOUNTER — OFFICE VISIT (OUTPATIENT)
Dept: CHIROPRACTIC MEDICINE | Facility: OTHER | Age: 78
End: 2018-10-05
Attending: CHIROPRACTOR
Payer: COMMERCIAL

## 2018-10-05 DIAGNOSIS — M99.03 SEGMENTAL AND SOMATIC DYSFUNCTION OF LUMBAR REGION: Primary | ICD-10-CM

## 2018-10-05 DIAGNOSIS — M99.02 SEGMENTAL AND SOMATIC DYSFUNCTION OF THORACIC REGION: ICD-10-CM

## 2018-10-05 DIAGNOSIS — M54.50 ACUTE BILATERAL LOW BACK PAIN WITHOUT SCIATICA: ICD-10-CM

## 2018-10-05 PROCEDURE — 98940 CHIROPRACT MANJ 1-2 REGIONS: CPT | Mod: AT | Performed by: CHIROPRACTOR

## 2018-10-05 RX ORDER — TRAMADOL HYDROCHLORIDE 50 MG/1
TABLET ORAL
Qty: 120 TABLET | Refills: 0 | Status: SHIPPED | OUTPATIENT
Start: 2018-10-05 | End: 2018-11-09

## 2018-10-05 NOTE — MR AVS SNAPSHOT
After Visit Summary   10/5/2018    Demi Narayan    MRN: [id number]           Patient Information     Date Of Birth          1940        Visit Information        Provider Department      10/5/2018 10:30 AM Curt Encarnacion DC [facility]        Today's Diagnoses     Segmental and somatic dysfunction of lumbar region    -  1    Acute bilateral low back pain without sciatica        Segmental and somatic dysfunction of thoracic region           Follow-ups after your visit        Your next 10 appointments already scheduled     Oct 26, 2018 10:30 AM CDT   Return Visit with JO[facility] Engel ([facility]    [address]  [place] [address]   [phone]              Who to contact     If you have questions or need follow up information about today's clinic visit or your schedule please contact  WOODY ACUNA directly at [phone].  Normal or non-critical lab and imaging results will be communicated to you by MyChart, letter or phone within 4 business days after the clinic has received the results. If you do not hear from us within 7 days, please contact the clinic through MyChart or phone. If you have a critical or abnormal lab result, we will notify you by phone as soon as possible.  Submit refill requests through [employer] or call your pharmacy and they will forward the refill request to us. Please allow 3 business days for your refill to be completed.          Additional Information About Your Visit        Care EveryWhere ID     This is your Care EveryWhere ID. This could be used by other organizations to access your [place] medical records  [id number]         Blood Pressure from Last 3 Encounters:   08/15/18 126/70   04/09/18 118/74   03/22/18 132/64    Weight from Last 3 Encounters:   08/15/18 115 lb (52.2 kg)   04/09/18 114 lb 6.4 oz (51.9 kg)   03/22/18 110 lb (49.9 kg)              We Performed the Following     CHIROPRAC  MANIP,SPINAL,1-2 REGIONS        Primary Care Provider Office Phone # Fax #    Virgil Mackay [phone] [address] [facility] [address]        Equal Access to Services     CROW STEVENS : Davion pablo jose Ordoñez, waaxda luqadaha, qaybta kaalmada abdirahman, maria luisa kaymarlen waltonvignesh larose rubina nixon. So [facility] [phone].    ATENCIÓN: Si habla español, tiene a maldonado disposición servicios gratuitos de asistencia lingüística. Llame al [phone].    We comply with applicable federal civil rights laws and [place] laws. We do not discriminate on the basis of race, color, national origin, age, disability, sex, sexual orientation, or gender identity.            Thank you!     Thank you for choosing  [facility]  for your care. Our goal is always to provide you with excellent care. Hearing back from our patients is one way we can continue to improve our services. Please take a few minutes to complete the written survey that you may receive in the mail after your visit with us. Thank you!             Your Updated Medication List - Protect others around you: Learn how to safely use, store and throw away your medicines at www.disposemymeds.org.          This list is accurate as of 10/5/18 11:59 PM.  Always use your most recent med list.                   Brand Name Dispense Instructions for use Diagnosis    aspirin 325 MG tablet      Take 325 mg by mouth At Bedtime.        CALCIUM + D PO      Take 600 mg by mouth.        estradiol 0.5 MG tablet    ESTRACE    90 tablet    Take 1 tablet (0.5 mg) by mouth daily    Hormone replacement therapy (HRT)       GLUCOSAMINE SULFATE PO      Take  by mouth daily.        MULTIVITAL PO      Take 1 tablet by mouth daily.        niacin 500 MG tablet      Take 1 tablet by mouth daily.        OMEGA-3 FISH OIL PO      Take  by mouth daily.        pantoprazole 40 MG EC tablet    PROTONIX    90 tablet    TAKE ONE TABLET BY MOUTH ONCE DAILY IN THE  MORNING BEFORE BREAKFAST    Thoracogenic scoliosis of thoracolumbar region, Post-menopause       traMADol 50 MG tablet    ULTRAM    120 tablet    TAKE 1 TO 2 TABLETS BY MOUTH EVERY 6 TO 8 HOURS AS NEEDED    Sprain of other parts of lumbar spine and pelvis, subsequent encounter

## 2018-10-05 NOTE — TELEPHONE ENCOUNTER
TRAMADOL HCL 50MG TAB        Last Written Prescription Date:  9/5/18  Last Fill Quantity: 120,   # refills: 0  Last Office Visit: 8/15/18  Future Office visit:

## 2018-10-09 DIAGNOSIS — Z79.890 HORMONE REPLACEMENT THERAPY (HRT): ICD-10-CM

## 2018-10-09 NOTE — TELEPHONE ENCOUNTER
estradiol (ESTRACE) 0.5 MG tablet    Last Written Prescription Date:  3/27/18  Last Fill Quantity: 90,   # refills: 1  Last Office Visit: 8/15/18  Future Office visit:    Next 5 appointments (look out 90 days)     Oct 26, 2018 10:30 AM CDT   Return Visit with Curt Encarnacion DC   [facility] [place] [facility] ([facility])    [address] [place] [address]   [phone]

## 2018-10-11 RX ORDER — ESTRADIOL 0.5 MG/1
TABLET ORAL
Qty: 90 TABLET | Refills: 1 | Status: SHIPPED | OUTPATIENT
Start: 2018-10-11 | End: 2019-04-04

## 2018-10-26 ENCOUNTER — OFFICE VISIT (OUTPATIENT)
Dept: CHIROPRACTIC MEDICINE | Facility: OTHER | Age: 78
End: 2018-10-26
Attending: CHIROPRACTOR
Payer: COMMERCIAL

## 2018-10-26 DIAGNOSIS — M99.02 SEGMENTAL AND SOMATIC DYSFUNCTION OF THORACIC REGION: ICD-10-CM

## 2018-10-26 DIAGNOSIS — M54.50 ACUTE BILATERAL LOW BACK PAIN WITHOUT SCIATICA: ICD-10-CM

## 2018-10-26 DIAGNOSIS — M99.03 SEGMENTAL AND SOMATIC DYSFUNCTION OF LUMBAR REGION: Primary | ICD-10-CM

## 2018-10-26 PROCEDURE — 98940 CHIROPRACT MANJ 1-2 REGIONS: CPT | Mod: AT | Performed by: CHIROPRACTOR

## 2018-10-26 NOTE — MR AVS SNAPSHOT
After Visit Summary   10/26/2018    Demi Narayan    MRN: [id number]           Patient Information     Date Of Birth          1940        Visit Information        Provider Department      10/26/2018 10:30 AM Curt Encarnacion [facility]        Today's Diagnoses     Segmental and somatic dysfunction of lumbar region    -  1    Acute bilateral low back pain without sciatica        Segmental and somatic dysfunction of thoracic region           Follow-ups after your visit        Your next 10 appointments already scheduled     Nov 16, 2018 10:30 AM CST   Return Visit with [facility] Engel [facility]    [address]  [facility] [address]   [phone]              Who to contact     If you have questions or need follow up information about today's clinic visit or your schedule please contact  WOODY ACUNA directly at [phone].  Normal or non-critical lab and imaging results will be communicated to you by MyChart, letter or phone within 4 business days after the clinic has received the results. If you do not hear from us within 7 days, please contact the clinic through MyChart or phone. If you have a critical or abnormal lab result, we will notify you by phone as soon as possible.  Submit refill requests through [employer] or call your pharmacy and they will forward the refill request to us. Please allow 3 business days for your refill to be completed.          Additional Information About Your Visit        Care EveryWhere ID     This is your Care EveryWhere ID. This could be used by other organizations to access your [place] medical records  [id number]         Blood Pressure from Last 3 Encounters:   08/15/18 126/70   04/09/18 118/74   03/22/18 132/64    Weight from Last 3 Encounters:   08/15/18 115 lb (52.2 kg)   04/09/18 114 lb 6.4 oz (51.9 kg)   03/22/18 110 lb (49.9 kg)              We Performed the Following     CHIROPRAC  MANIP,SPINAL,1-2 REGIONS        Primary Care Provider Office Phone # Fax #    Virgil Mackay [phone] [address] [facility] [address]        Equal Access to Services     CROW STEVENS : Davion pablo jose Soandree, waaxda luqadaha, qaybta kaalmada abdirahman, maria luisa raymonin hayaamarlen waltonvignesh larose rubina nixon. So [facility] [phone].    ATENCIÓN: Si habla español, tiene a maldonado disposición servicios gratuitos de asistencia lingüística. Llame al [phone].    We comply with applicable federal civil rights laws and [place] laws. We do not discriminate on the basis of race, color, national origin, age, disability, sex, sexual orientation, or gender identity.            Thank you!     Thank you for choosing  [facility]  for your care. Our goal is always to provide you with excellent care. Hearing back from our patients is one way we can continue to improve our services. Please take a few minutes to complete the written survey that you may receive in the mail after your visit with us. Thank you!             Your Updated Medication List - Protect others around you: Learn how to safely use, store and throw away your medicines at www.disposemymeds.org.          This list is accurate as of 10/26/18 11:59 PM.  Always use your most recent med list.                   Brand Name Dispense Instructions for use Diagnosis    aspirin 325 MG tablet      Take 325 mg by mouth At Bedtime.        CALCIUM + D PO      Take 600 mg by mouth.        estradiol 0.5 MG tablet    ESTRACE    90 tablet    Take 1 Tab by mouth one time a day.    Hormone replacement therapy (HRT)       GLUCOSAMINE SULFATE PO      Take  by mouth daily.        MULTIVITAL PO      Take 1 tablet by mouth daily.        niacin 500 MG tablet      Take 1 tablet by mouth daily.        OMEGA-3 FISH OIL PO      Take  by mouth daily.        pantoprazole 40 MG EC tablet    PROTONIX    90 tablet    TAKE ONE TABLET BY MOUTH ONCE DAILY IN THE  MORNING BEFORE BREAKFAST    Thoracogenic scoliosis of thoracolumbar region, Post-menopause       traMADol 50 MG tablet    ULTRAM    120 tablet    TAKE 1 TO 2 TABLETS BY MOUTH EVERY 6 TO 8 HOURS AS NEEDED    Sprain of other parts of lumbar spine and pelvis, subsequent encounter

## 2018-11-09 DIAGNOSIS — S33.8XXD SPRAIN OF OTHER PARTS OF LUMBAR SPINE AND PELVIS, SUBSEQUENT ENCOUNTER: ICD-10-CM

## 2018-11-09 RX ORDER — TRAMADOL HYDROCHLORIDE 50 MG/1
TABLET ORAL
Qty: 120 TABLET | Refills: 0 | Status: SHIPPED | OUTPATIENT
Start: 2018-11-09 | End: 2018-12-06

## 2018-11-09 NOTE — TELEPHONE ENCOUNTER
Controlled Substance Refill Request for traMADol (ULTRAM) 50 MG tablet  Problem List Complete:  Yes    Last Written Prescription Date:  10/5/18  Last Fill Quantity: 120,   # refills: 0    Last Office Visit with [facility] primary care provider: 8/15/18    Future Office visit:   Next 5 appointments (look out 90 days)     Nov 16, 2018 10:30 AM CST   Return Visit with Curt Encarnacion DC   [facility] ([facility])    [address] [place] [address]   [phone]                  Controlled substance agreement on file: Yes- 7/5/17     Processing:  Fax Rx to [facility] pharmacy

## 2018-11-16 ENCOUNTER — OFFICE VISIT (OUTPATIENT)
Dept: CHIROPRACTIC MEDICINE | Facility: OTHER | Age: 78
End: 2018-11-16
Attending: CHIROPRACTOR
Payer: COMMERCIAL

## 2018-11-16 DIAGNOSIS — M54.50 ACUTE BILATERAL LOW BACK PAIN WITHOUT SCIATICA: ICD-10-CM

## 2018-11-16 DIAGNOSIS — M99.02 SEGMENTAL AND SOMATIC DYSFUNCTION OF THORACIC REGION: ICD-10-CM

## 2018-11-16 DIAGNOSIS — M99.03 SEGMENTAL AND SOMATIC DYSFUNCTION OF LUMBAR REGION: Primary | ICD-10-CM

## 2018-11-16 PROCEDURE — 98940 CHIROPRACT MANJ 1-2 REGIONS: CPT | Mod: AT | Performed by: CHIROPRACTOR

## 2018-11-16 NOTE — MR AVS SNAPSHOT
After Visit Summary   11/16/2018    Demi Narayan    MRN: [id number]           Patient Information     Date Of Birth          1940        Visit Information        Provider Department      11/16/2018 10:30 AM Curt Encarnacion [facility]        Today's Diagnoses     Segmental and somatic dysfunction of lumbar region    -  1    Acute bilateral low back pain without sciatica        Segmental and somatic dysfunction of thoracic region           Follow-ups after your visit        Your next 10 appointments already scheduled     Dec 07, 2018 10:40 AM CST   Return Visit with JOEY Engel ([facility]    [address]  [facility] [address]   [phone]              Who to contact     If you have questions or need follow up information about today's clinic visit or your schedule please contact  WOODY ACUNA directly at [phone].  Normal or non-critical lab and imaging results will be communicated to you by MyChart, letter or phone within 4 business days after the clinic has received the results. If you do not hear from us within 7 days, please contact the clinic through MyChart or phone. If you have a critical or abnormal lab result, we will notify you by phone as soon as possible.  Submit refill requests through [employer] or call your pharmacy and they will forward the refill request to us. Please allow 3 business days for your refill to be completed.          Additional Information About Your Visit        Care EveryWhere ID     This is your Care EveryWhere ID. This could be used by other organizations to access your [place] medical records  [id number]         Blood Pressure from Last 3 Encounters:   08/15/18 126/70   04/09/18 118/74   03/22/18 132/64    Weight from Last 3 Encounters:   08/15/18 115 lb (52.2 kg)   04/09/18 114 lb 6.4 oz (51.9 kg)   03/22/18 110 lb (49.9 kg)              We Performed the Following     CHIROPRAC  MANIP,SPINAL,1-2 REGIONS        Primary Care Provider Office Phone # Fax #    Virgil Mackay [phone] [address] [facility] [address]        Equal Access to Services     CROW STEVENS : Davion pablo jose Soandree, waaxda luqadaha, qaybta kaalmada abdirahman, maria luisa raymonin hayaamarlen waltonvignesh larose rubina nixon. So [facility] [phone].    ATENCIÓN: Si habla español, tiene a maldonado disposición servicios gratuitos de asistencia lingüística. Llame al [phone].    We comply with applicable federal civil rights laws and [place] laws. We do not discriminate on the basis of race, color, national origin, age, disability, sex, sexual orientation, or gender identity.            Thank you!     Thank you for choosing  [facility]  for your care. Our goal is always to provide you with excellent care. Hearing back from our patients is one way we can continue to improve our services. Please take a few minutes to complete the written survey that you may receive in the mail after your visit with us. Thank you!             Your Updated Medication List - Protect others around you: Learn how to safely use, store and throw away your medicines at www.disposemymeds.org.          This list is accurate as of 11/16/18 11:59 PM.  Always use your most recent med list.                   Brand Name Dispense Instructions for use Diagnosis    aspirin 325 MG tablet      Take 325 mg by mouth At Bedtime.        CALCIUM + D PO      Take 600 mg by mouth.        estradiol 0.5 MG tablet    ESTRACE    90 tablet    Take 1 Tab by mouth one time a day.    Hormone replacement therapy (HRT)       GLUCOSAMINE SULFATE PO      Take  by mouth daily.        MULTIVITAL PO      Take 1 tablet by mouth daily.        niacin 500 MG tablet      Take 1 tablet by mouth daily.        OMEGA-3 FISH OIL PO      Take  by mouth daily.        pantoprazole 40 MG EC tablet    PROTONIX    90 tablet    TAKE ONE TABLET BY MOUTH ONCE DAILY IN THE  MORNING BEFORE BREAKFAST    Thoracogenic scoliosis of thoracolumbar region, Post-menopause       traMADol 50 MG tablet    ULTRAM    120 tablet    TAKE 1 TO 2 TABLETS BY MOUTH EVERY 6 TO 8 HOURS AS NEEDED    Sprain of other parts of lumbar spine and pelvis, subsequent encounter

## 2018-12-06 DIAGNOSIS — S33.8XXD SPRAIN OF OTHER PARTS OF LUMBAR SPINE AND PELVIS, SUBSEQUENT ENCOUNTER: ICD-10-CM

## 2018-12-06 NOTE — TELEPHONE ENCOUNTER
Tramadol      Last Written Prescription Date:  11/9/18  Last Fill Quantity: 120,   # refills: 0  Last Office Visit: 8/15/18  Future Office visit:    Next 5 appointments (look out 90 days)     Dec 07, 2018 10:40 AM CST   [facility]   [facility])    [address] [place] [address]   [phone]

## 2018-12-07 ENCOUNTER — OFFICE VISIT (OUTPATIENT)
Dept: CHIROPRACTIC MEDICINE | Facility: OTHER | Age: 78
End: 2018-12-07
Attending: CHIROPRACTOR
Payer: COMMERCIAL

## 2018-12-07 DIAGNOSIS — M99.02 SEGMENTAL AND SOMATIC DYSFUNCTION OF THORACIC REGION: ICD-10-CM

## 2018-12-07 DIAGNOSIS — M99.03 SEGMENTAL AND SOMATIC DYSFUNCTION OF LUMBAR REGION: Primary | ICD-10-CM

## 2018-12-07 DIAGNOSIS — M54.50 ACUTE BILATERAL LOW BACK PAIN WITHOUT SCIATICA: ICD-10-CM

## 2018-12-07 PROCEDURE — 98940 CHIROPRACT MANJ 1-2 REGIONS: CPT | Mod: AT | Performed by: CHIROPRACTOR

## 2018-12-07 RX ORDER — TRAMADOL HYDROCHLORIDE 50 MG/1
TABLET ORAL
Qty: 120 TABLET | Refills: 0 | Status: SHIPPED | OUTPATIENT
Start: 2018-12-07 | End: 2019-01-11

## 2018-12-26 DIAGNOSIS — M41.35 THORACOGENIC SCOLIOSIS OF THORACOLUMBAR REGION: ICD-10-CM

## 2018-12-26 DIAGNOSIS — Z78.0 POST-MENOPAUSE: ICD-10-CM

## 2018-12-27 ENCOUNTER — OFFICE VISIT (OUTPATIENT)
Dept: CHIROPRACTIC MEDICINE | Facility: OTHER | Age: 78
End: 2018-12-27
Attending: CHIROPRACTOR
Payer: COMMERCIAL

## 2018-12-27 ENCOUNTER — TELEPHONE (OUTPATIENT)
Dept: FAMILY MEDICINE | Facility: OTHER | Age: 78
End: 2018-12-27

## 2018-12-27 DIAGNOSIS — K57.32 DIVERTICULITIS OF COLON: Primary | ICD-10-CM

## 2018-12-27 DIAGNOSIS — M99.02 SEGMENTAL AND SOMATIC DYSFUNCTION OF THORACIC REGION: ICD-10-CM

## 2018-12-27 DIAGNOSIS — M54.50 ACUTE BILATERAL LOW BACK PAIN WITHOUT SCIATICA: ICD-10-CM

## 2018-12-27 DIAGNOSIS — M99.03 SEGMENTAL AND SOMATIC DYSFUNCTION OF LUMBAR REGION: Primary | ICD-10-CM

## 2018-12-27 PROCEDURE — 98940 CHIROPRACT MANJ 1-2 REGIONS: CPT | Mod: AT | Performed by: CHIROPRACTOR

## 2018-12-27 RX ORDER — PANTOPRAZOLE SODIUM 40 MG/1
TABLET, DELAYED RELEASE ORAL
Qty: 90 TABLET | Refills: 1 | Status: SHIPPED | OUTPATIENT
Start: 2018-12-27 | End: 2019-07-01

## 2018-12-27 NOTE — TELEPHONE ENCOUNTER
Patient called in regards to her Diverticulosis which she states can come and go with flare ups.  She stated that every so often she gets a prescription for antibiotics from Dr. Mackay to have when the flare ups happen.  The ones she has now are out of date.  Can she please get a new antibiotic prescription sent to the [facility] pharmacy in [place].  Please advise.  Thank you.

## 2018-12-27 NOTE — TELEPHONE ENCOUNTER
Protonix       Last Written Prescription Date:  3/23/2018  Last Fill Quantity: 90,   # refills: 2  Last Office Visit: 8/15/2018  Future Office visit:    Next 5 appointments (look out 90 days)    Jan 18, 2019 10:30 AM CST  Return Visit with Curt Encarnacion DC  [facility] [place] ([facility]) [address] [place] [id number] [phone]

## 2018-12-28 RX ORDER — CIPROFLOXACIN 500 MG/1
500 TABLET, FILM COATED ORAL 2 TIMES DAILY
Qty: 20 TABLET | Refills: 3 | Status: SHIPPED | OUTPATIENT
Start: 2018-12-28 | End: 2019-02-12

## 2019-01-11 ENCOUNTER — TELEPHONE (OUTPATIENT)
Dept: FAMILY MEDICINE | Facility: OTHER | Age: 79
End: 2019-01-11

## 2019-01-11 DIAGNOSIS — S33.8XXD SPRAIN OF OTHER PARTS OF LUMBAR SPINE AND PELVIS, SUBSEQUENT ENCOUNTER: ICD-10-CM

## 2019-01-11 RX ORDER — TRAMADOL HYDROCHLORIDE 50 MG/1
TABLET ORAL
Qty: 120 TABLET | Refills: 0 | Status: SHIPPED | OUTPATIENT
Start: 2019-01-11 | End: 2019-02-13

## 2019-01-11 NOTE — TELEPHONE ENCOUNTER
3:58 PM    Reason for Call: Phone Call    Description: Pt called and stated that they believe they have pink eye and was wondering if she could get a prescription. Please call and advise.     Was an appointment offered for this call? Yes  If yes : Appointment type              Date    Preferred method for responding to this message: Telephone Call  What is your phone number ?[phone]    If we cannot reach you directly, may we leave a detailed response at the number you provided? Yes    Can this message wait until your PCP/provider returns, if available today? Not applicable,     Rita Delgado

## 2019-01-11 NOTE — TELEPHONE ENCOUNTER
Tramadol  Last Written Prescription Date: 12/7/18  Last Fill Quantity: 120 # of Refills: 0  Last Office Visit: 8/15/18

## 2019-01-18 ENCOUNTER — OFFICE VISIT (OUTPATIENT)
Dept: CHIROPRACTIC MEDICINE | Facility: OTHER | Age: 79
End: 2019-01-18
Attending: CHIROPRACTOR
Payer: COMMERCIAL

## 2019-01-18 DIAGNOSIS — M54.50 ACUTE BILATERAL LOW BACK PAIN WITHOUT SCIATICA: ICD-10-CM

## 2019-01-18 DIAGNOSIS — M99.02 SEGMENTAL AND SOMATIC DYSFUNCTION OF THORACIC REGION: ICD-10-CM

## 2019-01-18 DIAGNOSIS — M99.03 SEGMENTAL AND SOMATIC DYSFUNCTION OF LUMBAR REGION: Primary | ICD-10-CM

## 2019-01-18 PROCEDURE — 98940 CHIROPRACT MANJ 1-2 REGIONS: CPT | Mod: AT | Performed by: CHIROPRACTOR

## 2019-02-08 ENCOUNTER — OFFICE VISIT (OUTPATIENT)
Dept: CHIROPRACTIC MEDICINE | Facility: OTHER | Age: 79
End: 2019-02-08
Attending: CHIROPRACTOR
Payer: COMMERCIAL

## 2019-02-08 DIAGNOSIS — M99.02 SEGMENTAL AND SOMATIC DYSFUNCTION OF THORACIC REGION: ICD-10-CM

## 2019-02-08 DIAGNOSIS — M99.03 SEGMENTAL AND SOMATIC DYSFUNCTION OF LUMBAR REGION: Primary | ICD-10-CM

## 2019-02-08 DIAGNOSIS — M54.50 ACUTE BILATERAL LOW BACK PAIN WITHOUT SCIATICA: ICD-10-CM

## 2019-02-08 PROCEDURE — 98940 CHIROPRACT MANJ 1-2 REGIONS: CPT | Mod: AT | Performed by: CHIROPRACTOR

## 2019-02-11 NOTE — PROGRESS NOTES
SUBJECTIVE:   Demi Narayan is a 78 year old female who presents to clinic today for the following health issues:      Musculoskeletal problem/pain      Duration: ongoing     Description  Location: lower back, sciatic nerve     Intensity:  moderate    Accompanying signs and symptoms: radiation of pain to down the legs    History  Previous similar problem: YES  Previous evaluation:  x-ray and MRI    Precipitating or alleviating factors:  Trauma or overuse: no   Aggravating factors include: laying down     Therapies tried and outcome: chiropractor, physical therapy medications    Problem list and histories reviewed & adjusted, as indicated.  Additional history: as documented    Patient Active Problem List   Diagnosis     H/O diverticulitis, S/P bowel resection     Dyslipidemia     Fibrocystic breast disease (FCBD)     Low back pain, chronic     GERD (gastroesophageal reflux)     Osteoarthritis, multiple joints     Comprehensive Medical Examination     Post-menopause     Diaphragmatic hernia     ACP (advance care planning)     Scoliosis, throracolumbar     Chronic pain syndrome     Lyme disease     Past Surgical History:   Procedure Laterality Date     ------------OTHER-------------      colonoscopy with biopsy - diverticulitis, hemorrhoids     ------------OTHER-------------  4/19/1994    sigmoidoscopy - abdominal pain, diverticula     ABDOMEN SURGERY      colon removed 2nd to diverticulitis     APPENDECTOMY       ARTHROSCOPY SHOULDER  11/20/2013    Procedure: ARTHROSCOPY SHOULDER;  Right Shoulder Arthroscopy Sub-Acromial Decompression Rotator Cuff Repair;  Surgeon: Lenny Trammell MD;  Location: [facility]     [facility]     Diverticulitis      bowel resection     EGD with biopsy  2011     ENDOSCOPY UPPER WITH PANCREATIC STIMULATION       ENDOSCOPY UPPER, COLONOSCOPY, COMBINED  7/18/2014    Procedure: COMBINED ENDOSCOPY UPPER, COLONOSCOPY;  Surgeon: Braden  Israel BARTH MD;  Location: [facility] OR     ENT SURGERY      tonsilectomy     ESOPHAGOSCOPY, GASTROSCOPY, DUODENOSCOPY (EGD), COMBINED N/A 2017    Procedure: COMBINED ESOPHAGOSCOPY, GASTROSCOPY, DUODENOSCOPY (EGD);  UPPER ENDOSCOPY WITH BIOPSY AND POLYPECTOMY;  Surgeon: Gallito Alvarado DO;  Location: [facility] OR     GYN SURGERY      hysterectomy with bladder and rectal repair     ORTHOPEDIC SURGERY  2013    right rotator cuff surgery     TVH with ovarian preservation         Social History     Tobacco Use     Smoking status: Former Smoker     Packs/day: 0.50     Years: 5.00     Pack years: 2.50     Types: Cigarettes     Last attempt to quit: 1968     Years since quittin.8     Smokeless tobacco: Never Used     Tobacco comment: no passive smoke exposure   Substance Use Topics     Alcohol use: Yes     Alcohol/week: 0.5 oz     Types: 1 Glasses of wine per week     Comment: daily with dinner     Family History   Problem Relation Age of Onset     Cerebrovascular Disease Father         CVA     Heart Disease Father         heart disease     Hypertension Father      Myocardial Infarction Father         myocardial infarction     Cerebrovascular Disease Mother         CVA     Diabetes Mother      Heart Disease Mother         heart disease     Hypertension Mother      Heart Disease Brother         heart disease     Hypertension Brother          Current Outpatient Medications   Medication Sig Dispense Refill     aspirin 325 MG tablet Take 325 mg by mouth At Bedtime.       Calcium Carbonate-Vitamin D (CALCIUM + D PO) Take 600 mg by mouth.       estradiol (ESTRACE) 0.5 MG tablet Take 1 Tab by mouth one time a day. 90 tablet 1     GLUCOSAMINE SULFATE PO Take  by mouth daily.       Multiple Vitamins-Minerals (MULTIVITAL PO) Take 1 tablet by mouth daily.       niacin 500 MG tablet Take 1 tablet by mouth daily.       Omega-3 Fatty Acids (OMEGA-3 FISH OIL PO) Take  by mouth daily.       pantoprazole (PROTONIX) 40 MG EC  tablet TAKE 1 TABLET BY MOUTH ONCE DAILY IN THE MORNING BEFORE BREAKFAST 90 tablet 1     traMADol (ULTRAM) 50 MG tablet TAKE 1 TO 2 TABLETS BY MOUTH EVERY 6 TO 8 HOURS AS NEEDED 120 tablet 0     No Known Allergies  BP Readings from Last 3 Encounters:   02/12/19 142/82   08/15/18 126/70   04/09/18 118/74    Wt Readings from Last 3 Encounters:   02/12/19 50.8 kg (112 lb)   08/15/18 52.2 kg (115 lb)   04/09/18 51.9 kg (114 lb 6.4 oz)                    Reviewed and updated as needed this visit by clinical staff  Tobacco  Allergies  Meds       Reviewed and updated as needed this visit by Provider         ROS:  Constitutional, HEENT, cardiovascular, pulmonary, gi and gu systems are negative, except as otherwise noted.    OBJECTIVE:     /82 (Patient Position: Sitting)   Pulse 87   Wt 50.8 kg (112 lb)   SpO2 98%   BMI 23.21 kg/m    Body mass index is 23.21 kg/m .  Physical Exam   Constitutional: She is oriented to person, place, and time. She appears well-developed and well-nourished. No distress.   Neurological: She is alert and oriented to person, place, and time.   Psychiatric: She has a normal mood and affect.     Other exam not repeated    Diagnostic Test Results:  none     ASSESSMENT/PLAN:     Chronic bilateral low back pain with left-sided sciatica  Previous Spine Surgery consultations and MRI reviewed in detail. Will continue pain control.  Repeat MRI as the last was over one year old.  Appointment with [facility] scheduled for evaluation and recommendations for further management.   - MR Lumbar Spine w/o Contrast; Future  - SPINE SURGERY REFERRAL    Greater than 25 minutes spent with patient, of which greater than 50% was spent reviewing pertinent medical records., counseling regarding management options of recurrent back pain, as well as coordination of care.    Virgil Mackay MD  [facility]

## 2019-02-12 ENCOUNTER — OFFICE VISIT (OUTPATIENT)
Dept: FAMILY MEDICINE | Facility: OTHER | Age: 79
End: 2019-02-12
Attending: FAMILY MEDICINE
Payer: COMMERCIAL

## 2019-02-12 VITALS
HEART RATE: 87 BPM | DIASTOLIC BLOOD PRESSURE: 82 MMHG | OXYGEN SATURATION: 98 % | SYSTOLIC BLOOD PRESSURE: 142 MMHG | WEIGHT: 112 LBS | BODY MASS INDEX: 23.21 KG/M2

## 2019-02-12 DIAGNOSIS — G89.29 CHRONIC BILATERAL LOW BACK PAIN WITH LEFT-SIDED SCIATICA: Primary | ICD-10-CM

## 2019-02-12 DIAGNOSIS — M54.42 CHRONIC BILATERAL LOW BACK PAIN WITH LEFT-SIDED SCIATICA: Primary | ICD-10-CM

## 2019-02-12 PROCEDURE — G0463 HOSPITAL OUTPT CLINIC VISIT: HCPCS

## 2019-02-12 PROCEDURE — 99214 OFFICE O/P EST MOD 30 MIN: CPT | Performed by: FAMILY MEDICINE

## 2019-02-12 ASSESSMENT — PAIN SCALES - GENERAL: PAINLEVEL: SEVERE PAIN (6)

## 2019-02-12 NOTE — NURSING NOTE
"Chief Complaint   Patient presents with     Musculoskeletal Problem       Initial /82 (Patient Position: Sitting)   Pulse 87   Wt 50.8 kg (112 lb)   SpO2 98%   BMI 23.21 kg/m   Estimated body mass index is 23.21 kg/m  as calculated from the following:    Height as of 8/15/18: 1.48 m (4' 10.25\").    Weight as of this encounter: 50.8 kg (112 lb).  Medication Reconciliation: complete    Aleah Cuba LPN  "

## 2019-02-13 ENCOUNTER — TELEPHONE (OUTPATIENT)
Dept: FAMILY MEDICINE | Facility: OTHER | Age: 79
End: 2019-02-13

## 2019-02-13 DIAGNOSIS — S33.8XXD SPRAIN OF OTHER PARTS OF LUMBAR SPINE AND PELVIS, SUBSEQUENT ENCOUNTER: ICD-10-CM

## 2019-02-13 NOTE — TELEPHONE ENCOUNTER
traMADol (ULTRAM) 50 MG tablet      Last Written Prescription Date:  1/11/19  Last Fill Quantity: 120,   # refills: 0  Last Office Visit: 2/12/19  Future Office visit:    Next 5 appointments (look out 90 days)    Mar 01, 2019 10:30 AM CST  Return Visit with Curt Encarnacion DC  [facility] ([facility]) [address] [place] [address]  [phone]           Routing refill request to provider for review/approval because:  Drug not on the [facility], [facility] or  Health refill protocol or controlled substance

## 2019-02-14 RX ORDER — TRAMADOL HYDROCHLORIDE 50 MG/1
TABLET ORAL
Qty: 120 TABLET | Refills: 0 | Status: SHIPPED | OUTPATIENT
Start: 2019-02-14 | End: 2019-03-20

## 2019-02-15 ENCOUNTER — HOSPITAL ENCOUNTER (OUTPATIENT)
Dept: MRI IMAGING | Facility: HOSPITAL | Age: 79
Discharge: HOME | End: 2019-02-15
Attending: FAMILY MEDICINE | Admitting: FAMILY MEDICINE
Payer: COMMERCIAL

## 2019-02-15 DIAGNOSIS — M54.42 CHRONIC BILATERAL LOW BACK PAIN WITH LEFT-SIDED SCIATICA: ICD-10-CM

## 2019-02-15 DIAGNOSIS — G89.29 CHRONIC BILATERAL LOW BACK PAIN WITH LEFT-SIDED SCIATICA: ICD-10-CM

## 2019-02-15 PROCEDURE — 72148 MRI LUMBAR SPINE W/O DYE: CPT | Mod: TC

## 2019-02-18 NOTE — TELEPHONE ENCOUNTER
[facility] called [facility] for [facility]. [facility] to [facility] in [place] instead. [facility] [place] as [facility] is [facility] choice of pharmacy and [facility] [address]. Please advise   Laura Malone, DARWIN

## 2019-02-20 ENCOUNTER — TELEPHONE (OUTPATIENT)
Dept: FAMILY MEDICINE | Facility: OTHER | Age: 79
End: 2019-02-20

## 2019-02-20 NOTE — TELEPHONE ENCOUNTER
3:00 PM    Reason for Call: Phone Call    Description: Patient called and was notified of results. patient would like PCP nurse to call her back as she has more questions in regards to next steps.     Was an appointment offered for this call? No  If yes : Appointment type              Date    Preferred method for responding to this message: Telephone Call  What is your phone number ?[phone]    If we cannot reach you directly, may we leave a detailed response at the number you provided? Yes    Can this message wait until your PCP/provider returns, if available today? Not applicable    Laura Malone MA

## 2019-02-22 NOTE — TELEPHONE ENCOUNTER
Spoke with patient, she states she has an appointment 03/04/19 to see spine surgeon. Would like to hold off on IR consult until after that appointment.

## 2019-03-01 ENCOUNTER — OFFICE VISIT (OUTPATIENT)
Dept: CHIROPRACTIC MEDICINE | Facility: OTHER | Age: 79
End: 2019-03-01
Attending: CHIROPRACTOR
Payer: COMMERCIAL

## 2019-03-01 DIAGNOSIS — M99.03 SEGMENTAL AND SOMATIC DYSFUNCTION OF LUMBAR REGION: Primary | ICD-10-CM

## 2019-03-01 DIAGNOSIS — M54.50 ACUTE BILATERAL LOW BACK PAIN WITHOUT SCIATICA: ICD-10-CM

## 2019-03-01 DIAGNOSIS — M99.02 SEGMENTAL AND SOMATIC DYSFUNCTION OF THORACIC REGION: ICD-10-CM

## 2019-03-01 PROCEDURE — 98940 CHIROPRACT MANJ 1-2 REGIONS: CPT | Mod: AT | Performed by: CHIROPRACTOR

## 2019-03-05 ENCOUNTER — TELEPHONE (OUTPATIENT)
Dept: FAMILY MEDICINE | Facility: OTHER | Age: 79
End: 2019-03-05

## 2019-03-05 DIAGNOSIS — G89.29 CHRONIC BILATERAL LOW BACK PAIN WITHOUT SCIATICA: Primary | ICD-10-CM

## 2019-03-05 DIAGNOSIS — M54.50 CHRONIC BILATERAL LOW BACK PAIN WITHOUT SCIATICA: Primary | ICD-10-CM

## 2019-03-05 NOTE — TELEPHONE ENCOUNTER
10:34 AM    Reason for Call: Phone Call    Description: Patient called and she states that she would like pcp nurse to call her back in regards to spine surgery referral. Patient states she went to a location and appointment was not even made. patient states there was a lot of confusion involved and she would like to clear it up with PCP nurse     Was an appointment offered for this call? No  If yes : Appointment type              Date    Preferred method for responding to this message: Telephone Call  What is your phone number ?[phone]    If we cannot reach you directly, may we leave a detailed response at the number you provided? Yes    Can this message wait until your PCP/provider returns, if available today? Not applicable    Laura Malone MA

## 2019-03-05 NOTE — TELEPHONE ENCOUNTER
Spoke with patient, she wants to know about a procedure for burning the nerves? She wants to know if this is something she could have done or if there is someone she can see [place] then [place] the cities? Please advise.

## 2019-03-08 ENCOUNTER — TELEPHONE (OUTPATIENT)
Dept: FAMILY MEDICINE | Facility: OTHER | Age: 79
End: 2019-03-08

## 2019-03-08 NOTE — TELEPHONE ENCOUNTER
9:00 AM    Reason for Call: Phone Call    Description: Patient called as she had an MRI of her spine and got messed up with the spine center in [facility]; ended up not seeing the provider at the place she wanted.  She is asking for a provider close to home.  Please advise et call her back.  Thank you.    Was an appointment offered for this call? No  If yes : Appointment type              Date    Preferred method for responding to this message: Telephone Call  What is your phone number ?    If we cannot reach you directly, may we leave a detailed response at the number you provided? Yes    Can this message wait until your PCP/provider returns, if available today? YES,     Barbara Langston LPN

## 2019-03-11 ENCOUNTER — HOSPITAL ENCOUNTER (OUTPATIENT)
Dept: INTERVENTIONAL RADIOLOGY/VASCULAR | Facility: HOSPITAL | Age: 79
Discharge: HOME | End: 2019-03-11
Attending: FAMILY MEDICINE | Admitting: FAMILY MEDICINE
Payer: COMMERCIAL

## 2019-03-11 DIAGNOSIS — G89.29 CHRONIC BILATERAL LOW BACK PAIN WITHOUT SCIATICA: ICD-10-CM

## 2019-03-11 DIAGNOSIS — M54.50 CHRONIC BILATERAL LOW BACK PAIN WITHOUT SCIATICA: ICD-10-CM

## 2019-03-11 PROCEDURE — G0463 HOSPITAL OUTPT CLINIC VISIT: HCPCS | Mod: TC

## 2019-03-20 DIAGNOSIS — S33.8XXD SPRAIN OF OTHER PARTS OF LUMBAR SPINE AND PELVIS, SUBSEQUENT ENCOUNTER: ICD-10-CM

## 2019-03-20 NOTE — TELEPHONE ENCOUNTER
traMADol (ULTRAM) 50 MG tablet      Last Written Prescription Date:  2-14-19  Last Fill Quantity: 120,   # refills: 0  Last Office Visit: 2-12-19  Future Office visit:       Routing refill request to provider for review/approval because:  Drug not on the [facility], [facility] or [facility] refill protocol or controlled substance

## 2019-03-21 RX ORDER — TRAMADOL HYDROCHLORIDE 50 MG/1
TABLET ORAL
Qty: 120 TABLET | Refills: 0 | Status: SHIPPED | OUTPATIENT
Start: 2019-03-21 | End: 2019-04-30

## 2019-03-29 ENCOUNTER — OFFICE VISIT (OUTPATIENT)
Dept: CHIROPRACTIC MEDICINE | Facility: OTHER | Age: 79
End: 2019-03-29
Attending: CHIROPRACTOR
Payer: COMMERCIAL

## 2019-03-29 DIAGNOSIS — M54.50 ACUTE BILATERAL LOW BACK PAIN WITHOUT SCIATICA: ICD-10-CM

## 2019-03-29 DIAGNOSIS — M99.03 SEGMENTAL AND SOMATIC DYSFUNCTION OF LUMBAR REGION: Primary | ICD-10-CM

## 2019-03-29 DIAGNOSIS — M99.02 SEGMENTAL AND SOMATIC DYSFUNCTION OF THORACIC REGION: ICD-10-CM

## 2019-03-29 PROCEDURE — 98940 CHIROPRACT MANJ 1-2 REGIONS: CPT | Mod: AT | Performed by: CHIROPRACTOR

## 2019-04-04 ENCOUNTER — HOSPITAL ENCOUNTER (OUTPATIENT)
Dept: INTERVENTIONAL RADIOLOGY/VASCULAR | Facility: HOSPITAL | Age: 79
Discharge: HOME | End: 2019-04-04
Attending: RADIOLOGY | Admitting: FAMILY MEDICINE
Payer: COMMERCIAL

## 2019-04-04 DIAGNOSIS — M54.42 LEFT-SIDED LOW BACK PAIN WITH LEFT-SIDED SCIATICA: ICD-10-CM

## 2019-04-04 PROCEDURE — 64483 NJX AA&/STRD TFRM EPI L/S 1: CPT | Mod: TC,LT

## 2019-04-04 PROCEDURE — 25000128 H RX IP 250 OP 636: Performed by: RADIOLOGY

## 2019-04-04 RX ORDER — DEXAMETHASONE SODIUM PHOSPHATE 10 MG/ML
10 INJECTION, SOLUTION INTRAMUSCULAR; INTRAVENOUS ONCE
Status: COMPLETED | OUTPATIENT
Start: 2019-04-04 | End: 2019-04-04

## 2019-04-04 RX ORDER — IOPAMIDOL 612 MG/ML
15 INJECTION, SOLUTION INTRATHECAL ONCE
Status: COMPLETED | OUTPATIENT
Start: 2019-04-04 | End: 2019-04-04

## 2019-04-04 RX ADMIN — DEXAMETHASONE SODIUM PHOSPHATE 10 MG: 10 INJECTION, SOLUTION INTRAMUSCULAR; INTRAVENOUS at 14:52

## 2019-04-04 RX ADMIN — IOPAMIDOL 2 ML: 612 INJECTION, SOLUTION INTRATHECAL at 14:54

## 2019-04-04 NOTE — IP AVS SNAPSHOT
[facility]  [address] [address]  Phone:  [phone]  Fax:  [phone]                                    After Visit Summary   4/4/2019    Demi Narayan    MRN: [id number]           After Visit Summary Signature Page    I have received my discharge instructions, and my questions have been answered. I have discussed any challenges I see with this plan with the nurse or doctor.    ..........................................................................................................................................  Patient/Patient Representative Signature      ..........................................................................................................................................  Patient Representative Print Name and Relationship to Patient    ..................................................               ................................................  Date                                   Time    ..........................................................................................................................................  Reviewed by Signature/Title    ...................................................              ..............................................  Date                                               Time          22EPIC Rev 08/18

## 2019-04-04 NOTE — DISCHARGE INSTRUCTIONS
Home number on file [phone] (home)  Is it ok to leave a message at this number(s)? Yes    Dr. Aguirre completed your procedure on 4/4/2019.    Current Pain Level (0-10 Scale): 5/10  Post Pain Level (0-10):  0/10    Radiology Discharge instructions for Steroid Injection    Activity Level:     Do not do any heavy activity or exercise for 24 hours.   Do not drive for 4 hours after your injection.  Diet:   Return to your normal diet.  Medications:   If you have stopped taking your Aspirin, Coumadin/Warfarin, Ibuprofen, or any   other blood thinner for this procedure you may resume in the morning unless   your primary care provider has given you other instructions.    Diabetics may see an increase in blood sugar after steroid injections. If you are concerned about your blood sugar, please contact your family doctor.    Site Care:  Remove the bandage and bathe or shower the morning after the procedure.      This is a Pain Management procedure.  You will be contacted in two weeks for follow up.    Call your Primary Care Provider if you have the following (if your primary care provider is not available please seek emergency care):   Nausea with vomiting   Severe headache   Drowsiness or confusion   Redness or drainage at the injection or puncture site   Temperature over 101 degrees F   Other concerns   Worsening back pain   Stiff neck

## 2019-04-17 NOTE — PROGRESS NOTES
SUBJECTIVE:   Demi Narayan is a 78 year old female who presents to clinic today for the following   health issues:      Skin lesion      Duration: pt noticed it 6 weeks ago    Description (location/character/radiation): right side of face next to brow line, small lump under skin    Accompanying signs and symptoms: tenderness when rubs it    History (similar episodes/previous evaluation): None     Danielle notes skin lesion as described above.  There is no asymmetry,  irregular borders,  abnormal pigmentation, or  increasing size.  No associated nonhealing areas. She has had sun exposure    Additional history: as documented    Reviewed  and updated as needed this visit by clinical staff  Tobacco  Allergies  Meds  Med Hx  Surg Hx  Fam Hx  Soc Hx        Reviewed and updated as needed this visit by Provider         Patient Active Problem List   Diagnosis     H/O diverticulitis, S/P bowel resection     Dyslipidemia     Fibrocystic breast disease (FCBD)     Low back pain, chronic     GERD (gastroesophageal reflux)     Osteoarthritis, multiple joints     Comprehensive Medical Examination     Post-menopause     Diaphragmatic hernia     ACP (advance care planning)     Scoliosis, throracolumbar     Chronic pain syndrome     Lyme disease     Past Surgical History:   Procedure Laterality Date     ------------OTHER-------------      colonoscopy with biopsy - diverticulitis, hemorrhoids     ------------OTHER-------------  4/19/1994    sigmoidoscopy - abdominal pain, diverticula     ABDOMEN SURGERY      colon removed 2nd to diverticulitis     APPENDECTOMY       ARTHROSCOPY SHOULDER  11/20/2013    Procedure: ARTHROSCOPY SHOULDER;  Right Shoulder Arthroscopy Sub-Acromial Decompression Rotator Cuff Repair;  Surgeon: Lenny Trammell MD;  Location: [facility]     [facility]     Diverticulitis      bowel resection     EGD with biopsy  2011     ENDOSCOPY UPPER WITH PANCREATIC  STIMULATION       ENDOSCOPY UPPER, COLONOSCOPY, COMBINED  2014    Procedure: COMBINED ENDOSCOPY UPPER, COLONOSCOPY;  Surgeon: Israel Feliciano MD;  Location: [facility]     ENT SURGERY      tonsilectomy     ESOPHAGOSCOPY, GASTROSCOPY, DUODENOSCOPY (EGD), COMBINED N/A 2017    Procedure: COMBINED ESOPHAGOSCOPY, GASTROSCOPY, DUODENOSCOPY (EGD);  UPPER ENDOSCOPY WITH BIOPSY AND POLYPECTOMY;  Surgeon: Gallito Alvarado DO;  Location: [facility]     GYN SURGERY      hysterectomy with bladder and rectal repair     IR CONSULTATION FOR IR EXAM  3/11/2019     ORTHOPEDIC SURGERY  2013    right rotator cuff surgery     TVH with ovarian preservation         Social History     Tobacco Use     Smoking status: Former Smoker     Packs/day: 0.50     Years: 5.00     Pack years: 2.50     Types: Cigarettes     Start date: 1963     Last attempt to quit: 1968     Years since quittin.0     Smokeless tobacco: Never Used   Substance Use Topics     Alcohol use: Yes     Alcohol/week: 0.5 oz     Types: 1 Glasses of wine per week     Comment: daily with dinner     Family History   Problem Relation Age of Onset     Cerebrovascular Disease Father         CVA     Heart Disease Father         heart disease     Hypertension Father      Myocardial Infarction Father         myocardial infarction     Cerebrovascular Disease Mother         CVA     Diabetes Mother      Heart Disease Mother         heart disease     Hypertension Mother      Heart Disease Brother         heart disease     Hypertension Brother          Current Outpatient Medications   Medication Sig Dispense Refill     aspirin 325 MG tablet Take 325 mg by mouth At Bedtime.       Calcium Carbonate-Vitamin D (CALCIUM + D PO) Take 600 mg by mouth.       estradiol (ESTRACE) 0.5 MG tablet Take 1 Tab by mouth one time a day. 90 tablet 1     GLUCOSAMINE SULFATE PO Take  by mouth daily.       Multiple Vitamins-Minerals (MULTIVITAL PO) Take 1 tablet by mouth daily.       niacin  500 MG tablet Take 1 tablet by mouth daily.       Omega-3 Fatty Acids (OMEGA-3 FISH OIL PO) Take  by mouth daily.       pantoprazole (PROTONIX) 40 MG EC tablet TAKE 1 TABLET BY MOUTH ONCE DAILY IN THE MORNING BEFORE BREAKFAST 90 tablet 1     traMADol (ULTRAM) 50 MG tablet TAKE 1 TO 2 TABLETS BY MOUTH EVERY 6 TO 8 HOURS AS NEEDED 120 tablet 0     No Known Allergies  BP Readings from Last 3 Encounters:   04/30/19 122/73   02/12/19 142/82   08/15/18 126/70    Wt Readings from Last 3 Encounters:   04/30/19 53.5 kg (118 lb)   02/12/19 50.8 kg (112 lb)   08/15/18 52.2 kg (115 lb)                    ROS:  Constitutional, HEENT, cardiovascular, pulmonary, gi and gu systems are negative, except as otherwise noted.    OBJECTIVE:     /73 (BP Location: Right arm, Patient Position: Sitting, Cuff Size: Adult Regular)   Pulse 84   Temp 97.2  F (36.2  C) (Tympanic)   Wt 53.5 kg (118 lb)   SpO2 98%   BMI 24.45 kg/m    Body mass index is 24.45 kg/m .  Physical Exam   Constitutional: She is oriented to person, place, and time. She appears well-developed and well-nourished. No distress.   Neurological: She is alert and oriented to person, place, and time.   Skin: Skin is warm and dry.   There is an actinic lesion noted in there area described above.   Psychiatric: She has a normal mood and affect.         Diagnostic Test Results:  none     ASSESSMENT/PLAN:     Skin lesion  Suspect actinic keratosis.  Natural history and warning signs reviewed.  Will follow.    Sprain of other parts of lumbar spine and pelvis, subsequent encounter  Refilled as written.  - traMADol (ULTRAM) 50 MG tablet; TAKE 1 TO 2 TABLETS BY MOUTH EVERY 6 TO 8 HOURS AS NEEDED      Virgil Mackay MD  [facility]

## 2019-04-18 ENCOUNTER — TELEPHONE (OUTPATIENT)
Dept: INTERVENTIONAL RADIOLOGY/VASCULAR | Facility: HOSPITAL | Age: 79
End: 2019-04-18

## 2019-04-18 NOTE — TELEPHONE ENCOUNTER
CONSULT PATIENT  PAIN INJECTION POST CALL    Procedure: Epidural Lumbar Sacral TF Left L3-4  Radiologist(s): Dr. Gallito Aguirre  Date of Procedure: 4/4/2019    The patient was not available by telephone. Message was left.           Milana Cardozo

## 2019-04-18 NOTE — TELEPHONE ENCOUNTER
CONSULT PATIENT  PAIN INJECTION POST CALL    Procedure: Epidural Lumbar Sacral TF Left L3-4  Radiologist(s): Dr. Gallito Aguirre  Date of Procedure: 4/4/2019    I responded to the patient's questions/concerns.    Pre-procedure pain score was: 5 (See pre-procedure score)  Post-procedure pain score as of today is: 8-9  Where is the pain? Left lower back down left leg now having shooting pain in right leg  Is this new pain? Yes: Shooting pain in right leg    Patient would like to pursue another injection.    Patient feels worse than before the injection. Patient is still having left lower back pain that shoots down left leg, but is having right leg pain right after the injection. When turning a certain way right leg will get a shooting, jolting pain down to below the shin bone. This pain is new since the injection. Mornings are the worst so bad almost throwing up due to the pain. Patient experiences burning pain in the morning and within a half hour this burning pain will go away. Patient has pain all day and can also feel a lump in the lower left area that she is able to move around. Patient has been wearing a band around waist and hip area to keep straight so she is capable of moving around better, and now relies on wearing it all the time. Patient feels slightly better after taking pain medicine but make brain foggy. Patient is hoping a second injection will help with this pain.       Milana Cardozo

## 2019-04-26 ENCOUNTER — OFFICE VISIT (OUTPATIENT)
Dept: CHIROPRACTIC MEDICINE | Facility: OTHER | Age: 79
End: 2019-04-26
Attending: CHIROPRACTOR
Payer: COMMERCIAL

## 2019-04-26 DIAGNOSIS — M99.03 SEGMENTAL AND SOMATIC DYSFUNCTION OF LUMBAR REGION: Primary | ICD-10-CM

## 2019-04-26 DIAGNOSIS — M54.50 ACUTE BILATERAL LOW BACK PAIN WITHOUT SCIATICA: ICD-10-CM

## 2019-04-26 DIAGNOSIS — M99.01 SEGMENTAL AND SOMATIC DYSFUNCTION OF CERVICAL REGION: ICD-10-CM

## 2019-04-26 DIAGNOSIS — M99.02 SEGMENTAL AND SOMATIC DYSFUNCTION OF THORACIC REGION: ICD-10-CM

## 2019-04-26 PROCEDURE — 98940 CHIROPRACT MANJ 1-2 REGIONS: CPT | Mod: AT | Performed by: CHIROPRACTOR

## 2019-04-30 ENCOUNTER — OFFICE VISIT (OUTPATIENT)
Dept: FAMILY MEDICINE | Facility: OTHER | Age: 79
End: 2019-04-30
Attending: FAMILY MEDICINE
Payer: COMMERCIAL

## 2019-04-30 VITALS
SYSTOLIC BLOOD PRESSURE: 122 MMHG | DIASTOLIC BLOOD PRESSURE: 73 MMHG | BODY MASS INDEX: 24.45 KG/M2 | OXYGEN SATURATION: 98 % | TEMPERATURE: 97.2 F | WEIGHT: 118 LBS | HEART RATE: 84 BPM

## 2019-04-30 DIAGNOSIS — L98.9 SKIN LESION: Primary | ICD-10-CM

## 2019-04-30 DIAGNOSIS — S33.8XXD SPRAIN OF OTHER PARTS OF LUMBAR SPINE AND PELVIS, SUBSEQUENT ENCOUNTER: ICD-10-CM

## 2019-04-30 PROCEDURE — 99213 OFFICE O/P EST LOW 20 MIN: CPT | Performed by: FAMILY MEDICINE

## 2019-04-30 PROCEDURE — G0463 HOSPITAL OUTPT CLINIC VISIT: HCPCS

## 2019-04-30 RX ORDER — TRAMADOL HYDROCHLORIDE 50 MG/1
TABLET ORAL
Qty: 120 TABLET | Refills: 0 | Status: SHIPPED | OUTPATIENT
Start: 2019-04-30 | End: 2019-05-29

## 2019-04-30 ASSESSMENT — PAIN SCALES - GENERAL: PAINLEVEL: NO PAIN (0)

## 2019-04-30 NOTE — NURSING NOTE
"Chief Complaint   Patient presents with     Derm Problem       Initial /73 (BP Location: Right arm, Patient Position: Sitting, Cuff Size: Adult Regular)   Pulse 84   Temp 97.2  F (36.2  C) (Tympanic)   Wt 53.5 kg (118 lb)   SpO2 98%   BMI 24.45 kg/m   Estimated body mass index is 24.45 kg/m  as calculated from the following:    Height as of 8/15/18: 1.48 m (4' 10.25\").    Weight as of this encounter: 53.5 kg (118 lb).  Medication Reconciliation: complete    Lida Weaver LPN  "

## 2019-05-13 ENCOUNTER — TELEPHONE (OUTPATIENT)
Dept: INTERVENTIONAL RADIOLOGY/VASCULAR | Facility: HOSPITAL | Age: 79
End: 2019-05-13

## 2019-05-13 NOTE — TELEPHONE ENCOUNTER
Message left for patient to come one hour later in the 1030 time spot, waiting for patient comfirmation

## 2019-05-15 ENCOUNTER — HOSPITAL ENCOUNTER (OUTPATIENT)
Dept: INTERVENTIONAL RADIOLOGY/VASCULAR | Facility: HOSPITAL | Age: 79
Discharge: HOME | End: 2019-05-15
Attending: RADIOLOGY | Admitting: FAMILY MEDICINE
Payer: COMMERCIAL

## 2019-05-15 DIAGNOSIS — M47.816 LUMBAR SPONDYLOSIS: ICD-10-CM

## 2019-05-15 PROCEDURE — 64494 INJ PARAVERT F JNT L/S 2 LEV: CPT | Mod: TC

## 2019-05-15 PROCEDURE — 25000125 ZZHC RX 250: Performed by: RADIOLOGY

## 2019-05-15 PROCEDURE — 25000128 H RX IP 250 OP 636: Performed by: RADIOLOGY

## 2019-05-15 RX ORDER — ROPIVACAINE HYDROCHLORIDE 10 MG/ML
INJECTION EPIDURAL; INFILTRATION; PERINEURAL
Status: DISPENSED
Start: 2019-05-15 | End: 2019-05-15

## 2019-05-15 RX ORDER — ROPIVACAINE HYDROCHLORIDE 10 MG/ML
10 INJECTION EPIDURAL; INFILTRATION; PERINEURAL ONCE
Status: COMPLETED | OUTPATIENT
Start: 2019-05-15 | End: 2019-05-15

## 2019-05-15 RX ORDER — LIDOCAINE HYDROCHLORIDE 10 MG/ML
INJECTION, SOLUTION EPIDURAL; INFILTRATION; INTRACAUDAL; PERINEURAL
Status: DISPENSED
Start: 2019-05-15 | End: 2019-05-15

## 2019-05-15 RX ADMIN — LIDOCAINE HYDROCHLORIDE 5 ML: 10 INJECTION, SOLUTION EPIDURAL; INFILTRATION; INTRACAUDAL at 09:13

## 2019-05-15 RX ADMIN — ROPIVACAINE HYDROCHLORIDE 3 ML: 10 INJECTION, SOLUTION EPIDURAL at 09:14

## 2019-05-15 NOTE — PROGRESS NOTES
Medial Branch Blocks Workup Discharge Instructions    To obtain authorization for a Rhizotomy (Radiofrequency Ablation), we are required to document the percent of relief of the Medial Branch Block injection after one hour.  Therefore, please call and report this to us an hour after your procedure.  If no one is available to answer, it will go to voicemail:  Please leave your name, phone number, and the relief you have obtained from your injection.    Report your pain level between 1 and 10 with 10 being the worst pain ever.  Please do not leave a range of relief; we require a specific number between 1 and 10.  Without this information, your injection pain relief will not be documented as required for your insurance company. Calling is a crucial step of the Medial Branch Block injection and Rhizotomy workup.  Please call [phone] at 10:15 AM    Thank you,  [facility] Care Team

## 2019-05-17 ENCOUNTER — HOSPITAL ENCOUNTER (OUTPATIENT)
Dept: INTERVENTIONAL RADIOLOGY/VASCULAR | Facility: HOSPITAL | Age: 79
Discharge: HOME | End: 2019-05-17
Attending: RADIOLOGY | Admitting: RADIOLOGY
Payer: COMMERCIAL

## 2019-05-17 DIAGNOSIS — M47.816 LUMBAR SPONDYLOSIS: ICD-10-CM

## 2019-05-17 PROCEDURE — 64493 INJ PARAVERT F JNT L/S 1 LEV: CPT | Mod: TC,RT

## 2019-05-20 ENCOUNTER — TELEPHONE (OUTPATIENT)
Dept: INTERVENTIONAL RADIOLOGY/VASCULAR | Facility: HOSPITAL | Age: 79
End: 2019-05-20

## 2019-05-29 DIAGNOSIS — S33.8XXD SPRAIN OF OTHER PARTS OF LUMBAR SPINE AND PELVIS, SUBSEQUENT ENCOUNTER: ICD-10-CM

## 2019-05-30 NOTE — TELEPHONE ENCOUNTER
traMADol  Last Written Prescription Date: 4/30/19  Last Fill Quantity: 120 # of Refills: 0  Last Office Visit: 4/30/19

## 2019-05-31 ENCOUNTER — OFFICE VISIT (OUTPATIENT)
Dept: CHIROPRACTIC MEDICINE | Facility: OTHER | Age: 79
End: 2019-05-31
Attending: CHIROPRACTOR
Payer: COMMERCIAL

## 2019-05-31 DIAGNOSIS — M54.50 ACUTE BILATERAL LOW BACK PAIN WITHOUT SCIATICA: ICD-10-CM

## 2019-05-31 DIAGNOSIS — M99.03 SEGMENTAL AND SOMATIC DYSFUNCTION OF LUMBAR REGION: Primary | ICD-10-CM

## 2019-05-31 DIAGNOSIS — M99.02 SEGMENTAL AND SOMATIC DYSFUNCTION OF THORACIC REGION: ICD-10-CM

## 2019-05-31 PROCEDURE — 98940 CHIROPRACT MANJ 1-2 REGIONS: CPT | Mod: AT | Performed by: CHIROPRACTOR

## 2019-05-31 RX ORDER — TRAMADOL HYDROCHLORIDE 50 MG/1
TABLET ORAL
Qty: 120 TABLET | Refills: 0 | Status: SHIPPED | OUTPATIENT
Start: 2019-05-31 | End: 2019-07-01

## 2019-06-27 ENCOUNTER — OFFICE VISIT (OUTPATIENT)
Dept: CHIROPRACTIC MEDICINE | Facility: OTHER | Age: 79
End: 2019-06-27
Attending: CHIROPRACTOR
Payer: COMMERCIAL

## 2019-06-27 DIAGNOSIS — M99.03 SEGMENTAL AND SOMATIC DYSFUNCTION OF LUMBAR REGION: Primary | ICD-10-CM

## 2019-06-27 DIAGNOSIS — M54.50 ACUTE BILATERAL LOW BACK PAIN WITHOUT SCIATICA: ICD-10-CM

## 2019-06-27 DIAGNOSIS — M99.02 SEGMENTAL AND SOMATIC DYSFUNCTION OF THORACIC REGION: ICD-10-CM

## 2019-06-27 PROCEDURE — 98940 CHIROPRACT MANJ 1-2 REGIONS: CPT | Mod: AT | Performed by: CHIROPRACTOR

## 2019-07-01 DIAGNOSIS — S33.8XXD SPRAIN OF OTHER PARTS OF LUMBAR SPINE AND PELVIS, SUBSEQUENT ENCOUNTER: ICD-10-CM

## 2019-07-01 DIAGNOSIS — M41.35 THORACOGENIC SCOLIOSIS OF THORACOLUMBAR REGION: ICD-10-CM

## 2019-07-01 DIAGNOSIS — Z78.0 POST-MENOPAUSE: ICD-10-CM

## 2019-07-02 RX ORDER — TRAMADOL HYDROCHLORIDE 50 MG/1
TABLET ORAL
Qty: 120 TABLET | Refills: 0 | Status: SHIPPED | OUTPATIENT
Start: 2019-07-02 | End: 2019-08-01

## 2019-07-02 RX ORDER — PANTOPRAZOLE SODIUM 40 MG/1
TABLET, DELAYED RELEASE ORAL
Qty: 90 TABLET | Refills: 1 | Status: SHIPPED | OUTPATIENT
Start: 2019-07-02 | End: 2019-12-27

## 2019-07-02 NOTE — TELEPHONE ENCOUNTER
Tramadol      Last Written Prescription Date:  5/31/2019  Last Fill Quantity: 120,   # refills: 0  Last Office Visit: 4/30/2019  Future Office visit:    Next 5 appointments (look out 90 days)    Jul 16, 2019 10:00 AM CDT  (Arrive by 9:45 AM)  PHYSICAL with Virgil Mackay MD  [facility] [address]  [place] [address]  [phone]           Routing refill request to provider for review/approval because:  Drug not on the [facility], [facility] or [facility] refill protocol or controlled substance

## 2019-07-12 ENCOUNTER — OFFICE VISIT (OUTPATIENT)
Dept: CHIROPRACTIC MEDICINE | Facility: OTHER | Age: 79
End: 2019-07-12
Attending: CHIROPRACTOR
Payer: COMMERCIAL

## 2019-07-12 DIAGNOSIS — M99.03 SEGMENTAL AND SOMATIC DYSFUNCTION OF LUMBAR REGION: Primary | ICD-10-CM

## 2019-07-12 DIAGNOSIS — M54.50 ACUTE BILATERAL LOW BACK PAIN WITHOUT SCIATICA: ICD-10-CM

## 2019-07-12 DIAGNOSIS — M99.02 SEGMENTAL AND SOMATIC DYSFUNCTION OF THORACIC REGION: ICD-10-CM

## 2019-07-12 PROCEDURE — 98940 CHIROPRACT MANJ 1-2 REGIONS: CPT | Mod: AT | Performed by: CHIROPRACTOR

## 2019-07-15 ENCOUNTER — TELEPHONE (OUTPATIENT)
Dept: FAMILY MEDICINE | Facility: OTHER | Age: 79
End: 2019-07-15

## 2019-07-15 NOTE — PATIENT INSTRUCTIONS
Preventive Health Recommendations    See your health care provider every year to    Review health changes.     Discuss preventive care.      Review your medicines if your doctor has prescribed any.      You no longer need a yearly Pap test unless you've had an abnormal Pap test in the past 10 years. If you have vaginal symptoms, such as bleeding or discharge, be sure to talk with your provider about a Pap test.      Every 1 to 2 years, have a mammogram.  If you are over 69, talk with your health care provider about whether or not you want to continue having screening mammograms.      Every 10 years, have a colonoscopy. Or, have a yearly FIT test (stool test). These exams will check for colon cancer.       Have a cholesterol test every 5 years, or more often if your doctor advises it.       Have a diabetes test (fasting glucose) every three years. If you are at risk for diabetes, you should have this test more often.       At age 65, have a bone density scan (DEXA) to check for osteoporosis (brittle bone disease).    Shots:    Get a flu shot each year.    Get a tetanus shot every 10 years.    Talk to your doctor about your pneumonia vaccines. There are now two you should receive - Pneumovax (PPSV 23) and Prevnar (PCV 13).    Talk to your pharmacist about the shingles vaccine.    Talk to your doctor about the hepatitis B vaccine.    Nutrition:     Eat at least 5 servings of fruits and vegetables each day.      Eat whole-grain bread, whole-wheat pasta and brown rice instead of white grains and rice.      Get adequate about Calcium and Vitamin D.     Lifestyle    Exercise at least 150 minutes a week (30 minutes a day, 5 days a week). This will help you control your weight and prevent disease.      Limit alcohol to one drink per day.      No smoking.       Wear sunscreen to prevent skin cancer.       See your dentist twice a year for an exam and cleaning.      See your eye doctor every 1 to 2 years to screen for  conditions such as glaucoma, macular degeneration, cataracts, etc.    Personalized Prevention Plan  You are due for the preventive services outlined below.  Your care team is available to assist you in scheduling these services.  If you have already completed any of these items, please share that information with your care team to update in your medical record.    Health Maintenance Due   Topic Date Due     Zoster (Shingles) Vaccine (2 of 3) 12/06/2007     Diptheria Tetanus Pertussis (DTAP/TDAP/TD) Vaccine (1 - Tdap) 09/29/2009     URINE DRUG SCREEN  06/30/2018     Pneumococcal Vaccine (2 of 2 - PPSV23) 09/22/2018     Annual Wellness Visit  08/15/2019     Depression Assessment  08/15/2019

## 2019-07-15 NOTE — TELEPHONE ENCOUNTER
Patient is coming in tomorrow morning to see Dr. Mackay for an annual. She is wondering if she can have orders placed for fasting labs to do prior to her appointment.

## 2019-07-15 NOTE — PROGRESS NOTES
"  SUBJECTIVE:   Demi Narayan is a 79 year old female who presents for Preventive Visit.    Are you in the first 12 months of your Medicare Part B coverage?  No    Physical Health:    In general, how would you rate your overall physical health? good    Outside of work, how many days during the week do you exercise? 6-7 days/week    Outside of work, approximately how many minutes a day do you exercise?less than 15 minutes    If you drink alcohol do you typically have >3 drinks per day or >7 drinks per week? No    Do you usually eat at least 4 servings of fruit and vegetables a day, include whole grains & fiber and avoid regularly eating high fat or \"junk\" foods? Yes    Do you have any problems taking medications regularly?  No    Do you have any side effects from medications? none    Needs assistance for the following daily activities: no assistance needed    Which of the following safety concerns are present in your home?  none identified     Hearing impairment: No    In the past 6 months, have you been bothered by leaking of urine? Yes, if she waits to long to use the restroom     Mental Health:    In general, how would you rate your overall mental or emotional health? excellent  PHQ-2 Score:      Do you feel safe in your environment? Yes    Do you have a Health Care Directive? Yes: Patient states has Advance Directive and will bring in a copy to clinic.    Additional concerns to address?  YES     Fall risk:  Fallen 2 or more times in the past year?: No  Any fall with injury in the past year?: No  click delete button to remove this line now  Cognitive Screenin) Repeat 3 items (Leader, Season, Table)      2) Clock draw:   NORMAL  3) 3 item recall:   Recalls 3 objects  Results: 3 items recalled: COGNITIVE IMPAIRMENT LESS LIKELY    Mini-CogTM Copyright S Thomas. Licensed by the author for use in [facility]; reprinted with permission (lela@.[facility]). All rights reserved.      Do you have sleep " apnea, excessive snoring or daytime drowsiness?: no    Skin lesion      Duration: years    Description (location/character/radiation): right temporal region    Accompanying signs and symptoms: None    History (similar episodes/previous evaluation): None     Danielle notes skin lesion as described above.  There is some asymmetry, and irregular borders. No abnormal pigmentation, or increasing size.  No associated nonhealing areas.      Patient would like to have you look at a spot on the right side of her face  Patient would like to talk about how she can hear her own heart beat in her ears    Reviewed and updated as needed this visit by clinical staff  Tobacco  Allergies  Meds  Med Hx  Surg Hx  Fam Hx  Soc Hx        Reviewed and updated as needed this visit by Provider        Social History     Tobacco Use     Smoking status: Former Smoker     Packs/day: 0.50     Years: 5.00     Pack years: 2.50     Types: Cigarettes     Start date: 1963     Last attempt to quit: 1968     Years since quittin.2     Smokeless tobacco: Never Used   Substance Use Topics     Alcohol use: Yes     Alcohol/week: 0.5 oz     Types: 1 Glasses of wine per week     Comment: daily with dinner                           Current providers sharing in care for this patient include:     Patient Care Team:  Virgil Mackay MD as PCP - General  Virgil Mackay MD as Assigned PCP  Nicole Joseph RN as Registered Nurse    The following health maintenance items are reviewed in Epic and correct as of today:  Health Maintenance   Topic Date Due     ZOSTER IMMUNIZATION (2 of 3) 2007     DTAP/TDAP/TD IMMUNIZATION (1 - Tdap) 2009     URINE DRUG SCREEN  2018     PNEUMOCOCCAL IMMUNIZATION 65+ LOW/MEDIUM RISK (2 of 2 - PPSV23) 2018     MEDICARE ANNUAL WELLNESS VISIT  08/15/2019     PHQ-9  08/15/2019     ADONIS ASSESSMENT  08/15/2019     INFLUENZA VACCINE (1) 2019     MAMMO SCREENING  2020     FALL RISK  ASSESSMENT  04/30/2020     ADVANCE CARE PLANNING  08/05/2021     LIPID  08/15/2023     COLONOSCOPY  07/18/2024     DEXA  Completed     IPV IMMUNIZATION  Aged Out     MENINGITIS IMMUNIZATION  Aged Out     BP Readings from Last 3 Encounters:   07/16/19 122/60   04/30/19 122/73   02/12/19 142/82    Wt Readings from Last 3 Encounters:   07/16/19 53.6 kg (118 lb 3.2 oz)   04/30/19 53.5 kg (118 lb)   02/12/19 50.8 kg (112 lb)                  Patient Active Problem List   Diagnosis     H/O diverticulitis, S/P bowel resection     Dyslipidemia     Fibrocystic breast disease (FCBD)     Low back pain, chronic     GERD (gastroesophageal reflux)     Osteoarthritis, multiple joints     Comprehensive Medical Examination     Post-menopause     Diaphragmatic hernia     ACP (advance care planning)     Scoliosis, throracolumbar     Chronic, continuous use of opioids     Lyme disease     Past Surgical History:   Procedure Laterality Date     ------------OTHER-------------      colonoscopy with biopsy - diverticulitis, hemorrhoids     ------------OTHER-------------  4/19/1994    sigmoidoscopy - abdominal pain, diverticula     ABDOMEN SURGERY      colon removed 2nd to diverticulitis     APPENDECTOMY       ARTHROSCOPY SHOULDER  11/20/2013    Procedure: ARTHROSCOPY SHOULDER;  Right Shoulder Arthroscopy Sub-Acromial Decompression Rotator Cuff Repair;  Surgeon: Lenny Trammell MD;  Location: [facility]     [facility]     Divert[facility]itis      bowel resection     EGD with biopsy  2011     ENDOSCOPY UPPER WITH PANCREATIC STIMULATION       ENDOSCOPY UPPER, COLONOSCOPY, COMBINED  7/18/2014    Procedure: COMBINED ENDOSCOPY UPPER, COLONOSCOPY;  Surgeon: Israel Feliciano MD;  Location: [facility] OR     ENT SURGERY      tonsilectomy     ESOPHAGOSCOPY, GASTROSCOPY, DUODENOSCOPY (EGD), COMBINED N/A 9/27/2017    Procedure: COMBINED ESOPHAGOSCOPY, GASTROSCOPY, DUODENOSCOPY (EGD);  UPPER ENDOSCOPY WITH BIOPSY  AND POLYPECTOMY;  Surgeon: Gallito Alvarado DO;  Location: [facility] OR     GYN SURGERY      hysterectomy with bladder and rectal repair     IR CONSULTATION FOR IR EXAM  3/11/2019     ORTHOPEDIC SURGERY  2013    right rotator cuff surgery     TVH with ovarian preservation         Social History     Tobacco Use     Smoking status: Former Smoker     Packs/day: 0.50     Years: 5.00     Pack years: 2.50     Types: Cigarettes     Start date: 1963     Last attempt to quit: 1968     Years since quittin.2     Smokeless tobacco: Never Used   Substance Use Topics     Alcohol use: Yes     Alcohol/week: 0.5 oz     Types: 1 Glasses of wine per week     Comment: daily with dinner     Family History   Problem Relation Age of Onset     Cerebrovascular Disease Father         CVA     Heart Disease Father         heart disease     Hypertension Father      Myocardial Infarction Father         myocardial infarction     Cerebrovascular Disease Mother         CVA     Diabetes Mother      Heart Disease Mother         heart disease     Hypertension Mother      Heart Disease Brother         heart disease     Hypertension Brother          Current Outpatient Medications   Medication Sig Dispense Refill     aspirin 325 MG tablet Take 325 mg by mouth At Bedtime.       Calcium Carbonate-Vitamin D (CALCIUM + D PO) Take 600 mg by mouth.       estradiol (ESTRACE) 0.5 MG tablet Take 1 Tab by mouth one time a day. 90 tablet 1     GLUCOSAMINE SULFATE PO Take  by mouth daily.       Multiple Vitamins-Minerals (MULTIVITAL PO) Take 1 tablet by mouth daily.       niacin 500 MG tablet Take 1 tablet by mouth daily.       Omega-3 Fatty Acids (OMEGA-3 FISH OIL PO) Take  by mouth daily.       pantoprazole (PROTONIX) 40 MG EC tablet TAKE 1 TABLET BY MOUTH IN THE MORNING BEFORE  BREAKFAST 90 tablet 1     traMADol (ULTRAM) 50 MG tablet TAKE 1 TO 2 TABLETS BY MOUTH EVERY 6 TO 8 HOURS AS NEEDED 120 tablet 0     No Known  "Allergies      ROS:  Constitutional, HEENT, cardiovascular, pulmonary, gi and gu systems are negative, except as otherwise noted.    OBJECTIVE:   /60 (BP Location: Right arm, Patient Position: Chair, Cuff Size: Adult Regular)   Pulse 86   Temp 98.6  F (37  C) (Tympanic)   Ht 1.461 m (4' 9.5\")   Wt 53.6 kg (118 lb 3.2 oz)   SpO2 98%   BMI 25.14 kg/m   Estimated body mass index is 25.14 kg/m  as calculated from the following:    Height as of this encounter: 1.461 m (4' 9.5\").    Weight as of this encounter: 53.6 kg (118 lb 3.2 oz).  EXAM:   GENERAL APPEARANCE: healthy, alert and no distress  EYES: Eyes grossly normal to inspection, PERRL and conjunctivae and sclerae normal  HENT: ear canals and TM's normal, nose and mouth without ulcers or lesions, oropharynx clear and oral mucous membranes moist  NECK: no adenopathy, no asymmetry, masses, or scars and thyroid normal to palpation  RESP: lungs clear to auscultation - no rales, rhonchi or wheezes  BREAST: normal without masses, tenderness or nipple discharge and no palpable axillary masses or adenopathy  CV: regular rate and rhythm, normal S1 S2, no S3 or S4, no murmur, click or rub, no peripheral edema and peripheral pulses strong  ABDOMEN: soft, nontender, no hepatosplenomegaly, no masses and bowel sounds normal  MS: no musculoskeletal defects are noted and gait is age appropriate without ataxia  SKIN: No rashes.  There is a raised lesion with irregular borders noted right temporal region.  NEURO: Normal strength and tone, sensory exam grossly normal, mentation intact and speech normal  PSYCH: mentation appears normal and affect normal/bright    Diagnostic Test Results:  Labs reviewed in Epic  Results for orders placed or performed in visit on 07/16/19   CBC with platelets differential   Result Value Ref Range    WBC 5.7 4.0 - 11.0 10e9/L    RBC Count 4.95 3.8 - 5.2 10e12/L    Hemoglobin 14.6 11.7 - 15.7 g/dL    Hematocrit 43.5 35.0 - 47.0 %    MCV 88 78 - " 100 fl    MCH 29.5 26.5 - 33.0 pg    MCHC 33.6 31.5 - 36.5 g/dL    RDW 13.1 10.0 - 15.0 %    Platelet Count 291 150 - 450 10e9/L    Diff Method Automated Method     % Neutrophils 39.5 %    % Lymphocytes 51.5 %    % Monocytes 7.3 %    % Eosinophils 1.0 %    % Basophils 0.5 %    % Immature Granulocytes 0.2 %    Nucleated RBCs 0 0 /100    Absolute Neutrophil 2.3 1.6 - 8.3 10e9/L    Absolute Lymphocytes 3.0 0.8 - 5.3 10e9/L    Absolute Monocytes 0.4 0.0 - 1.3 10e9/L    Absolute Eosinophils 0.1 0.0 - 0.7 10e9/L    Absolute Basophils 0.0 0.0 - 0.2 10e9/L    Abs Immature Granulocytes 0.0 0 - 0.4 10e9/L    Absolute Nucleated RBC 0.0    Comprehensive metabolic panel   Result Value Ref Range    Sodium 139 133 - 144 mmol/L    Potassium 4.0 3.4 - 5.3 mmol/L    Chloride 106 94 - 109 mmol/L    Carbon Dioxide 28 20 - 32 mmol/L    Anion Gap 5 3 - 14 mmol/L    Glucose 85 70 - 99 mg/dL    Urea Nitrogen 15 7 - 30 mg/dL    Creatinine 0.77 0.52 - 1.04 mg/dL    GFR Estimate 73 >60 mL/min/[1.73_m2]    GFR Estimate If Black 85 >60 mL/min/[1.73_m2]    Calcium 8.9 8.5 - 10.1 mg/dL    Bilirubin Total 0.5 0.2 - 1.3 mg/dL    Albumin 3.9 3.4 - 5.0 g/dL    Protein Total 7.3 6.8 - 8.8 g/dL    Alkaline Phosphatase 50 40 - 150 U/L    ALT 26 0 - 50 U/L    AST 22 0 - 45 U/L   Lipid Profile   Result Value Ref Range    Cholesterol 254 (H) <200 mg/dL    Triglycerides 152 (H) <150 mg/dL    HDL Cholesterol 74 >49 mg/dL    LDL Cholesterol Calculated 150 (H) <100 mg/dL    Non HDL Cholesterol 180 (H) <130 mg/dL   TSH with free T4 reflex   Result Value Ref Range    TSH 1.46 0.40 - 4.00 mU/L       ASSESSMENT / PLAN:   1. Routine general medical examination at a health care facility  General medical exam completed.  Breast exam performed.  Pap and pelvic exam deferred. Mammogram recommended. Screening labs drawn.  Colonoscopy and immunizations reviewed.  Discussed the importance of monthly breast self exam, aspirin use, and osteoporosis prevention . Follow up 1  "year.      2. Dyslipidemia  Fasting labs reviewed.  Goals discussed.  Discussed the importance of diet, exercise, and weight reduction.  Follow up 12 months.   - CBC with platelets differential  - Comprehensive metabolic panel  - Lipid Profile  - TSH with free T4 reflex    3. GERD (gastroesophageal reflux)  Stable    4. Primary osteoarthritis involving multiple joints  Stable    5. Low back pain, chronic  Stable    6. Skin lesion  Suspect actinic keratosis.  Freeze and thaw for 3 cycles.  Follow up with persistent lesion.      End of Life Planning:  Patient currently has an advanced directive: No.  I have verified the patient's ablity to prepare an advanced directive/make health care decisions.  Literature was provided to assist patient in preparing an advanced directive.    COUNSELING:  Reviewed preventive health counseling, as reflected in patient instructions  Special attention given to:       Regular exercise       Healthy diet/nutrition    Estimated body mass index is 25.14 kg/m  as calculated from the following:    Height as of this encounter: 1.461 m (4' 9.5\").    Weight as of this encounter: 53.6 kg (118 lb 3.2 oz).         reports that she quit smoking about 51 years ago. Her smoking use included cigarettes. She started smoking about 56 years ago. She has a 2.50 pack-year smoking history. She has never used smokeless tobacco.      Appropriate preventive services were discussed with this patient, including applicable screening as appropriate for cardiovascular disease, diabetes, osteopenia/osteoporosis, and glaucoma.  As appropriate for age/gender, discussed screening for colorectal cancer, prostate cancer, breast cancer, and cervical cancer. Checklist reviewing preventive services available has been given to the patient.    Reviewed patients plan of care and provided an AVS. The Basic Care Plan (routine screening as documented in Health Maintenance) for Demi meets the Care Plan requirement. This Care " Plan has been established and reviewed with the Patient.    Counseling Resources:  ATP IV Guidelines  Pooled Cohorts Equation Calculator  Breast Cancer Risk Calculator  FRAX Risk Assessment  ICSI Preventive Guidelines  Dietary Guidelines for Americans, 2010  USDA's MyPlate  ASA Prophylaxis  Lung CA Screening    Virgil Mackay MD  [facility] - [facility]

## 2019-07-16 ENCOUNTER — OFFICE VISIT (OUTPATIENT)
Dept: FAMILY MEDICINE | Facility: OTHER | Age: 79
End: 2019-07-16
Attending: FAMILY MEDICINE
Payer: COMMERCIAL

## 2019-07-16 ENCOUNTER — APPOINTMENT (OUTPATIENT)
Dept: LAB | Facility: OTHER | Age: 79
End: 2019-07-16
Attending: FAMILY MEDICINE
Payer: COMMERCIAL

## 2019-07-16 VITALS
OXYGEN SATURATION: 98 % | DIASTOLIC BLOOD PRESSURE: 60 MMHG | WEIGHT: 118.2 LBS | HEIGHT: 58 IN | TEMPERATURE: 98.6 F | HEART RATE: 86 BPM | BODY MASS INDEX: 24.81 KG/M2 | SYSTOLIC BLOOD PRESSURE: 122 MMHG

## 2019-07-16 DIAGNOSIS — K21.9 GASTROESOPHAGEAL REFLUX DISEASE WITHOUT ESOPHAGITIS: ICD-10-CM

## 2019-07-16 DIAGNOSIS — E78.2 MIXED HYPERLIPIDEMIA: ICD-10-CM

## 2019-07-16 DIAGNOSIS — M15.0 PRIMARY OSTEOARTHRITIS INVOLVING MULTIPLE JOINTS: ICD-10-CM

## 2019-07-16 DIAGNOSIS — L98.9 SKIN LESION: ICD-10-CM

## 2019-07-16 DIAGNOSIS — M54.50 CHRONIC BILATERAL LOW BACK PAIN WITHOUT SCIATICA: ICD-10-CM

## 2019-07-16 DIAGNOSIS — G89.29 CHRONIC BILATERAL LOW BACK PAIN WITHOUT SCIATICA: ICD-10-CM

## 2019-07-16 DIAGNOSIS — Z00.00 ROUTINE GENERAL MEDICAL EXAMINATION AT A HEALTH CARE FACILITY: Primary | ICD-10-CM

## 2019-07-16 LAB
ALBUMIN SERPL-MCNC: 3.9 G/DL (ref 3.4–5)
ALP SERPL-CCNC: 50 U/L (ref 40–150)
ALT SERPL W P-5'-P-CCNC: 26 U/L (ref 0–50)
ANION GAP SERPL CALCULATED.3IONS-SCNC: 5 MMOL/L (ref 3–14)
AST SERPL W P-5'-P-CCNC: 22 U/L (ref 0–45)
BASOPHILS # BLD AUTO: 0 10E9/L (ref 0–0.2)
BASOPHILS NFR BLD AUTO: 0.5 %
BILIRUB SERPL-MCNC: 0.5 MG/DL (ref 0.2–1.3)
BUN SERPL-MCNC: 15 MG/DL (ref 7–30)
CALCIUM SERPL-MCNC: 8.9 MG/DL (ref 8.5–10.1)
CHLORIDE SERPL-SCNC: 106 MMOL/L (ref 94–109)
CHOLEST SERPL-MCNC: 254 MG/DL
CO2 SERPL-SCNC: 28 MMOL/L (ref 20–32)
CREAT SERPL-MCNC: 0.77 MG/DL (ref 0.52–1.04)
DIFFERENTIAL METHOD BLD: NORMAL
EOSINOPHIL # BLD AUTO: 0.1 10E9/L (ref 0–0.7)
EOSINOPHIL NFR BLD AUTO: 1 %
ERYTHROCYTE [DISTWIDTH] IN BLOOD BY AUTOMATED COUNT: 13.1 % (ref 10–15)
GFR SERPL CREATININE-BSD FRML MDRD: 73 ML/MIN/{1.73_M2}
GLUCOSE SERPL-MCNC: 85 MG/DL (ref 70–99)
HCT VFR BLD AUTO: 43.5 % (ref 35–47)
HDLC SERPL-MCNC: 74 MG/DL
HGB BLD-MCNC: 14.6 G/DL (ref 11.7–15.7)
IMM GRANULOCYTES # BLD: 0 10E9/L (ref 0–0.4)
IMM GRANULOCYTES NFR BLD: 0.2 %
LDLC SERPL CALC-MCNC: 150 MG/DL
LYMPHOCYTES # BLD AUTO: 3 10E9/L (ref 0.8–5.3)
LYMPHOCYTES NFR BLD AUTO: 51.5 %
MCH RBC QN AUTO: 29.5 PG (ref 26.5–33)
MCHC RBC AUTO-ENTMCNC: 33.6 G/DL (ref 31.5–36.5)
MCV RBC AUTO: 88 FL (ref 78–100)
MONOCYTES # BLD AUTO: 0.4 10E9/L (ref 0–1.3)
MONOCYTES NFR BLD AUTO: 7.3 %
NEUTROPHILS # BLD AUTO: 2.3 10E9/L (ref 1.6–8.3)
NEUTROPHILS NFR BLD AUTO: 39.5 %
NONHDLC SERPL-MCNC: 180 MG/DL
NRBC # BLD AUTO: 0 10*3/UL
NRBC BLD AUTO-RTO: 0 /100
PLATELET # BLD AUTO: 291 10E9/L (ref 150–450)
POTASSIUM SERPL-SCNC: 4 MMOL/L (ref 3.4–5.3)
PROT SERPL-MCNC: 7.3 G/DL (ref 6.8–8.8)
RBC # BLD AUTO: 4.95 10E12/L (ref 3.8–5.2)
SODIUM SERPL-SCNC: 139 MMOL/L (ref 133–144)
TRIGL SERPL-MCNC: 152 MG/DL
TSH SERPL DL<=0.005 MIU/L-ACNC: 1.46 MU/L (ref 0.4–4)
WBC # BLD AUTO: 5.7 10E9/L (ref 4–11)

## 2019-07-16 PROCEDURE — 80053 COMPREHEN METABOLIC PANEL: CPT | Mod: ZL | Performed by: FAMILY MEDICINE

## 2019-07-16 PROCEDURE — 84443 ASSAY THYROID STIM HORMONE: CPT | Mod: ZL | Performed by: FAMILY MEDICINE

## 2019-07-16 PROCEDURE — 36415 COLL VENOUS BLD VENIPUNCTURE: CPT | Mod: ZL | Performed by: FAMILY MEDICINE

## 2019-07-16 PROCEDURE — 99397 PER PM REEVAL EST PAT 65+ YR: CPT | Mod: 25 | Performed by: FAMILY MEDICINE

## 2019-07-16 PROCEDURE — 85025 COMPLETE CBC W/AUTO DIFF WBC: CPT | Mod: ZL | Performed by: FAMILY MEDICINE

## 2019-07-16 PROCEDURE — 80061 LIPID PANEL: CPT | Mod: ZL | Performed by: FAMILY MEDICINE

## 2019-07-16 PROCEDURE — 17110 DESTRUCTION B9 LES UP TO 14: CPT | Performed by: FAMILY MEDICINE

## 2019-07-16 PROCEDURE — 17110 DESTRUCTION B9 LES UP TO 14: CPT

## 2019-07-16 ASSESSMENT — MIFFLIN-ST. JEOR: SCORE: 887.96

## 2019-07-16 ASSESSMENT — PAIN SCALES - GENERAL: PAINLEVEL: MILD PAIN (3)

## 2019-07-16 ASSESSMENT — ANXIETY QUESTIONNAIRES
3. WORRYING TOO MUCH ABOUT DIFFERENT THINGS: NOT AT ALL
7. FEELING AFRAID AS IF SOMETHING AWFUL MIGHT HAPPEN: NOT AT ALL
6. BECOMING EASILY ANNOYED OR IRRITABLE: NOT AT ALL
5. BEING SO RESTLESS THAT IT IS HARD TO SIT STILL: NOT AT ALL
1. FEELING NERVOUS, ANXIOUS, OR ON EDGE: NOT AT ALL
2. NOT BEING ABLE TO STOP OR CONTROL WORRYING: NOT AT ALL
4. TROUBLE RELAXING: NOT AT ALL
GAD7 TOTAL SCORE: 0

## 2019-07-16 ASSESSMENT — PATIENT HEALTH QUESTIONNAIRE - PHQ9: SUM OF ALL RESPONSES TO PHQ QUESTIONS 1-9: 0

## 2019-07-16 NOTE — NURSING NOTE
"Chief Complaint   Patient presents with     Physical       Initial /60 (BP Location: Right arm, Patient Position: Chair, Cuff Size: Adult Regular)   Pulse 86   Temp 98.6  F (37  C) (Tympanic)   Ht 1.461 m (4' 9.5\")   Wt 53.6 kg (118 lb 3.2 oz)   SpO2 98%   BMI 25.14 kg/m   Estimated body mass index is 25.14 kg/m  as calculated from the following:    Height as of this encounter: 1.461 m (4' 9.5\").    Weight as of this encounter: 53.6 kg (118 lb 3.2 oz).  Medication Reconciliation: complete     Anselmo Rice LPN    "

## 2019-07-16 NOTE — LETTER
July 18, 2019      Demi Narayan  [address] [facility] [address]        Dear ,    We are writing to inform you of your test results.        Resulted Orders   CBC with platelets differential   Result Value Ref Range    WBC 5.7 4.0 - 11.0 10e9/L    RBC Count 4.95 3.8 - 5.2 10e12/L    Hemoglobin 14.6 11.7 - 15.7 g/dL    Hematocrit 43.5 35.0 - 47.0 %    MCV 88 78 - 100 fl    MCH 29.5 26.5 - 33.0 pg    MCHC 33.6 31.5 - 36.5 g/dL    RDW 13.1 10.0 - 15.0 %    Platelet Count 291 150 - 450 10e9/L    Diff Method Automated Method     % Neutrophils 39.5 %    % Lymphocytes 51.5 %    % Monocytes 7.3 %    % Eosinophils 1.0 %    % Basophils 0.5 %    % Immature Granulocytes 0.2 %    Nucleated RBCs 0 0 /100    Absolute Neutrophil 2.3 1.6 - 8.3 10e9/L    Absolute Lymphocytes 3.0 0.8 - 5.3 10e9/L    Absolute Monocytes 0.4 0.0 - 1.3 10e9/L    Absolute Eosinophils 0.1 0.0 - 0.7 10e9/L    Absolute Basophils 0.0 0.0 - 0.2 10e9/L    Abs Immature Granulocytes 0.0 0 - 0.4 10e9/L    Absolute Nucleated RBC 0.0    Comprehensive metabolic panel   Result Value Ref Range    Sodium 139 133 - 144 mmol/L    Potassium 4.0 3.4 - 5.3 mmol/L    Chloride 106 94 - 109 mmol/L    Carbon Dioxide 28 20 - 32 mmol/L    Anion Gap 5 3 - 14 mmol/L    Glucose 85 70 - 99 mg/dL      Comment:      Fasting specimen    Urea Nitrogen 15 7 - 30 mg/dL    Creatinine 0.77 0.52 - 1.04 mg/dL    GFR Estimate 73 >60 mL/min/[1.73_m2]      Comment:      Non  GFR Calc  Starting 12/18/2018, serum creatinine based estimated GFR (eGFR) will be   calculated using the Chronic Kidney Disease Epidemiology Collaboration   (CKD-EPI) equation.      GFR Estimate If Black 85 >60 mL/min/[1.73_m2]      Comment:       GFR Calc  Starting 12/18/2018, serum creatinine based estimated GFR (eGFR) will be   calculated using the Chronic Kidney Disease Epidemiology Collaboration   (CKD-EPI) equation.      Calcium 8.9 8.5 - 10.1 mg/dL    Bilirubin Total 0.5 0.2  - 1.3 mg/dL    Albumin 3.9 3.4 - 5.0 g/dL    Protein Total 7.3 6.8 - 8.8 g/dL    Alkaline Phosphatase 50 40 - 150 U/L    ALT 26 0 - 50 U/L    AST 22 0 - 45 U/L   Lipid Profile   Result Value Ref Range    Cholesterol 254 (H) <200 mg/dL      Comment:      Desirable:       <200 mg/dl    Triglycerides 152 (H) <150 mg/dL      Comment:      Borderline high:  150-199 mg/dl  High:             200-499 mg/dl  Very high:       >499 mg/dl  Fasting specimen      HDL Cholesterol 74 >49 mg/dL    LDL Cholesterol Calculated 150 (H) <100 mg/dL      Comment:      Above desirable:  100-129 mg/dl  Borderline High:  130-159 mg/dL  High:             160-189 mg/dL  Very high:       >189 mg/dl      Non HDL Cholesterol 180 (H) <130 mg/dL      Comment:      Above Desirable:  130-159 mg/dl  Borderline high:  160-189 mg/dl  High:             190-219 mg/dl  Very high:       >219 mg/dl     TSH with free T4 reflex   Result Value Ref Range    TSH 1.46 0.40 - 4.00 mU/L       If you have any questions or concerns, please call the clinic at the number listed above.       Sincerely,        Virgil Mackay MD

## 2019-07-17 ASSESSMENT — ANXIETY QUESTIONNAIRES: GAD7 TOTAL SCORE: 0

## 2019-08-01 DIAGNOSIS — S33.8XXD SPRAIN OF OTHER PARTS OF LUMBAR SPINE AND PELVIS, SUBSEQUENT ENCOUNTER: ICD-10-CM

## 2019-08-01 RX ORDER — TRAMADOL HYDROCHLORIDE 50 MG/1
TABLET ORAL
Qty: 120 TABLET | Refills: 0 | Status: SHIPPED | OUTPATIENT
Start: 2019-08-01 | End: 2019-09-05

## 2019-08-01 NOTE — TELEPHONE ENCOUNTER
traMADol (ULTRAM) 50 MG tablet      Last Written Prescription Date:  7-2-19  Last Fill Quantity: 120,   # refills: 0  Last Office Visit: 7-16-19  Future Office visit:    Next 5 appointments (look out 90 days)    Aug 02, 2019  9:40 AM CDT  Return Visit with Curt Encarnacion DC  [facility] ([facility]) [address]  [place] [facility] [address]  [phone]           Routing refill request to provider for review/approval because:  Drug not on the [facility], U[facility] or  Health refill protocol or controlled substance

## 2019-08-02 ENCOUNTER — OFFICE VISIT (OUTPATIENT)
Dept: CHIROPRACTIC MEDICINE | Facility: OTHER | Age: 79
End: 2019-08-02
Attending: CHIROPRACTOR
Payer: COMMERCIAL

## 2019-08-02 DIAGNOSIS — M54.50 ACUTE BILATERAL LOW BACK PAIN WITHOUT SCIATICA: ICD-10-CM

## 2019-08-02 DIAGNOSIS — M99.02 SEGMENTAL AND SOMATIC DYSFUNCTION OF THORACIC REGION: ICD-10-CM

## 2019-08-02 DIAGNOSIS — M99.03 SEGMENTAL AND SOMATIC DYSFUNCTION OF LUMBAR REGION: Primary | ICD-10-CM

## 2019-08-02 PROCEDURE — 98940 CHIROPRACT MANJ 1-2 REGIONS: CPT | Mod: AT | Performed by: CHIROPRACTOR

## 2019-08-23 ENCOUNTER — OFFICE VISIT (OUTPATIENT)
Dept: CHIROPRACTIC MEDICINE | Facility: OTHER | Age: 79
End: 2019-08-23
Attending: CHIROPRACTOR
Payer: COMMERCIAL

## 2019-08-23 DIAGNOSIS — M99.02 SEGMENTAL AND SOMATIC DYSFUNCTION OF THORACIC REGION: ICD-10-CM

## 2019-08-23 DIAGNOSIS — M99.03 SEGMENTAL AND SOMATIC DYSFUNCTION OF LUMBAR REGION: Primary | ICD-10-CM

## 2019-08-23 DIAGNOSIS — M54.50 ACUTE BILATERAL LOW BACK PAIN WITHOUT SCIATICA: ICD-10-CM

## 2019-08-23 PROCEDURE — 98940 CHIROPRACT MANJ 1-2 REGIONS: CPT | Mod: AT | Performed by: CHIROPRACTOR

## 2019-09-05 DIAGNOSIS — S33.8XXD SPRAIN OF OTHER PARTS OF LUMBAR SPINE AND PELVIS, SUBSEQUENT ENCOUNTER: ICD-10-CM

## 2019-09-06 NOTE — TELEPHONE ENCOUNTER
Tramadol 50mg      Last Written Prescription Date:  8/1/2019  Last Fill Quantity: 120,   # refills: 0  Last Office Visit: 7/16/2019  Future Office visit:       Routing refill request to provider for review/approval because:  Drug not on the [facility], P or [facility] refill protocol or controlled substance

## 2019-09-09 RX ORDER — TRAMADOL HYDROCHLORIDE 50 MG/1
TABLET ORAL
Qty: 120 TABLET | Refills: 0 | Status: SHIPPED | OUTPATIENT
Start: 2019-09-09 | End: 2019-10-07

## 2019-09-10 ENCOUNTER — OFFICE VISIT (OUTPATIENT)
Dept: CHIROPRACTIC MEDICINE | Facility: OTHER | Age: 79
End: 2019-09-10
Attending: CHIROPRACTOR
Payer: COMMERCIAL

## 2019-09-10 DIAGNOSIS — M99.02 SEGMENTAL AND SOMATIC DYSFUNCTION OF THORACIC REGION: ICD-10-CM

## 2019-09-10 DIAGNOSIS — M99.03 SEGMENTAL AND SOMATIC DYSFUNCTION OF LUMBAR REGION: Primary | ICD-10-CM

## 2019-09-10 DIAGNOSIS — M54.50 ACUTE BILATERAL LOW BACK PAIN WITHOUT SCIATICA: ICD-10-CM

## 2019-09-10 PROCEDURE — 98940 CHIROPRACT MANJ 1-2 REGIONS: CPT | Mod: AT | Performed by: CHIROPRACTOR

## 2019-10-07 DIAGNOSIS — S33.8XXD SPRAIN OF OTHER PARTS OF LUMBAR SPINE AND PELVIS, SUBSEQUENT ENCOUNTER: ICD-10-CM

## 2019-10-08 RX ORDER — TRAMADOL HYDROCHLORIDE 50 MG/1
TABLET ORAL
Qty: 120 TABLET | Refills: 0 | Status: SHIPPED | OUTPATIENT
Start: 2019-10-08 | End: 2019-11-06

## 2019-10-08 NOTE — TELEPHONE ENCOUNTER
Tramadol      Last Written Prescription Date:  9/9/19  Last Fill Quantity: 120,   # refills: 0  Last Office Visit: 7/16/19  Future Office visit:       Routing refill request to provider for review/approval because:  Drug not on the [facility], P or [facility] refill protocol or controlled substance    Chronic, continuous use of opioids   Problem Detail     Noted:  7/3/2017   Priority:  Medium   Overview Addendum 7/31/2018  9:50 AM by Nicole Joseph RN   Patient is followed by Virgil Mcakay MD for ongoing prescription of pain medication.  All refills should only be approved by this provider, or covering partner.     Medication(s): Ultram 50mg.   Maximum quantity per month: #120  Clinic visit frequency required: Q 3 months      Controlled substance agreement:  Encounter-Level CSA - 06/30/2017:            Controlled Substance Agreement - Scan on 7/5/2017  4:11 PM : CONTROLLED SUBSTANCE AGREEMENT (below)                   Pain Clinic evaluation in the past: No     DIRE Total Score(s):  No flowsheet data found.     Last [facility] website verification:  Done on 7.31.18   [email or web address]

## 2019-10-09 ENCOUNTER — OFFICE VISIT (OUTPATIENT)
Dept: CHIROPRACTIC MEDICINE | Facility: OTHER | Age: 79
End: 2019-10-09
Attending: CHIROPRACTOR
Payer: COMMERCIAL

## 2019-10-09 DIAGNOSIS — Z79.890 HORMONE REPLACEMENT THERAPY (HRT): ICD-10-CM

## 2019-10-09 DIAGNOSIS — M54.50 ACUTE BILATERAL LOW BACK PAIN WITHOUT SCIATICA: ICD-10-CM

## 2019-10-09 DIAGNOSIS — M99.02 SEGMENTAL AND SOMATIC DYSFUNCTION OF THORACIC REGION: ICD-10-CM

## 2019-10-09 DIAGNOSIS — M99.03 SEGMENTAL AND SOMATIC DYSFUNCTION OF LUMBAR REGION: Primary | ICD-10-CM

## 2019-10-09 PROCEDURE — 98940 CHIROPRACT MANJ 1-2 REGIONS: CPT | Mod: AT | Performed by: CHIROPRACTOR

## 2019-10-09 RX ORDER — ESTRADIOL 0.5 MG/1
TABLET ORAL
Qty: 90 TABLET | Refills: 1 | Status: SHIPPED | OUTPATIENT
Start: 2019-10-09 | End: 2020-03-19

## 2019-10-09 NOTE — TELEPHONE ENCOUNTER
Estrace      Last Written Prescription Date:  4-8-19  Last Fill Quantity: 90,   # refills: 1  Last Office Visit: 4-30-19  Future Office visit:    Next 5 appointments (look out 90 days)    Oct 09, 2019  1:40 PM CDT  Return Visit with Curt Encarnacion DC  [facility] ([facility]) [address] [place] [id number]  [phone]           Routing refill request to provider for review/approval because:          Patient states she is out took her last one today and wants filled ASAP

## 2019-10-14 ENCOUNTER — TELEPHONE (OUTPATIENT)
Dept: FAMILY MEDICINE | Facility: OTHER | Age: 79
End: 2019-10-14

## 2019-10-14 NOTE — TELEPHONE ENCOUNTER
Received PA request from [facility] for Estradiol 0.5MG tablet. Submitted as urgent request through [facility]. Waiting for response.

## 2019-10-14 NOTE — TELEPHONE ENCOUNTER
APPROVAL received from [facility] for Estradiol. Approval dates 9/14/19 through 10/13/20. Pharmacy advised. Documentation scanned to Epic.

## 2019-11-04 ENCOUNTER — OFFICE VISIT (OUTPATIENT)
Dept: CHIROPRACTIC MEDICINE | Facility: OTHER | Age: 79
End: 2019-11-04
Attending: CHIROPRACTOR
Payer: COMMERCIAL

## 2019-11-04 DIAGNOSIS — M99.03 SEGMENTAL AND SOMATIC DYSFUNCTION OF LUMBAR REGION: Primary | ICD-10-CM

## 2019-11-04 DIAGNOSIS — M54.50 ACUTE BILATERAL LOW BACK PAIN WITHOUT SCIATICA: ICD-10-CM

## 2019-11-04 DIAGNOSIS — M99.02 SEGMENTAL AND SOMATIC DYSFUNCTION OF THORACIC REGION: ICD-10-CM

## 2019-11-04 PROCEDURE — 98940 CHIROPRACT MANJ 1-2 REGIONS: CPT | Mod: AT | Performed by: CHIROPRACTOR

## 2019-11-06 DIAGNOSIS — S33.8XXD SPRAIN OF OTHER PARTS OF LUMBAR SPINE AND PELVIS, SUBSEQUENT ENCOUNTER: ICD-10-CM

## 2019-11-07 NOTE — TELEPHONE ENCOUNTER
traMADol (ULTRAM) 50 MG tablet  Last visit date with prescribing provider: 7-  Last refill date: 10-8-2019  Quantity: 120, Refills: 0    Fariba Padgett LPN

## 2019-11-08 RX ORDER — TRAMADOL HYDROCHLORIDE 50 MG/1
TABLET ORAL
Qty: 120 TABLET | Refills: 0 | Status: SHIPPED | OUTPATIENT
Start: 2019-11-08 | End: 2019-12-07

## 2019-11-22 ENCOUNTER — OFFICE VISIT (OUTPATIENT)
Dept: CHIROPRACTIC MEDICINE | Facility: OTHER | Age: 79
End: 2019-11-22
Attending: CHIROPRACTOR
Payer: COMMERCIAL

## 2019-11-22 DIAGNOSIS — M99.02 SEGMENTAL AND SOMATIC DYSFUNCTION OF THORACIC REGION: ICD-10-CM

## 2019-11-22 DIAGNOSIS — M99.03 SEGMENTAL AND SOMATIC DYSFUNCTION OF LUMBAR REGION: Primary | ICD-10-CM

## 2019-11-22 DIAGNOSIS — M54.50 ACUTE BILATERAL LOW BACK PAIN WITHOUT SCIATICA: ICD-10-CM

## 2019-11-22 PROCEDURE — 98940 CHIROPRACT MANJ 1-2 REGIONS: CPT | Mod: AT | Performed by: CHIROPRACTOR

## 2019-12-07 DIAGNOSIS — S33.8XXD SPRAIN OF OTHER PARTS OF LUMBAR SPINE AND PELVIS, SUBSEQUENT ENCOUNTER: ICD-10-CM

## 2019-12-10 RX ORDER — TRAMADOL HYDROCHLORIDE 50 MG/1
TABLET ORAL
Qty: 120 TABLET | Refills: 0 | Status: SHIPPED | OUTPATIENT
Start: 2019-12-10 | End: 2020-01-07

## 2019-12-13 ENCOUNTER — OFFICE VISIT (OUTPATIENT)
Dept: CHIROPRACTIC MEDICINE | Facility: OTHER | Age: 79
End: 2019-12-13
Attending: CHIROPRACTOR
Payer: COMMERCIAL

## 2019-12-13 DIAGNOSIS — M99.02 SEGMENTAL AND SOMATIC DYSFUNCTION OF THORACIC REGION: ICD-10-CM

## 2019-12-13 DIAGNOSIS — M54.50 ACUTE BILATERAL LOW BACK PAIN WITHOUT SCIATICA: ICD-10-CM

## 2019-12-13 DIAGNOSIS — M99.03 SEGMENTAL AND SOMATIC DYSFUNCTION OF LUMBAR REGION: Primary | ICD-10-CM

## 2019-12-13 PROCEDURE — 98940 CHIROPRACT MANJ 1-2 REGIONS: CPT | Mod: AT | Performed by: CHIROPRACTOR

## 2019-12-26 DIAGNOSIS — Z78.0 POST-MENOPAUSE: ICD-10-CM

## 2019-12-26 DIAGNOSIS — M41.35 THORACOGENIC SCOLIOSIS OF THORACOLUMBAR REGION: ICD-10-CM

## 2019-12-27 RX ORDER — PANTOPRAZOLE SODIUM 40 MG/1
TABLET, DELAYED RELEASE ORAL
Qty: 90 TABLET | Refills: 1 | Status: SHIPPED | OUTPATIENT
Start: 2019-12-27 | End: 2020-07-01

## 2019-12-27 NOTE — TELEPHONE ENCOUNTER
Protonix      Last Written Prescription Date:  7/2/19  Last Fill Quantity: 90,   # refills: 1  Last Office Visit: 7/16/19  Future Office visit:       Routing refill request to provider for review/approval because:

## 2020-01-03 ENCOUNTER — OFFICE VISIT (OUTPATIENT)
Dept: CHIROPRACTIC MEDICINE | Facility: OTHER | Age: 80
End: 2020-01-03
Attending: CHIROPRACTOR
Payer: COMMERCIAL

## 2020-01-03 DIAGNOSIS — M99.02 SEGMENTAL AND SOMATIC DYSFUNCTION OF THORACIC REGION: ICD-10-CM

## 2020-01-03 DIAGNOSIS — M99.03 SEGMENTAL AND SOMATIC DYSFUNCTION OF LUMBAR REGION: Primary | ICD-10-CM

## 2020-01-03 DIAGNOSIS — M54.50 ACUTE BILATERAL LOW BACK PAIN WITHOUT SCIATICA: ICD-10-CM

## 2020-01-03 PROCEDURE — 98940 CHIROPRACT MANJ 1-2 REGIONS: CPT | Mod: AT | Performed by: CHIROPRACTOR

## 2020-01-05 DIAGNOSIS — S33.8XXD SPRAIN OF OTHER PARTS OF LUMBAR SPINE AND PELVIS, SUBSEQUENT ENCOUNTER: ICD-10-CM

## 2020-01-07 RX ORDER — TRAMADOL HYDROCHLORIDE 50 MG/1
TABLET ORAL
Qty: 120 TABLET | Refills: 0 | Status: SHIPPED | OUTPATIENT
Start: 2020-01-07 | End: 2020-02-05

## 2020-01-07 NOTE — TELEPHONE ENCOUNTER
traMADol (ULTRAM) 50 MG tablet      Last Written Prescription Date:  12/10/19  Last Fill Quantity: 120,   # refills: 0  Last Office Visit: 7/16/19  Future Office visit:       Routing refill request to provider for review/approval because:  Drug not on the [facility], [facility] or [facility] refill protocol or controlled substance

## 2020-01-27 ENCOUNTER — OFFICE VISIT (OUTPATIENT)
Dept: CHIROPRACTIC MEDICINE | Facility: OTHER | Age: 80
End: 2020-01-27
Attending: CHIROPRACTOR
Payer: COMMERCIAL

## 2020-01-27 DIAGNOSIS — M99.03 SEGMENTAL AND SOMATIC DYSFUNCTION OF LUMBAR REGION: Primary | ICD-10-CM

## 2020-01-27 DIAGNOSIS — M54.50 ACUTE BILATERAL LOW BACK PAIN WITHOUT SCIATICA: ICD-10-CM

## 2020-01-27 DIAGNOSIS — M99.02 SEGMENTAL AND SOMATIC DYSFUNCTION OF THORACIC REGION: ICD-10-CM

## 2020-01-27 PROCEDURE — 98940 CHIROPRACT MANJ 1-2 REGIONS: CPT | Mod: AT | Performed by: CHIROPRACTOR

## 2020-02-05 DIAGNOSIS — S33.8XXD SPRAIN OF OTHER PARTS OF LUMBAR SPINE AND PELVIS, SUBSEQUENT ENCOUNTER: ICD-10-CM

## 2020-02-06 RX ORDER — TRAMADOL HYDROCHLORIDE 50 MG/1
TABLET ORAL
Qty: 120 TABLET | Refills: 0 | Status: SHIPPED | OUTPATIENT
Start: 2020-02-06 | End: 2020-03-10

## 2020-02-10 ENCOUNTER — OFFICE VISIT (OUTPATIENT)
Dept: CHIROPRACTIC MEDICINE | Facility: OTHER | Age: 80
End: 2020-02-10
Attending: CHIROPRACTOR
Payer: COMMERCIAL

## 2020-02-10 DIAGNOSIS — M54.50 ACUTE BILATERAL LOW BACK PAIN WITHOUT SCIATICA: ICD-10-CM

## 2020-02-10 DIAGNOSIS — M99.03 SEGMENTAL AND SOMATIC DYSFUNCTION OF LUMBAR REGION: Primary | ICD-10-CM

## 2020-02-10 DIAGNOSIS — M99.02 SEGMENTAL AND SOMATIC DYSFUNCTION OF THORACIC REGION: ICD-10-CM

## 2020-02-10 PROCEDURE — 98940 CHIROPRACT MANJ 1-2 REGIONS: CPT | Mod: AT | Performed by: CHIROPRACTOR

## 2020-03-02 ENCOUNTER — OFFICE VISIT (OUTPATIENT)
Dept: CHIROPRACTIC MEDICINE | Facility: OTHER | Age: 80
End: 2020-03-02
Attending: CHIROPRACTOR
Payer: COMMERCIAL

## 2020-03-02 DIAGNOSIS — M99.02 SEGMENTAL AND SOMATIC DYSFUNCTION OF THORACIC REGION: ICD-10-CM

## 2020-03-02 DIAGNOSIS — M54.50 ACUTE LEFT-SIDED LOW BACK PAIN WITHOUT SCIATICA: ICD-10-CM

## 2020-03-02 DIAGNOSIS — M99.03 SEGMENTAL AND SOMATIC DYSFUNCTION OF LUMBAR REGION: Primary | ICD-10-CM

## 2020-03-02 PROCEDURE — 98940 CHIROPRACT MANJ 1-2 REGIONS: CPT | Mod: AT | Performed by: CHIROPRACTOR

## 2020-03-09 DIAGNOSIS — S33.8XXD SPRAIN OF OTHER PARTS OF LUMBAR SPINE AND PELVIS, SUBSEQUENT ENCOUNTER: ICD-10-CM

## 2020-03-10 RX ORDER — TRAMADOL HYDROCHLORIDE 50 MG/1
TABLET ORAL
Qty: 120 TABLET | Refills: 0 | Status: SHIPPED | OUTPATIENT
Start: 2020-03-10 | End: 2020-04-16

## 2020-03-11 ENCOUNTER — OFFICE VISIT (OUTPATIENT)
Dept: CHIROPRACTIC MEDICINE | Facility: OTHER | Age: 80
End: 2020-03-11
Attending: CHIROPRACTOR
Payer: COMMERCIAL

## 2020-03-11 DIAGNOSIS — M54.50 ACUTE BILATERAL LOW BACK PAIN WITHOUT SCIATICA: ICD-10-CM

## 2020-03-11 DIAGNOSIS — M99.02 SEGMENTAL AND SOMATIC DYSFUNCTION OF THORACIC REGION: ICD-10-CM

## 2020-03-11 DIAGNOSIS — M99.03 SEGMENTAL AND SOMATIC DYSFUNCTION OF LUMBAR REGION: Primary | ICD-10-CM

## 2020-03-11 PROCEDURE — 98940 CHIROPRACT MANJ 1-2 REGIONS: CPT | Mod: AT | Performed by: CHIROPRACTOR

## 2020-03-19 DIAGNOSIS — Z79.890 HORMONE REPLACEMENT THERAPY (HRT): ICD-10-CM

## 2020-03-19 RX ORDER — ESTRADIOL 0.5 MG/1
TABLET ORAL
Qty: 90 TABLET | Refills: 1 | Status: SHIPPED | OUTPATIENT
Start: 2020-03-19 | End: 2020-09-29

## 2020-04-16 DIAGNOSIS — S33.8XXD SPRAIN OF OTHER PARTS OF LUMBAR SPINE AND PELVIS, SUBSEQUENT ENCOUNTER: ICD-10-CM

## 2020-04-16 NOTE — TELEPHONE ENCOUNTER
Tramadol      Last Written Prescription Date:  3/10/20  Last Fill Quantity: 120,   # refills: 0  Last Office Visit: 7/16/19  Future Office visit:       Routing refill request to provider for review/approval because:  Drug not on the [facility], P or [facility] refill protocol or controlled substance    Chronic, continuous use of opioids   Problem Detail     Noted:  7/3/2017    Priority:  Medium    Overview Addendum 7/31/2018  9:50 AM by Nicole Joseph RN    Patient is followed by Virgil Mackay MD for ongoing prescription of pain medication.  All refills should only be approved by this provider, or covering partner.     Medication(s): Ultram 50mg.   Maximum quantity per month: #120  Clinic visit frequency required: Q 3 months      Controlled substance agreement:  Encounter-Level CSA - 06/30/2017:            Controlled Substance Agreement - Scan on 7/5/2017  4:11 PM : CONTROLLED SUBSTANCE AGREEMENT (below)                   Pain Clinic evaluation in the past: No     DIRE Total Score(s):  No flowsheet data found.     Last [facility] website verification:  Done on 7.31.18   [email or web address]

## 2020-04-17 RX ORDER — TRAMADOL HYDROCHLORIDE 50 MG/1
TABLET ORAL
Qty: 120 TABLET | Refills: 0 | Status: SHIPPED | OUTPATIENT
Start: 2020-04-17 | End: 2020-05-22

## 2020-05-21 DIAGNOSIS — S33.8XXD SPRAIN OF OTHER PARTS OF LUMBAR SPINE AND PELVIS, SUBSEQUENT ENCOUNTER: ICD-10-CM

## 2020-05-21 NOTE — TELEPHONE ENCOUNTER
traMADol (ULTRAM) 50 MG tablet       Last Written Prescription Date:  4/17/2020  Last Fill Quantity: 120,   # refills: 0  Last Office Visit: 3/11/2020  Future Office visit:

## 2020-05-22 RX ORDER — TRAMADOL HYDROCHLORIDE 50 MG/1
TABLET ORAL
Qty: 120 TABLET | Refills: 0 | Status: SHIPPED | OUTPATIENT
Start: 2020-05-22 | End: 2020-07-01

## 2020-06-22 ENCOUNTER — VIRTUAL VISIT (OUTPATIENT)
Dept: FAMILY MEDICINE | Facility: OTHER | Age: 80
End: 2020-06-22
Attending: FAMILY MEDICINE
Payer: COMMERCIAL

## 2020-06-22 ENCOUNTER — NURSE TRIAGE (OUTPATIENT)
Dept: FAMILY MEDICINE | Facility: OTHER | Age: 80
End: 2020-06-22

## 2020-06-22 ENCOUNTER — TELEPHONE (OUTPATIENT)
Dept: FAMILY MEDICINE | Facility: OTHER | Age: 80
End: 2020-06-22
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
## 2020-06-22 PROCEDURE — 99213 OFFICE O/P EST LOW 20 MIN: CPT | Mod: 95 | Performed by: FAMILY MEDICINE

## 2020-06-22 NOTE — TELEPHONE ENCOUNTER
"Protocol advises patient to be seen within 24 hours. Patient scheduled for a telephone visit today.    Additional Information    Negative: Sounds like a life-threatening emergency to the triager    Negative: Not a tick bite    Negative: [1] 2 to 14 days following tick bite AND [2] severe headache with fever occurs    Negative: [1] 2 to 14 days following tick bite AND [2] widespread rash with fever occurs    Negative: Patient sounds very sick or weak to the triager    Negative: [1] Fever AND [2] red area    Negative: [1] Fever AND [2] area is very tender to touch    Negative: [1] Red streak or red line AND [2] length > 2 inches (5 cm)    Negative: Can't remove live tick (after trying Care Advice)    Negative: Can't remove tick's head that was broken off in the skin (after trying Care Advice)    Negative: [1] 2 to 14 days following tick bite AND [2] fever AND [3] no rash or headache    Negative: [1] 2 to 14 days following tick bite AND [2] widespread rash or headache AND [3] no fever    Negative: [1] Red or very tender (to touch) area AND [2] started over 24 hours after the bite    Red ring or bull's-eye rash occurs at tick bite    Answer Assessment - Initial Assessment Questions  1. TYPE of TICK: \"Is it a wood tick or a deer tick?\" If unsure, ask: \"What size was the tick?\" \"Did it look more like a watermelon seed or a poppy seed?\"       She did not see the tick. States she has the bullseye rash and the spot was itchy.  2. LOCATION: \"Where is the tick bite located?\"       Left forearm by elbow  3. ONSET: \"How long do you think the tick was attached before you removed it?\" (Hours or days)       Unsure  4. TETANUS: \"When was the last tetanus booster?\"       6 years   5. PREGNANCY: \"Is there any chance you are pregnant?\" \"When was your last menstrual period?\"      No    Protocols used: TICK BITE-A-AH      "

## 2020-06-22 NOTE — PROGRESS NOTES
"Demi Narayan is a 79 year old female who is being evaluated via a billable telephone visit.      The patient has been notified of following:     \"This telephone visit will be conducted via a call between you and your physician/provider. We have found that certain health care needs can be provided without the need for a physical exam.  This service lets us provide the care you need with a short phone conversation.  If a prescription is necessary we can send it directly to your pharmacy.  If lab work is needed we can place an order for that and you can then stop by our lab to have the test done at a later time.    Telephone visits are billed at different rates depending on your insurance coverage. During this emergency period, for some insurers they may be billed the same as an in-person visit.  Please reach out to your insurance provider with any questions.    If during the course of the call the physician/provider feels a telephone visit is not appropriate, you will not be charged for this service.\"    Patient has given verbal consent for Telephone visit?  Yes    What phone number would you like to be contacted at? [phone]    How would you like to obtain your AVS? Mail a copy    Subjective     Demi Narayan is a 79 year old female who presents via phone visit today for the following health issues:    HPI  Tick bite      Duration: a month or few weeks; left forearm; unsure if bittten; no known tick    Description (location/character/radiation): brown vinny, itchy, lump, red and slightly swollen    Intensity:  mild    Accompanying signs and symptoms: none    History (similar episodes/previous evaluation): None    Precipitating or alleviating factors: None    Therapies tried and outcome: cortizone -no relief    History of lyme disease - few years ago    Goodwin stain    2 inch diameter    Cortisone 10 for the itch - helps some    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        Patient Active Problem List   Diagnosis     H/O diverticulitis, S/P bowel resection     Dyslipidemia     Fibrocystic breast disease (FCBD)     Low back pain, chronic     GERD (gastroesophageal reflux)     Osteoarthritis, multiple joints     Comprehensive Medical Examination     Post-menopause     Diaphragmatic hernia     ACP (advance care planning)     Scoliosis, throracolumbar     Chronic, continuous use of opioids     Lyme disease     Past Surgical History:   Procedure Laterality Date     ------------OTHER-------------      colonoscopy with biopsy - diverticulitis, hemorrhoids     ------------OTHER-------------  4/19/1994    sigmoidoscopy - abdominal pain, diverticula     ABDOMEN SURGERY      colon removed 2nd to diverticulitis     APPENDECTOMY       ARTHROSCOPY SHOULDER  11/20/2013    Procedure: ARTHROSCOPY SHOULDER;  Right Shoulder Arthroscopy Sub-Acromial Decompression Rotator Cuff Repair;  Surgeon: Lenny Trammell MD;  Location: [facility]     [facility]     Diverticulitis      bowel resection     EGD with biopsy  2011     ENDOSCOPY UPPER WITH PANCREATIC STIMULATION       ENDOSCOPY UPPER, COLONOSCOPY, COMBINED  7/18/2014    Procedure: COMBINED ENDOSCOPY UPPER, COLONOSCOPY;  Surgeon: Israel Feliciano MD;  Location: [facility]     ENT SURGERY      tonsilectomy     ESOPHAGOSCOPY, GASTROSCOPY, DUODENOSCOPY (EGD), COMBINED N/A 9/27/2017    Procedure: COMBINED ESOPHAGOSCOPY, GASTROSCOPY, DUODENOSCOPY (EGD);  UPPER ENDOSCOPY WITH BIOPSY AND POLYPECTOMY;   Surgeon: Gallito Alvarado DO;  Location: [facility]     GYN SURGERY      hysterectomy with bladder and rectal repair     IR CONSULTATION FOR IR EXAM  3/11/2019     ORTHOPEDIC SURGERY  2013    right rotator cuff surgery     TVH with ovarian preservation         Social History     Tobacco Use     Smoking status: Former Smoker     Packs/day: 0.50     Years: 5.00     Pack years: 2.50     Types: Cigarettes     Start date: 1963     Last attempt to quit: 1968     Years since quittin.1     Smokeless tobacco: Never Used   Substance Use Topics     Alcohol use: Yes     Alcohol/week: 0.8 standard drinks     Types: 1 Glasses of wine per week     Comment: daily with dinner     Family History   Problem Relation Age of Onset     Cerebrovascular Disease Father         CVA     Heart Disease Father         heart disease     Hypertension Father      Myocardial Infarction Father         myocardial infarction     Cerebrovascular Disease Mother         CVA     Diabetes Mother      Heart Disease Mother         heart disease     Hypertension Mother      Heart Disease Brother         heart disease     Hypertension Brother            Reviewed and updated as needed this visit by Provider         Review of Systems   Constitutional, HEENT, cardiovascular, pulmonary, gi and gu systems are negative, except as otherwise noted.       Objective   Reported vitals:  There were no vitals taken for this visit.   alert and no distress  PSYCH: Alert and oriented times 3; coherent speech, normal   rate and volume, able to articulate logical thoughts, able   to abstract reason, no tangential thoughts, no hallucinations   or delusions  Her affect is normal  RESP: No cough, no audible wheezing, able to talk in full sentences  Remainder of exam unable to be completed due to telephone visits    Diagnostic Test Results:  none         Assessment/Plan:  1. Tick bite, initial encounter  Possible tick bite?  No tick seen/removed.  Does not have a  target/bullseye lesion.  Describes more of an itch and some brown pigment changes.  Differential is broad - local skin infection, seborrheic keratosis, lyme, skin cancer, etc.  Patient not on MyChart - unable to load photos.  She feels that she should be treated for Lyme.  I really feel she should have the skin concern addressed in clinic - need to see it.  Her PCP is out of office x 2 weeks. DId discuss short course of antibiotic to treat for possible secondary infection.  Discussed topical agents.    Will ask to get her in with a covering provider.      No follow-ups on file.      Phone call duration:  10:30 minutes    Portia Stockton MD

## 2020-06-22 NOTE — TELEPHONE ENCOUNTER
Pt called stating she was treated for lymes disease about 4 years ago and states she has the rash, a bite,joint soreness, tiredness and all the symptoms she had before when she had so was wondering if she could get the medication for it that she got last time.

## 2020-06-22 NOTE — Clinical Note
New skin concern - not appropriate for virtual visit.  PCP out x 2 weeks.  Please schedule this week in person visit with any covering provider with openings.

## 2020-06-23 NOTE — PROGRESS NOTES
Subjective     Demi Narayan is a 79 year old female who presents to clinic today for the following health issues:    HPI   Derm issues       Duration: ongoing 1 month     Description (location/character/radiation): left arm     Intensity:  mild    Accompanying signs and symptoms: itchy, scabbed over and brown     History (similar episodes/previous evaluation): None    Precipitating or alleviating factors: None    Therapies tried and outcome: None     She is unsure as to whether or not she had a tick over the area.  She has a history of lyme disease many years ago.  At that time, she had myalgias and leg weakness.  Today, she denies any systemic symptoms of chills, fevers, sweats, fatigue or myalgias.  She has a bug bite to her left forearm with adjacent skin darkening.  She has skin itching over the area.          -------------------------------------    Patient Active Problem List   Diagnosis     H/O diverticulitis, S/P bowel resection     Dyslipidemia     Fibrocystic breast disease (FCBD)     Low back pain, chronic     GERD (gastroesophageal reflux)     Osteoarthritis, multiple joints     Comprehensive Medical Examination     Post-menopause     Diaphragmatic hernia     ACP (advance care planning)     Scoliosis, throracolumbar     Chronic, continuous use of opioids     Lyme disease     Past Surgical History:   Procedure Laterality Date     ------------OTHER-------------      colonoscopy with biopsy - diverticulitis, hemorrhoids     ------------OTHER-------------  4/19/1994    sigmoidoscopy - abdominal pain, diverticula     ABDOMEN SURGERY      colon removed 2nd to diverticulitis     APPENDECTOMY       ARTHROSCOPY SHOULDER  11/20/2013    Procedure: ARTHROSCOPY SHOULDER;  Right Shoulder Arthroscopy Sub-Acromial Decompression Rotator Cuff Repair;  Surgeon: Lenny Trammell MD;  Location: [facility]     [facility] [place]     Diverticulitis      bowel resection     EGD with  biopsy       ENDOSCOPY UPPER WITH PANCREATIC STIMULATION       ENDOSCOPY UPPER, COLONOSCOPY, COMBINED  2014    Procedure: COMBINED ENDOSCOPY UPPER, COLONOSCOPY;  Surgeon: Israel Feliciano MD;  Location: [facility]     ENT SURGERY      tonsilectomy     ESOPHAGOSCOPY, GASTROSCOPY, DUODENOSCOPY (EGD), COMBINED N/A 2017    Procedure: COMBINED ESOPHAGOSCOPY, GASTROSCOPY, DUODENOSCOPY (EGD);  UPPER ENDOSCOPY WITH BIOPSY AND POLYPECTOMY;  Surgeon: Gallito Alvarado DO;  Location: [facility]     GYN SURGERY      hysterectomy with bladder and rectal repair     IR CONSULTATION FOR IR EXAM  3/11/2019     ORTHOPEDIC SURGERY  2013    right rotator cuff surgery     TVH with ovarian preservation         Social History     Tobacco Use     Smoking status: Former Smoker     Packs/day: 0.50     Years: 5.00     Pack years: 2.50     Types: Cigarettes     Start date: 1963     Last attempt to quit: 1968     Years since quittin.1     Smokeless tobacco: Never Used   Substance Use Topics     Alcohol use: Yes     Alcohol/week: 0.8 standard drinks     Types: 1 Glasses of wine per week     Comment: daily with dinner     Family History   Problem Relation Age of Onset     Cerebrovascular Disease Father         CVA     Heart Disease Father         heart disease     Hypertension Father      Myocardial Infarction Father         myocardial infarction     Cerebrovascular Disease Mother         CVA     Diabetes Mother      Heart Disease Mother         heart disease     Hypertension Mother      Heart Disease Brother         heart disease     Hypertension Brother          Current Outpatient Medications   Medication Sig Dispense Refill     aspirin 325 MG tablet Take 325 mg by mouth At Bedtime.       Calcium Carbonate-Vitamin D (CALCIUM + D PO) Take 600 mg by mouth.       estradiol (ESTRACE) 0.5 MG tablet Take 0.5 mg by mouth one time a day. 90 tablet 1     GLUCOSAMINE SULFATE PO Take  by mouth daily.       Multiple  Vitamins-Minerals (MULTIVITAL PO) Take 1 tablet by mouth daily.       niacin 500 MG tablet Take 1 tablet by mouth daily.       Omega-3 Fatty Acids (OMEGA-3 FISH OIL PO) Take  by mouth daily.       pantoprazole (PROTONIX) 40 MG EC tablet TAKE 1 TABLET BY MOUTH IN THE MORNING BEFORE BREAKFAST 90 tablet 1     traMADol (ULTRAM) 50 MG tablet TAKE 1 TO 2 TABLETS BY MOUTH EVERY 6 TO 8 HOURS AS NEEDED 120 tablet 0       -------------------------------------  Reviewed and updated as needed this visit by Provider         Review of Systems   Constitutional, HEENT, cardiovascular, pulmonary, gi and gu systems are negative, except as otherwise noted.      Objective    /70 (BP Location: Right arm, Patient Position: Chair, Cuff Size: Adult Large)   Pulse 81   Temp 97.6  F (36.4  C) (Tympanic)   Wt 49.9 kg (110 lb)   SpO2 98%   BMI 23.39 kg/m    Body mass index is 23.39 kg/m .  Physical Exam   GENERAL: healthy, alert and no distress  SKIN: Left forearm show a 3 -4 mm lesions with inflammatory papule.  There is hyperpigmentation in the skin surrounding the lesion.  PSYCH: mentation appears normal, affect normal/bright    Diagnostic Test Results:  Labs reviewed in Epic      Pending lyme screen antibodies          ASSESSMENT /PLAN:    (W57.XXXA) Bug bite, initial encounter  (primary encounter diagnosis)  Comment: Her lesion may or not be a bug bite.  No apparent tick. She has inflammatory papules likely induced by scratching.  The surrounding skin is hyperpigmented.  No target lesions noted.  Her history is significant for lyme disease with a similar lesion and no noted tick, but with typical target lesion.  The lesion was noted 1 months ago and she has not developed any symptoms,so no prophylaxis antibiotic is indicated.  Plan:   Lyme Disease Kaykay with reflex to WB Serum    (Z86.19) History of Lyme disease  Comment:   Plan:  Lyme Disease Kaykay with reflex to WB Serum    (L29.9) Itchy skin  Comment:   Plan:   triamcinolone  (KENALOG) 0.1 % external ointment, 1-2 times per day PRN       Follow up with Provider - PRN      Froylan Clinton DO, DO

## 2020-06-24 ENCOUNTER — OFFICE VISIT (OUTPATIENT)
Dept: PEDIATRICS | Facility: OTHER | Age: 80
End: 2020-06-24
Attending: INTERNAL MEDICINE
Payer: COMMERCIAL

## 2020-06-24 VITALS
DIASTOLIC BLOOD PRESSURE: 70 MMHG | OXYGEN SATURATION: 98 % | HEART RATE: 81 BPM | TEMPERATURE: 97.6 F | BODY MASS INDEX: 23.39 KG/M2 | WEIGHT: 110 LBS | SYSTOLIC BLOOD PRESSURE: 122 MMHG
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
## 2020-06-24 DIAGNOSIS — L29.9 ITCHY SKIN: ICD-10-CM

## 2020-06-24 DIAGNOSIS — Z86.19 HISTORY OF LYME DISEASE: ICD-10-CM

## 2020-06-24 PROCEDURE — 99202 OFFICE O/P NEW SF 15 MIN: CPT | Performed by: INTERNAL MEDICINE

## 2020-06-24 PROCEDURE — 86618 LYME DISEASE ANTIBODY: CPT | Mod: ZL | Performed by: INTERNAL MEDICINE

## 2020-06-24 PROCEDURE — 36415 COLL VENOUS BLD VENIPUNCTURE: CPT | Mod: ZL | Performed by: INTERNAL MEDICINE

## 2020-06-24 PROCEDURE — G0463 HOSPITAL OUTPT CLINIC VISIT: HCPCS

## 2020-06-24 RX ORDER — TRIAMCINOLONE ACETONIDE 1 MG/G
OINTMENT TOPICAL 2 TIMES DAILY PRN
Qty: 80 G | Refills: 0 | Status: SHIPPED | OUTPATIENT
Start: 2020-06-24 | End: 2022-08-26

## 2020-06-24 ASSESSMENT — PAIN SCALES - GENERAL: PAINLEVEL: NO PAIN (0)

## 2020-06-24 NOTE — NURSING NOTE
"Chief Complaint   Patient presents with     Derm Problem       Initial /70 (BP Location: Right arm, Patient Position: Chair, Cuff Size: Adult Large)   Pulse 81   Temp 97.6  F (36.4  C) (Tympanic)   Wt 49.9 kg (110 lb)   SpO2 98%   BMI 23.39 kg/m   Estimated body mass index is 23.39 kg/m  as calculated from the following:    Height as of 7/16/19: 1.461 m (4' 9.5\").    Weight as of this encounter: 49.9 kg (110 lb).  Medication Reconciliation: complete  Anselmo Rice LPN  "

## 2020-06-26 LAB — B BURGDOR IGG+IGM SER QL: 0.28 (ref 0–0.89)

## 2020-06-29 DIAGNOSIS — Z78.0 POST-MENOPAUSE: ICD-10-CM

## 2020-06-29 DIAGNOSIS — S33.8XXD SPRAIN OF OTHER PARTS OF LUMBAR SPINE AND PELVIS, SUBSEQUENT ENCOUNTER: ICD-10-CM

## 2020-06-29 DIAGNOSIS — M41.35 THORACOGENIC SCOLIOSIS OF THORACOLUMBAR REGION: ICD-10-CM

## 2020-06-29 NOTE — LETTER
[facility]  [address] [place] [address]  Phone: [phone]    July 1, 2020       Demi Narayan  [address] [place] [address]            Dear Demi:    APPOINTMENT REMINDER:   Our records indicates that it is time for you to be seen for your yearly physical.      Your current medication request will be approved for one refill but you will need to be seen before any additional refills can be approved. Taking care of your health is important to us, and ongoing visits with your provider are vital to your care.    We look forward to seeing you in the near future.  You may call our office at [phone] to schedule a visit.     Please disregard this notice if you have already made an appointment.    Thank You,  Dr. Mackay

## 2020-06-30 ENCOUNTER — OFFICE VISIT (OUTPATIENT)
Dept: CHIROPRACTIC MEDICINE | Facility: OTHER | Age: 80
End: 2020-06-30
Attending: CHIROPRACTOR
Payer: COMMERCIAL

## 2020-06-30 DIAGNOSIS — M99.02 SEGMENTAL AND SOMATIC DYSFUNCTION OF THORACIC REGION: ICD-10-CM

## 2020-06-30 DIAGNOSIS — M99.03 SEGMENTAL AND SOMATIC DYSFUNCTION OF LUMBAR REGION: Primary | ICD-10-CM

## 2020-06-30 DIAGNOSIS — M54.50 ACUTE BILATERAL LOW BACK PAIN WITHOUT SCIATICA: ICD-10-CM

## 2020-06-30 PROCEDURE — 98940 CHIROPRACT MANJ 1-2 REGIONS: CPT | Mod: AT | Performed by: CHIROPRACTOR

## 2020-06-30 NOTE — TELEPHONE ENCOUNTER
protonix      Last Written Prescription Date:  12.27.19  Last Fill Quantity: 90,   # refills: 1  Last Office Visit: 6.24.2020    tramadol      Last Written Prescription Date:  5.22.2020  Last Fill Quantity: 120,   # refills: 0  Last Office Visit: 6.24.2020

## 2020-07-01 RX ORDER — PANTOPRAZOLE SODIUM 40 MG/1
TABLET, DELAYED RELEASE ORAL
Qty: 90 TABLET | Refills: 0 | Status: SHIPPED | OUTPATIENT
Start: 2020-07-01 | End: 2020-09-25

## 2020-07-02 RX ORDER — TRAMADOL HYDROCHLORIDE 50 MG/1
TABLET ORAL
Qty: 120 TABLET | Refills: 0 | Status: SHIPPED | OUTPATIENT
Start: 2020-07-02 | End: 2020-08-04

## 2020-07-14 ENCOUNTER — OFFICE VISIT (OUTPATIENT)
Dept: CHIROPRACTIC MEDICINE | Facility: OTHER | Age: 80
End: 2020-07-14
Attending: CHIROPRACTOR
Payer: COMMERCIAL

## 2020-07-14 DIAGNOSIS — M54.50 ACUTE BILATERAL LOW BACK PAIN WITHOUT SCIATICA: ICD-10-CM

## 2020-07-14 DIAGNOSIS — M99.03 SEGMENTAL AND SOMATIC DYSFUNCTION OF LUMBAR REGION: Primary | ICD-10-CM

## 2020-07-14 DIAGNOSIS — M99.02 SEGMENTAL AND SOMATIC DYSFUNCTION OF THORACIC REGION: ICD-10-CM

## 2020-07-14 PROCEDURE — 98940 CHIROPRACT MANJ 1-2 REGIONS: CPT | Mod: AT | Performed by: CHIROPRACTOR

## 2020-08-04 DIAGNOSIS — S33.8XXD SPRAIN OF OTHER PARTS OF LUMBAR SPINE AND PELVIS, SUBSEQUENT ENCOUNTER: ICD-10-CM

## 2020-08-04 RX ORDER — TRAMADOL HYDROCHLORIDE 50 MG/1
TABLET ORAL
Qty: 120 TABLET | Refills: 0 | Status: SHIPPED | OUTPATIENT
Start: 2020-08-04 | End: 2020-09-22

## 2020-08-10 ENCOUNTER — OFFICE VISIT (OUTPATIENT)
Dept: CHIROPRACTIC MEDICINE | Facility: OTHER | Age: 80
End: 2020-08-10
Attending: CHIROPRACTOR
Payer: COMMERCIAL

## 2020-08-10 DIAGNOSIS — M99.03 SEGMENTAL AND SOMATIC DYSFUNCTION OF LUMBAR REGION: Primary | ICD-10-CM

## 2020-08-10 DIAGNOSIS — M99.02 SEGMENTAL AND SOMATIC DYSFUNCTION OF THORACIC REGION: ICD-10-CM

## 2020-08-10 DIAGNOSIS — M54.50 ACUTE BILATERAL LOW BACK PAIN WITHOUT SCIATICA: ICD-10-CM

## 2020-08-10 PROCEDURE — 98940 CHIROPRACT MANJ 1-2 REGIONS: CPT | Mod: AT | Performed by: CHIROPRACTOR

## 2020-08-11 ENCOUNTER — OFFICE VISIT (OUTPATIENT)
Dept: FAMILY MEDICINE | Facility: OTHER | Age: 80
End: 2020-08-11
Attending: FAMILY MEDICINE
Payer: COMMERCIAL

## 2020-08-11 ENCOUNTER — TELEPHONE (OUTPATIENT)
Dept: FAMILY MEDICINE | Facility: OTHER | Age: 80
End: 2020-08-11

## 2020-08-11 VITALS
RESPIRATION RATE: 16 BRPM | WEIGHT: 114 LBS | TEMPERATURE: 98 F | OXYGEN SATURATION: 98 % | HEART RATE: 75 BPM | HEIGHT: 58 IN | BODY MASS INDEX: 23.93 KG/M2 | SYSTOLIC BLOOD PRESSURE: 136 MMHG | DIASTOLIC BLOOD PRESSURE: 60 MMHG

## 2020-08-11 DIAGNOSIS — R15.1 FECAL SMEARING: ICD-10-CM

## 2020-08-11 DIAGNOSIS — R07.89 ATYPICAL CHEST PAIN: ICD-10-CM

## 2020-08-11 DIAGNOSIS — Z00.00 ROUTINE GENERAL MEDICAL EXAMINATION AT A HEALTH CARE FACILITY: Primary | ICD-10-CM

## 2020-08-11 DIAGNOSIS — E78.2 MIXED HYPERLIPIDEMIA: Primary | ICD-10-CM

## 2020-08-11 DIAGNOSIS — M54.50 CHRONIC BILATERAL LOW BACK PAIN WITHOUT SCIATICA: ICD-10-CM

## 2020-08-11 DIAGNOSIS — E78.2 MIXED HYPERLIPIDEMIA: ICD-10-CM

## 2020-08-11 DIAGNOSIS — G89.29 CHRONIC BILATERAL LOW BACK PAIN WITHOUT SCIATICA: ICD-10-CM

## 2020-08-11 LAB
ALBUMIN SERPL-MCNC: 3.7 G/DL (ref 3.4–5)
ALP SERPL-CCNC: 50 U/L (ref 40–150)
ALT SERPL W P-5'-P-CCNC: 25 U/L (ref 0–50)
ANION GAP SERPL CALCULATED.3IONS-SCNC: 4 MMOL/L (ref 3–14)
AST SERPL W P-5'-P-CCNC: 20 U/L (ref 0–45)
BASOPHILS # BLD AUTO: 0.1 10E9/L (ref 0–0.2)
BASOPHILS NFR BLD AUTO: 0.9 %
BILIRUB SERPL-MCNC: 0.4 MG/DL (ref 0.2–1.3)
BUN SERPL-MCNC: 12 MG/DL (ref 7–30)
CALCIUM SERPL-MCNC: 9 MG/DL (ref 8.5–10.1)
CHLORIDE SERPL-SCNC: 106 MMOL/L (ref 94–109)
CHOLEST SERPL-MCNC: 252 MG/DL
CO2 SERPL-SCNC: 30 MMOL/L (ref 20–32)
CREAT SERPL-MCNC: 0.8 MG/DL (ref 0.52–1.04)
DIFFERENTIAL METHOD BLD: NORMAL
EOSINOPHIL # BLD AUTO: 0.2 10E9/L (ref 0–0.7)
EOSINOPHIL NFR BLD AUTO: 2.6 %
ERYTHROCYTE [DISTWIDTH] IN BLOOD BY AUTOMATED COUNT: 13.2 % (ref 10–15)
GFR SERPL CREATININE-BSD FRML MDRD: 69 ML/MIN/{1.73_M2}
GLUCOSE SERPL-MCNC: 88 MG/DL (ref 70–99)
HCT VFR BLD AUTO: 44.2 % (ref 35–47)
HDLC SERPL-MCNC: 76 MG/DL
HGB BLD-MCNC: 14.5 G/DL (ref 11.7–15.7)
IMM GRANULOCYTES # BLD: 0 10E9/L (ref 0–0.4)
IMM GRANULOCYTES NFR BLD: 0.2 %
LDLC SERPL CALC-MCNC: 146 MG/DL
LYMPHOCYTES # BLD AUTO: 2.6 10E9/L (ref 0.8–5.3)
LYMPHOCYTES NFR BLD AUTO: 44.2 %
MCH RBC QN AUTO: 28.8 PG (ref 26.5–33)
MCHC RBC AUTO-ENTMCNC: 32.8 G/DL (ref 31.5–36.5)
MCV RBC AUTO: 88 FL (ref 78–100)
MONOCYTES # BLD AUTO: 0.5 10E9/L (ref 0–1.3)
MONOCYTES NFR BLD AUTO: 7.8 %
NEUTROPHILS # BLD AUTO: 2.6 10E9/L (ref 1.6–8.3)
NEUTROPHILS NFR BLD AUTO: 44.3 %
NONHDLC SERPL-MCNC: 176 MG/DL
NRBC # BLD AUTO: 0 10*3/UL
NRBC BLD AUTO-RTO: 0 /100
PLATELET # BLD AUTO: 320 10E9/L (ref 150–450)
POTASSIUM SERPL-SCNC: 3.4 MMOL/L (ref 3.4–5.3)
PROT SERPL-MCNC: 7.5 G/DL (ref 6.8–8.8)
RBC # BLD AUTO: 5.03 10E12/L (ref 3.8–5.2)
SODIUM SERPL-SCNC: 140 MMOL/L (ref 133–144)
TRIGL SERPL-MCNC: 149 MG/DL
TSH SERPL DL<=0.005 MIU/L-ACNC: 2.35 MU/L (ref 0.4–4)
WBC # BLD AUTO: 5.8 10E9/L (ref 4–11)

## 2020-08-11 PROCEDURE — 99397 PER PM REEVAL EST PAT 65+ YR: CPT | Performed by: FAMILY MEDICINE

## 2020-08-11 PROCEDURE — 85025 COMPLETE CBC W/AUTO DIFF WBC: CPT | Mod: ZL | Performed by: FAMILY MEDICINE

## 2020-08-11 PROCEDURE — G0463 HOSPITAL OUTPT CLINIC VISIT: HCPCS

## 2020-08-11 PROCEDURE — 84443 ASSAY THYROID STIM HORMONE: CPT | Mod: ZL | Performed by: FAMILY MEDICINE

## 2020-08-11 PROCEDURE — 36415 COLL VENOUS BLD VENIPUNCTURE: CPT | Mod: ZL | Performed by: FAMILY MEDICINE

## 2020-08-11 PROCEDURE — 80053 COMPREHEN METABOLIC PANEL: CPT | Mod: ZL | Performed by: FAMILY MEDICINE

## 2020-08-11 PROCEDURE — 80061 LIPID PANEL: CPT | Mod: ZL | Performed by: FAMILY MEDICINE

## 2020-08-11 PROCEDURE — 99212 OFFICE O/P EST SF 10 MIN: CPT | Mod: 25 | Performed by: FAMILY MEDICINE

## 2020-08-11 ASSESSMENT — PAIN SCALES - GENERAL: PAINLEVEL: MILD PAIN (2)

## 2020-08-11 ASSESSMENT — MIFFLIN-ST. JEOR: SCORE: 863.91

## 2020-08-11 NOTE — PROGRESS NOTES
"  SUBJECTIVE:   Demi Narayan is a 79 year old female who presents for Preventive Visit.    Are you in the first 12 months of your Medicare Part B coverage?  No    Physical Health:    In general, how would you rate your overall physical health? good    Outside of work, how many days during the week do you exercise? 6-7 days/week    Outside of work, approximately how many minutes a day do you exercise?less than 15 minutes    If you drink alcohol do you typically have >3 drinks per day or >7 drinks per week? No    Do you usually eat at least 4 servings of fruit and vegetables a day, include whole grains & fiber and avoid regularly eating high fat or \"junk\" foods? Yes    Do you have any problems taking medications regularly?  No    Do you have any side effects from medications? none    Needs assistance for the following daily activities: no assistance needed    Which of the following safety concerns are present in your home?  none identified     Hearing impairment: No    In the past 6 months, have you been bothered by leaking of urine? Yes, if she waits to long to use the restroom     Mental Health:    In general, how would you rate your overall mental or emotional health? excellent  PHQ-2 Score:      Do you feel safe in your environment? Yes    Do you have a Health Care Directive? Yes: Patient states has Advance Directive and will bring in a copy to clinic.    Additional concerns to address?  YES     Fall risk:  Fallen 2 or more times in the past year?: No  Any fall with injury in the past year?: No  click delete button to remove this line now  Cognitive Screenin) Repeat 3 items (Leader, Season, Table)      2) Clock draw:   NORMAL  3) 3 item recall:   Recalls 3 objects  Results: 3 items recalled: COGNITIVE IMPAIRMENT LESS LIKELY    Mini-CogTM Copyright S Thomas. Licensed by the author for use in [facility]; reprinted with permission (lela@.[email or web address]). All rights reserved.      Do you have sleep " apnea, excessive snoring or daytime drowsiness?: no    Atypical chest pain  Danielle developed an episode of chest pain.  This radiated to her neck and to her upper back.  This was non exertional and not associated with nausea, vomiting or diaphoresis.     Chronic low back pain  She has a history of chronic low back pain with lumbar scoliosis and stenosis.  Danielle has been using a a rib belt with improved symptoms.  Requests replacement.    Fecal incontinence  In addition, she has developed intermittent fecal incontinence.  Danielle has a history of diverticulosis.    Reviewed and updated as needed this visit by clinical staff  Tobacco  Allergies  Meds         Reviewed and updated as needed this visit by Provider        Social History     Tobacco Use     Smoking status: Former Smoker     Packs/day: 0.50     Years: 5.00     Pack years: 2.50     Types: Cigarettes     Start date: 1963     Last attempt to quit: 1968     Years since quittin.3     Smokeless tobacco: Never Used   Substance Use Topics     Alcohol use: Yes     Alcohol/week: 0.8 standard drinks     Types: 1 Glasses of wine per week     Comment: daily with dinner                           Current providers sharing in care for this patient include:     Patient Care Team:  Virgil Mackay MD as PCP - General  Nicole Joseph RN as Registered Nurse  Froylan Clinton,  as Assigned PCP    The following health maintenance items are reviewed in Epic and correct as of today:  Health Maintenance   Topic Date Due     TREATMENT AGREEMENT FOR CHRONIC PAIN MANAGEMENT  1940     DTAP/TDAP/TD IMMUNIZATION (1 - Tdap) 1964     ZOSTER IMMUNIZATION (2 of 3) 2007     URINE DRUG SCREEN  2018     PNEUMOCOCCAL IMMUNIZATION 65+ LOW/MEDIUM RISK (2 of 2 - PPSV23) 2018     PHQ-2  2020     MAMMO SCREENING  2020     ADONIS ASSESSMENT  2020     PHQ-9  2020     MEDICARE ANNUAL WELLNESS VISIT  2020     INFLUENZA  VACCINE (1) 09/01/2020     FALL RISK ASSESSMENT  06/24/2021     ADVANCE CARE PLANNING  08/05/2021     COLORECTAL CANCER SCREENING  07/18/2024     DEXA  Completed     IPV IMMUNIZATION  Aged Out     MENINGITIS IMMUNIZATION  Aged Out     HEPATITIS B IMMUNIZATION  Aged Out     BP Readings from Last 3 Encounters:   08/11/20 136/60   06/24/20 122/70   07/16/19 122/60    Wt Readings from Last 3 Encounters:   08/11/20 51.7 kg (114 lb)   06/24/20 49.9 kg (110 lb)   07/16/19 53.6 kg (118 lb 3.2 oz)                  Patient Active Problem List   Diagnosis     H/O diverticulitis, S/P bowel resection     Dyslipidemia     Fibrocystic breast disease (FCBD)     Low back pain, chronic     GERD (gastroesophageal reflux)     Osteoarthritis, multiple joints     Comprehensive Medical Examination     Post-menopause     Diaphragmatic hernia     ACP (advance care planning)     Scoliosis, throracolumbar     Chronic, continuous use of opioids     Lyme disease     Past Surgical History:   Procedure Laterality Date     ------------OTHER-------------      colonoscopy with biopsy - diverticulitis, hemorrhoids     ------------OTHER-------------  4/19/1994    sigmoidoscopy - abdominal pain, diverticula     ABDOMEN SURGERY      colon removed 2nd to diverticulitis     APPENDECTOMY       ARTHROSCOPY SHOULDER  11/20/2013    Procedure: ARTHROSCOPY SHOULDER;  Right Shoulder Arthroscopy Sub-Acromial Decompression Rotator Cuff Repair;  Surgeon: Lenny Trammell MD;  Location: [facility]     [facility]     Diverticulitis      bowel resection     EGD with biopsy  2011     ENDOSCOPY UPPER WITH PANCREATIC STIMULATION       ENDOSCOPY UPPER, COLONOSCOPY, COMBINED  7/18/2014    Procedure: COMBINED ENDOSCOPY UPPER, COLONOSCOPY;  Surgeon: Israel Feliciano MD;  Location: [facility] OR     ENT SURGERY      tonsilectomy     ESOPHAGOSCOPY, GASTROSCOPY, DUODENOSCOPY (EGD), COMBINED N/A 9/27/2017    Procedure: COMBINED  ESOPHAGOSCOPY, GASTROSCOPY, DUODENOSCOPY (EGD);  UPPER ENDOSCOPY WITH BIOPSY AND POLYPECTOMY;  Surgeon: Gallito Alvarado DO;  Location: [facility] OR     GYN SURGERY      hysterectomy with bladder and rectal repair     IR CONSULTATION FOR IR EXAM  3/11/2019     ORTHOPEDIC SURGERY  2013    right rotator cuff surgery     TVH with ovarian preservation         Social History     Tobacco Use     Smoking status: Former Smoker     Packs/day: 0.50     Years: 5.00     Pack years: 2.50     Types: Cigarettes     Start date: 1963     Last attempt to quit: 1968     Years since quittin.3     Smokeless tobacco: Never Used   Substance Use Topics     Alcohol use: Yes     Alcohol/week: 0.8 standard drinks     Types: 1 Glasses of wine per week     Comment: daily with dinner     Family History   Problem Relation Age of Onset     Cerebrovascular Disease Father         CVA     Heart Disease Father         heart disease     Hypertension Father      Myocardial Infarction Father         myocardial infarction     Cerebrovascular Disease Mother         CVA     Diabetes Mother      Heart Disease Mother         heart disease     Hypertension Mother      Heart Disease Brother         heart disease     Hypertension Brother          Current Outpatient Medications   Medication Sig Dispense Refill     aspirin 325 MG tablet Take 325 mg by mouth At Bedtime.       Calcium Carbonate-Vitamin D (CALCIUM + D PO) Take 600 mg by mouth.       estradiol (ESTRACE) 0.5 MG tablet Take 0.5 mg by mouth one time a day. 90 tablet 1     GLUCOSAMINE SULFATE PO Take  by mouth daily.       Multiple Vitamins-Minerals (MULTIVITAL PO) Take 1 tablet by mouth daily.       niacin 500 MG tablet Take 1 tablet by mouth daily.       Omega-3 Fatty Acids (OMEGA-3 FISH OIL PO) Take  by mouth daily.       pantoprazole (PROTONIX) 40 MG EC tablet TAKE 1 TABLET BY MOUTH IN THE MORNING BEFORE BREAKFAST 90 tablet 0     traMADol (ULTRAM) 50 MG tablet TAKE 1 TO 2 TABLETS BY  "MOUTH EVERY 6 TO 8 HOURS AS NEEDED 120 tablet 0     triamcinolone (KENALOG) 0.1 % external ointment Apply topically 2 times daily as needed for irritation 80 g 0     No Known Allergies      ROS:  Constitutional, HEENT, cardiovascular, pulmonary, gi and gu systems are negative, except as otherwise noted.    OBJECTIVE:   /60 (BP Location: Right arm, Patient Position: Sitting, Cuff Size: Adult Regular)   Pulse 75   Temp 98  F (36.7  C) (Tympanic)   Resp 16   Ht 1.461 m (4' 9.5\")   Wt 51.7 kg (114 lb)   SpO2 98%   BMI 24.24 kg/m   Estimated body mass index is 24.24 kg/m  as calculated from the following:    Height as of this encounter: 1.461 m (4' 9.5\").    Weight as of this encounter: 51.7 kg (114 lb).  EXAM:   GENERAL APPEARANCE: healthy, alert and no distress  EYES: Eyes grossly normal to inspection, PERRL and conjunctivae and sclerae normal  HENT: ear canals and TM's normal, nose and mouth without ulcers or lesions, oropharynx clear and oral mucous membranes moist  NECK: no adenopathy, no asymmetry, masses, or scars and thyroid normal to palpation  RESP: lungs clear to auscultation - no rales, rhonchi or wheezes  BREAST: normal without masses, tenderness or nipple discharge and no palpable axillary masses or adenopathy  CV: regular rate and rhythm, normal S1 S2, no S3 or S4, no murmur, click or rub, no peripheral edema and peripheral pulses strong  ABDOMEN: soft, nontender, no hepatosplenomegaly, no masses and bowel sounds normal  MS: no musculoskeletal defects are noted and gait is age appropriate without ataxia  SKIN: No rashes.  There is a raised lesion with irregular borders noted right temporal region.  NEURO: Normal strength and tone, sensory exam grossly normal, mentation intact and speech normal  PSYCH: mentation appears normal and affect normal/bright    Diagnostic Test Results:  Labs reviewed in Epic  Results for orders placed or performed in visit on 08/11/20   CBC with platelets differential "     Status: None   Result Value Ref Range    WBC 5.8 4.0 - 11.0 10e9/L    RBC Count 5.03 3.8 - 5.2 10e12/L    Hemoglobin 14.5 11.7 - 15.7 g/dL    Hematocrit 44.2 35.0 - 47.0 %    MCV 88 78 - 100 fl    MCH 28.8 26.5 - 33.0 pg    MCHC 32.8 31.5 - 36.5 g/dL    RDW 13.2 10.0 - 15.0 %    Platelet Count 320 150 - 450 10e9/L    Diff Method Automated Method     % Neutrophils 44.3 %    % Lymphocytes 44.2 %    % Monocytes 7.8 %    % Eosinophils 2.6 %    % Basophils 0.9 %    % Immature Granulocytes 0.2 %    Nucleated RBCs 0 0 /100    Absolute Neutrophil 2.6 1.6 - 8.3 10e9/L    Absolute Lymphocytes 2.6 0.8 - 5.3 10e9/L    Absolute Monocytes 0.5 0.0 - 1.3 10e9/L    Absolute Eosinophils 0.2 0.0 - 0.7 10e9/L    Absolute Basophils 0.1 0.0 - 0.2 10e9/L    Abs Immature Granulocytes 0.0 0 - 0.4 10e9/L    Absolute Nucleated RBC 0.0    Comprehensive metabolic panel     Status: None   Result Value Ref Range    Sodium 140 133 - 144 mmol/L    Potassium 3.4 3.4 - 5.3 mmol/L    Chloride 106 94 - 109 mmol/L    Carbon Dioxide 30 20 - 32 mmol/L    Anion Gap 4 3 - 14 mmol/L    Glucose 88 70 - 99 mg/dL    Urea Nitrogen 12 7 - 30 mg/dL    Creatinine 0.80 0.52 - 1.04 mg/dL    GFR Estimate 69 >60 mL/min/[1.73_m2]    GFR Estimate If Black 80 >60 mL/min/[1.73_m2]    Calcium 9.0 8.5 - 10.1 mg/dL    Bilirubin Total 0.4 0.2 - 1.3 mg/dL    Albumin 3.7 3.4 - 5.0 g/dL    Protein Total 7.5 6.8 - 8.8 g/dL    Alkaline Phosphatase 50 40 - 150 U/L    ALT 25 0 - 50 U/L    AST 20 0 - 45 U/L   Lipid Profile     Status: Abnormal   Result Value Ref Range    Cholesterol 252 (H) <200 mg/dL    Triglycerides 149 <150 mg/dL    HDL Cholesterol 76 >49 mg/dL    LDL Cholesterol Calculated 146 (H) <100 mg/dL    Non HDL Cholesterol 176 (H) <130 mg/dL   TSH with free T4 reflex     Status: None   Result Value Ref Range    TSH 2.35 0.40 - 4.00 mU/L       ASSESSMENT / PLAN:   Routine general medical examination at a health care facility  General medical exam completed.  Breast exam  "performed.  Pap and pelvic exam deferred. Mammogram recommended. Screening labs drawn.  Colonoscopy and immunizations reviewed.  Discussed the importance of monthly breast self exam, aspirin use, and osteoporosis prevention . Follow up 1 year.      Atypical chest pain  Discussed potential etiologies. Radiology will call to schedule NM stress test.   - NM MPI Treadmill; Future    Dyslipidemia  Fasting labs reviewed.  Goals discussed.  Discussed the importance of diet, exercise, and weight reduction.  Follow up 12 months.     Low back pain, chronic  Brace written    Fecal smearing  Increase Citrucel to twice daily.      End of Life Planning:  Patient currently has an advanced directive: No.  I have verified the patient's ablity to prepare an advanced directive/make health care decisions.  Literature was provided to assist patient in preparing an advanced directive.    COUNSELING:  Reviewed preventive health counseling, as reflected in patient instructions  Special attention given to:       Regular exercise       Healthy diet/nutrition    Estimated body mass index is 24.24 kg/m  as calculated from the following:    Height as of this encounter: 1.461 m (4' 9.5\").    Weight as of this encounter: 51.7 kg (114 lb).         reports that she quit smoking about 52 years ago. Her smoking use included cigarettes. She started smoking about 57 years ago. She has a 2.50 pack-year smoking history. She has never used smokeless tobacco.      Appropriate preventive services were discussed with this patient, including applicable screening as appropriate for cardiovascular disease, diabetes, osteopenia/osteoporosis, and glaucoma.  As appropriate for age/gender, discussed screening for colorectal cancer, prostate cancer, breast cancer, and cervical cancer. Checklist reviewing preventive services available has been given to the patient.    Reviewed patients plan of care and provided an AVS. The Basic Care Plan (routine screening as " documented in Health Maintenance) for Demi meets the Care Plan requirement. This Care Plan has been established and reviewed with the Patient.    Counseling Resources:  ATP IV Guidelines  Pooled Cohorts Equation Calculator  Breast Cancer Risk Calculator  FRAX Risk Assessment  ICSI Preventive Guidelines  Dietary Guidelines for Americans, 2010  USDA's MyPlate  ASA Prophylaxis  Lung CA Screening    Virgil Mackay MD  [facility]

## 2020-08-11 NOTE — TELEPHONE ENCOUNTER
Has physical at 145 today. Gave blood work this am fasting and is requesting that you place orders as there was none in lab today. Thank you

## 2020-08-11 NOTE — NURSING NOTE
"Chief Complaint   Patient presents with     Physical       Initial /60 (BP Location: Right arm, Patient Position: Sitting, Cuff Size: Adult Regular)   Pulse 75   Temp 98  F (36.7  C) (Tympanic)   Resp 16   Ht 1.461 m (4' 9.5\")   Wt 51.7 kg (114 lb)   SpO2 98%   BMI 24.24 kg/m   Estimated body mass index is 24.24 kg/m  as calculated from the following:    Height as of this encounter: 1.461 m (4' 9.5\").    Weight as of this encounter: 51.7 kg (114 lb).  Medication Reconciliation: complete  Tatiana Griggs LPN  "

## 2020-08-13 NOTE — PROGRESS NOTES
Subjective Finding:    Chief compalint: No chief complaint on file.  , Pain Scale: 8/10, Intensity: sharp, Duration: 1 months, Change since last visit: , Radiating: down left leg.    Date of injury:     Activities that the pain restricts:   Home/household activities: yes.  Work duties: no.  Hobbies/social: yes.  Sleep: yes.  Makes symptoms better: ice  and rest.  Makes symptoms worse: activity and bending.  Have you seen anyone else for the symptoms? No.  Work related: no.  Automobile related injury: no.    Objective and Assessment:    Posture Analysis:   High shoulder: right.  Head tilt: right.  High iliac crest: left.  Head carriage: forward.  Thoracic Kyphosis: neutral.  Lumbar Lordosis: neutral.    Lumbar Range of Motion: extension decreased.  Cervical Range of Motion: .  Thoracic Range of Motion: .  Extremity Range of Motion: hip decreased.    Palpation:   Quad lumb: left, referred pain: yes  TFL: left, referred pain: yes    Segmental dysfunction pre-treatment: [id number].    Assessment post-treatment:  Cervical: ROM increased.  Thoracic: ROM increased.  Lumbar: ROM increased and pain and tenderness decreased.    Comments: .      Complicating Factors: .    Plan / Procedure:    Expected release date: .  Treatment plan: 2 times per week for 2 weeks.  Instructed patient: stretch as instructed at visit.  Short term goals: reduce pain.  Long term goals: restore normal function.  Prognosis: excellent.

## 2020-08-14 ENCOUNTER — TELEPHONE (OUTPATIENT)
Dept: NUCLEAR MEDICINE | Facility: HOSPITAL | Age: 80
End: 2020-08-14

## 2020-08-14 NOTE — TELEPHONE ENCOUNTER
Appointment Reminder Call    -Exam prep  -Where and when to register  -Appointment length - recommended a book    -COVID screening + mask

## 2020-08-17 ENCOUNTER — HOSPITAL ENCOUNTER (OUTPATIENT)
Dept: NUCLEAR MEDICINE | Facility: HOSPITAL | Age: 80
Setting detail: NUCLEAR MEDICINE
End: 2020-08-17
Attending: FAMILY MEDICINE
Payer: COMMERCIAL

## 2020-08-17 ENCOUNTER — HOSPITAL ENCOUNTER (OUTPATIENT)
Dept: CARDIOLOGY | Facility: HOSPITAL | Age: 80
Setting detail: NUCLEAR MEDICINE
End: 2020-08-17
Attending: FAMILY MEDICINE
Payer: COMMERCIAL

## 2020-08-17 DIAGNOSIS — R07.89 ATYPICAL CHEST PAIN: ICD-10-CM

## 2020-08-17 LAB
CV BLOOD PRESSURE: 79 %
CV STRESS MAX HR HE: 129
NUC STRESS EJECTION FRACTION: 89 %
RATE PRESSURE PRODUCT: NORMAL
STRESS ECHO BASELINE DIASTOLIC HE: 84
STRESS ECHO BASELINE HR: 84
STRESS ECHO BASELINE SYSTOLIC BP: 164
STRESS ECHO CALCULATED PERCENT HR: 93 %
STRESS ECHO LAST STRESS DIASTOLIC BP: 84
STRESS ECHO LAST STRESS SYSTOLIC BP: 162
STRESS ECHO POST ESTIMATED WORKLOAD: 4.6 METS
STRESS ECHO POST EXERCISE DUR MIN: 3 MIN
STRESS ECHO POST EXERCISE DUR SEC: 6 SEC
STRESS ECHO TARGET HR: 139

## 2020-08-17 PROCEDURE — 93017 CV STRESS TEST TRACING ONLY: CPT | Performed by: INTERNAL MEDICINE

## 2020-08-17 PROCEDURE — 34300033 ZZH RX 343: Performed by: RADIOLOGY

## 2020-08-17 PROCEDURE — 93016 CV STRESS TEST SUPVJ ONLY: CPT | Performed by: INTERNAL MEDICINE

## 2020-08-17 PROCEDURE — 93018 CV STRESS TEST I&R ONLY: CPT | Performed by: INTERNAL MEDICINE

## 2020-08-17 PROCEDURE — A9500 TC99M SESTAMIBI: HCPCS | Performed by: RADIOLOGY

## 2020-08-17 PROCEDURE — 78452 HT MUSCLE IMAGE SPECT MULT: CPT | Mod: TC

## 2020-08-17 RX ADMIN — Medication 31.2 MILLICURIE: at 09:22

## 2020-08-17 RX ADMIN — Medication 10.4 MILLICURIE: at 07:16

## 2020-08-25 ENCOUNTER — OFFICE VISIT (OUTPATIENT)
Dept: CHIROPRACTIC MEDICINE | Facility: OTHER | Age: 80
End: 2020-08-25
Attending: CHIROPRACTOR
Payer: COMMERCIAL

## 2020-08-25 DIAGNOSIS — M99.03 SEGMENTAL AND SOMATIC DYSFUNCTION OF LUMBAR REGION: Primary | ICD-10-CM

## 2020-08-25 DIAGNOSIS — M54.50 ACUTE BILATERAL LOW BACK PAIN WITHOUT SCIATICA: ICD-10-CM

## 2020-08-25 DIAGNOSIS — M99.02 SEGMENTAL AND SOMATIC DYSFUNCTION OF THORACIC REGION: ICD-10-CM

## 2020-08-25 PROCEDURE — 98940 CHIROPRACT MANJ 1-2 REGIONS: CPT | Mod: AT | Performed by: CHIROPRACTOR

## 2020-09-21 DIAGNOSIS — S33.8XXD SPRAIN OF OTHER PARTS OF LUMBAR SPINE AND PELVIS, SUBSEQUENT ENCOUNTER: ICD-10-CM

## 2020-09-22 RX ORDER — TRAMADOL HYDROCHLORIDE 50 MG/1
TABLET ORAL
Qty: 120 TABLET | Refills: 0 | Status: SHIPPED | OUTPATIENT
Start: 2020-09-22 | End: 2020-10-28

## 2020-09-22 NOTE — TELEPHONE ENCOUNTER
Controlled Substance Refill Request for traMADol HCl 50 MG Oral Tablet     Problem List Complete:    Yes    Last Written Prescription Date:  8/4/20  Last Fill Quantity: 120,   # refills: 0    THE MOST RECENT OFFICE VISIT MUST BE WITHIN THE PAST 3 MONTHS. AT LEAST ONE FACE TO FACE VISIT MUST OCCUR EVERY 6 MONTHS. ADDITIONAL VISITS CAN BE VIRTUAL.  (THIS STATEMENT SHOULD BE DELETED.)    [place] [facility] [place]11[place]20    Future Office visit:     Controlled substance agreement:   Encounter-Level CSA - 06/30/2017:    Controlled Substance Agreement - Scan on 7/5/2017  4:11 PM: CONTROLLED SUBSTANCE AGREEMENT     Patient-Level CSA:    There are no patient-level csa.         Last Urine Drug Screen:   Pain Drug SCR UR W RPTD Meds   Date Value Ref Range Status   06/30/2017   Final    FINAL  (Note)  ====================================================================  TOXASSURE COMP DRUG ANALYSIS,UR  ====================================================================  Test                             Result       Flag       Units  Drug Present and Declared for Prescription Verification   Tramadol                       PRESENT      EXPECTED   O-Desmethyltramadol            PRESENT      EXPECTED   N-Desmethyltramadol            PRESENT      EXPECTED    Source of tramadol is a prescription medication.    O-desmethyltramadol and N-desmethyltramadol are expected    metabolites of tramadol.    ====================================================================  Test                      Result    Flag   Units      Ref Range   Creatinine              23               mg/dL      >=20  ====================================================================  Declared Medications:  The flagging and interpretation on this report are based on the  following declared medications.  Unexpected results may arise from  inaccuracies in the declared medications.    **Note: The testing scope of this panel includes these  medications:    Tramadol (Ultram)  ====================================================================  For clinical consultation, please call [phone].  ====================================================================  Analysis performed by [employer] [place] [address]     , No results found for: COMDAT, No results found for: THC13, PCP13, COC13, MAMP13, OPI13, AMP13, BZO13, TCA13, MTD13, BAR13, OXY13, PPX13, BUP13     Processing:  Rx to be electronically transmitted to pharmacy by provider     [email or web address]   checked in past 3 months?

## 2020-09-25 DIAGNOSIS — M41.35 THORACOGENIC SCOLIOSIS OF THORACOLUMBAR REGION: ICD-10-CM

## 2020-09-25 DIAGNOSIS — Z78.0 POST-MENOPAUSE: ICD-10-CM

## 2020-09-25 RX ORDER — PANTOPRAZOLE SODIUM 40 MG/1
TABLET, DELAYED RELEASE ORAL
Qty: 90 TABLET | Refills: 0 | Status: SHIPPED | OUTPATIENT
Start: 2020-09-25 | End: 2020-12-28

## 2020-09-29 DIAGNOSIS — Z79.890 HORMONE REPLACEMENT THERAPY (HRT): ICD-10-CM

## 2020-09-29 RX ORDER — ESTRADIOL 0.5 MG/1
TABLET ORAL
Qty: 90 TABLET | Refills: 1 | Status: SHIPPED | OUTPATIENT
Start: 2020-09-29 | End: 2021-03-29

## 2020-10-08 ENCOUNTER — OFFICE VISIT (OUTPATIENT)
Dept: CHIROPRACTIC MEDICINE | Facility: OTHER | Age: 80
End: 2020-10-08
Payer: COMMERCIAL

## 2020-10-08 DIAGNOSIS — M99.03 SEGMENTAL AND SOMATIC DYSFUNCTION OF LUMBAR REGION: Primary | ICD-10-CM

## 2020-10-08 DIAGNOSIS — M99.02 SEGMENTAL AND SOMATIC DYSFUNCTION OF THORACIC REGION: ICD-10-CM

## 2020-10-08 DIAGNOSIS — M54.50 ACUTE BILATERAL LOW BACK PAIN WITHOUT SCIATICA: ICD-10-CM

## 2020-10-08 PROCEDURE — 98940 CHIROPRACT MANJ 1-2 REGIONS: CPT | Mod: AT | Performed by: CHIROPRACTOR

## 2020-10-25 DIAGNOSIS — S33.8XXD SPRAIN OF OTHER PARTS OF LUMBAR SPINE AND PELVIS, SUBSEQUENT ENCOUNTER: ICD-10-CM

## 2020-10-27 NOTE — TELEPHONE ENCOUNTER
Controlled Substance Refill Request for traMADol HCl 50 MG Oral Tablet    Problem List Complete:    Yes    Last Written Prescription Date:  9/22/20  Last Fill Quantity: 120,   # refills: 0    THE MOST RECENT OFFICE VISIT MUST BE WITHIN THE PAST 3 MONTHS. AT LEAST ONE FACE TO FACE VISIT MUST OCCUR EVERY 6 MONTHS. ADDITIONAL VISITS CAN BE VIRTUAL.  (THIS STATEMENT SHOULD BE DELETED.)    [place] [facility] [place] 8[place]11[place]    Future Office visit:     Controlled substance agreement:   Encounter-Level CSA - 06/30/2017:    Controlled Substance Agreement - Scan on 7/5/2017  4:11 PM: CONTROLLED SUBSTANCE AGREEMENT     Patient-Level CSA:    There are no patient-level csa.         Last Urine Drug Screen:   Pain Drug SCR UR W RPTD Meds   Date Value Ref Range Status   06/30/2017   Final    FINAL  (Note)  ====================================================================  TOXASSURE COMP DRUG ANALYSIS,UR  ====================================================================  Test                             Result       Flag       Units  Drug Present and Declared for Prescription Verification   Tramadol                       PRESENT      EXPECTED   O-Desmethyltramadol            PRESENT      EXPECTED   N-Desmethyltramadol            PRESENT      EXPECTED    Source of tramadol is a prescription medication.    O-desmethyltramadol and N-desmethyltramadol are expected    metabolites of tramadol.    ====================================================================  Test                      Result    Flag   Units      Ref Range   Creatinine              23               mg/dL      >=20  ====================================================================  Declared Medications:  The flagging and interpretation on this report are based on the  following declared medications.  Unexpected results may arise from  inaccuracies in the declared medications.    **Note: The testing scope of this panel includes these  medications:    Tramadol (Ultram)  ====================================================================  For clinical consultation, please call [phone].  ====================================================================  Analysis performed by [employer] [place] [address]     , No results found for: COMDAT, No results found for: THC13, PCP13, COC13, MAMP13, OPI13, AMP13, BZO13, TCA13, MTD13, BAR13, OXY13, PPX13, BUP13     Processing:  Rx to be electronically transmitted to pharmacy by provider     [email or web address]   checked in past 3 months?

## 2020-10-28 RX ORDER — TRAMADOL HYDROCHLORIDE 50 MG/1
TABLET ORAL
Qty: 120 TABLET | Refills: 0 | Status: SHIPPED | OUTPATIENT
Start: 2020-10-28 | End: 2020-12-01

## 2020-11-06 ENCOUNTER — OFFICE VISIT (OUTPATIENT)
Dept: CHIROPRACTIC MEDICINE | Facility: OTHER | Age: 80
End: 2020-11-06
Attending: CHIROPRACTOR
Payer: COMMERCIAL

## 2020-11-06 DIAGNOSIS — M54.50 ACUTE BILATERAL LOW BACK PAIN WITHOUT SCIATICA: ICD-10-CM

## 2020-11-06 DIAGNOSIS — M99.02 SEGMENTAL AND SOMATIC DYSFUNCTION OF THORACIC REGION: ICD-10-CM

## 2020-11-06 DIAGNOSIS — M99.03 SEGMENTAL AND SOMATIC DYSFUNCTION OF LUMBAR REGION: Primary | ICD-10-CM

## 2020-11-06 PROCEDURE — 98940 CHIROPRACT MANJ 1-2 REGIONS: CPT | Mod: AT | Performed by: CHIROPRACTOR

## 2020-11-25 ENCOUNTER — OFFICE VISIT (OUTPATIENT)
Dept: CHIROPRACTIC MEDICINE | Facility: OTHER | Age: 80
End: 2020-11-25
Attending: CHIROPRACTOR
Payer: COMMERCIAL

## 2020-11-25 DIAGNOSIS — M99.02 SEGMENTAL AND SOMATIC DYSFUNCTION OF THORACIC REGION: ICD-10-CM

## 2020-11-25 DIAGNOSIS — M54.50 ACUTE BILATERAL LOW BACK PAIN WITHOUT SCIATICA: ICD-10-CM

## 2020-11-25 DIAGNOSIS — M99.03 SEGMENTAL AND SOMATIC DYSFUNCTION OF LUMBAR REGION: Primary | ICD-10-CM

## 2020-11-25 PROCEDURE — 98940 CHIROPRACT MANJ 1-2 REGIONS: CPT | Mod: AT | Performed by: CHIROPRACTOR

## 2020-11-30 DIAGNOSIS — S33.8XXD SPRAIN OF OTHER PARTS OF LUMBAR SPINE AND PELVIS, SUBSEQUENT ENCOUNTER: ICD-10-CM

## 2020-11-30 NOTE — TELEPHONE ENCOUNTER
Tramadol 50 mg      Last Written Prescription Date:  10/28/2020  Last Fill Quantity: 120,   # refills: 0  Last Office Visit: 08/11/2020  Future Office visit:

## 2020-12-01 RX ORDER — TRAMADOL HYDROCHLORIDE 50 MG/1
TABLET ORAL
Qty: 120 TABLET | Refills: 0 | Status: SHIPPED | OUTPATIENT
Start: 2020-12-01 | End: 2021-01-05

## 2020-12-26 ENCOUNTER — TELEPHONE (OUTPATIENT)
Dept: FAMILY MEDICINE | Facility: OTHER | Age: 80
End: 2020-12-26

## 2020-12-26 DIAGNOSIS — M41.35 THORACOGENIC SCOLIOSIS OF THORACOLUMBAR REGION: ICD-10-CM

## 2020-12-26 DIAGNOSIS — Z78.0 POST-MENOPAUSE: ICD-10-CM

## 2020-12-27 NOTE — TELEPHONE ENCOUNTER
PROTONIX      Last Written Prescription Date:  9-  Last Fill Quantity: 90,   # refills: 0  Last Office Visit: 8-  Future Office visit:       Routing refill request to provider for review/approval because:

## 2020-12-28 RX ORDER — PANTOPRAZOLE SODIUM 40 MG/1
TABLET, DELAYED RELEASE ORAL
Qty: 90 TABLET | Refills: 0 | Status: SHIPPED | OUTPATIENT
Start: 2020-12-28 | End: 2021-09-01

## 2021-01-03 DIAGNOSIS — S33.8XXD SPRAIN OF OTHER PARTS OF LUMBAR SPINE AND PELVIS, SUBSEQUENT ENCOUNTER: ICD-10-CM

## 2021-01-04 NOTE — TELEPHONE ENCOUNTER
Tramadol 50 mg      Last Written Prescription Date:  12-1-2020  Last Fill Quantity: 120,   # refills: 0  Last Office Visit: 8-

## 2021-01-05 RX ORDER — TRAMADOL HYDROCHLORIDE 50 MG/1
TABLET ORAL
Qty: 120 TABLET | Refills: 0 | Status: SHIPPED | OUTPATIENT
Start: 2021-01-05 | End: 2021-02-09

## 2021-02-05 DIAGNOSIS — S33.8XXD SPRAIN OF OTHER PARTS OF LUMBAR SPINE AND PELVIS, SUBSEQUENT ENCOUNTER: ICD-10-CM

## 2021-02-09 RX ORDER — TRAMADOL HYDROCHLORIDE 50 MG/1
TABLET ORAL
Qty: 120 TABLET | Refills: 0 | Status: SHIPPED | OUTPATIENT
Start: 2021-02-09 | End: 2021-03-03

## 2021-02-09 NOTE — TELEPHONE ENCOUNTER
traMADol (ULTRAM) 50 MG tablet      Last Written Prescription Date:  1/5/21  Last Fill Quantity: 120,   # refills: 0  Last Office Visit: 8/11/20  Future Office visit:       Routing refill request to provider for review/approval because:  Drug not on the [facility], [facility] or [facility] refill protocol or controlled substance

## 2021-02-12 ENCOUNTER — TELEPHONE (OUTPATIENT)
Dept: FAMILY MEDICINE | Facility: OTHER | Age: 81
End: 2021-02-12

## 2021-02-12 NOTE — TELEPHONE ENCOUNTER
"Pt called, very upset that she and her  have not been able to receive covid vaccine. Attempted to explain to pt the process for scheduling a covid vaccine however pt cuts this writer off. Pt would like a message sent to Dr Mackay to make him aware. Pt states that she is \"sick of all the political nonsense\" and \"just wants to receive a vaccine.\" Will notify nursing supervisor of pt's complaints.   "

## 2021-02-17 ENCOUNTER — IMMUNIZATION (OUTPATIENT)
Dept: FAMILY MEDICINE | Facility: OTHER | Age: 81
End: 2021-02-17
Attending: FAMILY MEDICINE
Payer: COMMERCIAL

## 2021-02-17 PROCEDURE — 91300 PR COVID VAC PFIZER DIL RECON 30 MCG/0.3 ML IM: CPT

## 2021-02-23 ENCOUNTER — TELEPHONE (OUTPATIENT)
Dept: FAMILY MEDICINE | Facility: OTHER | Age: 81
End: 2021-02-23

## 2021-02-23 NOTE — TELEPHONE ENCOUNTER
8:57 AM    Reason for Call: OVERBOOK    Patient is having the following symptoms: Growth on head getting bigger      The patient is requesting an appointment for ASAP  with Codie Victor    Was an appointment offered for this call?   No    Preferred method for responding to this message: [phone]    If we cannot reach you directly, may we leave a detailed response at the number you provided?   Yes      Marcia Abraham

## 2021-03-03 DIAGNOSIS — S33.8XXD SPRAIN OF OTHER PARTS OF LUMBAR SPINE AND PELVIS, SUBSEQUENT ENCOUNTER: ICD-10-CM

## 2021-03-03 RX ORDER — TRAMADOL HYDROCHLORIDE 50 MG/1
TABLET ORAL
Qty: 120 TABLET | Refills: 0 | Status: SHIPPED | OUTPATIENT
Start: 2021-03-03 | End: 2021-04-06

## 2021-03-03 NOTE — TELEPHONE ENCOUNTER
traMADol (ULTRAM) 50 MG tablet    Last Written Prescription Date:  2/9/21  Last Fill Quantity: 120,   # refills: 0  Last Office Visit: 8/11/20  Future Office visit:    Next 5 appointments (look out 90 days)    Mar 05, 2021  4:00 PM  (Arrive by 3:45 PM)  SHORT with Virgil Mackay MD  [facility] ([facility] ) [address]  [place] [address]  [phone]           Routing refill request to provider for review/approval

## 2021-03-05 ENCOUNTER — OFFICE VISIT (OUTPATIENT)
Dept: FAMILY MEDICINE | Facility: OTHER | Age: 81
End: 2021-03-05
Attending: FAMILY MEDICINE
Payer: COMMERCIAL

## 2021-03-05 VITALS
HEIGHT: 58 IN | OXYGEN SATURATION: 98 % | WEIGHT: 115 LBS | TEMPERATURE: 97.9 F | SYSTOLIC BLOOD PRESSURE: 134 MMHG | BODY MASS INDEX: 24.14 KG/M2 | HEART RATE: 72 BPM | DIASTOLIC BLOOD PRESSURE: 77 MMHG | RESPIRATION RATE: 16 BRPM

## 2021-03-05 DIAGNOSIS — L98.9 SKIN LESION: Primary | ICD-10-CM

## 2021-03-05 PROCEDURE — 99213 OFFICE O/P EST LOW 20 MIN: CPT | Performed by: FAMILY MEDICINE

## 2021-03-05 ASSESSMENT — ANXIETY QUESTIONNAIRES
3. WORRYING TOO MUCH ABOUT DIFFERENT THINGS: NOT AT ALL
6. BECOMING EASILY ANNOYED OR IRRITABLE: NOT AT ALL
1. FEELING NERVOUS, ANXIOUS, OR ON EDGE: NOT AT ALL
GAD7 TOTAL SCORE: 0
7. FEELING AFRAID AS IF SOMETHING AWFUL MIGHT HAPPEN: NOT AT ALL
2. NOT BEING ABLE TO STOP OR CONTROL WORRYING: NOT AT ALL
4. TROUBLE RELAXING: NOT AT ALL
5. BEING SO RESTLESS THAT IT IS HARD TO SIT STILL: NOT AT ALL

## 2021-03-05 ASSESSMENT — PAIN SCALES - GENERAL: PAINLEVEL: MODERATE PAIN (5)

## 2021-03-05 ASSESSMENT — PATIENT HEALTH QUESTIONNAIRE - PHQ9: SUM OF ALL RESPONSES TO PHQ QUESTIONS 1-9: 0

## 2021-03-05 ASSESSMENT — MIFFLIN-ST. JEOR: SCORE: 868.45

## 2021-03-05 NOTE — PROGRESS NOTES
"    Assessment & Plan     Skin lesion  Etiology undetermined.  Appointment with ENT scheduled for evaluation and recommendations for further management.   - OTOLARYNGOLOGY REFERRAL  [id number]}     See Patient Instructions    No follow-ups on file.    Virgil Mackay MD  [facility] Simms is a 80 year old who presents for the following health issues     HPI       Concern - Lesion  Onset: prior  Description: light brown growth on left side of head  Intensity: 0/10  Progression of Symptoms:  Growth getting larger and itchy at times  Accompanying Signs & Symptoms: none  Previous history of similar problem: none  Precipitating factors:        Worsened by: nothing  Alleviating factors:        Improved by: nothing  Therapies tried and outcome:  none     Danielle has had the lesion as described above.  Denies fever, pruritis, hives, hair loss, sun sensitivity, or nail changes.,    Review of Systems   Constitutional, HEENT, cardiovascular, pulmonary, gi and gu systems are negative, except as otherwise noted.      Objective    /77 (BP Location: Right arm, Patient Position: Sitting, Cuff Size: Adult Regular)   Pulse 72   Temp 97.9  F (36.6  C) (Tympanic)   Resp 16   Ht 1.461 m (4' 9.5\")   Wt 52.2 kg (115 lb)   SpO2 98%   BMI 24.45 kg/m    Body mass index is 24.45 kg/m .  Physical Exam  Vitals signs and nursing note reviewed.   Constitutional:       General: She is not in acute distress.     Appearance: She is well-developed.   Skin:     General: Skin is warm and dry.      Comments: There is a lesion, flat, noted right temporal region.   Neurological:      Mental Status: She is alert and oriented to person, place, and time.   Psychiatric:         Mood and Affect: Mood normal.         Behavior: Behavior normal.         Thought Content: Thought content normal.        Other exam not repeated    No results found for any visits on 03/05/21.            "

## 2021-03-05 NOTE — NURSING NOTE
"Chief Complaint   Patient presents with     Lesion       Initial /77 (BP Location: Right arm, Patient Position: Sitting, Cuff Size: Adult Regular)   Pulse 72   Temp 97.9  F (36.6  C) (Tympanic)   Resp 16   Ht 1.461 m (4' 9.5\")   Wt 52.2 kg (115 lb)   SpO2 98%   BMI 24.45 kg/m   Estimated body mass index is 24.45 kg/m  as calculated from the following:    Height as of this encounter: 1.461 m (4' 9.5\").    Weight as of this encounter: 52.2 kg (115 lb).  Medication Reconciliation: complete  Tatiana Griggs LPN  "

## 2021-03-06 ASSESSMENT — ANXIETY QUESTIONNAIRES: GAD7 TOTAL SCORE: 0

## 2021-03-08 ENCOUNTER — IMMUNIZATION (OUTPATIENT)
Dept: FAMILY MEDICINE | Facility: OTHER | Age: 81
End: 2021-03-08
Attending: FAMILY MEDICINE
Payer: COMMERCIAL

## 2021-03-08 PROCEDURE — 91300 PR COVID VAC PFIZER DIL RECON 30 MCG/0.3 ML IM: CPT

## 2021-03-10 NOTE — PATIENT INSTRUCTIONS
Thank you for allowing Barbara Lewis NP and our ENT team to participate in your care.  If your medications are too expensive, please give the nurse a call.  We can possibly change this medication.  If you have a scheduling or an appointment question please contact our Health Unit Coordinator at their direct line [phone].   ALL nursing questions or concerns can be directed to your ENT nurse at: [phone] - F[place]zia    Wash the area with mild soap and water, then cover with bacitracin for 1 week  After 1 week continue to wash with soap and water and cover with aquaphor  Use sunscreen to the area once healed  I will call you with the pathology result when it available    Follow up as needed

## 2021-03-12 ENCOUNTER — OFFICE VISIT (OUTPATIENT)
Dept: CHIROPRACTIC MEDICINE | Facility: OTHER | Age: 81
End: 2021-03-12
Attending: CHIROPRACTOR
Payer: COMMERCIAL

## 2021-03-12 DIAGNOSIS — M54.50 ACUTE BILATERAL LOW BACK PAIN WITHOUT SCIATICA: ICD-10-CM

## 2021-03-12 DIAGNOSIS — M99.02 SEGMENTAL AND SOMATIC DYSFUNCTION OF THORACIC REGION: ICD-10-CM

## 2021-03-12 DIAGNOSIS — M99.03 SEGMENTAL AND SOMATIC DYSFUNCTION OF LUMBAR REGION: Primary | ICD-10-CM

## 2021-03-12 PROCEDURE — 98940 CHIROPRACT MANJ 1-2 REGIONS: CPT | Mod: AT | Performed by: CHIROPRACTOR

## 2021-03-15 ENCOUNTER — OFFICE VISIT (OUTPATIENT)
Dept: OTOLARYNGOLOGY | Facility: OTHER | Age: 81
End: 2021-03-15
Attending: FAMILY MEDICINE
Payer: COMMERCIAL

## 2021-03-15 VITALS
DIASTOLIC BLOOD PRESSURE: 64 MMHG | TEMPERATURE: 98 F | OXYGEN SATURATION: 97 % | HEIGHT: 60 IN | HEART RATE: 77 BPM | BODY MASS INDEX: 21.79 KG/M2 | SYSTOLIC BLOOD PRESSURE: 130 MMHG | WEIGHT: 111 LBS

## 2021-03-15 DIAGNOSIS — L98.9 SKIN LESION: ICD-10-CM

## 2021-03-15 PROCEDURE — 99213 OFFICE O/P EST LOW 20 MIN: CPT | Mod: 25 | Performed by: NURSE PRACTITIONER

## 2021-03-15 PROCEDURE — 11102 TANGNTL BX SKIN SINGLE LES: CPT | Performed by: NURSE PRACTITIONER

## 2021-03-15 PROCEDURE — 88305 TISSUE EXAM BY PATHOLOGIST: CPT | Mod: TC | Performed by: NURSE PRACTITIONER

## 2021-03-15 PROCEDURE — G0463 HOSPITAL OUTPT CLINIC VISIT: HCPCS | Mod: 25

## 2021-03-15 ASSESSMENT — MIFFLIN-ST. JEOR: SCORE: 889.99

## 2021-03-15 ASSESSMENT — PAIN SCALES - GENERAL: PAINLEVEL: NO PAIN (0)

## 2021-03-15 NOTE — NURSING NOTE
Chief Complaint   Patient presents with     Ent Problem     Patient referred by Dr. Mackay for skin lesion.  This is located on the left side of head.       Initial /64 (BP Location: Right arm, Cuff Size: Adult Regular)   Pulse 77   Temp 98  F (36.7  C) (Tympanic)   Ht 1.524 m (5')   Wt 50.3 kg (111 lb)   SpO2 97%   BMI 21.68 kg/m   Estimated body mass index is 21.68 kg/m  as calculated from the following:    Height as of this encounter: 1.524 m (5').    Weight as of this encounter: 50.3 kg (111 lb).  Medication Reconciliation: complete  Melissa Mraquez LPN

## 2021-03-15 NOTE — NURSING NOTE
Otolaryngology Note         Chief Complaint:     Patient presents with:  Ent Problem: Patient referred by Dr. Mackay for skin lesion.  This is located on the left side of head.           History of Present Illness:     Demi Narayan is a 80 year old female seen today for a lesion on the left temple.  She reports it has been there for a couple of years, but has recently grown in size.     She denies itching, pain, bleeding.  At times it gets a crust on the surface, she scratches at it.      No personal history of skin cancer.  She has a history of previous lesions biopsied, negative for malignancy    She reports history of sun exposure but does typically cover her face.  Sun exposure with driving in a car.      Former smoker, quit in 1968  No history of immunocompromise or solid organ transplant.           Medications:     Current Outpatient Rx   Medication Sig Dispense Refill     aspirin 325 MG tablet Take 325 mg by mouth At Bedtime.       Calcium Carbonate-Vitamin D (CALCIUM + D PO) Take 600 mg by mouth.       estradiol (ESTRACE) 0.5 MG tablet Take 1 Tab by mouth one time a day. 90 tablet 1     GLUCOSAMINE SULFATE PO Take  by mouth daily.       Multiple Vitamins-Minerals (MULTIVITAL PO) Take 1 tablet by mouth daily.       niacin 500 MG tablet Take 1 tablet by mouth daily.       Omega-3 Fatty Acids (OMEGA-3 FISH OIL PO) Take  by mouth daily.       pantoprazole (PROTONIX) 40 MG EC tablet TAKE 1 TABLET BY MOUTH IN THE MORNING BEFORE BREAKFAST 90 tablet 0     traMADol (ULTRAM) 50 MG tablet TAKE 1 TO 2 TABLETS BY MOUTH EVERY 6 TO 8 HOURS AS NEEDED 120 tablet 0     triamcinolone (KENALOG) 0.1 % external ointment Apply topically 2 times daily as needed for irritation 80 g 0            Allergies:     Allergies: Patient has no known allergies.          Past Medical History:     Past Medical History:   Diagnosis Date     Chronic/recurrent back pain 7/12/2011     Diverticulitis      Diverticulitis, recurrent 2/6/2002     bowel resection     Dyslipidemia 2006     Fibrocystic breast disease 2011     Gastro-oesophageal reflux disease      GERD 2/10/2012     Hiatal hernia 2/10/2012     Hormone replacement therapy, postmenopausal 2011     Osteoarthritis             Past Surgical History:     Past Surgical History:   Procedure Laterality Date     ------------OTHER-------------      colonoscopy with biopsy - diverticulitis, hemorrhoids     ------------OTHER-------------  1994    sigmoidoscopy - abdominal pain, diverticula     ABDOMEN SURGERY      colon removed 2nd to diverticulitis     APPENDECTOMY       ARTHROSCOPY SHOULDER  2013    Procedure: ARTHROSCOPY SHOULDER;  Right Shoulder Arthroscopy Sub-Acromial Decompression Rotator Cuff Repair;  Surgeon: Lenny Trammell MD;  Location: [facility]     [facility]     Diverticulitis      bowel resection     EGD with biopsy       ENDOSCOPY UPPER WITH PANCREATIC STIMULATION       ENDOSCOPY UPPER, COLONOSCOPY, COMBINED  2014    Procedure: COMBINED ENDOSCOPY UPPER, COLONOSCOPY;  Surgeon: Israel Feliciano MD;  Location: [facility] OR     ENT SURGERY      tonsilectomy     ESOPHAGOSCOPY, GASTROSCOPY, DUODENOSCOPY (EGD), COMBINED N/A 2017    Procedure: COMBINED ESOPHAGOSCOPY, GASTROSCOPY, DUODENOSCOPY (EGD);  UPPER ENDOSCOPY WITH BIOPSY AND POLYPECTOMY;  Surgeon: Gallito Alvarado DO;  Location: [facility]     GYN SURGERY      hysterectomy with bladder and rectal repair     IR CONSULTATION FOR IR EXAM  3/11/2019     ORTHOPEDIC SURGERY  2013    right rotator cuff surgery     TVH with ovarian preservation         ENT family history reviewed         Social History:     Social History     Tobacco Use     Smoking status: Former Smoker     Packs/day: 0.50     Years: 5.00     Pack years: 2.50     Types: Cigarettes     Start date: 1963     Quit date: 1968     Years since quittin.8     Smokeless tobacco: Never Used    Substance Use Topics     Alcohol use: Yes     Alcohol/week: 0.8 standard drinks     Types: 1 Glasses of wine per week     Comment: daily with dinner     Drug use: No            Review of Systems:     ROS: See HPI         Physical Exam:     /64 (BP Location: Right arm, Cuff Size: Adult Regular)   Pulse 77   Temp 98  F (36.7  C) (Tympanic)   Ht 1.524 m (5')   Wt 50.3 kg (111 lb)   SpO2 97%   BMI 21.68 kg/m    General - The patient is well nourished and well developed, and appears to have good nutritional status.  Alert and oriented to person and place, answers questions and cooperates with examination appropriately.   Head and Face - Normocephalic and atraumatic, with no gross asymmetry noted.  The facial nerve is intact, with strong symmetric movements.  Voice and Breathing - The patient was breathing comfortably without the use of accessory muscles. There was no wheezing, stridor. The patients voice was clear and strong, and had appropriate pitch and quality.  Ears - External ear normal. Canals are patent. Right tympanic membrane is intact without effusion, retraction or mass. Left tympanic membrane is intact without effusion, retraction or mass.  Eyes - Extraocular movements intact, sclera were not icteric or injected.  Mouth - Examination of the oral cavity showed pink, healthy oral mucosa. Dentition in good condition. No lesions or ulcerations noted. The tongue was mobile and midline.   Throat - The walls of the oropharynx were smooth, pink, moist, symmetric, and had no lesions or ulcerations.  The tonsillar pillars and soft palate were symmetric. The uvula was midline on elevation.    Neck - Normal midline excursion of the laryngotracheal complex during swallowing.  Full range of motion on passive movement.  Palpation of the occipital, submental, submandibular, internal jugular chain, and supraclavicular nodes did not demonstrate any abnormal lymph nodes or masses.  Palpation of the thyroid was  soft and smooth, with no nodules or goiter appreciated.  The trachea was mobile and midline.  Nose - External contour is symmetric, no gross deflection or scars.  Nasal mucosa is pink and moist with no abnormal mucus.  The septum and turbinates were evaluated: ***.  No polyps, masses, or purulence noted on examination.         Assessment and Plan:       ICD-10-CM    1. Skin lesion  L98.9

## 2021-03-15 NOTE — PROGRESS NOTES
Otolaryngology Note          Chief Complaint:      Patient presents with:  Ent Problem: Patient referred by Dr. Mackay for skin lesion.  This is located on the left side of head.             History of Present Illness:      Demi Narayan is a 80 year old female seen today for a lesion on the left temple.  She reports it has been there for a couple of years, but has recently grown in size.      She denies itching, pain, bleeding.  At times it gets a crust on the surface, she scratches at it.       No personal history of skin cancer.  She has a history of previous lesions biopsied, negative for malignancy     She reports history of sun exposure but does typically cover her face.  Sun exposure with driving in a car.       Former smoker, quit in 1968  No history of immunocompromise or solid organ transplant.            Medications:             Current Outpatient Rx   Medication Sig Dispense Refill     aspirin 325 MG tablet Take 325 mg by mouth At Bedtime.         Calcium Carbonate-Vitamin D (CALCIUM + D PO) Take 600 mg by mouth.         estradiol (ESTRACE) 0.5 MG tablet Take 1 Tab by mouth one time a day. 90 tablet 1     GLUCOSAMINE SULFATE PO Take  by mouth daily.         Multiple Vitamins-Minerals (MULTIVITAL PO) Take 1 tablet by mouth daily.         niacin 500 MG tablet Take 1 tablet by mouth daily.         Omega-3 Fatty Acids (OMEGA-3 FISH OIL PO) Take  by mouth daily.         pantoprazole (PROTONIX) 40 MG EC tablet TAKE 1 TABLET BY MOUTH IN THE MORNING BEFORE BREAKFAST 90 tablet 0     traMADol (ULTRAM) 50 MG tablet TAKE 1 TO 2 TABLETS BY MOUTH EVERY 6 TO 8 HOURS AS NEEDED 120 tablet 0     triamcinolone (KENALOG) 0.1 % external ointment Apply topically 2 times daily as needed for irritation 80 g 0              Allergies:      Allergies: Patient has no known allergies.           Past Medical History:           Past Medical History:   Diagnosis Date     Chronic/recurrent back pain 7/12/2011     Diverticulitis        Diverticulitis, recurrent 2/6/2002     bowel resection     Dyslipidemia 6/20/2006     Fibrocystic breast disease 7/12/2011     Gastro-oesophageal reflux disease       GERD 2/10/2012     Hiatal hernia 2/10/2012     Hormone replacement therapy, postmenopausal 7/12/2011     Osteoarthritis                Past Surgical History:            Past Surgical History:   Procedure Laterality Date     ------------OTHER-------------         colonoscopy with biopsy - diverticulitis, hemorrhoids     ------------OTHER-------------   4/19/1994     sigmoidoscopy - abdominal pain, diverticula     ABDOMEN SURGERY         colon removed 2nd to diverticulitis     APPENDECTOMY         ARTHROSCOPY SHOULDER   11/20/2013     Procedure: ARTHROSCOPY SHOULDER;  Right Shoulder Arthroscopy Sub-Acromial Decompression Rotator Cuff Repair;  Surgeon: Lenny Trammell MD;  Location: [facility]     [facility]     Diverticulitis         bowel resection     EGD with biopsy   2011     ENDOSCOPY UPPER WITH PANCREATIC STIMULATION         ENDOSCOPY UPPER, COLONOSCOPY, COMBINED   7/18/2014     Procedure: COMBINED ENDOSCOPY UPPER, COLONOSCOPY;  Surgeon: Israel Feliciano MD;  Location: [facility]     ENT SURGERY         tonsilectomy     ESOPHAGOSCOPY, GASTROSCOPY, DUODENOSCOPY (EGD), COMBINED N/A 9/27/2017     Procedure: COMBINED ESOPHAGOSCOPY, GASTROSCOPY, DUODENOSCOPY (EGD);  UPPER ENDOSCOPY WITH BIOPSY AND POLYPECTOMY;  Surgeon: Gallito Alvarado DO;  Location: [facility]     GYN SURGERY         hysterectomy with bladder and rectal repair     IR CONSULTATION FOR IR EXAM   3/11/2019     ORTHOPEDIC SURGERY   11/20/2013     right rotator cuff surgery     TVH with ovarian preservation             ENT family history reviewed          Social History:      Social History            Tobacco Use     Smoking status: Former Smoker       Packs/day: 0.50       Years: 5.00       Pack years: 2.50       Types: Cigarettes        Start date: 1963       Quit date: 1968       Years since quittin.8     Smokeless tobacco: Never Used   Substance Use Topics     Alcohol use: Yes       Alcohol/week: 0.8 standard drinks       Types: 1 Glasses of wine per week       Comment: daily with dinner     Drug use: No              Review of Systems:      ROS: See HPI          Physical Exam:      /64 (BP Location: Right arm, Cuff Size: Adult Regular)   Pulse 77   Temp 98  F (36.7  C) (Tympanic)   Ht 1.524 m (5')   Wt 50.3 kg (111 lb)   SpO2 97%   BMI 21.68 kg/m      General - The patient is well nourished and well developed, and appears to have good nutritional status.  Alert and oriented to person and place, answers questions and cooperates with examination appropriately.   Head and Face - Normocephalic and atraumatic, with no gross asymmetry noted.  The facial nerve is intact, with strong symmetric movements.  No parotid gland masses or swelling noted with palpation.   Voice and Breathing - The patient was breathing comfortably without the use of accessory muscles. There was no wheezing, stridor. The patients voice was clear and strong, and had appropriate pitch and quality.  Ears - External ear normal. Canals are patent. Right tympanic membrane is intact without effusion, retraction or mass. Left tympanic membrane is intact without effusion, retraction or mass.  Eyes - Extraocular movements intact, sclera were not icteric or injected.  Mouth - Examination of the oral cavity showed pink, healthy oral mucosa. Dentition in good condition. No lesions or ulcerations noted. The tongue was mobile and midline.   Throat - The walls of the oropharynx were smooth, pink, moist, symmetric, and had no lesions or ulcerations.  The tonsillar pillars and soft palate were symmetric. The uvula was midline on elevation.    Neck -  Palpation of the occipital, submental, submandibular, internal jugular chain, and supraclavicular nodes did not demonstrate  any abnormal lymph nodes or masses.  Palpation of the thyroid was soft and smooth, with no nodules or goiter appreciated.  The trachea was mobile and midline.  Nose - External contour is symmetric, no gross deflection or scars.   Left temporal area has 13 mm x 15 mm oval shaped minimally raised tan plaque like lesion that is non-tender to palpation.  No ulcerated areas or scabbing noted.      Procedure - left temple lesion shave biopsy     Informed consent was discussed and signed, and risks of the procedure were discussed, including bleeding, anesthesia, infection, scar formation, numbness, need for additional surgery.  A time out was taken.  The site was prepped and draped in the normal sterile fashion.   I then infiltrated the lesions with 1% lidocaine with 1:200,000 epinephrine.  I then used a derma blade to shave the lesion off at the skin level.   Biopsy was completed to site.  Hemostasis was achieved with direct pressure and scant use of silver nitrate.  The specimen(s) was placed in formalin and sent to pathology.    Pre and post procedure pictures were taken.              Assessment and Plan:          ICD-10-CM     1. Skin lesion  L98.9          Wash the area with mild soap and water, then cover with bacitracin for 1 week  After 1 week continue to wash with soap and water and cover with aquaphor  Use sunscreen to the area once healed  I will call you with the pathology result when it available  Advised the area may be hypopigmented.      Follow up as needed    Barbara ERNANDEZ  [facility]  5:23 PM  March 16, 2021

## 2021-03-15 NOTE — LETTER
3/15/2021         RE: Demi Narayan  [address] [facility] [address]        Dear Colleague,    Thank you for referring your patient, Demi Narayan, to the [facility]. Please see a copy of my visit note below.    Otolaryngology Note          Chief Complaint:      Patient presents with:  Ent Problem: Patient referred by Dr. Mackay for skin lesion.  This is located on the left side of head.             History of Present Illness:      Demi Narayan is a 80 year old female seen today for a lesion on the left temple.  She reports it has been there for a couple of years, but has recently grown in size.      She denies itching, pain, bleeding.  At times it gets a crust on the surface, she scratches at it.       No personal history of skin cancer.  She has a history of previous lesions biopsied, negative for malignancy     She reports history of sun exposure but does typically cover her face.  Sun exposure with driving in a car.       Former smoker, quit in 1968  No history of immunocompromise or solid organ transplant.            Medications:             Current Outpatient Rx   Medication Sig Dispense Refill     aspirin 325 MG tablet Take 325 mg by mouth At Bedtime.         Calcium Carbonate-Vitamin D (CALCIUM + D PO) Take 600 mg by mouth.         estradiol (ESTRACE) 0.5 MG tablet Take 1 Tab by mouth one time a day. 90 tablet 1     GLUCOSAMINE SULFATE PO Take  by mouth daily.         Multiple Vitamins-Minerals (MULTIVITAL PO) Take 1 tablet by mouth daily.         niacin 500 MG tablet Take 1 tablet by mouth daily.         Omega-3 Fatty Acids (OMEGA-3 FISH OIL PO) Take  by mouth daily.         pantoprazole (PROTONIX) 40 MG EC tablet TAKE 1 TABLET BY MOUTH IN THE MORNING BEFORE BREAKFAST 90 tablet 0     traMADol (ULTRAM) 50 MG tablet TAKE 1 TO 2 TABLETS BY MOUTH EVERY 6 TO 8 HOURS AS NEEDED 120 tablet 0     triamcinolone (KENALOG) 0.1 % external ointment Apply topically 2 times daily as  needed for irritation 80 g 0              Allergies:      Allergies: Patient has no known allergies.           Past Medical History:           Past Medical History:   Diagnosis Date     Chronic/recurrent back pain 7/12/2011     Diverticulitis       Diverticulitis, recurrent 2/6/2002     bowel resection     Dyslipidemia 6/20/2006     Fibrocystic breast disease 7/12/2011     Gastro-oesophageal reflux disease       GERD 2/10/2012     Hiatal hernia 2/10/2012     Hormone replacement therapy, postmenopausal 7/12/2011     Osteoarthritis                Past Surgical History:            Past Surgical History:   Procedure Laterality Date     ------------OTHER-------------         colonoscopy with biopsy - diverticulitis, hemorrhoids     ------------OTHER-------------   4/19/1994     sigmoidoscopy - abdominal pain, diverticula     ABDOMEN SURGERY         colon removed 2nd to diverticulitis     APPENDECTOMY         ARTHROSCOPY SHOULDER   11/20/2013     Procedure: ARTHROSCOPY SHOULDER;  Right Shoulder Arthroscopy Sub-Acromial Decompression Rotator Cuff Repair;  Surgeon: Lenny Trammell MD;  Location: [facility]     [facility]     Di[facility]itis         bowel resection     EGD with biopsy   2011     ENDOSCOPY UPPER WITH PANCREATIC STIMULATION         ENDOSCOPY UPPER, COLONOSCOPY, COMBINED   7/18/2014     Procedure: COMBINED ENDOSCOPY UPPER, COLONOSCOPY;  Surgeon: Israel Feliciano MD;  Location: [facility] OR     ENT SURGERY         tonsilectomy     ESOPHAGOSCOPY, GASTROSCOPY, DUODENOSCOPY (EGD), COMBINED N/A 9/27/2017     Procedure: COMBINED ESOPHAGOSCOPY, GASTROSCOPY, DUODENOSCOPY (EGD);  UPPER ENDOSCOPY WITH BIOPSY AND POLYPECTOMY;  Surgeon: Gallito Alvarado DO;  Location: [facility]     GYN SURGERY         hysterectomy with bladder and rectal repair     IR CONSULTATION FOR IR EXAM   3/11/2019     ORTHOPEDIC SURGERY   11/20/2013     right rotator cuff surgery     TVH with ovarian  preservation             ENT family history reviewed          Social History:      Social History            Tobacco Use     Smoking status: Former Smoker       Packs/day: 0.50       Years: 5.00       Pack years: 2.50       Types: Cigarettes       Start date: 1963       Quit date: 1968       Years since quittin.8     Smokeless tobacco: Never Used   Substance Use Topics     Alcohol use: Yes       Alcohol/week: 0.8 standard drinks       Types: 1 Glasses of wine per week       Comment: daily with dinner     Drug use: No              Review of Systems:      ROS: See HPI          Physical Exam:      /64 (BP Location: Right arm, Cuff Size: Adult Regular)   Pulse 77   Temp 98  F (36.7  C) (Tympanic)   Ht 1.524 m (5')   Wt 50.3 kg (111 lb)   SpO2 97%   BMI 21.68 kg/m      General - The patient is well nourished and well developed, and appears to have good nutritional status.  Alert and oriented to person and place, answers questions and cooperates with examination appropriately.   Head and Face - Normocephalic and atraumatic, with no gross asymmetry noted.  The facial nerve is intact, with strong symmetric movements.  No parotid gland masses or swelling noted with palpation.   Voice and Breathing - The patient was breathing comfortably without the use of accessory muscles. There was no wheezing, stridor. The patients voice was clear and strong, and had appropriate pitch and quality.  Ears - External ear normal. Canals are patent. Right tympanic membrane is intact without effusion, retraction or mass. Left tympanic membrane is intact without effusion, retraction or mass.  Eyes - Extraocular movements intact, sclera were not icteric or injected.  Mouth - Examination of the oral cavity showed pink, healthy oral mucosa. Dentition in good condition. No lesions or ulcerations noted. The tongue was mobile and midline.   Throat - The walls of the oropharynx were smooth, pink, moist, symmetric, and had no  lesions or ulcerations.  The tonsillar pillars and soft palate were symmetric. The uvula was midline on elevation.    Neck -  Palpation of the occipital, submental, submandibular, internal jugular chain, and supraclavicular nodes did not demonstrate any abnormal lymph nodes or masses.  Palpation of the thyroid was soft and smooth, with no nodules or goiter appreciated.  The trachea was mobile and midline.  Nose - External contour is symmetric, no gross deflection or scars.   Left temporal area has 13 mm x 15 mm oval shaped minimally raised tan plaque like lesion that is non-tender to palpation.  No ulcerated areas or scabbing noted.      Procedure - left temple lesion shave biopsy     Informed consent was discussed and signed, and risks of the procedure were discussed, including bleeding, anesthesia, infection, scar formation, numbness, need for additional surgery.  A time out was taken.  The site was prepped and draped in the normal sterile fashion.   I then infiltrated the lesions with 1% lidocaine with 1:200,000 epinephrine.  I then used a derma blade to shave the lesion off at the skin level.   Biopsy was completed to site.  Hemostasis was achieved with direct pressure and scant use of silver nitrate.  The specimen(s) was placed in formalin and sent to pathology.    Pre and post procedure pictures were taken.              Assessment and Plan:          ICD-10-CM     1. Skin lesion  L98.9          Wash the area with mild soap and water, then cover with bacitracin for 1 week  After 1 week continue to wash with soap and water and cover with aquaphor  Use sunscreen to the area once healed  I will call you with the pathology result when it available  Advised the area may be hypopigmented.      Follow up as needed    Barbara ERNANDEZ  [facility]  5:23 PM  March 16, 2021        Again, thank you for allowing me to participate in the care of your patient.        Sincerely,        Barbara HERNANDEZ  Debbie, NP

## 2021-03-16 LAB — COPATH REPORT: NORMAL

## 2021-03-17 ENCOUNTER — OFFICE VISIT (OUTPATIENT)
Dept: CHIROPRACTIC MEDICINE | Facility: OTHER | Age: 81
End: 2021-03-17
Attending: CHIROPRACTOR
Payer: COMMERCIAL

## 2021-03-17 DIAGNOSIS — M99.02 SEGMENTAL AND SOMATIC DYSFUNCTION OF THORACIC REGION: ICD-10-CM

## 2021-03-17 DIAGNOSIS — M54.50 ACUTE BILATERAL LOW BACK PAIN WITHOUT SCIATICA: ICD-10-CM

## 2021-03-17 DIAGNOSIS — M99.03 SEGMENTAL AND SOMATIC DYSFUNCTION OF LUMBAR REGION: Primary | ICD-10-CM

## 2021-03-17 PROCEDURE — 98940 CHIROPRACT MANJ 1-2 REGIONS: CPT | Mod: AT | Performed by: CHIROPRACTOR

## 2021-03-25 ENCOUNTER — OFFICE VISIT (OUTPATIENT)
Dept: CHIROPRACTIC MEDICINE | Facility: OTHER | Age: 81
End: 2021-03-25
Attending: CHIROPRACTOR
Payer: COMMERCIAL

## 2021-03-25 DIAGNOSIS — M54.50 ACUTE BILATERAL LOW BACK PAIN WITHOUT SCIATICA: ICD-10-CM

## 2021-03-25 DIAGNOSIS — M99.02 SEGMENTAL AND SOMATIC DYSFUNCTION OF THORACIC REGION: ICD-10-CM

## 2021-03-25 DIAGNOSIS — M99.03 SEGMENTAL AND SOMATIC DYSFUNCTION OF LUMBAR REGION: Primary | ICD-10-CM

## 2021-03-25 PROCEDURE — 98940 CHIROPRACT MANJ 1-2 REGIONS: CPT | Mod: AT | Performed by: CHIROPRACTOR

## 2021-03-27 DIAGNOSIS — Z79.890 HORMONE REPLACEMENT THERAPY (HRT): ICD-10-CM

## 2021-03-28 NOTE — TELEPHONE ENCOUNTER
Estrace       Last Written Prescription Date:  9/29/2020  Last Fill Quantity: 90,   # refills: 1  Last Office Visit: 3/5/2021  Future Office visit:    Next 5 appointments (look out 90 days)    Apr 08, 2021 10:30 AM  Return Visit with JOEY Engel ([facility] ) [address] [place] [address]  [place] [address]  [phone]

## 2021-03-29 RX ORDER — ESTRADIOL 0.5 MG/1
TABLET ORAL
Qty: 90 TABLET | Refills: 1 | Status: SHIPPED | OUTPATIENT
Start: 2021-03-29 | End: 2021-09-21

## 2021-04-05 DIAGNOSIS — S33.8XXD SPRAIN OF OTHER PARTS OF LUMBAR SPINE AND PELVIS, SUBSEQUENT ENCOUNTER: ICD-10-CM

## 2021-04-05 NOTE — TELEPHONE ENCOUNTER
Tramadol  Last Written Prescription Date: 3/3/21  Last Fill Quantity: 120 # of Refills: 0  Last Office Visit: 3/5/21

## 2021-04-06 RX ORDER — TRAMADOL HYDROCHLORIDE 50 MG/1
TABLET ORAL
Qty: 120 TABLET | Refills: 0 | Status: SHIPPED | OUTPATIENT
Start: 2021-04-06 | End: 2021-05-04

## 2021-04-08 ENCOUNTER — ANCILLARY PROCEDURE (OUTPATIENT)
Dept: MAMMOGRAPHY | Facility: OTHER | Age: 81
End: 2021-04-08
Attending: FAMILY MEDICINE
Payer: COMMERCIAL

## 2021-04-08 DIAGNOSIS — Z12.31 VISIT FOR SCREENING MAMMOGRAM: ICD-10-CM

## 2021-04-08 PROCEDURE — 77063 BREAST TOMOSYNTHESIS BI: CPT | Mod: TC

## 2021-04-12 ENCOUNTER — OFFICE VISIT (OUTPATIENT)
Dept: CHIROPRACTIC MEDICINE | Facility: OTHER | Age: 81
End: 2021-04-12
Attending: CHIROPRACTOR
Payer: COMMERCIAL

## 2021-04-12 DIAGNOSIS — M99.03 SEGMENTAL AND SOMATIC DYSFUNCTION OF LUMBAR REGION: Primary | ICD-10-CM

## 2021-04-12 DIAGNOSIS — M54.50 ACUTE BILATERAL LOW BACK PAIN WITHOUT SCIATICA: ICD-10-CM

## 2021-04-12 DIAGNOSIS — M99.02 SEGMENTAL AND SOMATIC DYSFUNCTION OF THORACIC REGION: ICD-10-CM

## 2021-04-12 PROCEDURE — 98940 CHIROPRACT MANJ 1-2 REGIONS: CPT | Mod: AT | Performed by: CHIROPRACTOR

## 2021-05-05 ENCOUNTER — OFFICE VISIT (OUTPATIENT)
Dept: CHIROPRACTIC MEDICINE | Facility: OTHER | Age: 81
End: 2021-05-05
Attending: CHIROPRACTOR
Payer: COMMERCIAL

## 2021-05-05 DIAGNOSIS — M54.50 ACUTE BILATERAL LOW BACK PAIN WITHOUT SCIATICA: ICD-10-CM

## 2021-05-05 DIAGNOSIS — M99.03 SEGMENTAL AND SOMATIC DYSFUNCTION OF LUMBAR REGION: Primary | ICD-10-CM

## 2021-05-05 DIAGNOSIS — M99.02 SEGMENTAL AND SOMATIC DYSFUNCTION OF THORACIC REGION: ICD-10-CM

## 2021-05-05 PROCEDURE — 98940 CHIROPRACT MANJ 1-2 REGIONS: CPT | Mod: AT | Performed by: CHIROPRACTOR

## 2021-05-26 ENCOUNTER — OFFICE VISIT (OUTPATIENT)
Dept: CHIROPRACTIC MEDICINE | Facility: OTHER | Age: 81
End: 2021-05-26
Attending: CHIROPRACTOR
Payer: COMMERCIAL

## 2021-05-26 DIAGNOSIS — M54.50 ACUTE BILATERAL LOW BACK PAIN WITHOUT SCIATICA: ICD-10-CM

## 2021-05-26 DIAGNOSIS — M99.02 SEGMENTAL AND SOMATIC DYSFUNCTION OF THORACIC REGION: ICD-10-CM

## 2021-05-26 DIAGNOSIS — M99.03 SEGMENTAL AND SOMATIC DYSFUNCTION OF LUMBAR REGION: Primary | ICD-10-CM

## 2021-05-26 PROCEDURE — 98940 CHIROPRACT MANJ 1-2 REGIONS: CPT | Mod: AT | Performed by: CHIROPRACTOR

## 2021-05-27 ENCOUNTER — TELEPHONE (OUTPATIENT)
Dept: FAMILY MEDICINE | Facility: OTHER | Age: 81
End: 2021-05-27

## 2021-05-27 DIAGNOSIS — Z00.00 ROUTINE GENERAL MEDICAL EXAMINATION AT A HEALTH CARE FACILITY: Primary | ICD-10-CM

## 2021-05-27 DIAGNOSIS — E78.2 MIXED HYPERLIPIDEMIA: ICD-10-CM

## 2021-05-27 NOTE — TELEPHONE ENCOUNTER
Please send orders for a UA and blood work to the lab so she can do that before her appointment tomorrow at 8:15.  She will be fasting and will stop in lab first.

## 2021-06-01 DIAGNOSIS — S33.8XXD SPRAIN OF OTHER PARTS OF LUMBAR SPINE AND PELVIS, SUBSEQUENT ENCOUNTER: ICD-10-CM

## 2021-06-01 RX ORDER — TRAMADOL HYDROCHLORIDE 50 MG/1
TABLET ORAL
Qty: 120 TABLET | Refills: 0 | Status: SHIPPED | OUTPATIENT
Start: 2021-06-01 | End: 2021-07-04

## 2021-06-01 NOTE — PROGRESS NOTES
"SUBJECTIVE:   Demi Narayan is a 80 year old female who presents for Preventive Visit.      Patient has been advised of split billing requirements and indicates understanding: Yes  Are you in the first 12 months of your Medicare Part B coverage?  No    Physical Health:    In general, how would you rate your overall physical health? good    Outside of work, how many days during the week do you exercise? 6-7 days/week    Outside of work, approximately how many minutes a day do you exercise?less than 15 minutes  If you drink alcohol do you typically have >3 drinks per day or >7 drinks per week? Yes - AUDIT SCORE:   1 glass wine at dinner  No flowsheet data found.    Do you usually eat at least 4 servings of fruit and vegetables a day, include whole grains & fiber and avoid regularly eating high fat or \"junk\" foods? Yes    Do you have any problems taking medications regularly?  YES    Do you have any side effects from medications? none    Needs assistance for the following daily activities: no assistance needed    Which of the following safety concerns are present in your home?  none identified     Hearing impairment: No    In the past 6 months, have you been bothered by leaking of urine? yes    Mental Health:    In general, how would you rate your overall mental or emotional health? excellent  PHQ-2 Score:      Do you feel safe in your environment? Yes    Have you ever done Advance Care Planning? (For example, a Health Directive, POLST, or a discussion with a medical provider or your loved ones about your wishes): Yes, patient states has an Advance Care Planning document and will bring a copy to the clinic.    Additional concerns to address?  YES    Cognitive Screening: Per LPN      Reviewed and updated as needed this visit by clinical staff  Tobacco  Allergies  Meds              Reviewed and updated as needed this visit by Provider      Demi reports having urine incontinence and stool leakage. She would like " to see urology. She has tried Kegel exercises and exercises haven't bene effective.                       Social History     Tobacco Use     Smoking status: Former Smoker     Packs/day: 0.50     Years: 5.00     Pack years: 2.50     Types: Cigarettes     Start date: 1963     Quit date: 1968     Years since quittin.1     Smokeless tobacco: Never Used   Substance Use Topics     Alcohol use: Yes     Alcohol/week: 0.8 standard drinks     Types: 1 Glasses of wine per week     Comment: daily with dinner                           Current providers sharing in care for this patient include:   Patient Care Team:  Virgil Mackay MD as PCP - General  Nicole Joseph RN as Registered Nurse  Virgil Mackay MD as Assigned PCP  Barbara Lewis NP as Assigned Surgical Provider    The following health maintenance items are reviewed in Epic and correct as of today:  Health Maintenance   Topic Date Due     TREATMENT AGREEMENT FOR CHRONIC PAIN MANAGEMENT  Never done     DTAP/TDAP/TD IMMUNIZATION (1 - Tdap) 1964     ZOSTER IMMUNIZATION (2 of 3) 2007     URINE DRUG SCREEN  2018     Pneumococcal Vaccine: 65+ Years (2 of 2 - PPSV23) 2018     INFLUENZA VACCINE (Season Ended) 2021     ADONIS ASSESSMENT  2022     PHQ-9  2022     FALL RISK ASSESSMENT  03/15/2022     MAMMO SCREENING  2022     MEDICARE ANNUAL WELLNESS VISIT  2022     COLORECTAL CANCER SCREENING  2024     ADVANCE CARE PLANNING  2026     DEXA  2032     PHQ-2  Completed     COVID-19 Vaccine  Completed     IPV IMMUNIZATION  Aged Out     MENINGITIS IMMUNIZATION  Aged Out     HEPATITIS B IMMUNIZATION  Aged Out     BP Readings from Last 3 Encounters:   21 136/70   03/15/21 130/64   21 134/77    Wt Readings from Last 3 Encounters:   21 52.6 kg (116 lb)   03/15/21 50.3 kg (111 lb)   21 52.2 kg (115 lb)                  Patient Active Problem List   Diagnosis     H/O  diverticulitis, S/P bowel resection     Dyslipidemia     Fibrocystic breast disease (FCBD)     Low back pain, chronic     GERD (gastroesophageal reflux)     Osteoarthritis, multiple joints     Comprehensive Medical Examination     Post-menopause     Diaphragmatic hernia     ACP (advance care planning)     Scoliosis, throracolumbar     Chronic, continuous use of opioids     Lyme disease     Past Surgical History:   Procedure Laterality Date     ------------OTHER-------------      colonoscopy with biopsy - diverticulitis, hemorrhoids     ------------OTHER-------------  4/19/1994    sigmoidoscopy - abdominal pain, diverticula     ABDOMEN SURGERY      colon removed 2nd to diverticulitis     APPENDECTOMY       ARTHROSCOPY SHOULDER  11/20/2013    Procedure: ARTHROSCOPY SHOULDER;  Right Shoulder Arthroscopy Sub-Acromial Decompression Rotator Cuff Repair;  Surgeon: Lenny Trammell MD;  Location: [facility]     [facility]     Diverticulitis      bowel resection     EGD with biopsy  2011     ENDOSCOPY UPPER WITH PANCREATIC STIMULATION       ENDOSCOPY UPPER, COLONOSCOPY, COMBINED  7/18/2014    Procedure: COMBINED ENDOSCOPY UPPER, COLONOSCOPY;  Surgeon: Israel Feliciano MD;  Location: [facility] OR     ENT SURGERY      tonsilectomy     ESOPHAGOSCOPY, GASTROSCOPY, DUODENOSCOPY (EGD), COMBINED N/A 9/27/2017    Procedure: COMBINED ESOPHAGOSCOPY, GASTROSCOPY, DUODENOSCOPY (EGD);  UPPER ENDOSCOPY WITH BIOPSY AND POLYPECTOMY;  Surgeon: Gallito Alvarado DO;  Location: [facility]     GYN SURGERY      hysterectomy with bladder and rectal repair     IR CONSULTATION FOR IR EXAM  3/11/2019     ORTHOPEDIC SURGERY  11/20/2013    right rotator cuff surgery     TVH with ovarian preservation         Social History     Tobacco Use     Smoking status: Former Smoker     Packs/day: 0.50     Years: 5.00     Pack years: 2.50     Types: Cigarettes     Start date: 1/1/1963     Quit date: 5/6/1968     Years  since quittin.1     Smokeless tobacco: Never Used   Substance Use Topics     Alcohol use: Yes     Alcohol/week: 0.8 standard drinks     Types: 1 Glasses of wine per week     Comment: daily with dinner     Family History   Problem Relation Age of Onset     Cerebrovascular Disease Father         CVA     Heart Disease Father         heart disease     Hypertension Father      Myocardial Infarction Father         myocardial infarction     Cerebrovascular Disease Mother         CVA     Diabetes Mother      Heart Disease Mother         heart disease     Hypertension Mother      Heart Disease Brother         heart disease     Hypertension Brother          Current Outpatient Medications   Medication Sig Dispense Refill     aspirin 325 MG tablet Take 325 mg by mouth At Bedtime.       Calcium Carbonate-Vitamin D (CALCIUM + D PO) Take 600 mg by mouth.       estradiol (ESTRACE) 0.5 MG tablet Take 1 Tab by mouth one time a day. 90 tablet 1     GLUCOSAMINE SULFATE PO Take  by mouth daily.       latanoprost (XALATAN) 0.005 % ophthalmic solution        Multiple Vitamins-Minerals (MULTIVITAL PO) Take 1 tablet by mouth daily.       niacin 500 MG tablet Take 1 tablet by mouth daily.       Omega-3 Fatty Acids (OMEGA-3 FISH OIL PO) Take  by mouth daily.       pantoprazole (PROTONIX) 40 MG EC tablet TAKE 1 TABLET BY MOUTH IN THE MORNING BEFORE BREAKFAST 90 tablet 0     traMADol (ULTRAM) 50 MG tablet TAKE 1 TO 2 TABLETS BY MOUTH EVERY 6 TO 8 HOURS AS NEEDED 120 tablet 0     triamcinolone (KENALOG) 0.1 % external ointment Apply topically 2 times daily as needed for irritation 80 g 0     Mammogram Screening: Mammogram Screening - Patient over age 75, has elected to continue with screening.    ROS:  CONSTITUTIONAL: NEGATIVE for fever, chills, change in weight  INTEGUMENTARY/SKIN: NEGATIVE for worrisome rashes, moles or lesions  EYES: NEGATIVE for vision changes or irritation  ENT/MOUTH: NEGATIVE for ear, mouth and throat problems  RESP:  NEGATIVE for significant cough or SOB  BREAST: NEGATIVE for masses, tenderness or discharge  CV: NEGATIVE for chest pain, palpitations or peripheral edema  GI: NEGATIVE for nausea, abdominal pain, heartburn, or change in bowel habits. +epigastric abdominal fullness sensation with HX hiatal hernia. She feels like symptoms are worsening (CT ordered) Takes Protonix   : NEGATIVE for frequency, dysuria, or hematuria  MUSCULOSKELETAL: NEGATIVE for significant arthralgias or myalgia  NEURO: NEGATIVE for weakness, dizziness or paresthesias. +fuzzy sensation in her head at time and the skin on her scalp has a decrease in sensation (she would like her carotid arteries checked. Family history of carotid stenosis)  ENDOCRINE: NEGATIVE for temperature intolerance, skin/hair changes  HEME: NEGATIVE for bleeding problems  PSYCHIATRIC: NEGATIVE for changes in mood or affect    OBJECTIVE:   /70   Pulse 78   Temp 96.6  F (35.9  C) (Tympanic)   Wt 52.6 kg (116 lb)   SpO2 99%   BMI 22.65 kg/m   Estimated body mass index is 22.65 kg/m  as calculated from the following:    Height as of 3/15/21: 1.524 m (5').    Weight as of this encounter: 52.6 kg (116 lb).  EXAM:   GENERAL: healthy, alert and no distress  EYES: Eyes grossly normal to inspection, PERRL and conjunctivae and sclerae normal  HENT: bilateral cerumen impaction (bilateral ear lavage per LPN), nose and mouth without ulcers or lesions  NECK: no adenopathy, no asymmetry, masses, or scars and thyroid normal to palpation  RESP: lungs clear to auscultation - no rales, rhonchi or wheezes  BREAST: normal without masses, tenderness or nipple discharge and no palpable axillary masses or adenopathy  CV: regular rate and rhythm, normal S1 S2, no S3 or S4, no murmur, click or rub, no peripheral edema and peripheral pulses strong  ABDOMEN: soft, nontender, no hepatosplenomegaly, no masses and bowel sounds normal   (female): normal female external genitalia, normal urethral  meatus, vaginal mucosa pink, moist, well rugated without masses. +white discharge (wet prep obtained). -for bladder prolapse palpated on exam. LPN in exam room for exam  RECTAL: normal sphincter tone, no rectal masses  MS: no gross musculoskeletal defects noted, no edema  SKIN: no suspicious lesions or rashes  NEURO: Normal strength and tone, mentation intact and speech normal  PSYCH: mentation appears normal, affect normal/bright    Diagnostic Test Results:  Labs reviewed in Epic    Results for orders placed or performed in visit on 06/03/21   Wet prep     Status: None    Specimen: Vagina   Result Value Ref Range    Specimen Description Vagina     Wet Prep Few  WBC'S seen       Wet Prep No Trichomonas seen     Wet Prep No clue cells seen     Wet Prep No yeast seen    Results for orders placed or performed in visit on 06/03/21   UA with Microscopic reflex to Culture     Status: Abnormal    Specimen: Midstream Urine   Result Value Ref Range    Color Urine Yellow     Appearance Urine Slightly Cloudy     Glucose Urine Negative NEG^Negative mg/dL    Bilirubin Urine Negative NEG^Negative    Ketones Urine Negative NEG^Negative mg/dL    Specific Gravity Urine 1.024 1.003 - 1.035    Blood Urine Negative NEG^Negative    pH Urine 6.0 4.7 - 8.0 pH    Protein Albumin Urine Negative NEG^Negative mg/dL    Urobilinogen mg/dL Normal 0.0 - 2.0 mg/dL    Nitrite Urine Negative NEG^Negative    Leukocyte Esterase Urine Negative NEG^Negative    Source Midstream Urine     WBC Urine 2 0 - 5 /HPF    RBC Urine 1 0 - 2 /HPF    Bacteria Urine Few (A) NEG^Negative /HPF    Squamous Epithelial /HPF Urine 10 (H) 0 - 1 /HPF    Mucous Urine Present (A) NEG^Negative /LPF   TSH with free T4 reflex     Status: None   Result Value Ref Range    TSH 1.92 0.40 - 4.00 mU/L   Lipid Profile     Status: Abnormal   Result Value Ref Range    Cholesterol 245 (H) <200 mg/dL    Triglycerides 155 (H) <150 mg/dL    HDL Cholesterol 74 >49 mg/dL    LDL Cholesterol  Calculated 140 (H) <100 mg/dL    Non HDL Cholesterol 171 (H) <130 mg/dL   Comprehensive metabolic panel     Status: Abnormal   Result Value Ref Range    Sodium 141 133 - 144 mmol/L    Potassium 3.3 (L) 3.4 - 5.3 mmol/L    Chloride 105 94 - 109 mmol/L    Carbon Dioxide 29 20 - 32 mmol/L    Anion Gap 7 3 - 14 mmol/L    Glucose 80 70 - 99 mg/dL    Urea Nitrogen 16 7 - 30 mg/dL    Creatinine 0.88 0.52 - 1.04 mg/dL    GFR Estimate 61 >60 mL/min/[1.73_m2]    GFR Estimate If Black 71 >60 mL/min/[1.73_m2]    Calcium 8.7 8.5 - 10.1 mg/dL    Bilirubin Total 0.5 0.2 - 1.3 mg/dL    Albumin 3.7 3.4 - 5.0 g/dL    Protein Total 7.3 6.8 - 8.8 g/dL    Alkaline Phosphatase 46 40 - 150 U/L    ALT 24 0 - 50 U/L    AST 20 0 - 45 U/L   CBC with platelets differential     Status: None   Result Value Ref Range    WBC 6.6 4.0 - 11.0 10e9/L    RBC Count 5.14 3.8 - 5.2 10e12/L    Hemoglobin 14.7 11.7 - 15.7 g/dL    Hematocrit 44.5 35.0 - 47.0 %    MCV 87 78 - 100 fl    MCH 28.6 26.5 - 33.0 pg    MCHC 33.0 31.5 - 36.5 g/dL    RDW 13.4 10.0 - 15.0 %    Platelet Count 314 150 - 450 10e9/L    Diff Method Automated Method     % Neutrophils 43.6 %    % Lymphocytes 45.6 %    % Monocytes 8.2 %    % Eosinophils 1.5 %    % Basophils 0.9 %    % Immature Granulocytes 0.2 %    Nucleated RBCs 0 0 /100    Absolute Neutrophil 2.9 1.6 - 8.3 10e9/L    Absolute Lymphocytes 3.0 0.8 - 5.3 10e9/L    Absolute Monocytes 0.5 0.0 - 1.3 10e9/L    Absolute Eosinophils 0.1 0.0 - 0.7 10e9/L    Absolute Basophils 0.1 0.0 - 0.2 10e9/L    Abs Immature Granulocytes 0.0 0 - 0.4 10e9/L    Absolute Nucleated RBC 0.0        ASSESSMENT / PLAN:       ICD-10-CM    1. Routine general medical examination at a health care facility  Z00.00    2. Yeast infection of the vagina  B37.3 Wet prep   3. Urge incontinence of urine  N39.41 Adult Urology Referral   4. Screening for colon cancer  Z12.11 GENERAL SURG ADULT REFERRAL   5. GERD (gastroesophageal reflux)  K21.9    6. Hx of hiatal hernia   Z87.19 CT Abdomen Pelvis w/o Contrast   7. Abdominal pain, epigastric  R10.13 CT Abdomen Pelvis w/o Contrast   8. Bilateral impacted cerumen  H61.23 REMOVE IMPACTED CERUMEN   9. Screening for condition  Z13.9    10. Other disturbances of skin sensation (CODE)   R20.8 US Carotid Bilateral     Increase potassium rich foods in diet.     Labs reviewed with Demi in exam room.     Patient has been advised of split billing requirements and indicates understanding:     COUNSELING:  Reviewed preventive health counseling, as reflected in patient instructions       Regular exercise       Healthy diet/nutrition       Vision screening       Bladder control       Fall risk prevention       Osteoporosis prevention/bone health       Colon cancer screening    Estimated body mass index is 22.65 kg/m  as calculated from the following:    Height as of 3/15/21: 1.524 m (5').    Weight as of this encounter: 52.6 kg (116 lb).    Weight management plan noted, stable and monitoring    She reports that she quit smoking about 53 years ago. Her smoking use included cigarettes. She started smoking about 58 years ago. She has a 2.50 pack-year smoking history. She has never used smokeless tobacco.    Appropriate preventive services were discussed with this patient, including applicable screening as appropriate for cardiovascular disease, diabetes, osteopenia/osteoporosis, and glaucoma.  As appropriate for age/gender, discussed screening for colorectal cancer, prostate cancer, breast cancer, and cervical cancer. Checklist reviewing preventive services available has been given to the patient.    Reviewed patients plan of care and provided an AVS. The Basic Care Plan (routine screening as documented in Health Maintenance) for Demi meets the Care Plan requirement. This Care Plan has been established and reviewed with the Patient.    Counseling Resources:  ATP IV Guidelines  Pooled Cohorts Equation Calculator  Breast Cancer Risk  Calculator  BRCA-Related Cancer Risk Assessment: FHS-7 Tool  FRAX Risk Assessment  ICSI Preventive Guidelines  Dietary Guidelines for Americans, 2010  USDA's MyPlate  ASA Prophylaxis  Lung CA Screening    Lashawn Hurd NP  [facility]

## 2021-06-01 NOTE — TELEPHONE ENCOUNTER
traMADol HCl 50 MG Oral Tablet    Last Written Prescription Date:  5/4/21  Last Fill Quantity: 120,   # refills: 0  Last Office Visit: 3/5/21  Future Office visit:    Next 5 appointments (look out 90 days)    Jun 03, 2021  1:15 PM  (Arrive by 1:00 PM)  PHYSICAL with Lashawn Hurd NP  [facility] ([facility] ) [address]  [place] [address]  [phone]   Jun 09, 2021 10:30 AM  Return Visit with Curt Encarnacion DC  [facility] ([facility] ) [address]  [facility] [address]  [phone]           Routing refill request to provider for review/approval because:    Drug not on the [facility], [facility] or [facility] refill protocol or controlled substance

## 2021-06-01 NOTE — PATIENT INSTRUCTIONS
Preventive Health Recommendations    See your health care provider every year to    Review health changes.     Discuss preventive care.      Review your medicines if your doctor has prescribed any.      You no longer need a yearly Pap test unless you've had an abnormal Pap test in the past 10 years. If you have vaginal symptoms, such as bleeding or discharge, be sure to talk with your provider about a Pap test.      Every 1 to 2 years, have a mammogram.  If you are over 69, talk with your health care provider about whether or not you want to continue having screening mammograms.      Every 10 years, have a colonoscopy. Or, have a yearly FIT test (stool test). These exams will check for colon cancer.       Have a cholesterol test every 5 years, or more often if your doctor advises it.       Have a diabetes test (fasting glucose) every three years. If you are at risk for diabetes, you should have this test more often.       At age 65, have a bone density scan (DEXA) to check for osteoporosis (brittle bone disease).    Shots:    Get a flu shot each year.    Get a tetanus shot every 10 years.    Talk to your doctor about your pneumonia vaccines. There are now two you should receive - Pneumovax (PPSV 23) and Prevnar (PCV 13).    Talk to your pharmacist about the shingles vaccine.    Talk to your doctor about the hepatitis B vaccine.    Nutrition:     Eat at least 5 servings of fruits and vegetables each day.      Eat whole-grain bread, whole-wheat pasta and brown rice instead of white grains and rice.      Get adequate about Calcium and Vitamin D.     Lifestyle    Exercise at least 150 minutes a week (30 minutes a day, 5 days a week). This will help you control your weight and prevent disease.      Limit alcohol to one drink per day.      No smoking.       Wear sunscreen to prevent skin cancer.       See your dentist twice a year for an exam and cleaning.      See your eye doctor every 1 to 2 years to screen for  conditions such as glaucoma, macular degeneration, cataracts, etc.    Personalized Prevention Plan  You are due for the preventive services outlined below.  Your care team is available to assist you in scheduling these services.  If you have already completed any of these items, please share that information with your care team to update in your medical record.    Health Maintenance Due   Topic Date Due     TREATMENT AGREEMENT FOR CHRONIC PAIN MANAGEMENT  Never done     Diptheria Tetanus Pertussis (DTAP/TDAP/TD) Vaccine (1 - Tdap) 06/24/1964     Zoster (Shingles) Vaccine (2 of 3) 12/06/2007     URINE DRUG SCREEN  06/30/2018     Pneumococcal Vaccine (2 of 2 - PPSV23) 09/22/2018

## 2021-06-03 ENCOUNTER — OFFICE VISIT (OUTPATIENT)
Dept: FAMILY MEDICINE | Facility: OTHER | Age: 81
End: 2021-06-03
Attending: NURSE PRACTITIONER
Payer: COMMERCIAL

## 2021-06-03 VITALS
WEIGHT: 116 LBS | SYSTOLIC BLOOD PRESSURE: 136 MMHG | TEMPERATURE: 96.6 F | HEART RATE: 78 BPM | DIASTOLIC BLOOD PRESSURE: 70 MMHG | OXYGEN SATURATION: 99 % | BODY MASS INDEX: 22.65 KG/M2

## 2021-06-03 DIAGNOSIS — Z87.19 HX OF HIATAL HERNIA: ICD-10-CM

## 2021-06-03 DIAGNOSIS — N39.41 URGE INCONTINENCE OF URINE: ICD-10-CM

## 2021-06-03 DIAGNOSIS — Z13.9 SCREENING FOR CONDITION: ICD-10-CM

## 2021-06-03 DIAGNOSIS — Z00.00 ROUTINE GENERAL MEDICAL EXAMINATION AT A HEALTH CARE FACILITY: Primary | ICD-10-CM

## 2021-06-03 DIAGNOSIS — E78.2 MIXED HYPERLIPIDEMIA: ICD-10-CM

## 2021-06-03 DIAGNOSIS — B37.31 YEAST INFECTION OF THE VAGINA: ICD-10-CM

## 2021-06-03 DIAGNOSIS — H61.23 BILATERAL IMPACTED CERUMEN: ICD-10-CM

## 2021-06-03 DIAGNOSIS — Z12.11 SCREENING FOR COLON CANCER: ICD-10-CM

## 2021-06-03 DIAGNOSIS — R20.8 OTHER DISTURBANCES OF SKIN SENSATION (CODE): ICD-10-CM

## 2021-06-03 DIAGNOSIS — R10.13 ABDOMINAL PAIN, EPIGASTRIC: ICD-10-CM

## 2021-06-03 DIAGNOSIS — K21.9 GASTROESOPHAGEAL REFLUX DISEASE WITHOUT ESOPHAGITIS: ICD-10-CM

## 2021-06-03 DIAGNOSIS — Z00.00 ROUTINE GENERAL MEDICAL EXAMINATION AT A HEALTH CARE FACILITY: ICD-10-CM

## 2021-06-03 LAB
ALBUMIN SERPL-MCNC: 3.7 G/DL (ref 3.4–5)
ALBUMIN UR-MCNC: NEGATIVE MG/DL
ALP SERPL-CCNC: 46 U/L (ref 40–150)
ALT SERPL W P-5'-P-CCNC: 24 U/L (ref 0–50)
ANION GAP SERPL CALCULATED.3IONS-SCNC: 7 MMOL/L (ref 3–14)
APPEARANCE UR: ABNORMAL
AST SERPL W P-5'-P-CCNC: 20 U/L (ref 0–45)
BACTERIA #/AREA URNS HPF: ABNORMAL /HPF
BASOPHILS # BLD AUTO: 0.1 10E9/L (ref 0–0.2)
BASOPHILS NFR BLD AUTO: 0.9 %
BILIRUB SERPL-MCNC: 0.5 MG/DL (ref 0.2–1.3)
BILIRUB UR QL STRIP: NEGATIVE
BUN SERPL-MCNC: 16 MG/DL (ref 7–30)
CALCIUM SERPL-MCNC: 8.7 MG/DL (ref 8.5–10.1)
CHLORIDE SERPL-SCNC: 105 MMOL/L (ref 94–109)
CHOLEST SERPL-MCNC: 245 MG/DL
CO2 SERPL-SCNC: 29 MMOL/L (ref 20–32)
COLOR UR AUTO: YELLOW
CREAT SERPL-MCNC: 0.88 MG/DL (ref 0.52–1.04)
DIFFERENTIAL METHOD BLD: NORMAL
EOSINOPHIL # BLD AUTO: 0.1 10E9/L (ref 0–0.7)
EOSINOPHIL NFR BLD AUTO: 1.5 %
ERYTHROCYTE [DISTWIDTH] IN BLOOD BY AUTOMATED COUNT: 13.4 % (ref 10–15)
GFR SERPL CREATININE-BSD FRML MDRD: 61 ML/MIN/{1.73_M2}
GLUCOSE SERPL-MCNC: 80 MG/DL (ref 70–99)
GLUCOSE UR STRIP-MCNC: NEGATIVE MG/DL
HCT VFR BLD AUTO: 44.5 % (ref 35–47)
HDLC SERPL-MCNC: 74 MG/DL
HGB BLD-MCNC: 14.7 G/DL (ref 11.7–15.7)
HGB UR QL STRIP: NEGATIVE
IMM GRANULOCYTES # BLD: 0 10E9/L (ref 0–0.4)
IMM GRANULOCYTES NFR BLD: 0.2 %
KETONES UR STRIP-MCNC: NEGATIVE MG/DL
LDLC SERPL CALC-MCNC: 140 MG/DL
LEUKOCYTE ESTERASE UR QL STRIP: NEGATIVE
LYMPHOCYTES # BLD AUTO: 3 10E9/L (ref 0.8–5.3)
LYMPHOCYTES NFR BLD AUTO: 45.6 %
MCH RBC QN AUTO: 28.6 PG (ref 26.5–33)
MCHC RBC AUTO-ENTMCNC: 33 G/DL (ref 31.5–36.5)
MCV RBC AUTO: 87 FL (ref 78–100)
MONOCYTES # BLD AUTO: 0.5 10E9/L (ref 0–1.3)
MONOCYTES NFR BLD AUTO: 8.2 %
MUCOUS THREADS #/AREA URNS LPF: PRESENT /LPF
NEUTROPHILS # BLD AUTO: 2.9 10E9/L (ref 1.6–8.3)
NEUTROPHILS NFR BLD AUTO: 43.6 %
NITRATE UR QL: NEGATIVE
NONHDLC SERPL-MCNC: 171 MG/DL
NRBC # BLD AUTO: 0 10*3/UL
NRBC BLD AUTO-RTO: 0 /100
PH UR STRIP: 6 PH (ref 4.7–8)
PLATELET # BLD AUTO: 314 10E9/L (ref 150–450)
POTASSIUM SERPL-SCNC: 3.3 MMOL/L (ref 3.4–5.3)
PROT SERPL-MCNC: 7.3 G/DL (ref 6.8–8.8)
RBC # BLD AUTO: 5.14 10E12/L (ref 3.8–5.2)
RBC #/AREA URNS AUTO: 1 /HPF (ref 0–2)
SODIUM SERPL-SCNC: 141 MMOL/L (ref 133–144)
SOURCE: ABNORMAL
SP GR UR STRIP: 1.02 (ref 1–1.03)
SPECIMEN SOURCE: NORMAL
SQUAMOUS #/AREA URNS AUTO: 10 /HPF (ref 0–1)
TRIGL SERPL-MCNC: 155 MG/DL
TSH SERPL DL<=0.005 MIU/L-ACNC: 1.92 MU/L (ref 0.4–4)
UROBILINOGEN UR STRIP-MCNC: NORMAL MG/DL (ref 0–2)
WBC # BLD AUTO: 6.6 10E9/L (ref 4–11)
WBC #/AREA URNS AUTO: 2 /HPF (ref 0–5)
WET PREP SPEC: NORMAL

## 2021-06-03 PROCEDURE — 99397 PER PM REEVAL EST PAT 65+ YR: CPT | Performed by: NURSE PRACTITIONER

## 2021-06-03 PROCEDURE — 84443 ASSAY THYROID STIM HORMONE: CPT | Mod: ZL | Performed by: FAMILY MEDICINE

## 2021-06-03 PROCEDURE — 80053 COMPREHEN METABOLIC PANEL: CPT | Mod: ZL | Performed by: FAMILY MEDICINE

## 2021-06-03 PROCEDURE — 87210 SMEAR WET MOUNT SALINE/INK: CPT | Mod: ZL | Performed by: NURSE PRACTITIONER

## 2021-06-03 PROCEDURE — 80061 LIPID PANEL: CPT | Mod: ZL | Performed by: FAMILY MEDICINE

## 2021-06-03 PROCEDURE — 85025 COMPLETE CBC W/AUTO DIFF WBC: CPT | Mod: ZL | Performed by: FAMILY MEDICINE

## 2021-06-03 PROCEDURE — 69210 REMOVE IMPACTED EAR WAX UNI: CPT | Performed by: NURSE PRACTITIONER

## 2021-06-03 PROCEDURE — 36415 COLL VENOUS BLD VENIPUNCTURE: CPT | Mod: ZL | Performed by: FAMILY MEDICINE

## 2021-06-03 PROCEDURE — 81001 URINALYSIS AUTO W/SCOPE: CPT | Mod: ZL | Performed by: FAMILY MEDICINE

## 2021-06-03 RX ORDER — LATANOPROST 50 UG/ML
SOLUTION/ DROPS OPHTHALMIC
COMMUNITY
Start: 2021-04-15 | End: 2022-10-14

## 2021-06-03 ASSESSMENT — PAIN SCALES - GENERAL: PAINLEVEL: SEVERE PAIN (6)

## 2021-06-03 NOTE — NURSING NOTE
Chief Complaint   Patient presents with     Physical       Initial /70   Pulse 78   Temp 96.6  F (35.9  C) (Tympanic)   Wt 52.6 kg (116 lb)   SpO2 99%   BMI 22.65 kg/m   Estimated body mass index is 22.65 kg/m  as calculated from the following:    Height as of 3/15/21: 1.524 m (5').    Weight as of this encounter: 52.6 kg (116 lb).  Medication Reconciliation: complete  Rebekah Bynum LPN

## 2021-06-06 DIAGNOSIS — E87.6 HYPOKALEMIA: Primary | ICD-10-CM

## 2021-06-09 ENCOUNTER — OFFICE VISIT (OUTPATIENT)
Dept: CHIROPRACTIC MEDICINE | Facility: OTHER | Age: 81
End: 2021-06-09
Attending: CHIROPRACTOR
Payer: COMMERCIAL

## 2021-06-09 DIAGNOSIS — M99.02 SEGMENTAL AND SOMATIC DYSFUNCTION OF THORACIC REGION: ICD-10-CM

## 2021-06-09 DIAGNOSIS — M99.03 SEGMENTAL AND SOMATIC DYSFUNCTION OF LUMBAR REGION: Primary | ICD-10-CM

## 2021-06-09 DIAGNOSIS — M54.50 ACUTE BILATERAL LOW BACK PAIN WITHOUT SCIATICA: ICD-10-CM

## 2021-06-09 PROCEDURE — 98940 CHIROPRACT MANJ 1-2 REGIONS: CPT | Mod: AT | Performed by: CHIROPRACTOR

## 2021-06-17 ENCOUNTER — HOSPITAL ENCOUNTER (OUTPATIENT)
Dept: ULTRASOUND IMAGING | Facility: HOSPITAL | Age: 81
End: 2021-06-17
Attending: NURSE PRACTITIONER
Payer: COMMERCIAL

## 2021-06-17 ENCOUNTER — HOSPITAL ENCOUNTER (OUTPATIENT)
Dept: CT IMAGING | Facility: HOSPITAL | Age: 81
End: 2021-06-17
Attending: NURSE PRACTITIONER
Payer: COMMERCIAL

## 2021-06-17 DIAGNOSIS — R10.13 ABDOMINAL PAIN, EPIGASTRIC: ICD-10-CM

## 2021-06-17 DIAGNOSIS — Z87.19 HX OF HIATAL HERNIA: ICD-10-CM

## 2021-06-17 DIAGNOSIS — R20.8 OTHER DISTURBANCES OF SKIN SENSATION (CODE): ICD-10-CM

## 2021-06-17 PROCEDURE — 74176 CT ABD & PELVIS W/O CONTRAST: CPT

## 2021-06-17 PROCEDURE — 93880 EXTRACRANIAL BILAT STUDY: CPT

## 2021-06-18 DIAGNOSIS — E87.6 HYPOKALEMIA: ICD-10-CM

## 2021-06-18 DIAGNOSIS — I65.22 LEFT CAROTID STENOSIS: Primary | ICD-10-CM

## 2021-06-18 LAB
ANION GAP SERPL CALCULATED.3IONS-SCNC: 4 MMOL/L (ref 3–14)
BUN SERPL-MCNC: 14 MG/DL (ref 7–30)
CALCIUM SERPL-MCNC: 9.1 MG/DL (ref 8.5–10.1)
CHLORIDE SERPL-SCNC: 105 MMOL/L (ref 94–109)
CO2 SERPL-SCNC: 31 MMOL/L (ref 20–32)
CREAT SERPL-MCNC: 0.86 MG/DL (ref 0.52–1.04)
GFR SERPL CREATININE-BSD FRML MDRD: 62 ML/MIN/{1.73_M2}
GLUCOSE SERPL-MCNC: 105 MG/DL (ref 70–99)
POTASSIUM SERPL-SCNC: 3.5 MMOL/L (ref 3.4–5.3)
SODIUM SERPL-SCNC: 140 MMOL/L (ref 133–144)

## 2021-06-18 PROCEDURE — 36415 COLL VENOUS BLD VENIPUNCTURE: CPT | Mod: ZL | Performed by: FAMILY MEDICINE

## 2021-06-18 PROCEDURE — 80048 BASIC METABOLIC PNL TOTAL CA: CPT | Mod: ZL | Performed by: FAMILY MEDICINE

## 2021-06-24 ENCOUNTER — TELEPHONE (OUTPATIENT)
Dept: SURGERY | Facility: OTHER | Age: 81
End: 2021-06-24

## 2021-06-24 NOTE — TELEPHONE ENCOUNTER
Referral received for colonoscopy for screening for colon cancer.   This patient was approved by Zoë, surgery education RN for meet and summeret colonoscopy without preop appointment.   First attempt to call patient to schedule. No answer.   Patient had colonoscopy on 7/18/14, completed by . Patient' health maintenance states 10 year recall. Patient would be due 7/14/2024, unless she had done somewhere else during this time frame. Pathology notes from  do not state when to have patient return, unless I am not seeing them.   Called patient to discuss this, left message for her to return call. Radha Plaza LPN

## 2021-06-29 ENCOUNTER — TRANSFERRED RECORDS (OUTPATIENT)
Dept: HEALTH INFORMATION MANAGEMENT | Facility: CLINIC | Age: 81
End: 2021-06-29

## 2021-06-30 ENCOUNTER — OFFICE VISIT (OUTPATIENT)
Dept: CHIROPRACTIC MEDICINE | Facility: OTHER | Age: 81
End: 2021-06-30
Attending: CHIROPRACTOR
Payer: COMMERCIAL

## 2021-06-30 ENCOUNTER — TELEPHONE (OUTPATIENT)
Dept: FAMILY MEDICINE | Facility: OTHER | Age: 81
End: 2021-06-30

## 2021-06-30 DIAGNOSIS — M99.03 SEGMENTAL AND SOMATIC DYSFUNCTION OF LUMBAR REGION: Primary | ICD-10-CM

## 2021-06-30 DIAGNOSIS — M99.02 SEGMENTAL AND SOMATIC DYSFUNCTION OF THORACIC REGION: ICD-10-CM

## 2021-06-30 DIAGNOSIS — M54.50 ACUTE BILATERAL LOW BACK PAIN WITHOUT SCIATICA: ICD-10-CM

## 2021-06-30 DIAGNOSIS — I65.22 LEFT CAROTID STENOSIS: Primary | ICD-10-CM

## 2021-06-30 PROCEDURE — 98940 CHIROPRACT MANJ 1-2 REGIONS: CPT | Mod: AT | Performed by: CHIROPRACTOR

## 2021-06-30 NOTE — TELEPHONE ENCOUNTER
I have not seen a report from Dr. Springer. Can we get report? I would be happy to order for her.

## 2021-06-30 NOTE — TELEPHONE ENCOUNTER
Patient calling and states she was seen by Dr. Springer for vascular surgery. Patient states provider would like her on Lipitor. States this information was going to be sent to PCP and Lashawn Hurd. Patient would like to know if this information was received or not? Patient would like Rx sent to [facility].    Patient's phone number is [phone]

## 2021-07-01 RX ORDER — ATORVASTATIN CALCIUM 20 MG/1
20 TABLET, FILM COATED ORAL DAILY
Qty: 90 TABLET | Refills: 1 | Status: SHIPPED | OUTPATIENT
Start: 2021-07-01 | End: 2022-01-03

## 2021-07-01 NOTE — TELEPHONE ENCOUNTER
Virgil Mackay MD  You; Lashawn Hurd NP 1 hour ago (5:35 AM)     Note reviewed and atorvastatin sent to pharmacy     MARIA TERESA    Message text

## 2021-07-02 DIAGNOSIS — S33.8XXD SPRAIN OF OTHER PARTS OF LUMBAR SPINE AND PELVIS, SUBSEQUENT ENCOUNTER: ICD-10-CM

## 2021-07-02 NOTE — TELEPHONE ENCOUNTER
tramadol      Last Written Prescription Date:  6/1/21  Last Fill Quantity: 120,   # refills: 0  Last Office Visit: 6/3/21  Future Office visit:    Next 5 appointments (look out 90 days)    Jul 14, 2021 10:10 AM  Return Visit with Curt Encarnacion DC  [facility] [place] ([facility] ) [address] [place] [address]  [place] [address]  [phone]           Routing refill request to provider for review/approval because:  Drug not on the [facility], [facility] or [facility] refill protocol or controlled substance

## 2021-07-04 RX ORDER — TRAMADOL HYDROCHLORIDE 50 MG/1
TABLET ORAL
Qty: 120 TABLET | Refills: 0 | Status: SHIPPED | OUTPATIENT
Start: 2021-07-04 | End: 2021-08-03

## 2021-07-14 ENCOUNTER — OFFICE VISIT (OUTPATIENT)
Dept: CHIROPRACTIC MEDICINE | Facility: OTHER | Age: 81
End: 2021-07-14
Attending: CHIROPRACTOR
Payer: COMMERCIAL

## 2021-07-14 DIAGNOSIS — M99.03 SEGMENTAL AND SOMATIC DYSFUNCTION OF LUMBAR REGION: Primary | ICD-10-CM

## 2021-07-14 DIAGNOSIS — M99.02 SEGMENTAL AND SOMATIC DYSFUNCTION OF THORACIC REGION: ICD-10-CM

## 2021-07-14 DIAGNOSIS — M54.50 ACUTE BILATERAL LOW BACK PAIN WITHOUT SCIATICA: ICD-10-CM

## 2021-07-14 PROCEDURE — 98940 CHIROPRACT MANJ 1-2 REGIONS: CPT | Mod: AT | Performed by: CHIROPRACTOR

## 2021-07-16 ENCOUNTER — TELEPHONE (OUTPATIENT)
Dept: SURGERY | Facility: OTHER | Age: 81
End: 2021-07-16

## 2021-07-16 NOTE — TELEPHONE ENCOUNTER
Referral received for colonoscopy for colon cancer screening for 81 year-old. Health maintenance is flagged for 10 year repeat with last colonoscopy in 2014. Should have consult to discuss.    This is the 2nd attempt by phone to schedule meet and greet colonoscopy. Patient not home. Left message for patient to return call.  Letter sent requesting patient to call office to schedule colonoscopy/consult.

## 2021-07-16 NOTE — TELEPHONE ENCOUNTER
Referring provider notified that patient has not yet been scheduled for colonoscopy consult after 2 or more attempts have been made schedule by phone and mail.

## 2021-07-16 NOTE — LETTER
July 16, 2021          Demi Narayan  [address] [facility] [address]        Dear Demi,     Our office has received a colonoscopy referral for colon cancer screening. We have made two or more unsuccessful attempts to schedule by phone. Please call when you are ready to schedule. Colonoscopy is typically recommended every 5-10 years, depending on your history, in order to prevent and detect colon cancer at its earliest stages.  Colon cancer is now the second leading cause of cancer death in the [place] for both men and women. There are over 130,000 new cases and 50,000 deaths per year from colon cancer.  Colonoscopies can prevent a large majority of these deaths. This test is not only looking for cancer, but also precancerous lesions. These lesions can be removed before they ever become cancer. You can be given some sedation which makes the test comfortable for most people. You may schedule an appointment to discuss the procedure first if you prefer. You may need a preop appointment prior to your procedure.     Colonoscopy is the best screening tool for colon cancer; however, if you do not wish to do a colonoscopy or cannot afford to do one at this time, you may have other options. One option is called a FIT test or Fecal Immunochemical Occult Blood Test  Which is a home stool sample kit that is mailed or returned to the clinic lab. This test can detect hidden bleeding in the lower colon and should be done every year. Another option is Cologuard which also involves collecting a stool sample at home. This test kit is shipped directly to your home and returned from any UPS location. It can identify altered DNA shed in the stool associated with the possibility of colon cancer or precancerous lesions and should be done every 3 years. Both tests are easy to do and do not require any dietary or medication restrictions and involve only one collection sample. If a positive result is obtained from either test,  you would be referred for a colonoscopy. Call your primary care provider if you wish to discuss other options that may be available to you.     If you are under/uninsured, you may contact our Patient Financial Services Office at [phone] to determine which benefits you may qualify for. If you have completed either one of these tests or had a flexible sigmoidoscopy in the past five years at another facility, please have the records sent to our clinic so that we can best coordinate your care.  Please call us at [phone] if you have questions or would like arrange your colonoscopy procedure, consultation appointment, or discuss other options.       Sincerely;       [facility] Team

## 2021-07-20 DIAGNOSIS — N39.41 URGE INCONTINENCE: Primary | ICD-10-CM

## 2021-07-29 ENCOUNTER — LAB (OUTPATIENT)
Dept: LAB | Facility: OTHER | Age: 81
End: 2021-07-29
Attending: NURSE PRACTITIONER
Payer: COMMERCIAL

## 2021-07-29 ENCOUNTER — ALLIED HEALTH/NURSE VISIT (OUTPATIENT)
Dept: FAMILY MEDICINE | Facility: OTHER | Age: 81
End: 2021-07-29
Attending: FAMILY MEDICINE
Payer: COMMERCIAL

## 2021-07-29 ENCOUNTER — OFFICE VISIT (OUTPATIENT)
Dept: UROLOGY | Facility: OTHER | Age: 81
End: 2021-07-29
Attending: NURSE PRACTITIONER
Payer: COMMERCIAL

## 2021-07-29 VITALS
HEART RATE: 83 BPM | DIASTOLIC BLOOD PRESSURE: 62 MMHG | TEMPERATURE: 98.3 F | SYSTOLIC BLOOD PRESSURE: 130 MMHG | BODY MASS INDEX: 21.6 KG/M2 | HEIGHT: 60 IN | RESPIRATION RATE: 16 BRPM | WEIGHT: 110 LBS | OXYGEN SATURATION: 98 %

## 2021-07-29 DIAGNOSIS — S33.8XXD SPRAIN OF OTHER PARTS OF LUMBAR SPINE AND PELVIS, SUBSEQUENT ENCOUNTER: ICD-10-CM

## 2021-07-29 DIAGNOSIS — F11.90 CHRONIC, CONTINUOUS USE OF OPIOIDS: Primary | ICD-10-CM

## 2021-07-29 DIAGNOSIS — Z12.11 SPECIAL SCREENING FOR MALIGNANT NEOPLASMS, COLON: ICD-10-CM

## 2021-07-29 DIAGNOSIS — M51.14 INTERVERTEBRAL DISC DISORDERS WITH RADICULOPATHY, THORACIC REGION: ICD-10-CM

## 2021-07-29 DIAGNOSIS — M41.35 THORACOGENIC SCOLIOSIS OF THORACOLUMBAR REGION: ICD-10-CM

## 2021-07-29 DIAGNOSIS — N39.41 URGE INCONTINENCE OF URINE: ICD-10-CM

## 2021-07-29 DIAGNOSIS — N39.41 URGE INCONTINENCE: Primary | ICD-10-CM

## 2021-07-29 DIAGNOSIS — N39.41 URGE INCONTINENCE: ICD-10-CM

## 2021-07-29 LAB
ALBUMIN UR-MCNC: NEGATIVE MG/DL
APPEARANCE UR: CLEAR
BACTERIA #/AREA URNS HPF: ABNORMAL /HPF
BILIRUB UR QL STRIP: NEGATIVE
COLOR UR AUTO: YELLOW
GLUCOSE UR STRIP-MCNC: NEGATIVE MG/DL
HGB UR QL STRIP: NEGATIVE
KETONES UR STRIP-MCNC: NEGATIVE MG/DL
LEUKOCYTE ESTERASE UR QL STRIP: ABNORMAL
NITRATE UR QL: NEGATIVE
PH UR STRIP: 5.5 [PH] (ref 4.7–8)
RBC URINE: <1 /HPF
SP GR UR STRIP: 1.01 (ref 1–1.03)
SQUAMOUS EPITHELIAL: 3 /HPF
UROBILINOGEN UR STRIP-MCNC: NORMAL MG/DL
WBC URINE: 1 /HPF

## 2021-07-29 PROCEDURE — 81001 URINALYSIS AUTO W/SCOPE: CPT | Mod: ZL

## 2021-07-29 PROCEDURE — 99203 OFFICE O/P NEW LOW 30 MIN: CPT | Performed by: UROLOGY

## 2021-07-29 PROCEDURE — 51798 US URINE CAPACITY MEASURE: CPT

## 2021-07-29 PROCEDURE — G0463 HOSPITAL OUTPT CLINIC VISIT: HCPCS

## 2021-07-29 ASSESSMENT — ENCOUNTER SYMPTOMS
PALPITATIONS: 1
BACK PAIN: 1

## 2021-07-29 ASSESSMENT — MIFFLIN-ST. JEOR: SCORE: 872.52

## 2021-07-29 ASSESSMENT — PAIN SCALES - GENERAL: PAINLEVEL: NO PAIN (0)

## 2021-07-29 NOTE — LETTER
Opioid / Opioid Plus Controlled Substance Agreement    This is an agreement between you and your provider about the safe and appropriate use of controlled substance/opioids prescribed by your care team. Controlled substances are medicines that can cause physical and mental dependence (abuse).    There are strict laws about having and using these medicines. We here at [facility] are committing to working with you in your efforts to get better. To support you in this work, we ll help you schedule regular office appointments for medicine refills. If we must cancel or change your appointment for any reason, we ll make sure you have enough medicine to last until your next appointment.     As a Provider, I will:    Listen carefully to your concerns and treat you with respect.     Recommend a treatment plan that I believe is in your best interest. This plan may involve therapies other than opioid pain medication.     Talk with you often about the possible benefits, and the risk of harm of any medicine that we prescribe for you.     Provide a plan on how to taper (discontinue or go off) using this medicine if the decision is made to stop its use.    As a Patient, I understand that opioid(s):     Are a controlled substance prescribed by my care team to help me function or work and manage my condition(s).     Are strong medicines and can cause serious side effects such as:    Drowsiness, which can seriously affect my driving ability    A lower breathing rate, enough to cause death    Harm to my thinking ability     Depression     Abuse of and addiction to this medicine    Need to be taken exactly as prescribed. Combining opioids with certain medicines or chemicals (such as illegal drugs, sedatives, sleeping pills, and benzodiazepines) can be dangerous or even fatal. If I stop opioids suddenly, I may have severe withdrawal symptoms.    Do not work for all types of pain nor for all patients. If they re not helpful, I may  be asked to stop them.        The risks, benefits and side effects of these medicine(s) were explained to me. I agree that:  1. I will take part in other treatments as advised by my care team. This may be psychiatry or counseling, physical therapy, behavioral therapy, group treatment or a referral to a specialist.     2. I will keep all my appointments. I understand that this is part of the monitoring of opioids. My care team may require an office visit for EVERY opioid/controlled substance refill. If I miss appointments or don t follow instructions, my care team may stop my medicine.    3. I will take my medicines as prescribed. I will not change the dose or schedule unless my care team tells me to. There will be no refills if I run out early.     4. I may be asked to come to the clinic and complete a urine drug test or complete a pill count at any time. If I don t give a urine sample or participate in a pill count, the care team may stop my medicine.    5. I will only receive prescriptions from this clinic for chronic pain. If I am treated by another provider for acute pain issues, I will tell them that I am taking opioid pain medication for chronic pain and that I have a treatment agreement with this provider. I will inform my [facility] care team within one business day if I am given a prescription for any pain medication by another healthcare provider. My [facility] care team can contact other providers and pharmacists about my use of any medicines.    6. It is up to me to make sure that I don t run out of my medicines on weekends or holidays. If my care team is willing to refill my opioid prescription without a visit, I must request refills only during office hours. Refills may take up to 3 business days to process. I will use one pharmacy to fill all my opioid and other controlled substance prescriptions. I will notify the clinic about any changes to my insurance or medication  availability.    7. I am responsible for my prescriptions. If the medicine/prescription is lost, stolen or destroyed, it will not be replaced. I also agree not to share controlled substance medicines with anyone.    8. I am aware I should not use any illegal or recreational drugs. I agree not to drink alcohol unless my care team says I can.       9. If I enroll in the [place] Medical Cannabis program, I will tell my care team prior to my next refill.     10. I will tell my care team right away if I become pregnant, have a new medical problem treated outside of my regular clinic, or have a change in my medications.    11. I understand that this medicine can affect my thinking, judgment and reaction time. Alcohol and drugs affect the brain and body, which can affect the safety of my driving. Being under the influence of alcohol or drugs can affect my decision-making, behaviors, personal safety, and the safety of others. Driving while impaired (DWI) can occur if a person is driving, operating, or in physical control of a car, motorcycle, boat, snowmobile, ATV, motorbike, off-road vehicle, or any other motor vehicle ([place] Statute 169A.20). I understand the risk if I choose to drive or operate any vehicle or machinery.    I understand that if I do not follow any of the conditions above, my prescriptions or treatment may be stopped or changed.          Opioids  What You Need to Know    What are opioids?   Opioids are pain medicines that must be prescribed by a doctor. They are also known as narcotics.     Examples are:   1. morphine (MS Contin, Arina)  2. oxycodone (Oxycontin)  3. oxycodone and acetaminophen (Percocet)  4. hydrocodone and acetaminophen (Vicodin, Norco)   5. fentanyl patch (Duragesic)   6. hydromorphone (Dilaudid)   7. methadone  8. codeine (Tylenol #3)     What do opioids do well?   Opioids are best for severe short-term pain such as after a surgery or injury. They may work well for cancer pain. They may  help some people with long-lasting (chronic) pain.     What do opioids NOT do well?   Opioids never get rid of pain entirely, and they don t work well for most patients with chronic pain. Opioids don t reduce swelling, one of the causes of pain.                                    Other ways to manage chronic pain and improve function include:       Treat the health problem that may be causing pain    Anti-inflammation medicines, which reduce swelling and tenderness, such as ibuprofen (Advil, Motrin) or naproxen (Aleve)    Acetaminophen (Tylenol)    Antidepressants and anti-seizure medicines, especially for nerve pain    Topical treatments such as patches or creams    Injections or nerve blocks    Chiropractic or osteopathic treatment    Acupuncture, massage, deep breathing, meditation, visual imagery, aromatherapy    Use heat or ice at the pain site    Physical therapy     Exercise    Stop smoking    Take part in therapy       Risks and side effects     Talk to your doctor before you start or decide to keep taking opioids. Possible side effects include:      Lowering your breathing rate enough to cause death    Overdose, including death, especially if taking higher than prescribed doses    Worse depression symptoms; less pleasure in things you usually enjoy    Feeling tired or sluggish    Slower thoughts or cloudy thinking    Being more sensitive to pain over time; pain is harder to control    Trouble sleeping or restless sleep    Changes in hormone levels (for example, less testosterone)    Changes in sex drive or ability to have sex    Constipation    Unsafe driving    Itching and sweating    Dizziness    Nausea, throwing up and dry mouth    What else should I know about opioids?    Opioids may lead to dependence, tolerance, or addiction.      Dependence means that if you stop or reduce the medicine too quickly, you will have withdrawal symptoms. These include loose poop (diarrhea), jitters, flu-like symptoms,  nervousness and tremors. Dependence is not the same as addiction.                       Tolerance means needing higher doses over time to get the same effect. This may increase the chance of serious side effects.      Addiction is when people improperly use a substance that harms their body, their mind or their relations with others. Use of opiates can cause a relapse of addiction if you have a history of drug or alcohol abuse.      People who have used opioids for a long time may have a lower quality of life, worse depression, higher levels of pain and more visits to doctors.    You can overdose on opioids. Take these steps to lower your risk of overdose:    1. Recognize the signs:  Signs of overdose include decrease or loss of consciousness (blackout), slowed breathing, trouble waking up and blue lips. If someone is worried about overdose, they should call 911.    2. Talk to your doctor about Narcan (naloxone).   If you are at risk for overdose, you may be given a prescription for Narcan. This medicine very quickly reverses the effects of opioids.   If you overdose, a friend or family member can give you Narcan while waiting for the ambulance. They need to know the signs of overdose and how to give Narcan.     3. Don't use alcohol or street drugs.   Taking them with opioids can cause death.    4. Do not take any of these medicines unless your doctor says it s OK. Taking these with opioids can cause death:    Benzodiazepines, such as lorazepam (Ativan), alprazolam (Xanax) or diazepam (Valium)    Muscle relaxers, such as cyclobenzaprine (Flexeril)    Sleeping pills like zolpidem (Ambien)     Other opioids      How to keep you and other people safe while taking opioids:    1. Never share your opioids with others.  Opioid medicines are regulated by the Drug Enforcement Agency (MICHELLE). Selling or sharing medications is a criminal act.    2. Be sure to store opioids in a secure place, locked up if possible. Young children  can easily swallow them and overdose.    3. When you are traveling with your medicines, keep them in the original bottles. If you use a pill box, be sure you also carry a copy of your medicine list from your clinic or pharmacy.    4. Safe disposal of opioids    Most pharmacies have places to get rid of medicine, called disposal kiosks. Medicine disposal options are also available in every [facility]. Search your county and  medication disposal  to find more options. You can find more details at:  [email or web address]     I agree that my provider, clinic care team, and pharmacy may work with any city, state or federal law enforcement agency that investigates the misuse, sale, or other diversion of my controlled medicine. I will allow my provider to discuss my care with, or share a copy of, this agreement with any other treating provider, pharmacy or emergency room where I receive care.    I have read this agreement and have asked questions about anything I did not understand.    _______________________________________________________  Patient Signature - Demi L Narayan _____________________                   Date     _______________________________________________________  Provider Signature - [facility] FP NURSE   _____________________                   Date     _______________________________________________________  Witness Signature (required if provider not present while patient signing)   _____________________                   Date

## 2021-07-29 NOTE — PROGRESS NOTES
Patient here to sign drug contract, is due for colonoscopy but would prefer to have cologuard done.

## 2021-07-29 NOTE — NURSING NOTE
Review of Systems:    Weight loss:    No     Recent fever/chills:  No   Night sweats:   No  Current skin rash:  No   Recent hair loss:  No  Heat intolerance:  yes   Cold intolerance:  No  Chest pain:   No   Palpitations:   yes  Shortness of breath:  No   Wheezing:   No  Constipation:    No   Diarrhea:   No   Nausea:   No   Vomiting:   No   Kidney/side pain:  No   Back pain:   yes  Frequent headaches:  No   Dizziness:     No  Leg swelling:   No   Calf pain:    No    Parents, brothers or sisters with history of kidney cancer?   No  Parents, brothers or sisters with history of bladder cancer: No

## 2021-07-29 NOTE — Clinical Note
Patient would like to do cologuard screening instead of colonoscopy, please sign orders if you agree.

## 2021-07-29 NOTE — LETTER
7/29/2021       RE: Demi Narayan  [address] [facility] [address]     Dear Colleague,    Thank you for referring your patient, Demi Narayan, to the [facility]. Please see a copy of my visit note below.      History     Chief Complaint:      Consult (incontinence of bowel and bladder)      HPI   Demi Narayan is a 81 year old female who presents with a history of some urinary incontinence and also some stool leakage.  Demi has episodes where she will have an urgency and occasionally she cannot make it to the toilet and will leak some urine.  She also occasionally has some leakage of liquid stool.  She denies any constipation.  She did use Metamucil but that was not very palatable for her so she is taking a fiber tab and not sure that that is really enough fiber to bulk her stools.  Her urinary leakage is not severe she is only using a small tissue for that.  Her voiding frequency is every 3-4 hours during the day depending on her liquids and nocturia x1.  Oftentimes she will have an episode if she holds it too long.  She does drink about 4 cups of coffee a day.  No history of urinary tract infections or hematuria.    Allergies:    No Known Allergies     Medications:      aspirin 325 MG tablet  atorvastatin (LIPITOR) 20 MG tablet  Calcium Carbonate-Vitamin D (CALCIUM + D PO)  estradiol (ESTRACE) 0.5 MG tablet  GLUCOSAMINE SULFATE PO  latanoprost (XALATAN) 0.005 % ophthalmic solution  Multiple Vitamins-Minerals (MULTIVITAL PO)  niacin 500 MG tablet  Omega-3 Fatty Acids (OMEGA-3 FISH OIL PO)  pantoprazole (PROTONIX) 40 MG EC tablet  traMADol (ULTRAM) 50 MG tablet  triamcinolone (KENALOG) 0.1 % external ointment        Problem List:      Patient Active Problem List    Diagnosis Date Noted     Lyme disease 09/11/2017     Priority: Medium     Chronic, continuous use of opioids 07/03/2017     Priority: Medium     Patient is  followed by Virgil Mackay MD for ongoing prescription of pain medication.  All refills should only be approved by this provider, or covering partner.    Medication(s): Ultram 50mg.   Maximum quantity per month: #120  Clinic visit frequency required: Q 3 months     Controlled substance agreement:  Encounter-Level CSA - 06/30/2017:          Controlled Substance Agreement - Scan on 7/5/2017  4:11 PM : CONTROLLED SUBSTANCE AGREEMENT (below)              Pain Clinic evaluation in the past: No    DIRE Total Score(s):  No flowsheet data found.    Last [facility] website verification:  Done on 7.31.18   [email or web address]         Scoliosis, throracolumbar 02/05/2017     Priority: Medium     ACP (advance care planning) 08/05/2016     Priority: Medium     Advance Care Planning 8/5/2016: ACP Review of Chart / Resources Provided:  Reviewed chart for advance care plan.  Demi ANKIT Narayan has no plan or code status on file. Discussed available resources and provided with information. Confirmed code status reflects current choices pending further ACP discussions.  Confirmed/documented legally designated decision makers.  Added by Ava Padgett             Diaphragmatic hernia 04/25/2016     Priority: Medium     Post-menopause 11/09/2015     Priority: Medium     Comprehensive Medical Examination 05/08/2015     Priority: Medium     Osteoarthritis, multiple joints      Priority: Medium     GERD (gastroesophageal reflux) 02/10/2012     Priority: Medium     Fibrocystic breast disease (FCBD) 07/12/2011     Priority: Medium     Low back pain, chronic 07/12/2011     Priority: Medium     Dyslipidemia 06/20/2006     Priority: Medium     H/O diverticulitis, S/P bowel resection 02/06/2002     Priority: Medium                 Past Medical History:      Past Medical History:   Diagnosis Date     Chronic/recurrent back pain 7/12/2011     Diverticulitis      Diverticulitis, recurrent 2/6/2002     Dyslipidemia 6/20/2006      Fibrocystic breast disease 7/12/2011     Gastro-oesophageal reflux disease      GERD 2/10/2012     Hiatal hernia 2/10/2012     Hormone replacement therapy, postmenopausal 7/12/2011     Osteoarthritis        Past Surgical History:      Past Surgical History:   Procedure Laterality Date     ------------OTHER-------------      colonoscopy with biopsy - diverticulitis, hemorrhoids     ------------OTHER-------------  4/19/1994    sigmoidoscopy - abdominal pain, diverticula     ABDOMEN SURGERY      colon removed 2nd to diverticulitis     APPENDECTOMY       ARTHROSCOPY SHOULDER  11/20/2013    Procedure: ARTHROSCOPY SHOULDER;  Right Shoulder Arthroscopy Sub-Acromial Decompression Rotator Cuff Repair;  Surgeon: Lenny Trammell MD;  Location: [facility]     [facility]     Diverticulitis      bowel resection     EGD with biopsy  2011     ENDOSCOPY UPPER WITH PANCREATIC STIMULATION       ENDOSCOPY UPPER, COLONOSCOPY, COMBINED  7/18/2014    Procedure: COMBINED ENDOSCOPY UPPER, COLONOSCOPY;  Surgeon: Israel Feliciano MD;  Location: [facility] OR     ENT SURGERY      tonsilectomy     ESOPHAGOSCOPY, GASTROSCOPY, DUODENOSCOPY (EGD), COMBINED N/A 9/27/2017    Procedure: COMBINED ESOPHAGOSCOPY, GASTROSCOPY, DUODENOSCOPY (EGD);  UPPER ENDOSCOPY WITH BIOPSY AND POLYPECTOMY;  Surgeon: Gallito Alvarado DO;  Location: [facility] OR     GYN SURGERY      hysterectomy with bladder and rectal repair     IR CONSULTATION FOR IR EXAM  3/11/2019     ORTHOPEDIC SURGERY  11/20/2013    right rotator cuff surgery     TVH with ovarian preservation         Family History:      Family History   Problem Relation Age of Onset     Cerebrovascular Disease Father         CVA     Heart Disease Father         heart disease     Hypertension Father      Myocardial Infarction Father         myocardial infarction     Cerebrovascular Disease Mother         CVA     Diabetes Mother      Heart Disease Mother         heart disease  "    Hypertension Mother      Heart Disease Brother         heart disease     Hypertension Brother        Social History:    Marital Status:   [2]  Social History     Tobacco Use     Smoking status: Former Smoker     Packs/day: 0.50     Years: 5.00     Pack years: 2.50     Types: Cigarettes     Start date: 1963     Quit date: 1968     Years since quittin.2     Smokeless tobacco: Never Used   Substance Use Topics     Alcohol use: Yes     Alcohol/week: 0.8 standard drinks     Types: 1 Glasses of wine per week     Comment: daily with dinner     Drug use: No        Review of Systems   Cardiovascular: Positive for palpitations.   Endocrine: Positive for heat intolerance.   Musculoskeletal: Positive for back pain.   All other systems reviewed and are negative.        Physical Exam   Vitals:  /62   Pulse 83   Temp 98.3  F (36.8  C) (Tympanic)   Resp 16   Ht 1.511 m (4' 11.5\")   Wt 49.9 kg (110 lb)   SpO2 98%   BMI 21.85 kg/m        Physical Exam  Constitutional:       Appearance: Normal appearance. She is normal weight.   Pulmonary:      Effort: Pulmonary effort is normal.   Abdominal:      General: Abdomen is flat. There is no distension.      Palpations: Abdomen is soft. There is no mass.      Tenderness: There is no abdominal tenderness.      Hernia: No hernia is present.          Comments: Infraumbilical midline incision from her partial colectomy   Genitourinary:     Comments: External genitalia are normal.  Normal urethra.  There is mild urethral descensus.  Patient does have a grade 2 cystocele and a grade 1-2 rectocele.  Poor Kegel contraction.  Neurological:      Mental Status: She is alert.         Void residual: 0    Impression: Urge incontinence, stool incontinence  Plan   Plan: I discussed various treatment options of urge incontinence such as pelvic floor strengthening exercises, techniques to abort her bladder spasms, medications and interventional procedures.  She does have " glaucoma so at this time she would prefer not to take medications.  She is interested in physical therapy so that referral will be made.  Her stool incontinence is primarily liquid so I did encourage her to just increase her fiber to bulk her stools to see if that can help with the leakage and I also encouraged her to cut back on her coffee.  Follow-up in 1 proximately 4 months.      No follow-ups on file.    Maria M Perez MD  [facility] [place]              Again, thank you for allowing me to participate in the care of your patient.      Sincerely,    Maria M Perez MD

## 2021-07-29 NOTE — NURSING NOTE
"Chief Complaint   Patient presents with     Consult     incontinence of bowel and bladder       Initial /62   Pulse 83   Temp 98.3  F (36.8  C) (Tympanic)   Resp 16   Ht 1.511 m (4' 11.5\")   Wt 49.9 kg (110 lb)   SpO2 98%   BMI 21.85 kg/m   Estimated body mass index is 21.85 kg/m  as calculated from the following:    Height as of this encounter: 1.511 m (4' 11.5\").    Weight as of this encounter: 49.9 kg (110 lb).  Medication Reconciliation: complete  Fauzia Cohn LPN  "

## 2021-07-29 NOTE — PROGRESS NOTES
History     Chief Complaint:      Consult (incontinence of bowel and bladder)      HPI   Demi Narayan is a 81 year old female who presents with a history of some urinary incontinence and also some stool leakage.  Demi has episodes where she will have an urgency and occasionally she cannot make it to the toilet and will leak some urine.  She also occasionally has some leakage of liquid stool.  She denies any constipation.  She did use Metamucil but that was not very palatable for her so she is taking a fiber tab and not sure that that is really enough fiber to bulk her stools.  Her urinary leakage is not severe she is only using a small tissue for that.  Her voiding frequency is every 3-4 hours during the day depending on her liquids and nocturia x1.  Oftentimes she will have an episode if she holds it too long.  She does drink about 4 cups of coffee a day.  No history of urinary tract infections or hematuria.    Allergies:    No Known Allergies     Medications:      aspirin 325 MG tablet  atorvastatin (LIPITOR) 20 MG tablet  Calcium Carbonate-Vitamin D (CALCIUM + D PO)  estradiol (ESTRACE) 0.5 MG tablet  GLUCOSAMINE SULFATE PO  latanoprost (XALATAN) 0.005 % ophthalmic solution  Multiple Vitamins-Minerals (MULTIVITAL PO)  niacin 500 MG tablet  Omega-3 Fatty Acids (OMEGA-3 FISH OIL PO)  pantoprazole (PROTONIX) 40 MG EC tablet  traMADol (ULTRAM) 50 MG tablet  triamcinolone (KENALOG) 0.1 % external ointment        Problem List:      Patient Active Problem List    Diagnosis Date Noted     Lyme disease 09/11/2017     Priority: Medium     Chronic, continuous use of opioids 07/03/2017     Priority: Medium     Patient is followed by Virgil Mackay MD for ongoing prescription of pain medication.  All refills should only be approved by this provider, or covering partner.    Medication(s): Ultram 50mg.   Maximum quantity per month: #120  Clinic visit frequency required: Q 3 months     Controlled substance  agreement:  Encounter-Level CSA - 06/30/2017:          Controlled Substance Agreement - Scan on 7/5/2017  4:11 PM : CONTROLLED SUBSTANCE AGREEMENT (below)              Pain Clinic evaluation in the past: No    DIRE Total Score(s):  No flowsheet data found.    Last [facility] website verification:  Done on 7.31.18   [email or web address]         Scoliosis, throracolumbar 02/05/2017     Priority: Medium     ACP (advance care planning) 08/05/2016     Priority: Medium     Advance Care Planning 8/5/2016: ACP Review of Chart / Resources Provided:  Reviewed chart for advance care plan.  Demi PHAM Sylvain has no plan or code status on file. Discussed available resources and provided with information. Confirmed code status reflects current choices pending further ACP discussions.  Confirmed/documented legally designated decision makers.  Added by Ava Padgett             Diaphragmatic hernia 04/25/2016     Priority: Medium     Post-menopause 11/09/2015     Priority: Medium     Comprehensive Medical Examination 05/08/2015     Priority: Medium     Osteoarthritis, multiple joints      Priority: Medium     GERD (gastroesophageal reflux) 02/10/2012     Priority: Medium     Fibrocystic breast disease (FCBD) 07/12/2011     Priority: Medium     Low back pain, chronic 07/12/2011     Priority: Medium     Dyslipidemia 06/20/2006     Priority: Medium     H/O diverticulitis, S/P bowel resection 02/06/2002     Priority: Medium                 Past Medical History:      Past Medical History:   Diagnosis Date     Chronic/recurrent back pain 7/12/2011     Diverticulitis      Diverticulitis, recurrent 2/6/2002     Dyslipidemia 6/20/2006     Fibrocystic breast disease 7/12/2011     Gastro-oesophageal reflux disease      GERD 2/10/2012     Hiatal hernia 2/10/2012     Hormone replacement therapy, postmenopausal 7/12/2011     Osteoarthritis        Past Surgical History:      Past Surgical History:   Procedure Laterality Date      ------------OTHER-------------      colonoscopy with biopsy - diverticulitis, hemorrhoids     ------------OTHER-------------  4/19/1994    sigmoidoscopy - abdominal pain, diverticula     ABDOMEN SURGERY      colon removed 2nd to diverticulitis     APPENDECTOMY       ARTHROSCOPY SHOULDER  11/20/2013    Procedure: ARTHROSCOPY SHOULDER;  Right Shoulder Arthroscopy Sub-Acromial Decompression Rotator Cuff Repair;  Surgeon: Lenny Trammell MD;  Location: [facility]     COLONOSCOPY  2/1/2011    Colonoscopy [facility]     Diverticulitis      bowel resection     EGD with biopsy  2011     ENDOSCOPY UPPER WITH PANCREATIC STIMULATION       ENDOSCOPY UPPER, COLONOSCOPY, COMBINED  7/18/2014    Procedure: COMBINED ENDOSCOPY UPPER, COLONOSCOPY;  Surgeon: Israel Feliciano MD;  Location: [facility]     ENT SURGERY      tonsilectomy     ESOPHAGOSCOPY, GASTROSCOPY, DUODENOSCOPY (EGD), COMBINED N/A 9/27/2017    Procedure: COMBINED ESOPHAGOSCOPY, GASTROSCOPY, DUODENOSCOPY (EGD);  UPPER ENDOSCOPY WITH BIOPSY AND POLYPECTOMY;  Surgeon: Gallito Alvarado DO;  Location: [facility]     GYN SURGERY      hysterectomy with bladder and rectal repair     IR CONSULTATION FOR IR EXAM  3/11/2019     ORTHOPEDIC SURGERY  11/20/2013    right rotator cuff surgery     TVH with ovarian preservation         Family History:      Family History   Problem Relation Age of Onset     Cerebrovascular Disease Father         CVA     Heart Disease Father         heart disease     Hypertension Father      Myocardial Infarction Father         myocardial infarction     Cerebrovascular Disease Mother         CVA     Diabetes Mother      Heart Disease Mother         heart disease     Hypertension Mother      Heart Disease Brother         heart disease     Hypertension Brother        Social History:    Marital Status:   [2]  Social History     Tobacco Use     Smoking status: Former Smoker     Packs/day: 0.50     Years: 5.00     Pack years: 2.50     Types:  "Cigarettes     Start date: 1963     Quit date: 1968     Years since quittin.2     Smokeless tobacco: Never Used   Substance Use Topics     Alcohol use: Yes     Alcohol/week: 0.8 standard drinks     Types: 1 Glasses of wine per week     Comment: daily with dinner     Drug use: No        Review of Systems   Cardiovascular: Positive for palpitations.   Endocrine: Positive for heat intolerance.   Musculoskeletal: Positive for back pain.   All other systems reviewed and are negative.        Physical Exam   Vitals:  /62   Pulse 83   Temp 98.3  F (36.8  C) (Tympanic)   Resp 16   Ht 1.511 m (4' 11.5\")   Wt 49.9 kg (110 lb)   SpO2 98%   BMI 21.85 kg/m        Physical Exam  Constitutional:       Appearance: Normal appearance. She is normal weight.   Pulmonary:      Effort: Pulmonary effort is normal.   Abdominal:      General: Abdomen is flat. There is no distension.      Palpations: Abdomen is soft. There is no mass.      Tenderness: There is no abdominal tenderness.      Hernia: No hernia is present.          Comments: Infraumbilical midline incision from her partial colectomy   Genitourinary:     Comments: External genitalia are normal.  Normal urethra.  There is mild urethral descensus.  Patient does have a grade 2 cystocele and a grade 1-2 rectocele.  Poor Kegel contraction.  Neurological:      Mental Status: She is alert.         Void residual: 0    Impression: Urge incontinence, stool incontinence  Plan   Plan: I discussed various treatment options of urge incontinence such as pelvic floor strengthening exercises, techniques to abort her bladder spasms, medications and interventional procedures.  She does have glaucoma so at this time she would prefer not to take medications.  She is interested in physical therapy so that referral will be made.  Her stool incontinence is primarily liquid so I did encourage her to just increase her fiber to bulk her stools to see if that can help with the " leakage and I also encouraged her to cut back on her coffee.  Follow-up in 1 proximately 4 months.      No follow-ups on file.    Maria M Perez MD  [facility] [place]

## 2021-08-02 DIAGNOSIS — S33.8XXD SPRAIN OF OTHER PARTS OF LUMBAR SPINE AND PELVIS, SUBSEQUENT ENCOUNTER: ICD-10-CM

## 2021-08-02 NOTE — TELEPHONE ENCOUNTER
tramadol      Last Written Prescription Date:  7/4/21  Last Fill Quantity: 120,   # refills: 0  Last Office Visit: 6/3/21  Future Office visit:    Next 5 appointments (look out 90 days)    Aug 04, 2021 10:00 AM  Return Visit with Curt Encarnacion DC  [facility] [place] ([facility] ) [address] [place] [address]  [place] [address]  [phone]           Routing refill request to provider for review/approval because:  Drug not on the [facility], [facility] or [facility] refill protocol or controlled substance

## 2021-08-03 RX ORDER — TRAMADOL HYDROCHLORIDE 50 MG/1
TABLET ORAL
Qty: 120 TABLET | Refills: 0 | Status: SHIPPED | OUTPATIENT
Start: 2021-08-03 | End: 2021-09-01

## 2021-08-04 ENCOUNTER — OFFICE VISIT (OUTPATIENT)
Dept: CHIROPRACTIC MEDICINE | Facility: OTHER | Age: 81
End: 2021-08-04
Attending: CHIROPRACTOR
Payer: COMMERCIAL

## 2021-08-04 DIAGNOSIS — M99.03 SEGMENTAL AND SOMATIC DYSFUNCTION OF LUMBAR REGION: Primary | ICD-10-CM

## 2021-08-04 DIAGNOSIS — M54.50 ACUTE BILATERAL LOW BACK PAIN WITHOUT SCIATICA: ICD-10-CM

## 2021-08-04 DIAGNOSIS — M99.02 SEGMENTAL AND SOMATIC DYSFUNCTION OF THORACIC REGION: ICD-10-CM

## 2021-08-04 PROCEDURE — 98940 CHIROPRACT MANJ 1-2 REGIONS: CPT | Mod: AT | Performed by: CHIROPRACTOR

## 2021-08-18 ENCOUNTER — TELEPHONE (OUTPATIENT)
Dept: FAMILY MEDICINE | Facility: OTHER | Age: 81
End: 2021-08-18

## 2021-08-18 NOTE — TELEPHONE ENCOUNTER
Patient calling and is requesting if she can have a cologuard screening or if provider would like her to have a colonoscopy instead. Patient is aware that provider is not in and will wait for him to return to address concern. Please advise, thank you.    Patient's phone number is [phone]

## 2021-08-24 NOTE — TELEPHONE ENCOUNTER
Johanny,   Please see message below.  It looks like a letter was sent on 7/16, can you please attempt to contact patient again to schedule.    Thank you!

## 2021-08-25 ENCOUNTER — OFFICE VISIT (OUTPATIENT)
Dept: CHIROPRACTIC MEDICINE | Facility: OTHER | Age: 81
End: 2021-08-25
Attending: CHIROPRACTOR
Payer: COMMERCIAL

## 2021-08-25 DIAGNOSIS — M54.50 ACUTE BILATERAL LOW BACK PAIN WITHOUT SCIATICA: ICD-10-CM

## 2021-08-25 DIAGNOSIS — M99.03 SEGMENTAL AND SOMATIC DYSFUNCTION OF LUMBAR REGION: Primary | ICD-10-CM

## 2021-08-25 DIAGNOSIS — M99.02 SEGMENTAL AND SOMATIC DYSFUNCTION OF THORACIC REGION: ICD-10-CM

## 2021-08-25 PROCEDURE — 98940 CHIROPRACT MANJ 1-2 REGIONS: CPT | Mod: AT | Performed by: CHIROPRACTOR

## 2021-09-01 DIAGNOSIS — M41.35 THORACOGENIC SCOLIOSIS OF THORACOLUMBAR REGION: ICD-10-CM

## 2021-09-01 DIAGNOSIS — Z78.0 POST-MENOPAUSE: ICD-10-CM

## 2021-09-01 DIAGNOSIS — S33.8XXD SPRAIN OF OTHER PARTS OF LUMBAR SPINE AND PELVIS, SUBSEQUENT ENCOUNTER: ICD-10-CM

## 2021-09-01 RX ORDER — PANTOPRAZOLE SODIUM 40 MG/1
TABLET, DELAYED RELEASE ORAL
Qty: 90 TABLET | Refills: 0 | Status: SHIPPED | OUTPATIENT
Start: 2021-09-01 | End: 2021-12-29

## 2021-09-01 RX ORDER — TRAMADOL HYDROCHLORIDE 50 MG/1
TABLET ORAL
Qty: 120 TABLET | Refills: 0 | Status: SHIPPED | OUTPATIENT
Start: 2021-09-01 | End: 2021-10-05

## 2021-09-01 NOTE — TELEPHONE ENCOUNTER
tramadol      Last Written Prescription Date:  8/3/21  Last Fill Quantity: 120,   # refills: 0  Last Office Visit: 6/3/21  Future Office visit:    Next 5 appointments (look out 90 days)    Sep 15, 2021 10:00 AM  Return Visit with Curt Encarnacion DC  [facility] [place] ([facility]  [place] ) [address] [place] [address]  [phone]           protonix      Last Written Prescription Date:  12/28/20  Last Fill Quantity: 90,   # refills: 0

## 2021-09-03 NOTE — TELEPHONE ENCOUNTER
Patient is scheduled on 09/10.  Next 5 appointments (look out 90 days)    Sep 10, 2021 10:00 AM  (Arrive by 9:45 AM)  CONSULT with FREEDOM Bey  [facility] [place] ([facility] [place] ) [address]  [place] [address]  [phone]   Sep 15, 2021 10:00 AM  Return Visit with Curt Encarnacion DC  [facility] [place] ([facility] [place] ) [address] [place] [address]  [phone]

## 2021-09-07 ENCOUNTER — HOSPITAL ENCOUNTER (OUTPATIENT)
Dept: PHYSICAL THERAPY | Facility: HOSPITAL | Age: 81
Setting detail: THERAPIES SERIES
End: 2021-09-07
Attending: UROLOGY
Payer: COMMERCIAL

## 2021-09-07 DIAGNOSIS — N39.41 URGE INCONTINENCE OF URINE: ICD-10-CM

## 2021-09-07 PROCEDURE — 97162 PT EVAL MOD COMPLEX 30 MIN: CPT | Mod: GP

## 2021-09-07 PROCEDURE — 97530 THERAPEUTIC ACTIVITIES: CPT | Mod: GP

## 2021-09-07 NOTE — PROGRESS NOTES
09/07/21 1200   General Information   Type of Visit Initial OP Ortho PT Evaluation   Start of Care Date 09/07/21   Referring Physician Dr Perez   Orders Evaluate and Treat   Orders Comment pelvic floor, incontinence   Date of Order 07/29/21   Certification Required? Yes   Medical Diagnosis UI, bowel incontinence.    Surgical/Medical history reviewed Yes   Precautions/Limitations no known precautions/limitations   Body Part(s)   Body Part(s) Pelvic Floor Dysfunction   Presentation and Etiology   Pertinent history of current problem (include personal factors and/or comorbidities that impact the POC) This 80 y/o female referred to PT d/t urinary urgency and bowel/urinary incontinence starting approx 5 years ago. Has no stress incontinence. Pt has bowel leakage in underwear sometimes liquid, sometimes small solid. SShe has no awareness that she has gone typically until she goes to toilet. Uses small piece of toilet paper for protection. This often happens after walking dog. H/o vaginal hysterectomy approx 60 years ago. Hx of partial removal of colon d/t diverticulosis with vertical incision from umbilicus to pubic bone approx 20 years ago. Pt reports feeling like she may have a prolapse during intercourse or when feeling with her hand. Has been taking metamucil capsules but they don't help.   Has no dyspaurenia.  Pt has significant scoliosis with L hip raised higher than R in standing -   Impairments P. Bowel or bladder problems   Functional Limitations perform activities of daily living;perform desired leisure / sports activities   Current Level of Function   Current Community Support Family/friend caregiver   Patient role/employment history F. Retired   Current equipment-Gait/Locomotion None   Fall Risk Screen   Have you fallen 2 or more times in the past year? No   Have you fallen and had an injury in the past year? No   Is patient a fall risk? No   Specific Questions   Specific Questions Pelvic Floor  "Dysfunction;Women's Health   Pelvic Floor Dysfunction Questions   Regular exercise Yes   Fluid intake-glasses/day (one glass/cup = 8oz 6   Caffeinated beverages-glasses/day 4   Alcoholic beverages - glasses/day 0   Recent diet change? No   How long can you delay the need to urinate?  it depends- typically not long   How many times do you wake to urinate at night?   2   How often do you urinate during the day?   6   Can you stop the flow of urine when on the toilet?  No   Is the volume of urine passed usually  Average   Do you have the sensation that you need to go to the toilet?  Yes   Do you empty your bladder frequently, before you experience the urge to pass urine?  Yes   Do you have \"triggers\" that make you feel you can't wait to go to the toilet?  Yes   Number of bladder infections last year?  0   Frequency of bowel movements:  daily   Consistency of stool?  Soft formed   Do you ignore the urge to defecate?  No   Women's Health Questions   Number of pregnancies  3   Number of vaginal deliveries  3   Weight of largest baby  7 pounds 2 ounces   Number of episiotomies  3   Pelvic Floor Dysfunction Objective Findings   Pain-pelvic dysfunction Pelvic pain   Observation L Iliac elzbieta higher, R shld higher in standing   Posture significant scoliosis thoracic/lumbar spine. MRI results- severe degen changes t/o lumbar spine.   Areas of Tightness   (quadratus lumborum, LLE tightness t/o)   Abdominal Wall Scar mobility   Type of Storage Problem urge incontinence;urgency   Type of Emptying Problem fecal incontinence   Protection needed Number of pads per day;Pad   Power (MMT at Levator Ani) 2+   Endurance (Up to 10 seconds as long as still 50% power) 6   Repetitions (Contract 10 seconds or MVC, rest 4 seconds and count max number of reps) 5   Fast Twitch (Number of 1 second contractions can do in 10 seconds. Norm=7 reps) 7   Elevation (Able to lift posterior vaginal wall toward head and public bone) Present   Medications " Pain medications  (see medical chart)   Pad uses tissue only   Scar mobility wfl   Pelvic L OI, Levator ani tender to palpation    Pain medications tramadol   Planned Therapy Interventions   Planned Therapy Interventions neuromuscular re-education;stretching;strengthening;manual therapy   Planned Therapy Interventions Comment HEP/educ   Planned Modality Interventions   Planned Modality Interventions Biofeedback;Electrical stimulation   Planned Modality Interventions Comments Prn    Clinical Impression   Criteria for Skilled Therapeutic Interventions Met yes, treatment indicated   PT Diagnosis Dysfunctional PFM as evidenced by UI, bowel incontinence, pelvic organ prolapse and L sided pelvic pain complicated by scoliosis.    Influenced by the following impairments urge, incontinence, prolapse, weakness, tightness, pain, scoliosis   Functional limitations due to impairments exercise, adls, travel, intercourse   Clinical Presentation Evolving/Changing   Clinical Presentation Rationale Clinical judgement    Clinical Decision Making (Complexity) Moderate complexity   Therapy Frequency 1 time/week   Predicted Duration of Therapy Intervention (days/wks) 12 weeks   Risk & Benefits of therapy have been explained Yes   Patient, Family & other staff in agreement with plan of care Yes   Clinical Impression Comments Ptwill benefit from PFM strengthening and neuromusc re education. Pt will also benefit from education on voiding, toilet time, avoiding JIC'ing and bearing down to urinate. HEP will be focused on treating urge, leakage and reducing prolapse as able.    Education Assessment   Barriers to Learning No barriers   ORTHO GOALS   PT Ortho Eval Goals 1;2;3   Ortho Goal 1   Goal Identifier STG 1   Goal Description Pt will demonstrate indep HEP compliance   Target Date 10/19/21   Ortho Goal 2   Goal Identifier STG 2   Goal Description Pt will have improved PFM strength through PERF score by 1 grade to enable pt to have 50 %  less bowel leakage episodes per day.    Target Date 11/02/21   Ortho Goal 3   Goal Identifier LTG    Goal Description ADLs and physical activities not limited by incontinence or prolapse   Target Date 11/30/21   Total Evaluation Time   PT Eval, Moderate Complexity Minutes (91093) 35   Therapy Certification   Certification date from 09/07/21   Certification date to 11/30/21   Medical Diagnosis Urgency, bowel/urinary incontinence

## 2021-09-10 ENCOUNTER — OFFICE VISIT (OUTPATIENT)
Dept: SURGERY | Facility: OTHER | Age: 81
End: 2021-09-10
Attending: NURSE PRACTITIONER
Payer: COMMERCIAL

## 2021-09-10 VITALS
DIASTOLIC BLOOD PRESSURE: 84 MMHG | BODY MASS INDEX: 21.6 KG/M2 | HEART RATE: 76 BPM | TEMPERATURE: 98.2 F | OXYGEN SATURATION: 98 % | SYSTOLIC BLOOD PRESSURE: 124 MMHG | HEIGHT: 60 IN | WEIGHT: 110 LBS

## 2021-09-10 DIAGNOSIS — R15.9 INCONTINENCE OF FECES, UNSPECIFIED FECAL INCONTINENCE TYPE: Primary | ICD-10-CM

## 2021-09-10 PROCEDURE — G0463 HOSPITAL OUTPT CLINIC VISIT: HCPCS

## 2021-09-10 PROCEDURE — 99213 OFFICE O/P EST LOW 20 MIN: CPT | Performed by: CLINICAL NURSE SPECIALIST

## 2021-09-10 ASSESSMENT — MIFFLIN-ST. JEOR: SCORE: 872.52

## 2021-09-10 ASSESSMENT — PAIN SCALES - GENERAL: PAINLEVEL: MILD PAIN (2)

## 2021-09-10 NOTE — PATIENT INSTRUCTIONS
Thank you for allowing [facility] and our surgical team to participate in your care. Please call our health unit coordinator at [phone] with scheduling questions or the nurse at [phone] with any other questions or concerns.

## 2021-09-10 NOTE — PROGRESS NOTES
RE: Demi Narayan  : 1940  RICHIE: 9/10/2021      Chief Complaint:  Stool leakage    History of Present Illness:  I am seeing Demi Narayan at the request of Dr. Mackay for evaluation regarding chronic stool leakage.  She reports stool leakage for many years.  She has been placing toilet tissue in her underwear to prevent staining.  She had a colonoscopy in  with random biopsies, which revealed collagenous colitis - she does not remember any recommended treatment for the collagenous colitis.  She denies family history of colon or rectal cancer, blood in stool, weight loss, abdominal pain.  Previous abdominal surgeries include hemicolectomy due to diverticulitis and hysterectomy.   She specifically denies fevers, chills, nausea, vomiting, chest pain, shortness of breath, palpitations, sore throat, cough, or generalized feeling ill.       Medical history:  Past Medical History:   Diagnosis Date     Chronic/recurrent back pain 2011     Diverticulitis      Diverticulitis, recurrent 2002    bowel resection     Dyslipidemia 2006     Fibrocystic breast disease 2011     Gastro-oesophageal reflux disease      GERD 2/10/2012     Hiatal hernia 2/10/2012     Hormone replacement therapy, postmenopausal 2011     Osteoarthritis        Surgical history:  Past Surgical History:   Procedure Laterality Date     ------------OTHER-------------      colonoscopy with biopsy - diverticulitis, hemorrhoids     ------------OTHER-------------  1994    sigmoidoscopy - abdominal pain, diverticula     ABDOMEN SURGERY      colon removed 2nd to diverticulitis     APPENDECTOMY       ARTHROSCOPY SHOULDER  2013    Procedure: ARTHROSCOPY SHOULDER;  Right Shoulder Arthroscopy Sub-Acromial Decompression Rotator Cuff Repair;  Surgeon: Lenny Trammell MD;  [facility]     [facility]     Diverticulitis      bowel resection     EGD with biopsy   2011     ENDOSCOPY UPPER WITH PANCREATIC STIMULATION       ENDOSCOPY UPPER, COLONOSCOPY, COMBINED  7/18/2014    Procedure: COMBINED ENDOSCOPY UPPER, COLONOSCOPY;  Surgeon: Israel Feliciano MD;  Location: [facility]     ENT SURGERY      tonsilectomy     ESOPHAGOSCOPY, GASTROSCOPY, DUODENOSCOPY (EGD), COMBINED N/A 9/27/2017    Procedure: COMBINED ESOPHAGOSCOPY, GASTROSCOPY, DUODENOSCOPY (EGD);  UPPER ENDOSCOPY WITH BIOPSY AND POLYPECTOMY;  Surgeon: Gallito Alvarado DO;  Location: [facility]     GYN SURGERY      hysterectomy with bladder and rectal repair     IR CONSULTATION FOR IR EXAM  3/11/2019     ORTHOPEDIC SURGERY  11/20/2013    right rotator cuff surgery     TVH with ovarian preservation         Family history:  Family History   Problem Relation Age of Onset     Cerebrovascular Disease Father         CVA     Heart Disease Father         heart disease     Hypertension Father      Myocardial Infarction Father         myocardial infarction     Cerebrovascular Disease Mother         CVA     Diabetes Mother      Heart Disease Mother         heart disease     Hypertension Mother      Heart Disease Brother         heart disease     Hypertension Brother        Medications:  Prior to Admission medications    Medication Sig Start Date End Date Taking? Authorizing Provider   aspirin 325 MG tablet Take 325 mg by mouth At Bedtime.   Yes Reported, Patient   atorvastatin (LIPITOR) 20 MG tablet Take 1 tablet (20 mg) by mouth daily 7/1/21  Yes Virgil Mackay MD   Calcium Carbonate-Vitamin D (CALCIUM + D PO) Take 600 mg by mouth.   Yes Reported, Patient   estradiol (ESTRACE) 0.5 MG tablet Take 1 Tab by mouth one time a day. 3/29/21  Yes Virgil Mackay MD   GLUCOSAMINE SULFATE PO Take  by mouth daily.   Yes Reported, Patient   latanoprost (XALATAN) 0.005 % ophthalmic solution  4/15/21  Yes Reported, Patient   Multiple Vitamins-Minerals (MULTIVITAL PO) Take 1 tablet by mouth daily.   Yes Reported, Patient   niacin 500 MG tablet  Take 1 tablet by mouth daily.   Yes Reported, Patient   Omega-3 Fatty Acids (OMEGA-3 FISH OIL PO) Take  by mouth daily.   Yes Reported, Patient   pantoprazole (PROTONIX) 40 MG EC tablet TAKE 1 TABLET BY MOUTH IN THE MORNING BEFORE BREAKFAST 21  Yes Virgil Mackay MD   traMADol (ULTRAM) 50 MG tablet TAKE 1 TO 2 TABLETS BY MOUTH EVERY 6 TO 8 HOURS AS NEEDED 21  Yes Virgil Mackay MD   triamcinolone (KENALOG) 0.1 % external ointment Apply topically 2 times daily as needed for irritation 20  Yes Froylan Clinton DO       Allergies:  The patient has No Known Allergies.  .  Social history:  Social History     Tobacco Use     Smoking status: Former Smoker     Packs/day: 0.50     Years: 5.00     Pack years: 2.50     Types: Cigarettes     Start date: 1963     Quit date: 1968     Years since quittin.3     Smokeless tobacco: Never Used   Substance Use Topics     Alcohol use: Yes     Alcohol/week: 0.8 standard drinks     Types: 1 Glasses of wine per week     Comment: daily with dinner     Marital status: .    Review of Systems:    Constitutional: Negative for fever, chills.   HENT: Negative for ear pain, congestion, sore throat, and ear discharge.    Eyes: Negative for blurred vision, double vision.   Respiratory: Negative for cough, hemoptysis, shortness of breath, wheezing and stridor.    Cardiovascular: Negative for chest pain, palpitations and orthopnea.   Gastrointestinal: Negative for heartburn, nausea, vomiting, abdominal pain and blood in stool.   Genitourinary: Negative for urgency, frequency   Musculoskeletal: Negative for myalgias   Neurological: Negative for tingling, speech change and headaches.   Endo/Heme/Allergies: Does not bruise/bleed easily.   Psychiatric/Behavioral: Negative for depression, suicidal ideas and hallucinations. The patient is not nervous/anxious.    Physical Examination:  /84   Pulse 76   Temp 98.2  F (36.8  C) (Tympanic)   Ht 1.511 m (4'  "11.5\")   Wt 49.9 kg (110 lb)   SpO2 98%   BMI 21.85 kg/m    General: Alert and orientedx4, no acute distress, well-developed/well-nourished, ambulating without assistance  HEENT: normocephalic atraumatic, extraocular movements intact, sclerae anicteric, Trachea mideline  Chest:   Respirations even and unlabored  Cardiac: Regular rate and rhythm   Abdomen: Soft, non-tender, non-distended  Extremities: Cursory exam unremarkable.  No peripheral edema noted.  Skin: Warm, dry, less than 2 sec cap refill  Neuro: CN 2-12 grossly intact, no focal deficit, GCS 15  Psych: Pleasant, calm, asks appropriate questions      Assessment/Plan:  #1 Stool leaking  #2 Start taking fiber supplement  #3 Follow up in 2 months    We will follow up in 2 months, if the symptoms are not improved/resolved, consider starting budesonide 9 mg every morning x 8 weeks vs colonoscopy with biopsy.      Nicole Stern [facility]  [address] [place] [address]    Referring Provider:  Lashawn Hurd NP  [address] [place] [address]     Primary Care Provider:  Virgil Mackay  "

## 2021-09-10 NOTE — NURSING NOTE
"Chief Complaint   Patient presents with     Consult     colonoscopy consult       Initial /84   Pulse 76   Temp 98.2  F (36.8  C) (Tympanic)   Ht 1.511 m (4' 11.5\")   Wt 49.9 kg (110 lb)   SpO2 98%   BMI 21.85 kg/m   Estimated body mass index is 21.85 kg/m  as calculated from the following:    Height as of this encounter: 1.511 m (4' 11.5\").    Weight as of this encounter: 49.9 kg (110 lb).  Medication Reconciliation: complete  Funmilayo Peña LPN    "

## 2021-09-14 ENCOUNTER — HOSPITAL ENCOUNTER (OUTPATIENT)
Dept: PHYSICAL THERAPY | Facility: HOSPITAL | Age: 81
Setting detail: THERAPIES SERIES
End: 2021-09-14
Attending: UROLOGY
Payer: COMMERCIAL

## 2021-09-14 PROCEDURE — 97530 THERAPEUTIC ACTIVITIES: CPT | Mod: GP

## 2021-09-14 PROCEDURE — 97140 MANUAL THERAPY 1/> REGIONS: CPT | Mod: GP

## 2021-09-16 ENCOUNTER — OFFICE VISIT (OUTPATIENT)
Dept: CHIROPRACTIC MEDICINE | Facility: OTHER | Age: 81
End: 2021-09-16
Attending: CHIROPRACTOR
Payer: COMMERCIAL

## 2021-09-16 DIAGNOSIS — M99.02 SEGMENTAL AND SOMATIC DYSFUNCTION OF THORACIC REGION: ICD-10-CM

## 2021-09-16 DIAGNOSIS — M54.50 ACUTE BILATERAL LOW BACK PAIN WITHOUT SCIATICA: ICD-10-CM

## 2021-09-16 DIAGNOSIS — M99.03 SEGMENTAL AND SOMATIC DYSFUNCTION OF LUMBAR REGION: Primary | ICD-10-CM

## 2021-09-16 PROCEDURE — 98940 CHIROPRACT MANJ 1-2 REGIONS: CPT | Mod: AT | Performed by: CHIROPRACTOR

## 2021-09-20 DIAGNOSIS — Z79.890 HORMONE REPLACEMENT THERAPY (HRT): ICD-10-CM

## 2021-09-21 RX ORDER — ESTRADIOL 0.5 MG/1
TABLET ORAL
Qty: 90 TABLET | Refills: 1 | Status: SHIPPED | OUTPATIENT
Start: 2021-09-21 | End: 2022-02-22

## 2021-09-28 ENCOUNTER — HOSPITAL ENCOUNTER (OUTPATIENT)
Dept: PHYSICAL THERAPY | Facility: HOSPITAL | Age: 81
Setting detail: THERAPIES SERIES
End: 2021-09-28
Attending: UROLOGY
Payer: COMMERCIAL

## 2021-09-28 PROCEDURE — 97140 MANUAL THERAPY 1/> REGIONS: CPT | Mod: GP

## 2021-09-28 PROCEDURE — 97110 THERAPEUTIC EXERCISES: CPT | Mod: GP

## 2021-10-01 DIAGNOSIS — S33.8XXD SPRAIN OF OTHER PARTS OF LUMBAR SPINE AND PELVIS, SUBSEQUENT ENCOUNTER: ICD-10-CM

## 2021-10-04 NOTE — TELEPHONE ENCOUNTER
ultram      Last Written Prescription Date:  9/1/21  Last Fill Quantity: 120,   # refills: 0  Last Office Visit: 6/3/21  Future Office visit:    Next 5 appointments (look out 90 days)    Oct 07, 2021  9:50 AM  Return Visit with Curt Encarnacion DC  [facility] [place] [facility] ([facility] [place] ) [address] [place] [address]  [place] [address]  [phone]   Nov 12, 2021 10:30 AM  (Arrive by 10:15 AM)  Return Visit with FREEDOM Bey [facility] [place] ([facility] [place] ) [address]  [place] [address]  [phone]

## 2021-10-05 ENCOUNTER — IMMUNIZATION (OUTPATIENT)
Dept: FAMILY MEDICINE | Facility: OTHER | Age: 81
End: 2021-10-05
Attending: FAMILY MEDICINE
Payer: COMMERCIAL

## 2021-10-05 ENCOUNTER — HOSPITAL ENCOUNTER (OUTPATIENT)
Dept: PHYSICAL THERAPY | Facility: HOSPITAL | Age: 81
Setting detail: THERAPIES SERIES
End: 2021-10-05
Attending: UROLOGY
Payer: COMMERCIAL

## 2021-10-05 PROCEDURE — 97110 THERAPEUTIC EXERCISES: CPT | Mod: GP

## 2021-10-05 PROCEDURE — 91300 PR COVID VAC PFIZER DIL RECON 30 MCG/0.3 ML IM: CPT

## 2021-10-05 PROCEDURE — 97140 MANUAL THERAPY 1/> REGIONS: CPT | Mod: GP

## 2021-10-05 PROCEDURE — 97530 THERAPEUTIC ACTIVITIES: CPT | Mod: GP

## 2021-10-05 RX ORDER — TRAMADOL HYDROCHLORIDE 50 MG/1
TABLET ORAL
Qty: 120 TABLET | Refills: 0 | Status: SHIPPED | OUTPATIENT
Start: 2021-10-05 | End: 2021-11-03

## 2021-10-07 ENCOUNTER — OFFICE VISIT (OUTPATIENT)
Dept: CHIROPRACTIC MEDICINE | Facility: OTHER | Age: 81
End: 2021-10-07
Attending: CHIROPRACTOR
Payer: COMMERCIAL

## 2021-10-07 DIAGNOSIS — M99.03 SEGMENTAL AND SOMATIC DYSFUNCTION OF LUMBAR REGION: Primary | ICD-10-CM

## 2021-10-07 DIAGNOSIS — M99.02 SEGMENTAL AND SOMATIC DYSFUNCTION OF THORACIC REGION: ICD-10-CM

## 2021-10-07 DIAGNOSIS — M54.50 ACUTE BILATERAL LOW BACK PAIN WITHOUT SCIATICA: ICD-10-CM

## 2021-10-07 PROCEDURE — 98940 CHIROPRACT MANJ 1-2 REGIONS: CPT | Mod: AT | Performed by: CHIROPRACTOR

## 2021-10-12 ENCOUNTER — HOSPITAL ENCOUNTER (OUTPATIENT)
Dept: PHYSICAL THERAPY | Facility: HOSPITAL | Age: 81
Setting detail: THERAPIES SERIES
End: 2021-10-12
Attending: UROLOGY
Payer: COMMERCIAL

## 2021-10-12 PROCEDURE — 97110 THERAPEUTIC EXERCISES: CPT | Mod: GP

## 2021-10-28 ENCOUNTER — OFFICE VISIT (OUTPATIENT)
Dept: CHIROPRACTIC MEDICINE | Facility: OTHER | Age: 81
End: 2021-10-28
Attending: CHIROPRACTOR
Payer: COMMERCIAL

## 2021-10-28 DIAGNOSIS — M54.50 ACUTE BILATERAL LOW BACK PAIN WITHOUT SCIATICA: ICD-10-CM

## 2021-10-28 DIAGNOSIS — M99.02 SEGMENTAL AND SOMATIC DYSFUNCTION OF THORACIC REGION: ICD-10-CM

## 2021-10-28 DIAGNOSIS — M99.03 SEGMENTAL AND SOMATIC DYSFUNCTION OF LUMBAR REGION: Primary | ICD-10-CM

## 2021-10-28 PROCEDURE — 98940 CHIROPRACT MANJ 1-2 REGIONS: CPT | Mod: AT | Performed by: CHIROPRACTOR

## 2021-11-01 DIAGNOSIS — S33.8XXD SPRAIN OF OTHER PARTS OF LUMBAR SPINE AND PELVIS, SUBSEQUENT ENCOUNTER: ICD-10-CM

## 2021-11-02 NOTE — TELEPHONE ENCOUNTER
ultram      Last Written Prescription Date:  10/5/21  Last Fill Quantity: 20,   # refills: 0  Last Office Visit: 6/3/21  Future Office visit:    Next 5 appointments (look out 90 days)    Nov 11, 2021 10:00 AM  Return Visit with Curt Encarnacion [facility] ([facility] [place] ) [address] [facility] [address]  [phone]   Nov 12, 2021 10:30 AM  (Arrive by 10:15 AM)  Return Visit with FREEDOM Bey [facility] [place] ([facility] [place] ) [address]  [place] [address]  [phone]

## 2021-11-03 RX ORDER — TRAMADOL HYDROCHLORIDE 50 MG/1
TABLET ORAL
Qty: 120 TABLET | Refills: 0 | Status: SHIPPED | OUTPATIENT
Start: 2021-11-03 | End: 2021-12-02

## 2021-11-11 ENCOUNTER — OFFICE VISIT (OUTPATIENT)
Dept: CHIROPRACTIC MEDICINE | Facility: OTHER | Age: 81
End: 2021-11-11
Attending: CHIROPRACTOR
Payer: COMMERCIAL

## 2021-11-11 DIAGNOSIS — M99.02 SEGMENTAL AND SOMATIC DYSFUNCTION OF THORACIC REGION: ICD-10-CM

## 2021-11-11 DIAGNOSIS — M54.50 ACUTE BILATERAL LOW BACK PAIN WITHOUT SCIATICA: ICD-10-CM

## 2021-11-11 DIAGNOSIS — M99.03 SEGMENTAL AND SOMATIC DYSFUNCTION OF LUMBAR REGION: Primary | ICD-10-CM

## 2021-11-11 PROCEDURE — 98940 CHIROPRACT MANJ 1-2 REGIONS: CPT | Mod: AT | Performed by: CHIROPRACTOR

## 2021-11-12 ENCOUNTER — OFFICE VISIT (OUTPATIENT)
Dept: SURGERY | Facility: OTHER | Age: 81
End: 2021-11-12
Attending: CLINICAL NURSE SPECIALIST
Payer: COMMERCIAL

## 2021-11-12 VITALS
WEIGHT: 117 LBS | HEART RATE: 82 BPM | DIASTOLIC BLOOD PRESSURE: 70 MMHG | BODY MASS INDEX: 22.97 KG/M2 | RESPIRATION RATE: 16 BRPM | TEMPERATURE: 97.4 F | HEIGHT: 60 IN | OXYGEN SATURATION: 99 % | SYSTOLIC BLOOD PRESSURE: 120 MMHG

## 2021-11-12 DIAGNOSIS — R15.9 INCONTINENCE OF FECES, UNSPECIFIED FECAL INCONTINENCE TYPE: Primary | ICD-10-CM

## 2021-11-12 PROCEDURE — G0463 HOSPITAL OUTPT CLINIC VISIT: HCPCS

## 2021-11-12 PROCEDURE — 99213 OFFICE O/P EST LOW 20 MIN: CPT | Performed by: CLINICAL NURSE SPECIALIST

## 2021-11-12 RX ORDER — WHEAT DEXTRIN 3 G/3.8 G
POWDER (GRAM) ORAL DAILY
COMMUNITY

## 2021-11-12 ASSESSMENT — MIFFLIN-ST. JEOR: SCORE: 912.21

## 2021-11-12 ASSESSMENT — PAIN SCALES - GENERAL: PAINLEVEL: SEVERE PAIN (6)

## 2021-11-12 NOTE — PROGRESS NOTES
RE: Demi Narayan  : 1940  RICHIE: 2021      Chief Complaint:  Stool leakage    History of Present Illness:  I am seeing Demi Narayan at the request of Dr. Mackay for evaluation regarding chronic stool leakage.   She reports stool leakage for many years.  She has been placing toilet tissue in her underwear to prevent staining.  When I saw her in September, I asked her to increase her fiber intake.  She started taking one teaspoon of Benefiber daily and she reports an improvement in the stool leakage.  She also reports regular, soft, formed bowel movements.      She had a colonoscopy in  with random biopsies, which revealed collagenous colitis - she does not remember any recommended treatment for the collagenous colitis.  She denies family history of colon or rectal cancer, blood in stool, weight loss, abdominal pain.  Previous abdominal surgeries include hysterectomy and hemicolectomy due to diverticulitis.   She specifically denies fevers, chills, nausea, vomiting, chest pain, shortness of breath, palpitations, sore throat, cough, or generalized feeling ill.       Medical history:  Past Medical History:   Diagnosis Date     Chronic/recurrent back pain 2011     Diverticulitis      Diverticulitis, recurrent 2002    bowel resection     Dyslipidemia 2006     Fibrocystic breast disease 2011     Gastro-oesophageal reflux disease      GERD 2/10/2012     Hiatal hernia 2/10/2012     Hormone replacement therapy, postmenopausal 2011     Osteoarthritis        Surgical history:  Past Surgical History:   Procedure Laterality Date     ------------OTHER-------------      colonoscopy with biopsy - diverticulitis, hemorrhoids     ------------OTHER-------------  1994    sigmoidoscopy - abdominal pain, diverticula     ABDOMEN SURGERY      colon removed 2nd to diverticulitis     APPENDECTOMY       ARTHROSCOPY SHOULDER  2013    Procedure: ARTHROSCOPY SHOULDER;  Right Shoulder  Arthroscopy Sub-Acromial Decompression Rotator Cuff Repair;  Surgeon: Lenny Trammell MD;  [facility]: [facility]     [facility]     Di[facility]itis      bowel resection     EGD with biopsy  2011     ENDOSCOPY UPPER WITH PANCREATIC STIMULATION       ENDOSCOPY UPPER, COLONOSCOPY, COMBINED  7/18/2014    Procedure: COMBINED ENDOSCOPY UPPER, COLONOSCOPY;  Surgeon: Israel Feliciano MD;  Location: [facility]     ENT SURGERY      tonsilectomy     ESOPHAGOSCOPY, GASTROSCOPY, DUODENOSCOPY (EGD), COMBINED N/A 9/27/2017    Procedure: COMBINED ESOPHAGOSCOPY, GASTROSCOPY, DUODENOSCOPY (EGD);  UPPER ENDOSCOPY WITH BIOPSY AND POLYPECTOMY;  Surgeon: Gallito Alvarado DO;  Location: [facility]     GYN SURGERY      hysterectomy with bladder and rectal repair     IR CONSULTATION FOR IR EXAM  3/11/2019     ORTHOPEDIC SURGERY  11/20/2013    right rotator cuff surgery     TVH with ovarian preservation         Family history:  Family History   Problem Relation Age of Onset     Cerebrovascular Disease Father         CVA     Heart Disease Father         heart disease     Hypertension Father      Myocardial Infarction Father         myocardial infarction     Cerebrovascular Disease Mother         CVA     Diabetes Mother      Heart Disease Mother         heart disease     Hypertension Mother      Heart Disease Brother         heart disease     Hypertension Brother        Medications:  Prior to Admission medications    Medication Sig Start Date End Date Taking? Authorizing Provider   aspirin 325 MG tablet Take 325 mg by mouth At Bedtime.   Yes Reported, Patient   atorvastatin (LIPITOR) 20 MG tablet Take 1 tablet (20 mg) by mouth daily 7/1/21  Yes Virgil Mackay MD   Calcium Carbonate-Vitamin D (CALCIUM + D PO) Take 600 mg by mouth.   Yes Reported, Patient   estradiol (ESTRACE) 0.5 MG tablet Take 1 Tab by mouth one time a day. 3/29/21  Yes Virgil Mackay MD   GLUCOSAMINE SULFATE PO Take  by mouth  daily.   Yes Reported, Patient   latanoprost (XALATAN) 0.005 % ophthalmic solution  4/15/21  Yes Reported, Patient   Multiple Vitamins-Minerals (MULTIVITAL PO) Take 1 tablet by mouth daily.   Yes Reported, Patient   niacin 500 MG tablet Take 1 tablet by mouth daily.   Yes Reported, Patient   Omega-3 Fatty Acids (OMEGA-3 FISH OIL PO) Take  by mouth daily.   Yes Reported, Patient   pantoprazole (PROTONIX) 40 MG EC tablet TAKE 1 TABLET BY MOUTH IN THE MORNING BEFORE BREAKFAST 21  Yes Virgil Mackay MD   traMADol (ULTRAM) 50 MG tablet TAKE 1 TO 2 TABLETS BY MOUTH EVERY 6 TO 8 HOURS AS NEEDED 21  Yes Virgil Mackay MD   triamcinolone (KENALOG) 0.1 % external ointment Apply topically 2 times daily as needed for irritation 20  Yes Froylan Clinton DO       Allergies:  The patient has No Known Allergies.  .  Social history:  Social History     Tobacco Use     Smoking status: Former Smoker     Packs/day: 0.50     Years: 5.00     Pack years: 2.50     Types: Cigarettes     Start date: 1963     Quit date: 1968     Years since quittin.5     Smokeless tobacco: Never Used   Substance Use Topics     Alcohol use: Yes     Alcohol/week: 0.8 standard drinks     Types: 1 Glasses of wine per week     Comment: daily with dinner     Marital status: .    Review of Systems:    Constitutional: Negative for fever, chills.   HENT: Negative for ear pain, congestion, sore throat, and ear discharge.    Eyes: Negative for blurred vision, double vision.   Respiratory: Negative for cough, hemoptysis, shortness of breath, wheezing and stridor.    Cardiovascular: Negative for chest pain, palpitations and orthopnea.   Gastrointestinal: Negative for heartburn, nausea, vomiting, abdominal pain and blood in stool.   Genitourinary: Negative for urgency, frequency   Musculoskeletal: Negative for myalgias   Neurological: Negative for tingling, speech change and headaches.   Endo/Heme/Allergies: Does not  bruise/bleed easily.   Psychiatric/Behavioral: Negative for depression, suicidal ideas and hallucinations. The patient is not nervous/anxious.    Physical Examination:  /70   Pulse 82   Temp 97.4  F (36.3  C) (Tympanic)   Resp 16   Ht 1.524 m (5')   Wt 53.1 kg (117 lb)   SpO2 99%   BMI 22.85 kg/m    General: Alert and orientedx4, no acute distress, well-developed/well-nourished, ambulating without assistance  HEENT: normocephalic atraumatic, extraocular movements intact, sclerae anicteric, Trachea mideline  Chest:   Respirations even and unlabored  Cardiac: Regular rate and rhythm   Abdomen: Soft, non-tender, non-distended  Extremities: Cursory exam unremarkable.  No peripheral edema noted.  Skin: Warm, dry, less than 2 sec cap refill  Neuro: CN 2-12 grossly intact, no focal deficit, GCS 15  Psych: Pleasant, calm, asks appropriate questions      Assessment/Plan:  #1 Stool leaking  #2 Increase fiber supplement to two tablespoons per day  #3 Follow up in 1 month    We will follow up in 1 month, if the symptoms are not improved/resolved, consider starting budesonide 9 mg every morning x 8 weeks vs colonoscopy with biopsy.      Nicole Stern [facility]  [address] [place] [address]    Referring Provider:  Lashawn Hurd NP  [address] [place] [address]     Primary Care Provider:  Virgil Mackay

## 2021-11-12 NOTE — NURSING NOTE
Chief Complaint   Patient presents with     RECHECK     follow up on chronic stool leakage       Initial /70   Pulse 82   Temp 97.4  F (36.3  C) (Tympanic)   Resp 16   Ht 1.524 m (5')   Wt 53.1 kg (117 lb)   SpO2 99%   BMI 22.85 kg/m   Estimated body mass index is 22.85 kg/m  as calculated from the following:    Height as of this encounter: 1.524 m (5').    Weight as of this encounter: 53.1 kg (117 lb).  Medication Reconciliation: complete  Fauzia Cohn LPN

## 2021-11-12 NOTE — PATIENT INSTRUCTIONS
Thank you for allowing [facility] and our surgical team to participate in your care. Please call our health unit coordinator at [phone] with scheduling questions or the nurse at [phone] with any other questions or concerns.

## 2021-11-23 ENCOUNTER — HOSPITAL ENCOUNTER (OUTPATIENT)
Dept: PHYSICAL THERAPY | Facility: HOSPITAL | Age: 81
Setting detail: THERAPIES SERIES
End: 2021-11-23
Attending: UROLOGY
Payer: COMMERCIAL

## 2021-11-23 PROCEDURE — 97110 THERAPEUTIC EXERCISES: CPT | Mod: GP

## 2021-11-30 ENCOUNTER — HOSPITAL ENCOUNTER (OUTPATIENT)
Dept: PHYSICAL THERAPY | Facility: HOSPITAL | Age: 81
Setting detail: THERAPIES SERIES
End: 2021-11-30
Attending: UROLOGY
Payer: COMMERCIAL

## 2021-11-30 PROCEDURE — 97110 THERAPEUTIC EXERCISES: CPT | Mod: GP

## 2021-12-01 DIAGNOSIS — S33.8XXD SPRAIN OF OTHER PARTS OF LUMBAR SPINE AND PELVIS, SUBSEQUENT ENCOUNTER: ICD-10-CM

## 2021-12-02 ENCOUNTER — OFFICE VISIT (OUTPATIENT)
Dept: CHIROPRACTIC MEDICINE | Facility: OTHER | Age: 81
End: 2021-12-02
Attending: CHIROPRACTOR
Payer: COMMERCIAL

## 2021-12-02 DIAGNOSIS — M54.50 ACUTE BILATERAL LOW BACK PAIN WITHOUT SCIATICA: ICD-10-CM

## 2021-12-02 DIAGNOSIS — M99.02 SEGMENTAL AND SOMATIC DYSFUNCTION OF THORACIC REGION: ICD-10-CM

## 2021-12-02 DIAGNOSIS — M99.03 SEGMENTAL AND SOMATIC DYSFUNCTION OF LUMBAR REGION: Primary | ICD-10-CM

## 2021-12-02 PROCEDURE — 98940 CHIROPRACT MANJ 1-2 REGIONS: CPT | Mod: AT | Performed by: CHIROPRACTOR

## 2021-12-02 RX ORDER — TRAMADOL HYDROCHLORIDE 50 MG/1
TABLET ORAL
Qty: 120 TABLET | Refills: 0 | Status: SHIPPED | OUTPATIENT
Start: 2021-12-02 | End: 2022-01-03

## 2021-12-02 NOTE — TELEPHONE ENCOUNTER
Ultram      Last Written Prescription Date:  11/3/21  Last Fill Quantity: 120,   # refills: 0  Last Office Visit: 6/3/21  Future Office visit:    Next 5 appointments (look out 90 days)    Dec 10, 2021 10:30 AM  (Arrive by 10:15 AM)  Return Visit with FREEDOM Bey [facility] [place] ([facility] [place] ) [address] [facility] [place] [facility] [address]  [phone]   Dec 23, 2021 11:10 AM  Return Visit with Curt Encarnacion DC  [facility] [place] [facility] [place] [facility] [address] [facility] [address]  [phone]           Routing refill request to provider for review/approval because:

## 2021-12-10 ENCOUNTER — OFFICE VISIT (OUTPATIENT)
Dept: SURGERY | Facility: OTHER | Age: 81
End: 2021-12-10
Attending: CLINICAL NURSE SPECIALIST
Payer: COMMERCIAL

## 2021-12-10 VITALS
HEIGHT: 60 IN | SYSTOLIC BLOOD PRESSURE: 118 MMHG | BODY MASS INDEX: 23.05 KG/M2 | WEIGHT: 117.4 LBS | TEMPERATURE: 97.1 F | DIASTOLIC BLOOD PRESSURE: 72 MMHG | HEART RATE: 77 BPM

## 2021-12-10 DIAGNOSIS — R15.9 INCONTINENCE OF FECES, UNSPECIFIED FECAL INCONTINENCE TYPE: Primary | ICD-10-CM

## 2021-12-10 PROCEDURE — 99212 OFFICE O/P EST SF 10 MIN: CPT | Performed by: CLINICAL NURSE SPECIALIST

## 2021-12-10 PROCEDURE — G0463 HOSPITAL OUTPT CLINIC VISIT: HCPCS

## 2021-12-10 ASSESSMENT — MIFFLIN-ST. JEOR: SCORE: 914.02

## 2021-12-10 ASSESSMENT — PAIN SCALES - GENERAL: PAINLEVEL: NO PAIN (0)

## 2021-12-10 NOTE — NURSING NOTE
Chief Complaint   Patient presents with     Surgical Followup     follow up, stool leakage       Initial /72   Pulse 77   Temp 97.1  F (36.2  C)   Ht 1.524 m (5')   Wt 53.3 kg (117 lb 6.4 oz)   BMI 22.93 kg/m   Estimated body mass index is 22.93 kg/m  as calculated from the following:    Height as of this encounter: 1.524 m (5').    Weight as of this encounter: 53.3 kg (117 lb 6.4 oz).  Medication Reconciliation: complete  Radha Plaza LPN

## 2021-12-10 NOTE — PATIENT INSTRUCTIONS
Thank you for allowing [facility] and our surgical team to participate in your care. Please call our health unit coordinator at [phone] with scheduling questions or the nurse at [phone] with any other questions or concerns.

## 2021-12-10 NOTE — PROGRESS NOTES
"RE: Demi Narayan  : 1940        Chief Complaint:  Stool leakage    History of Present Illness:  I am seeing Demi Narayan at the request of Dr. Mackay in follow up regarding chronic stool leakage.   She reports stool leakage for many years.  She was placing toilet tissue in her underwear to prevent staining.  When I saw her in September, I asked her to increase her fiber intake.  She started taking one teaspoon of Benefiber daily and she reported an improvement in the stool leakage.  When I saw her in November, I asked her to increase her Benefiber to 2 tablespoons daily.  She tells me she is \"almost\" at that dose (she is taking a little less than one tablespoon morning and night).  She reports regular, soft, formed bowel movements with a decrease in stool leakage since increasing her fiber in November.      She had a colonoscopy in  with random biopsies, which revealed collagenous colitis - she does not remember any recommended treatment for the collagenous colitis.  She denies family history of colon or rectal cancer, blood in stool, weight loss, abdominal pain.  Previous abdominal surgeries include hysterectomy and hemicolectomy due to diverticulitis.   She specifically denies fevers, chills, nausea, vomiting, chest pain, shortness of breath, palpitations, sore throat, cough, or generalized feeling ill.       Medical history:  Past Medical History:   Diagnosis Date     Chronic/recurrent back pain 2011     Diverticulitis      Diverticulitis, recurrent 2002    bowel resection     Dyslipidemia 2006     Fibrocystic breast disease 2011     Gastro-oesophageal reflux disease      GERD 2/10/2012     Hiatal hernia 2/10/2012     Hormone replacement therapy, postmenopausal 2011     Osteoarthritis        Surgical history:  Past Surgical History:   Procedure Laterality Date     ------------OTHER-------------      colonoscopy with biopsy - diverticulitis, hemorrhoids     " ------------OTHER-------------  4/19/1994    sigmoidoscopy - abdominal pain, diverticula     ABDOMEN SURGERY      colon removed 2nd to diverticulitis     APPENDECTOMY       ARTHROSCOPY SHOULDER  11/20/2013    Procedure: ARTHROSCOPY SHOULDER;  Right Shoulder Arthroscopy Sub-Acromial Decompression Rotator Cuff Repair;  Surgeon: Lenny Trammell MD;  [facility]     [facility]     Diverticulitis      bowel resection     EGD with biopsy  2011     ENDOSCOPY UPPER WITH PANCREATIC STIMULATION       ENDOSCOPY UPPER, COLONOSCOPY, COMBINED  7/18/2014    Procedure: COMBINED ENDOSCOPY UPPER, COLONOSCOPY;  Surgeon: Israel Feliciano MD;  Location: [facility]     ENT SURGERY      tonsilectomy     ESOPHAGOSCOPY, GASTROSCOPY, DUODENOSCOPY (EGD), COMBINED N/A 9/27/2017    Procedure: COMBINED ESOPHAGOSCOPY, GASTROSCOPY, DUODENOSCOPY (EGD);  UPPER ENDOSCOPY WITH BIOPSY AND POLYPECTOMY;  Surgeon: Gallito Alvarado DO;  Location: [facility]     GYN SURGERY      hysterectomy with bladder and rectal repair     IR CONSULTATION FOR IR EXAM  3/11/2019     ORTHOPEDIC SURGERY  11/20/2013    right rotator cuff surgery     TVH with ovarian preservation         Family history:  Family History   Problem Relation Age of Onset     Cerebrovascular Disease Father         CVA     Heart Disease Father         heart disease     Hypertension Father      Myocardial Infarction Father         myocardial infarction     Cerebrovascular Disease Mother         CVA     Diabetes Mother      Heart Disease Mother         heart disease     Hypertension Mother      Heart Disease Brother         heart disease     Hypertension Brother        Medications:  Prior to Admission medications    Medication Sig Start Date End Date Taking? Authorizing Provider   aspirin 325 MG tablet Take 325 mg by mouth At Bedtime.   Yes Reported, Patient   atorvastatin (LIPITOR) 20 MG tablet Take 1 tablet (20 mg) by mouth daily 7/1/21  Yes  Virgil Mackay MD   Calcium Carbonate-Vitamin D (CALCIUM + D PO) Take 600 mg by mouth.   Yes Reported, Patient   estradiol (ESTRACE) 0.5 MG tablet Take 1 Tab by mouth one time a day. 3/29/21  Yes Virgil Mackay MD   GLUCOSAMINE SULFATE PO Take  by mouth daily.   Yes Reported, Patient   latanoprost (XALATAN) 0.005 % ophthalmic solution  4/15/21  Yes Reported, Patient   Multiple Vitamins-Minerals (MULTIVITAL PO) Take 1 tablet by mouth daily.   Yes Reported, Patient   niacin 500 MG tablet Take 1 tablet by mouth daily.   Yes Reported, Patient   Omega-3 Fatty Acids (OMEGA-3 FISH OIL PO) Take  by mouth daily.   Yes Reported, Patient   pantoprazole (PROTONIX) 40 MG EC tablet TAKE 1 TABLET BY MOUTH IN THE MORNING BEFORE BREAKFAST 21  Yes Virgil Mackay MD   traMADol (ULTRAM) 50 MG tablet TAKE 1 TO 2 TABLETS BY MOUTH EVERY 6 TO 8 HOURS AS NEEDED 21  Yes Virgil Mackay MD   triamcinolone (KENALOG) 0.1 % external ointment Apply topically 2 times daily as needed for irritation 20  Yes Froylan Clinton DO       Allergies:  The patient has No Known Allergies.  .  Social history:  Social History     Tobacco Use     Smoking status: Former Smoker     Packs/day: 0.50     Years: 5.00     Pack years: 2.50     Types: Cigarettes     Start date: 1963     Quit date: 1968     Years since quittin.6     Smokeless tobacco: Never Used   Substance Use Topics     Alcohol use: Yes     Alcohol/week: 0.8 standard drinks     Types: 1 Glasses of wine per week     Comment: daily with dinner     Marital status: .    Review of Systems:    Constitutional: Negative for fever, chills.   HENT: Negative for ear pain, congestion, sore throat, and ear discharge.    Eyes: Negative for blurred vision, double vision.   Respiratory: Negative for cough, hemoptysis, shortness of breath, wheezing and stridor.    Cardiovascular: Negative for chest pain, palpitations and orthopnea.   Gastrointestinal: Negative for  heartburn, nausea, vomiting, abdominal pain and blood in stool.   Genitourinary: Negative for urgency, frequency   Musculoskeletal: Negative for myalgias   Neurological: Negative for tingling, speech change and headaches.   Endo/Heme/Allergies: Does not bruise/bleed easily.   Psychiatric/Behavioral: Negative for depression, suicidal ideas and hallucinations. The patient is not nervous/anxious.    Physical Examination:  /72   Pulse 77   Temp 97.1  F (36.2  C)   Ht 1.524 m (5')   Wt 53.3 kg (117 lb 6.4 oz)   BMI 22.93 kg/m    General: Alert and orientedx4, no acute distress, well-developed/well-nourished, ambulating without assistance; slight kyphosis  HEENT: normocephalic atraumatic, extraocular movements intact, sclerae anicteric, Trachea mideline  Chest:   Respirations even and unlabored  Cardiac: Regular rate and rhythm   Abdomen: Soft, non-tender, non-distended  Extremities: Cursory exam unremarkable.  No peripheral edema noted.  Skin: Warm, dry, less than 2 sec cap refill  Neuro: CN 2-12 grossly intact, no focal deficit, GCS 15  Psych: Pleasant, calm, asks appropriate questions      Assessment/Plan:  #1 Stool leaking  #2 Increase fiber supplement to full two tablespoons per day  #3 Follow up as needed    She reports the stool leakage has all but resolved.  She has agreed to increase her fiber to a full two tablespoons daily.  She has agreed to call for a follow up appointment if develops stool leakage again.    Nicole Stern [facility]  [address] [place] [address]    Referring Provider:  Lashawn Hurd NP  [address] [place] [address]     Primary Care Provider:  Virgil Mackay

## 2021-12-22 ENCOUNTER — OFFICE VISIT (OUTPATIENT)
Dept: CHIROPRACTIC MEDICINE | Facility: OTHER | Age: 81
End: 2021-12-22
Attending: CHIROPRACTOR
Payer: COMMERCIAL

## 2021-12-22 DIAGNOSIS — M99.02 SEGMENTAL AND SOMATIC DYSFUNCTION OF THORACIC REGION: ICD-10-CM

## 2021-12-22 DIAGNOSIS — M54.50 ACUTE BILATERAL LOW BACK PAIN WITHOUT SCIATICA: ICD-10-CM

## 2021-12-22 DIAGNOSIS — M99.03 SEGMENTAL AND SOMATIC DYSFUNCTION OF LUMBAR REGION: Primary | ICD-10-CM

## 2021-12-22 PROCEDURE — 98940 CHIROPRACT MANJ 1-2 REGIONS: CPT | Mod: AT | Performed by: CHIROPRACTOR

## 2021-12-29 DIAGNOSIS — M41.35 THORACOGENIC SCOLIOSIS OF THORACOLUMBAR REGION: ICD-10-CM

## 2021-12-29 DIAGNOSIS — Z78.0 POST-MENOPAUSE: ICD-10-CM

## 2021-12-29 RX ORDER — PANTOPRAZOLE SODIUM 40 MG/1
TABLET, DELAYED RELEASE ORAL
Qty: 90 TABLET | Refills: 0 | Status: SHIPPED | OUTPATIENT
Start: 2021-12-29 | End: 2022-03-24

## 2021-12-29 NOTE — TELEPHONE ENCOUNTER
Pantoprazole Sodium 40 MG Oral Tablet Delayed Release    Last Written Prescription Date:  9/1/2021  Last Fill Quantity: 90,   # refills: 0  Last Office Visit: 6/3/2021  Future Office visit:    Next 5 appointments (look out 90 days)    Jan 12, 2022 11:30 AM  Return Visit with Curt Encarnacion DC  [facility] [place] ([facility] [place] ) [address] [place] [address]  [place] [address]  [phone]           Routing refill request to provider for review/approval because:

## 2022-01-02 DIAGNOSIS — S33.8XXD SPRAIN OF OTHER PARTS OF LUMBAR SPINE AND PELVIS, SUBSEQUENT ENCOUNTER: ICD-10-CM

## 2022-01-02 DIAGNOSIS — I65.22 LEFT CAROTID STENOSIS: ICD-10-CM

## 2022-01-03 RX ORDER — ATORVASTATIN CALCIUM 20 MG/1
TABLET, FILM COATED ORAL
Qty: 90 TABLET | Refills: 0 | Status: SHIPPED | OUTPATIENT
Start: 2022-01-03 | End: 2022-04-13

## 2022-01-03 RX ORDER — TRAMADOL HYDROCHLORIDE 50 MG/1
TABLET ORAL
Qty: 120 TABLET | Refills: 0 | Status: SHIPPED | OUTPATIENT
Start: 2022-01-03 | End: 2022-02-01

## 2022-01-03 NOTE — TELEPHONE ENCOUNTER
Message send to [facility] requesting to schedule establish care visit.     Fariba Brown MD     3 = assistive equipment and person

## 2022-01-03 NOTE — TELEPHONE ENCOUNTER
Ultram       Last Written Prescription Date:  12/2/2021  Last Fill Quantity: 120,   # refills: 0    Lipitor       Last Written Prescription Date:  9/21/2021  Last Fill Quantity: 90,   # refills: 1  Last Office Visit: 6/3/2021  Future Office visit:    Next 5 appointments (look out 90 days)    Jan 12, 2022 11:30 AM  Return Visit with Curt Encarnacion DC  [facility] [place] ([facility] [facility] [place] ) [address] [place] [address]  [place] [address]  [phone]

## 2022-01-12 ENCOUNTER — OFFICE VISIT (OUTPATIENT)
Dept: CHIROPRACTIC MEDICINE | Facility: OTHER | Age: 82
End: 2022-01-12
Attending: CHIROPRACTOR
Payer: COMMERCIAL

## 2022-01-12 DIAGNOSIS — M54.50 ACUTE BILATERAL LOW BACK PAIN WITHOUT SCIATICA: ICD-10-CM

## 2022-01-12 DIAGNOSIS — M99.03 SEGMENTAL AND SOMATIC DYSFUNCTION OF LUMBAR REGION: Primary | ICD-10-CM

## 2022-01-12 DIAGNOSIS — M99.02 SEGMENTAL AND SOMATIC DYSFUNCTION OF THORACIC REGION: ICD-10-CM

## 2022-01-12 PROCEDURE — 98940 CHIROPRACT MANJ 1-2 REGIONS: CPT | Mod: AT | Performed by: CHIROPRACTOR

## 2022-02-01 DIAGNOSIS — S33.8XXD SPRAIN OF OTHER PARTS OF LUMBAR SPINE AND PELVIS, SUBSEQUENT ENCOUNTER: ICD-10-CM

## 2022-02-01 RX ORDER — TRAMADOL HYDROCHLORIDE 50 MG/1
TABLET ORAL
Qty: 120 TABLET | Refills: 0 | Status: SHIPPED | OUTPATIENT
Start: 2022-02-01 | End: 2022-03-02

## 2022-02-01 NOTE — TELEPHONE ENCOUNTER
Tramadol  Last Written Prescription Date: 1/3/22  Last Fill Quantity: 120 # of Refills: 0  Last Office Visit: 6/3/21

## 2022-02-02 ENCOUNTER — OFFICE VISIT (OUTPATIENT)
Dept: CHIROPRACTIC MEDICINE | Facility: OTHER | Age: 82
End: 2022-02-02
Attending: CHIROPRACTOR
Payer: COMMERCIAL

## 2022-02-02 DIAGNOSIS — M54.50 ACUTE BILATERAL LOW BACK PAIN WITHOUT SCIATICA: ICD-10-CM

## 2022-02-02 DIAGNOSIS — M99.02 SEGMENTAL AND SOMATIC DYSFUNCTION OF THORACIC REGION: ICD-10-CM

## 2022-02-02 DIAGNOSIS — M99.03 SEGMENTAL AND SOMATIC DYSFUNCTION OF LUMBAR REGION: Primary | ICD-10-CM

## 2022-02-02 PROCEDURE — 98940 CHIROPRACT MANJ 1-2 REGIONS: CPT | Mod: AT | Performed by: CHIROPRACTOR

## 2022-02-03 DIAGNOSIS — S33.8XXD SPRAIN OF OTHER PARTS OF LUMBAR SPINE AND PELVIS, SUBSEQUENT ENCOUNTER: ICD-10-CM

## 2022-02-07 RX ORDER — TRAMADOL HYDROCHLORIDE 50 MG/1
TABLET ORAL
Qty: 120 TABLET | Refills: 0 | OUTPATIENT
Start: 2022-02-07

## 2022-02-07 NOTE — TELEPHONE ENCOUNTER
Ultram    - appt on2/22/22  Last Written Prescription Date:  1.6.22  Last Fill Quantity: #120,   # refills: 0  Last Office Visit: 6.3.21 with Lilliam Hurd  Future Office visit:    Next 5 appointments (look out 90 days)    Feb 23, 2022 11:30 AM  Return Visit with Curt Encarnacion DC  [facility] [place] ([facility] [place] ) [address] [place] [address]  [place] [address]  [phone]           Routing refill request to provider for review/approval because:  Drug not on the [facility], [facility] or [facility] refill protocol or controlled substance

## 2022-02-17 PROBLEM — F11.90 CHRONIC, CONTINUOUS USE OF OPIOIDS: Status: ACTIVE | Noted: 2017-07-03

## 2022-02-21 NOTE — PROGRESS NOTES
Assessment & Plan     Encounter to establish care  Followed with  - Diogenes visit 3/5/2021    Chronic, continuous use of opioids /Low back pain, chronic / Scoliosis, throracolumbar  Maintained on tramadol 50mg q6h, qty 120 per month  - UDS ordered (7/2021), needs to be completed    Dyslipidemia  Will upate lipid panel. Started on atorvastatin ~ 6 months ago  - Lipid Profile (Chol, Trig, HDL, LDL calc); Future  - Basic metabolic panel; Future  - CBC with platelets; Future    Stenosis of left carotid artery - >70% left. no sx recommended  Follows with [facility] vascular  - due for repeat US carotid 6/2022    Hormone replacement therapy (HRT)  Discussed risks and benefits, will continue for now. Has not tried to wean off estrogen   S/p hysterectomy  - estradiol (ESTRACE) 0.5 MG tablet; Take 1 Tablet by mouth one time a day.    Chest pain, unspecified type / Palpitations  Stress test (8/17/2020): negative  EKG (2/22/2022): NSR, unchanged  Due to FHx of heart issues and Demi's concern will obtain the following for consideration of arrhythmia  - EKG 12-lead complete w/read - (Clinic Performed)  - Leadless EKG Monitor 3 to 7 Days; Future  - Echocardiogram Complete; Future  - Magnesium; Future  - low threshold for stress test  - ?GERD, but on protonix, no epigastric pain on exam    Vitamin D deficiency  - Vitamin D Deficiency; Future    Need for DTaP vaccination  - TDAP VACCINE (Adacel, Boostrix)  [[id number]]    42 minutes spent on the date of the encounter doing chart review, review of test results, interpretation of tests, patient visit, documentation and discussion with other provider(s)       See Patient Instructions    Return in about 3 months (around 5/22/2022), or if symptoms worsen or fail to improve, for recheck .    Fariba Brown MD  [facility]    Misa Simms is a 81 year old who presents for the following health issues     HPI     Followed with Dr.Copeman Kolby Shetty  visit 3/5/2021    Pain History:  When did you first notice your pain? - More than 6 weeks   Have you seen this provider for your pain in the past?   No   Where in your body do your have pain? Lower back and left sciatic   Are you seeing anyone else for your pain? Yes - Dr. Ferraro  What makes your pain better? Aspercreme with Lidocaine  What makes your pain worse? Bowel movement, worse in the morning, unable to stand for long periods of time  How has pain affected your ability to work? Not applicable  Who lives in your household?     PHQ-9 SCORE 8/15/2018 7/16/2019 3/5/2021   PHQ-9 Total Score - - -   PHQ-9 Total Score 0 0 0       ADONIS-7 SCORE 8/15/2018 7/16/2019 3/5/2021   Total Score 0 0 0           PEG Score 2/22/2022   PEG Total Score 2     Chronic Pain - Initial Assessment:    How would you describe your pain? Back pain, many year history  Have you had any recent changes to the severity or character of your pain? Stable   Is there an underlying cause that has been identified? No original injury. H/o scoliosis    Has your ability to work or do daily activities changed recently because of your pain? Cannot stand for long period of times. Uses cane when walking dog  Which of these pain treatments have you tried? Has not tried lyrica/gabapentin, muscle relaxer, antidepressants.   Previous medication treatments:    - has been elevated by ?[facility]  - tramadol 50mg q6h     #  Not interested in weaning off d/t sexually active  - estrace, many years    # HLD / US internal carotid artery stenosis   -  ASA 325mg d/t Demi believed higher dosage would be better. Discussed risk and benefits of full ASA  - atorvastatin 20mg, started in the last 6 months   - evaluated by Dr. Springer, [facility] vascular, on 6/29/2021. No sx d/t asymptomatic. Recommendation for statin and low dose ASA  - due for repeat US in one year (6/2022)      # Chest pain  - squeezing feeling in lower sternum  - sitting at table, having  dinner  - second episode no food, fixing breakfast  - stomach upset  - palpations at night, deep breathing helps  - on protonix   - no change in weight   - FHx: mother bypass at 72, Sister pacemaker (10 years younger), sister pacemaker (74yo, passed), son (at 66 w/ MI and stents)      Recent Labs   Lab Test 06/03/21  0752 08/11/20  0745 04/22/16  0740 05/08/15  0752 05/06/14  0820   CHOL 245* 252*   < > 234* 237*   HDL 74 76   < > 74 83*   * 146*   < > 126 112   TRIG 155* 149   < > 170* 212*   CHOLHDLRATIO  --   --   --  3.2 2.9*    < > = values in this interval not displayed.       # Immunizations: PPSV23 (declined d/t ear infection after last pna vaccine), influenza (declines), tdap (update today)  - dexa scan **        Review of Systems   Constitutional: Negative for chills and fever.   HENT: Negative for congestion.    Respiratory: Positive for shortness of breath (w/ walking dog).    Cardiovascular: Positive for chest pain and palpitations. Negative for peripheral edema.   Gastrointestinal: Negative for abdominal pain and heartburn (on protonix).   Musculoskeletal: Positive for back pain.   Psychiatric/Behavioral: Negative for mood changes.          Objective    BP (!) 154/78   Pulse 76   Temp 98.7  F (37.1  C) (Tympanic)   Resp 18   Wt 49.9 kg (110 lb)   SpO2 100%   BMI 21.48 kg/m    Body mass index is 21.48 kg/m .  Physical Exam  Constitutional:       General: She is not in acute distress.     Appearance: She is not ill-appearing.   Cardiovascular:      Rate and Rhythm: Normal rate and regular rhythm.      Heart sounds: No murmur heard.  Pulmonary:      Effort: Pulmonary effort is normal. No respiratory distress.      Breath sounds: No wheezing or rales.   Abdominal:      General: Bowel sounds are normal.      Tenderness: There is no abdominal tenderness.   Musculoskeletal:      Right lower leg: No edema.      Left lower leg: No edema.   Neurological:      Mental Status: She is alert.    Psychiatric:         Mood and Affect: Mood normal.       Recent Labs   Lab Test 06/03/21  0752 08/11/20  0745 04/22/16  0740 05/08/15  0752 05/06/14  0820   CHOL 245* 252*   < > 234* 237*   HDL 74 76   < > 74 83*   * 146*   < > 126 112   TRIG 155* 149   < > 170* 212*   CHOLHDLRATIO  --   --   --  3.2 2.9*    < > = values in this interval not displayed.     CBC RESULTS: Recent Labs   Lab Test 06/03/21 0752   WBC 6.6   RBC 5.14   HGB 14.7   HCT 44.5   MCV 87   MCH 28.6   MCHC 33.0   RDW 13.4        Lab Results   Component Value Date    TSH 1.92 06/03/2021     Last Comprehensive Metabolic Panel:  Sodium   Date Value Ref Range Status   06/18/2021 140 133 - 144 mmol/L Final     Potassium   Date Value Ref Range Status   06/18/2021 3.5 3.4 - 5.3 mmol/L Final     Chloride   Date Value Ref Range Status   06/18/2021 105 94 - 109 mmol/L Final     Carbon Dioxide   Date Value Ref Range Status   06/18/2021 31 20 - 32 mmol/L Final     Anion Gap   Date Value Ref Range Status   06/18/2021 4 3 - 14 mmol/L Final     Glucose   Date Value Ref Range Status   06/18/2021 105 (H) 70 - 99 mg/dL Final     Urea Nitrogen   Date Value Ref Range Status   06/18/2021 14 7 - 30 mg/dL Final     Creatinine   Date Value Ref Range Status   06/18/2021 0.86 0.52 - 1.04 mg/dL Final     GFR Estimate   Date Value Ref Range Status   06/18/2021 62 >60 mL/min/[1.73_m2] Final     Comment:     Non  GFR Calc  Starting 12/18/2018, serum creatinine based estimated GFR (eGFR) will be   calculated using the Chronic Kidney Disease Epidemiology Collaboration   (CKD-EPI) equation.       Calcium   Date Value Ref Range Status   06/18/2021 9.1 8.5 - 10.1 mg/dL Final     Bilirubin Total   Date Value Ref Range Status   06/03/2021 0.5 0.2 - 1.3 mg/dL Final     Alkaline Phosphatase   Date Value Ref Range Status   06/03/2021 46 40 - 150 U/L Final     ALT   Date Value Ref Range Status   06/03/2021 24 0 - 50 U/L Final     AST   Date Value Ref Range  Status   06/03/2021 20 0 - 45 U/L Final         US carotid (6/17/2021):  Impression:  High-grade left internal carotid artery stenosis. Greater than 70%  diameter stenosis of the left internal carotid artery with mixed  calcified and noncalcified ulcerative plaque.    Stress test (8/17/2020):       The nuclear stress test is negative for inducible myocardial ischemia or infarction.     The left ventricular ejection fraction at rest is 79%.  The left ventricular ejection fraction at stress is 89%.     The patient's exercise capacity is mildly impaired.     There is no prior study for comparison.          No evidence of myocardial ischemia.  Normal left ventricular function       EKG (2/22/2022): NSR, intervals normal, axis normal, no ST/T changes consistent with ischemia, no LVH by voltage. Unchanged from EKG dated 7/27/2015

## 2022-02-22 ENCOUNTER — OFFICE VISIT (OUTPATIENT)
Dept: FAMILY MEDICINE | Facility: OTHER | Age: 82
End: 2022-02-22
Attending: FAMILY MEDICINE
Payer: COMMERCIAL

## 2022-02-22 VITALS
BODY MASS INDEX: 21.48 KG/M2 | DIASTOLIC BLOOD PRESSURE: 79 MMHG | RESPIRATION RATE: 18 BRPM | WEIGHT: 110 LBS | OXYGEN SATURATION: 100 % | TEMPERATURE: 98.7 F | HEART RATE: 76 BPM | SYSTOLIC BLOOD PRESSURE: 138 MMHG

## 2022-02-22 DIAGNOSIS — R00.2 PALPITATIONS: ICD-10-CM

## 2022-02-22 DIAGNOSIS — Z79.890 HORMONE REPLACEMENT THERAPY (HRT): ICD-10-CM

## 2022-02-22 DIAGNOSIS — Z23 NEED FOR DTAP VACCINATION: ICD-10-CM

## 2022-02-22 DIAGNOSIS — M41.25 OTHER IDIOPATHIC SCOLIOSIS, THORACOLUMBAR REGION: ICD-10-CM

## 2022-02-22 DIAGNOSIS — I65.22 STENOSIS OF LEFT CAROTID ARTERY: ICD-10-CM

## 2022-02-22 DIAGNOSIS — E55.9 VITAMIN D DEFICIENCY: ICD-10-CM

## 2022-02-22 DIAGNOSIS — R07.9 CHEST PAIN, UNSPECIFIED TYPE: ICD-10-CM

## 2022-02-22 DIAGNOSIS — F11.90 CHRONIC, CONTINUOUS USE OF OPIOIDS: ICD-10-CM

## 2022-02-22 DIAGNOSIS — G89.29 CHRONIC BILATERAL LOW BACK PAIN WITHOUT SCIATICA: ICD-10-CM

## 2022-02-22 DIAGNOSIS — M54.50 CHRONIC BILATERAL LOW BACK PAIN WITHOUT SCIATICA: ICD-10-CM

## 2022-02-22 DIAGNOSIS — Z76.89 ENCOUNTER TO ESTABLISH CARE: Primary | ICD-10-CM

## 2022-02-22 DIAGNOSIS — E78.2 MIXED HYPERLIPIDEMIA: ICD-10-CM

## 2022-02-22 PROCEDURE — 90471 IMMUNIZATION ADMIN: CPT

## 2022-02-22 PROCEDURE — 93005 ELECTROCARDIOGRAM TRACING: CPT | Performed by: FAMILY MEDICINE

## 2022-02-22 PROCEDURE — 93010 ELECTROCARDIOGRAM REPORT: CPT | Performed by: INTERNAL MEDICINE

## 2022-02-22 PROCEDURE — 90715 TDAP VACCINE 7 YRS/> IM: CPT

## 2022-02-22 PROCEDURE — 99215 OFFICE O/P EST HI 40 MIN: CPT | Performed by: FAMILY MEDICINE

## 2022-02-22 PROCEDURE — G0463 HOSPITAL OUTPT CLINIC VISIT: HCPCS | Mod: 25

## 2022-02-22 RX ORDER — ESTRADIOL 0.5 MG/1
TABLET ORAL
Qty: 90 TABLET | Refills: 1 | Status: SHIPPED | OUTPATIENT
Start: 2022-02-22 | End: 2022-09-08

## 2022-02-22 RX ORDER — ASPIRIN 81 MG/1
81 TABLET ORAL DAILY
COMMUNITY

## 2022-02-22 ASSESSMENT — ENCOUNTER SYMPTOMS
ABDOMINAL PAIN: 0
HEARTBURN: 0
PALPITATIONS: 1
CHILLS: 0
FEVER: 0
SHORTNESS OF BREATH: 1
BACK PAIN: 1

## 2022-02-22 ASSESSMENT — PAIN SCALES - GENERAL: PAINLEVEL: MILD PAIN (3)

## 2022-02-22 NOTE — PATIENT INSTRUCTIONS
It was nice to meet you today.     Decrease your aspirin to 81mg     We will call you with your EKG results.   Order have been placed for ultrasound of your heart and heart monitor

## 2022-02-22 NOTE — NURSING NOTE
Chief Complaint   Patient presents with     Establish Care     Pain       Initial BP (!) 154/78   Pulse 76   Temp 98.7  F (37.1  C) (Tympanic)   Resp 18   Wt 49.9 kg (110 lb)   SpO2 100%   BMI 21.48 kg/m   Estimated body mass index is 21.48 kg/m  as calculated from the following:    Height as of 12/10/21: 1.524 m (5').    Weight as of this encounter: 49.9 kg (110 lb).  Medication Reconciliation: complete  Manuela Nelson LPN

## 2022-02-23 ENCOUNTER — OFFICE VISIT (OUTPATIENT)
Dept: CHIROPRACTIC MEDICINE | Facility: OTHER | Age: 82
End: 2022-02-23
Attending: CHIROPRACTOR
Payer: COMMERCIAL

## 2022-02-23 DIAGNOSIS — M99.02 SEGMENTAL AND SOMATIC DYSFUNCTION OF THORACIC REGION: ICD-10-CM

## 2022-02-23 DIAGNOSIS — M54.50 ACUTE BILATERAL LOW BACK PAIN WITHOUT SCIATICA: ICD-10-CM

## 2022-02-23 DIAGNOSIS — M99.03 SEGMENTAL AND SOMATIC DYSFUNCTION OF LUMBAR REGION: Primary | ICD-10-CM

## 2022-02-23 PROCEDURE — 98940 CHIROPRACT MANJ 1-2 REGIONS: CPT | Mod: AT | Performed by: CHIROPRACTOR

## 2022-03-01 DIAGNOSIS — S33.8XXD SPRAIN OF OTHER PARTS OF LUMBAR SPINE AND PELVIS, SUBSEQUENT ENCOUNTER: ICD-10-CM

## 2022-03-02 RX ORDER — TRAMADOL HYDROCHLORIDE 50 MG/1
TABLET ORAL
Qty: 120 TABLET | Refills: 0 | Status: SHIPPED | OUTPATIENT
Start: 2022-03-02 | End: 2022-04-05

## 2022-03-03 DIAGNOSIS — S33.8XXD SPRAIN OF OTHER PARTS OF LUMBAR SPINE AND PELVIS, SUBSEQUENT ENCOUNTER: ICD-10-CM

## 2022-03-03 NOTE — TELEPHONE ENCOUNTER
traMADol HCl 50 MG Oral Tablet  Last Written Prescription Date:  3/2/2022  Last Fill Quantity: ,   # refills:   Last Office Visit:   Future Office visit:    Next 5 appointments (look out 90 days)    Mar 16, 2022 11:00 AM  Return Visit with Curt Encarnacion DC  [facility] [place] ([facility] ) [address] [place] [address]  [phone]   May 24, 2022  1:00 PM  (Arrive by 12:45 PM)  SHORT with Fariba Brown MD  [facility] ([facility] ) [address] [facility] [address]  [phone]           Routing refill request to provider for review/approval because:  Duplicate refill request

## 2022-03-08 ENCOUNTER — HOSPITAL ENCOUNTER (OUTPATIENT)
Dept: CARDIOLOGY | Facility: HOSPITAL | Age: 82
End: 2022-03-08
Attending: FAMILY MEDICINE
Payer: COMMERCIAL

## 2022-03-08 DIAGNOSIS — R07.9 CHEST PAIN, UNSPECIFIED TYPE: ICD-10-CM

## 2022-03-08 LAB — LVEF ECHO: NORMAL

## 2022-03-08 PROCEDURE — 93306 TTE W/DOPPLER COMPLETE: CPT

## 2022-03-08 PROCEDURE — 93244 EXT ECG>48HR<7D REV&INTERPJ: CPT | Performed by: INTERNAL MEDICINE

## 2022-03-08 PROCEDURE — 93242 EXT ECG>48HR<7D RECORDING: CPT

## 2022-03-08 PROCEDURE — 93306 TTE W/DOPPLER COMPLETE: CPT | Mod: 26 | Performed by: INTERNAL MEDICINE

## 2022-03-08 RX ORDER — TRAMADOL HYDROCHLORIDE 50 MG/1
TABLET ORAL
Qty: 120 TABLET | Refills: 0 | OUTPATIENT
Start: 2022-03-08

## 2022-03-11 ENCOUNTER — LAB (OUTPATIENT)
Dept: LAB | Facility: OTHER | Age: 82
End: 2022-03-11
Payer: COMMERCIAL

## 2022-03-11 DIAGNOSIS — E55.9 VITAMIN D DEFICIENCY: ICD-10-CM

## 2022-03-11 DIAGNOSIS — E78.2 MIXED HYPERLIPIDEMIA: Primary | ICD-10-CM

## 2022-03-11 DIAGNOSIS — E78.2 MIXED HYPERLIPIDEMIA: ICD-10-CM

## 2022-03-11 DIAGNOSIS — R00.2 PALPITATIONS: ICD-10-CM

## 2022-03-11 LAB
ALBUMIN SERPL-MCNC: 3.8 G/DL (ref 3.4–5)
ALP SERPL-CCNC: 51 U/L (ref 40–150)
ALT SERPL W P-5'-P-CCNC: 36 U/L (ref 0–50)
ANION GAP SERPL CALCULATED.3IONS-SCNC: 3 MMOL/L (ref 3–14)
AST SERPL W P-5'-P-CCNC: 26 U/L (ref 0–45)
BILIRUB DIRECT SERPL-MCNC: 0.1 MG/DL (ref 0–0.2)
BILIRUB SERPL-MCNC: 0.5 MG/DL (ref 0.2–1.3)
BUN SERPL-MCNC: 13 MG/DL (ref 7–30)
CALCIUM SERPL-MCNC: 9.2 MG/DL (ref 8.5–10.1)
CHLORIDE BLD-SCNC: 105 MMOL/L (ref 94–109)
CHOLEST SERPL-MCNC: 157 MG/DL
CO2 SERPL-SCNC: 31 MMOL/L (ref 20–32)
CREAT SERPL-MCNC: 0.84 MG/DL (ref 0.52–1.04)
ERYTHROCYTE [DISTWIDTH] IN BLOOD BY AUTOMATED COUNT: 13.5 % (ref 10–15)
FASTING STATUS PATIENT QL REPORTED: YES
GFR SERPL CREATININE-BSD FRML MDRD: 69 ML/MIN/1.73M2
GLUCOSE BLD-MCNC: 91 MG/DL (ref 70–99)
HCT VFR BLD AUTO: 46.1 % (ref 35–47)
HDLC SERPL-MCNC: 81 MG/DL
HGB BLD-MCNC: 15.3 G/DL (ref 11.7–15.7)
LDLC SERPL CALC-MCNC: 55 MG/DL
MAGNESIUM SERPL-MCNC: 2 MG/DL (ref 1.6–2.3)
MCH RBC QN AUTO: 29.1 PG (ref 26.5–33)
MCHC RBC AUTO-ENTMCNC: 33.2 G/DL (ref 31.5–36.5)
MCV RBC AUTO: 88 FL (ref 78–100)
NONHDLC SERPL-MCNC: 76 MG/DL
PLATELET # BLD AUTO: 301 10E3/UL (ref 150–450)
POTASSIUM BLD-SCNC: 3.4 MMOL/L (ref 3.4–5.3)
PROT SERPL-MCNC: 7.5 G/DL (ref 6.8–8.8)
RBC # BLD AUTO: 5.26 10E6/UL (ref 3.8–5.2)
SODIUM SERPL-SCNC: 139 MMOL/L (ref 133–144)
TRIGL SERPL-MCNC: 106 MG/DL
WBC # BLD AUTO: 6.1 10E3/UL (ref 4–11)

## 2022-03-11 PROCEDURE — 83735 ASSAY OF MAGNESIUM: CPT | Mod: ZL

## 2022-03-11 PROCEDURE — 82306 VITAMIN D 25 HYDROXY: CPT | Mod: ZL

## 2022-03-11 PROCEDURE — 80061 LIPID PANEL: CPT | Mod: ZL

## 2022-03-11 PROCEDURE — 80053 COMPREHEN METABOLIC PANEL: CPT | Mod: ZL

## 2022-03-11 PROCEDURE — 82248 BILIRUBIN DIRECT: CPT | Mod: ZL | Performed by: FAMILY MEDICINE

## 2022-03-11 PROCEDURE — 36415 COLL VENOUS BLD VENIPUNCTURE: CPT | Mod: ZL

## 2022-03-11 PROCEDURE — 85014 HEMATOCRIT: CPT | Mod: ZL

## 2022-03-14 LAB — DEPRECATED CALCIDIOL+CALCIFEROL SERPL-MC: 63 UG/L (ref 20–75)

## 2022-03-15 NOTE — RESULT ENCOUNTER NOTE
Patient stated that she has been on Lipitor approximately 6 months. It was prescribed by  on 06/29/2021

## 2022-03-16 ENCOUNTER — OFFICE VISIT (OUTPATIENT)
Dept: CHIROPRACTIC MEDICINE | Facility: OTHER | Age: 82
End: 2022-03-16
Attending: CHIROPRACTOR
Payer: COMMERCIAL

## 2022-03-16 DIAGNOSIS — M54.50 ACUTE BILATERAL LOW BACK PAIN WITHOUT SCIATICA: ICD-10-CM

## 2022-03-16 DIAGNOSIS — M99.03 SEGMENTAL AND SOMATIC DYSFUNCTION OF LUMBAR REGION: Primary | ICD-10-CM

## 2022-03-16 DIAGNOSIS — M99.02 SEGMENTAL AND SOMATIC DYSFUNCTION OF THORACIC REGION: ICD-10-CM

## 2022-03-16 PROCEDURE — 98940 CHIROPRACT MANJ 1-2 REGIONS: CPT | Mod: AT | Performed by: CHIROPRACTOR

## 2022-03-24 DIAGNOSIS — M41.35 THORACOGENIC SCOLIOSIS OF THORACOLUMBAR REGION: ICD-10-CM

## 2022-03-24 DIAGNOSIS — Z78.0 POST-MENOPAUSE: ICD-10-CM

## 2022-03-24 RX ORDER — PANTOPRAZOLE SODIUM 40 MG/1
TABLET, DELAYED RELEASE ORAL
Qty: 90 TABLET | Refills: 0 | Status: SHIPPED | OUTPATIENT
Start: 2022-03-24 | End: 2022-06-22

## 2022-03-24 NOTE — TELEPHONE ENCOUNTER
Protonix      Last Written Prescription Date:  12/29/21  Last Fill Quantity: 90,   # refills: 0  Last Office Visit: 2/22/22  Future Office visit:    Next 5 appointments (look out 90 days)    Apr 06, 2022 11:00 AM  Return Visit with Curt Encarnacion DC  [facility] [place] [facility] [address] [place] [address]  [phone]   May 24, 2022  1:00 PM  (Arrive by 12:45 PM)  SHORT with Fariba Brown MD  [facility] ([facility] ) [address]  [place] [address]  [phone]           Routing refill request to provider for review/approval because:

## 2022-03-30 ENCOUNTER — TELEPHONE (OUTPATIENT)
Dept: FAMILY MEDICINE | Facility: OTHER | Age: 82
End: 2022-03-30
Payer: COMMERCIAL

## 2022-03-30 NOTE — TELEPHONE ENCOUNTER
Notified patient that Dr. Brown will discuss with her at next visit. Patient expressed understanding.

## 2022-03-30 NOTE — TELEPHONE ENCOUNTER
Spoke with patient regarding results from EKG monitor. Patient wondering if she should do the stress test that was talked about or if she doesn't need to as there were no concerning finds with the monitor. Patient also wondering about scheduling a colonoscopy as well. Referral pended for approval.

## 2022-03-30 NOTE — TELEPHONE ENCOUNTER
Is Demi having any more chest pain? If so, please pend [place] stress test.  Her last colonoscopy was 2014. What were Dr. Feliciano's recommendations for repeat colonoscopy? If Demi does not know, please obtain Dr. Feliciano, [facility] last note     Fariba Brown MD

## 2022-04-01 DIAGNOSIS — S33.8XXD SPRAIN OF OTHER PARTS OF LUMBAR SPINE AND PELVIS, SUBSEQUENT ENCOUNTER: ICD-10-CM

## 2022-04-04 NOTE — TELEPHONE ENCOUNTER
ultram      Last Written Prescription Date:  3/2/22  Last Fill Quantity: 120,   # refills: 0  Last Office Visit: 2/22/22  Future Office visit:    Next 5 appointments (look out 90 days)    Apr 06, 2022 11:00 AM  Return Visit with Curt Encarnacion DC  [facility] [place] ([facility]  [place] ) [address] [place] [address]  [place] [address]  [phone]   May 24, 2022  1:00 PM  (Arrive by 12:45 PM)  SHORT with Fariba Brown MD  [facility] [place] ([facility] [place] ) [address]  [place] [address]  [phone]

## 2022-04-05 RX ORDER — TRAMADOL HYDROCHLORIDE 50 MG/1
TABLET ORAL
Qty: 120 TABLET | Refills: 0 | Status: SHIPPED | OUTPATIENT
Start: 2022-04-05 | End: 2022-05-04

## 2022-04-07 ENCOUNTER — OFFICE VISIT (OUTPATIENT)
Dept: CHIROPRACTIC MEDICINE | Facility: OTHER | Age: 82
End: 2022-04-07
Attending: CHIROPRACTOR
Payer: COMMERCIAL

## 2022-04-07 DIAGNOSIS — M99.02 SEGMENTAL AND SOMATIC DYSFUNCTION OF THORACIC REGION: ICD-10-CM

## 2022-04-07 DIAGNOSIS — M54.50 ACUTE BILATERAL LOW BACK PAIN WITHOUT SCIATICA: ICD-10-CM

## 2022-04-07 DIAGNOSIS — M99.03 SEGMENTAL AND SOMATIC DYSFUNCTION OF LUMBAR REGION: Primary | ICD-10-CM

## 2022-04-07 PROCEDURE — 98940 CHIROPRACT MANJ 1-2 REGIONS: CPT | Mod: AT | Performed by: CHIROPRACTOR

## 2022-04-13 DIAGNOSIS — I65.22 LEFT CAROTID STENOSIS: ICD-10-CM

## 2022-04-13 RX ORDER — ATORVASTATIN CALCIUM 20 MG/1
TABLET, FILM COATED ORAL
Qty: 90 TABLET | Refills: 2 | Status: SHIPPED | OUTPATIENT
Start: 2022-04-13 | End: 2023-02-20

## 2022-04-27 ENCOUNTER — OFFICE VISIT (OUTPATIENT)
Dept: CHIROPRACTIC MEDICINE | Facility: OTHER | Age: 82
End: 2022-04-27
Attending: CHIROPRACTOR
Payer: COMMERCIAL

## 2022-04-27 DIAGNOSIS — M99.03 SEGMENTAL AND SOMATIC DYSFUNCTION OF LUMBAR REGION: Primary | ICD-10-CM

## 2022-04-27 DIAGNOSIS — M54.50 ACUTE BILATERAL LOW BACK PAIN WITHOUT SCIATICA: ICD-10-CM

## 2022-04-27 DIAGNOSIS — M99.02 SEGMENTAL AND SOMATIC DYSFUNCTION OF THORACIC REGION: ICD-10-CM

## 2022-04-27 PROCEDURE — 98940 CHIROPRACT MANJ 1-2 REGIONS: CPT | Mod: AT | Performed by: CHIROPRACTOR

## 2022-05-02 DIAGNOSIS — S33.8XXD SPRAIN OF OTHER PARTS OF LUMBAR SPINE AND PELVIS, SUBSEQUENT ENCOUNTER: ICD-10-CM

## 2022-05-04 RX ORDER — TRAMADOL HYDROCHLORIDE 50 MG/1
TABLET ORAL
Qty: 120 TABLET | Refills: 0 | Status: SHIPPED | OUTPATIENT
Start: 2022-05-04 | End: 2022-05-24

## 2022-05-04 NOTE — TELEPHONE ENCOUNTER
ultram      Last Written Prescription Date:  4/5/22  Last Fill Quantity: 120,   # refills: 0  Last Office Visit: 2/22/22  Future Office visit:    Next 5 appointments (look out 90 days)    May 18, 2022 11:10 AM  Return Visit with Curt Encarnacion DC  [facility] [place] ([facility]  [place] ) [address] [place] [address]  [place] [address]  [phone]   May 24, 2022  1:00 PM  (Arrive by 12:45 PM)  SHORT with Fariba Brown MD  [facility] [place] ([facility] [place] ) [address]  [place] [address]  [phone]

## 2022-05-12 ENCOUNTER — ANCILLARY PROCEDURE (OUTPATIENT)
Dept: MAMMOGRAPHY | Facility: OTHER | Age: 82
End: 2022-05-12
Attending: FAMILY MEDICINE
Payer: COMMERCIAL

## 2022-05-12 DIAGNOSIS — Z12.31 VISIT FOR SCREENING MAMMOGRAM: ICD-10-CM

## 2022-05-12 PROCEDURE — 77067 SCR MAMMO BI INCL CAD: CPT | Mod: TC

## 2022-05-13 ENCOUNTER — TELEPHONE (OUTPATIENT)
Dept: MAMMOGRAPHY | Facility: OTHER | Age: 82
End: 2022-05-13
Payer: COMMERCIAL

## 2022-05-18 ENCOUNTER — OFFICE VISIT (OUTPATIENT)
Dept: CHIROPRACTIC MEDICINE | Facility: OTHER | Age: 82
End: 2022-05-18
Attending: CHIROPRACTOR
Payer: COMMERCIAL

## 2022-05-18 DIAGNOSIS — M54.50 ACUTE BILATERAL LOW BACK PAIN WITHOUT SCIATICA: ICD-10-CM

## 2022-05-18 DIAGNOSIS — M99.02 SEGMENTAL AND SOMATIC DYSFUNCTION OF THORACIC REGION: ICD-10-CM

## 2022-05-18 DIAGNOSIS — M99.03 SEGMENTAL AND SOMATIC DYSFUNCTION OF LUMBAR REGION: Primary | ICD-10-CM

## 2022-05-18 PROCEDURE — 98940 CHIROPRACT MANJ 1-2 REGIONS: CPT | Mod: AT | Performed by: CHIROPRACTOR

## 2022-05-23 NOTE — PROGRESS NOTES
Assessment & Plan     Low back pain, chronic / Chronic, continuous use of opioids / Long term (current) use of opiate analgesic / Scoliosis, throracolumbar  [place] PDMP reviewed and appropriate  - [id number] - Urine Drug Confirmation Panel (Comprehensive); Future  - Rx sent for tramadol qty 120 dated 6/3/2022 with 2 refills.       Urge incontinence of urine / Incontinence of feces, unspecified fecal incontinence type  S/p PT w/o improvements. Will refer back to urology   - Adult Urology Referral; Future    Palpitations  Resolved  - Ziopatch (3/8/2022): PAC 3.1%, VT with maximum heart rate of 240 bmp  - echo (3/8/2022): EF 60 to 65%, mild to moderate aortic insufficiency     56}     See Patient Instructions    Return in about 3 months (around 8/24/2022) for chronic pain, Physical Exam.    Fariba Brown MD  [facility] Simms is a 81 year old who presents for the following health issues     HPI     # chest pain: fluttering has improved  - Ziopatch (3/8/2022): PAC 3.1%, VT with maximum heart rate of 240 bmp  - echo (3/8/2022): EF 60 to 65%, mild to moderate aortic insufficiency        # bladder issues  - s/p PT w/o improvement   - does not empty completely   - fecal incontinence, does not have any sensation, many year history   - feels bladder and rectum are falling into vagina  - using benefiber daily w/o improvement   - visited with urology on 7/2021 with recommendation for fiber and PT. Follow up in 4 months       Pain History:  When did you first notice your pain? - Chronic Pain   Have you seen this provider for your pain in the past?   Yes   Where in your body do you have pain? Legs and back   Are you seeing anyone else for your pain? Yes - chiropractor        PHQ-9 SCORE 7/16/2019 3/5/2021 5/24/2022   PHQ-9 Total Score - - -   PHQ-9 Total Score 0 0 0     ADONIS-7 SCORE 7/16/2019 3/5/2021 5/24/2022   Total Score 0 0 0     PEG Score 2/22/2022 5/24/2022   PEG Total Score 2 5        Chronic Pain Follow Up:    Location of pain: back and legs   Analgesia/pain control:    - Recent changes:  None     - Overall control: Tolerable with discomfort    - Current treatments: tramadol    Adherence:     - Do you ever take more pain medicine than prescribed? No    - When did you take your last dose of pain medicine?  This AM  Adverse effects: No     - cannot walk any distance w/o use of cane   - UDS: no surprises     PDMP Review       Value Time User    State PDMP site checked  Yes 5/4/2022  2:48 PM Fariba Brown MD        Last CSA Agreement:   CSA -- Patient Level:    Controlled Substance Agreement - Opioid - Scan on 8/4/2021  7:26 AM: OPIOD/OPIOD PLUS CONTROLLED SUBSTANCE AGREEMENT       Last UDS: 7/9/2017      Review of Systems   Constitutional: Negative for chills and fever.   HENT: Negative for congestion.    Respiratory: Negative for shortness of breath.    Cardiovascular: Negative for chest pain and palpitations.   Gastrointestinal: Negative for abdominal pain.   Genitourinary:        Urine incontinence   Fecal incontinence    Musculoskeletal: Positive for back pain.          Objective    /70 (BP Location: Left arm, Patient Position: Chair, Cuff Size: Adult Regular)   Pulse 94   Temp 99  F (37.2  C) (Tympanic)   Resp 16   Wt 53.1 kg (117 lb)   SpO2 96%   BMI 22.85 kg/m    Body mass index is 22.85 kg/m .  Physical Exam  Constitutional:       General: She is not in acute distress.     Appearance: She is not ill-appearing.   Cardiovascular:      Rate and Rhythm: Normal rate and regular rhythm.      Heart sounds: No murmur heard.  Pulmonary:      Effort: Pulmonary effort is normal. No respiratory distress.      Breath sounds: No wheezing or rales.   Musculoskeletal:      Right lower leg: No edema.      Left lower leg: No edema.   Neurological:      Mental Status: She is alert.

## 2022-05-24 ENCOUNTER — OFFICE VISIT (OUTPATIENT)
Dept: FAMILY MEDICINE | Facility: OTHER | Age: 82
End: 2022-05-24
Attending: FAMILY MEDICINE
Payer: COMMERCIAL

## 2022-05-24 VITALS
WEIGHT: 117 LBS | DIASTOLIC BLOOD PRESSURE: 70 MMHG | BODY MASS INDEX: 22.85 KG/M2 | TEMPERATURE: 99 F | HEART RATE: 94 BPM | SYSTOLIC BLOOD PRESSURE: 130 MMHG | RESPIRATION RATE: 16 BRPM | OXYGEN SATURATION: 96 %

## 2022-05-24 DIAGNOSIS — N39.41 URGE INCONTINENCE OF URINE: ICD-10-CM

## 2022-05-24 DIAGNOSIS — G89.29 CHRONIC BILATERAL LOW BACK PAIN WITHOUT SCIATICA: Primary | ICD-10-CM

## 2022-05-24 DIAGNOSIS — R15.9 INCONTINENCE OF FECES, UNSPECIFIED FECAL INCONTINENCE TYPE: ICD-10-CM

## 2022-05-24 DIAGNOSIS — Z79.891 LONG TERM (CURRENT) USE OF OPIATE ANALGESIC: ICD-10-CM

## 2022-05-24 DIAGNOSIS — F11.90 CHRONIC, CONTINUOUS USE OF OPIOIDS: ICD-10-CM

## 2022-05-24 DIAGNOSIS — M54.50 CHRONIC BILATERAL LOW BACK PAIN WITHOUT SCIATICA: Primary | ICD-10-CM

## 2022-05-24 DIAGNOSIS — S33.8XXD SPRAIN OF OTHER PARTS OF LUMBAR SPINE AND PELVIS, SUBSEQUENT ENCOUNTER: ICD-10-CM

## 2022-05-24 DIAGNOSIS — R00.2 PALPITATIONS: ICD-10-CM

## 2022-05-24 DIAGNOSIS — M41.25 OTHER IDIOPATHIC SCOLIOSIS, THORACOLUMBAR REGION: ICD-10-CM

## 2022-05-24 LAB — CREAT UR-MCNC: 55 MG/DL

## 2022-05-24 PROCEDURE — 80307 DRUG TEST PRSMV CHEM ANLYZR: CPT | Mod: ZL | Performed by: FAMILY MEDICINE

## 2022-05-24 PROCEDURE — 99214 OFFICE O/P EST MOD 30 MIN: CPT | Performed by: FAMILY MEDICINE

## 2022-05-24 PROCEDURE — G0463 HOSPITAL OUTPT CLINIC VISIT: HCPCS

## 2022-05-24 RX ORDER — TRAMADOL HYDROCHLORIDE 50 MG/1
TABLET ORAL
Qty: 120 TABLET | Refills: 2 | Status: SHIPPED | OUTPATIENT
Start: 2022-06-03 | End: 2022-09-02

## 2022-05-24 ASSESSMENT — ENCOUNTER SYMPTOMS
FEVER: 0
CHILLS: 0
PALPITATIONS: 0
SHORTNESS OF BREATH: 0
BACK PAIN: 1
ABDOMINAL PAIN: 0

## 2022-05-24 ASSESSMENT — ANXIETY QUESTIONNAIRES
GAD7 TOTAL SCORE: 0
GAD7 TOTAL SCORE: 0
6. BECOMING EASILY ANNOYED OR IRRITABLE: NOT AT ALL
1. FEELING NERVOUS, ANXIOUS, OR ON EDGE: NOT AT ALL
7. FEELING AFRAID AS IF SOMETHING AWFUL MIGHT HAPPEN: NOT AT ALL
2. NOT BEING ABLE TO STOP OR CONTROL WORRYING: NOT AT ALL
3. WORRYING TOO MUCH ABOUT DIFFERENT THINGS: NOT AT ALL
4. TROUBLE RELAXING: NOT AT ALL
5. BEING SO RESTLESS THAT IT IS HARD TO SIT STILL: NOT AT ALL

## 2022-05-24 ASSESSMENT — PATIENT HEALTH QUESTIONNAIRE - PHQ9: SUM OF ALL RESPONSES TO PHQ QUESTIONS 1-9: 0

## 2022-05-24 ASSESSMENT — PAIN SCALES - GENERAL: PAINLEVEL: SEVERE PAIN (7)

## 2022-05-24 NOTE — NURSING NOTE
Chief Complaint   Patient presents with     Pain     Incontinence       Initial /70 (BP Location: Left arm, Patient Position: Chair, Cuff Size: Adult Regular)   Pulse 94   Temp 99  F (37.2  C) (Tympanic)   Resp 16   Wt 53.1 kg (117 lb)   SpO2 96%   BMI 22.85 kg/m   Estimated body mass index is 22.85 kg/m  as calculated from the following:    Height as of 12/10/21: 1.524 m (5').    Weight as of this encounter: 53.1 kg (117 lb).  Medication Reconciliation: complete  Keke Kovacs LPN

## 2022-06-01 LAB
N-NORTRAMADOL/CREAT UR CFM: ABNORMAL NG/MG{CREAT}
O-NORTRAMADOL UR CFM-MCNC: ABNORMAL NG/ML
TRAMADOL CTO UR CFM-MCNC: ABNORMAL NG/ML
TRAMADOL/CREAT UR: ABNORMAL

## 2022-06-08 ENCOUNTER — OFFICE VISIT (OUTPATIENT)
Dept: CHIROPRACTIC MEDICINE | Facility: OTHER | Age: 82
End: 2022-06-08
Attending: CHIROPRACTOR
Payer: COMMERCIAL

## 2022-06-08 DIAGNOSIS — M54.50 ACUTE BILATERAL LOW BACK PAIN WITHOUT SCIATICA: ICD-10-CM

## 2022-06-08 DIAGNOSIS — M99.03 SEGMENTAL AND SOMATIC DYSFUNCTION OF LUMBAR REGION: Primary | ICD-10-CM

## 2022-06-08 DIAGNOSIS — M99.02 SEGMENTAL AND SOMATIC DYSFUNCTION OF THORACIC REGION: ICD-10-CM

## 2022-06-08 PROCEDURE — 98940 CHIROPRACT MANJ 1-2 REGIONS: CPT | Mod: AT | Performed by: CHIROPRACTOR

## 2022-06-22 DIAGNOSIS — M41.35 THORACOGENIC SCOLIOSIS OF THORACOLUMBAR REGION: ICD-10-CM

## 2022-06-22 DIAGNOSIS — Z78.0 POST-MENOPAUSE: ICD-10-CM

## 2022-06-22 RX ORDER — PANTOPRAZOLE SODIUM 40 MG/1
TABLET, DELAYED RELEASE ORAL
Qty: 90 TABLET | Refills: 2 | Status: SHIPPED | OUTPATIENT
Start: 2022-06-22 | End: 2023-03-23

## 2022-06-29 ENCOUNTER — OFFICE VISIT (OUTPATIENT)
Dept: CHIROPRACTIC MEDICINE | Facility: OTHER | Age: 82
End: 2022-06-29
Attending: CHIROPRACTOR
Payer: COMMERCIAL

## 2022-06-29 DIAGNOSIS — M99.02 SEGMENTAL AND SOMATIC DYSFUNCTION OF THORACIC REGION: ICD-10-CM

## 2022-06-29 DIAGNOSIS — M54.50 ACUTE BILATERAL LOW BACK PAIN WITHOUT SCIATICA: ICD-10-CM

## 2022-06-29 DIAGNOSIS — M99.03 SEGMENTAL AND SOMATIC DYSFUNCTION OF LUMBAR REGION: Primary | ICD-10-CM

## 2022-06-29 PROCEDURE — 98940 CHIROPRACT MANJ 1-2 REGIONS: CPT | Mod: AT | Performed by: CHIROPRACTOR

## 2022-07-09 DIAGNOSIS — R39.15 URINARY URGENCY: Primary | ICD-10-CM

## 2022-07-13 ENCOUNTER — LAB (OUTPATIENT)
Dept: LAB | Facility: OTHER | Age: 82
End: 2022-07-13
Attending: FAMILY MEDICINE
Payer: COMMERCIAL

## 2022-07-13 ENCOUNTER — OFFICE VISIT (OUTPATIENT)
Dept: UROLOGY | Facility: OTHER | Age: 82
End: 2022-07-13
Attending: FAMILY MEDICINE
Payer: COMMERCIAL

## 2022-07-13 VITALS
RESPIRATION RATE: 16 BRPM | DIASTOLIC BLOOD PRESSURE: 80 MMHG | OXYGEN SATURATION: 98 % | BODY MASS INDEX: 22.42 KG/M2 | WEIGHT: 114.8 LBS | HEART RATE: 84 BPM | SYSTOLIC BLOOD PRESSURE: 128 MMHG

## 2022-07-13 DIAGNOSIS — R39.15 URINARY URGENCY: ICD-10-CM

## 2022-07-13 DIAGNOSIS — N39.41 URGE INCONTINENCE OF URINE: Primary | ICD-10-CM

## 2022-07-13 LAB
ALBUMIN UR-MCNC: NEGATIVE MG/DL
APPEARANCE UR: CLEAR
BACTERIA #/AREA URNS HPF: ABNORMAL /HPF
BILIRUB UR QL STRIP: NEGATIVE
COLOR UR AUTO: ABNORMAL
GLUCOSE UR STRIP-MCNC: NEGATIVE MG/DL
HGB UR QL STRIP: NEGATIVE
KETONES UR STRIP-MCNC: NEGATIVE MG/DL
LEUKOCYTE ESTERASE UR QL STRIP: ABNORMAL
MUCOUS THREADS #/AREA URNS LPF: PRESENT /LPF
NITRATE UR QL: NEGATIVE
PH UR STRIP: 6.5 [PH] (ref 4.7–8)
RBC URINE: 0 /HPF
SP GR UR STRIP: 1.01 (ref 1–1.03)
SQUAMOUS EPITHELIAL: 3 /HPF
UROBILINOGEN UR STRIP-MCNC: NORMAL MG/DL
WBC URINE: 2 /HPF

## 2022-07-13 PROCEDURE — 81003 URINALYSIS AUTO W/O SCOPE: CPT | Mod: ZL

## 2022-07-13 PROCEDURE — G0463 HOSPITAL OUTPT CLINIC VISIT: HCPCS

## 2022-07-13 PROCEDURE — 99214 OFFICE O/P EST MOD 30 MIN: CPT | Performed by: UROLOGY

## 2022-07-13 PROCEDURE — 51798 US URINE CAPACITY MEASURE: CPT | Performed by: UROLOGY

## 2022-07-13 RX ORDER — OXYBUTYNIN CHLORIDE 5 MG/1
10 TABLET, EXTENDED RELEASE ORAL DAILY
Qty: 90 TABLET | Refills: 3 | Status: SHIPPED | OUTPATIENT
Start: 2022-07-13 | End: 2022-08-26

## 2022-07-13 ASSESSMENT — PAIN SCALES - GENERAL: PAINLEVEL: MODERATE PAIN (5)

## 2022-07-13 NOTE — PROGRESS NOTES
Type of Visit  NPV    Chief Complaint  Incontinence    HPI  Ms. Narayan is a 82 year old female who presents with incontinence.  Patient leaks urine about several times a day.  Volume of incontinence is described as medium.  Overall, leaking urine interferes (bother score) with daily living approximately 3/10.  Patient uses 6 pads per day.  Onset was 5 years ago.  Patient denies: dysuria and gross hematuria.    Patient has tried no medications in the past.    She also has fecal incontinence.    Leakage of urine occurs with urgency? Yes  Leakage of urine occurs with valsalva? No  Leakage of urine occurs without sensation? No      Past Medical History  She  has a past medical history of Chronic/recurrent back pain (7/12/2011), Diverticulitis, Diverticulitis, recurrent (2/6/2002), Dyslipidemia (6/20/2006), Fibrocystic breast disease (7/12/2011), Gastro-oesophageal reflux disease, GERD (2/10/2012), Hiatal hernia (2/10/2012), Hormone replacement therapy, postmenopausal (7/12/2011), and Osteoarthritis.  Patient Active Problem List   Diagnosis     H/O diverticulitis, S/P bowel resection     Dyslipidemia     Fibrocystic breast disease (FCBD)     Low back pain, chronic     GERD (gastroesophageal reflux)     Osteoarthritis, multiple joints     Comprehensive Medical Examination     Post-menopause     Diaphragmatic hernia     ACP (advance care planning)     Scoliosis, throracolumbar     Chronic, continuous use of opioids     Lyme disease     Stenosis of left carotid artery - >70% left. no sx recommended. Repeat US (6/2022)       Past Surgical History  She  has a past surgical history that includes ENT surgery; GYN surgery; Abdomen surgery; appendectomy; Endoscopy upper with pancreatic stimulation; ------------other-------------; ------------other------------- (4/19/1994); EGD with biopsy (2011); TVH with ovarian preservation; Diverticulitis; Arthroscopy shoulder (11/20/2013); orthopedic surgery (11/20/2013); colonoscopy  (2/1/2011); Endoscopy upper, colonoscopy, combined (7/18/2014); Esophagoscopy, gastroscopy, duodenoscopy (EGD), combined (N/A, 9/27/2017); and IR Consultation for IR Exam (3/11/2019).    Medications  She has a current medication list which includes the following prescription(s): pantoprazole, aspirin, atorvastatin, calcium citrate-vitamin d, estradiol, glucosamine sulfate, latanoprost, multiple vitamins-minerals, omega-3 fatty acids, tramadol, triamcinolone, and benefiber.    Allergies  No Known Allergies    Social History  She  reports that she quit smoking about 54 years ago. Her smoking use included cigarettes. She started smoking about 59 years ago. She has a 2.50 pack-year smoking history. She has never used smokeless tobacco. She reports current alcohol use of about 0.8 standard drinks of alcohol per week. She reports that she does not use drugs.  No drug abuse.    Family History  Family History   Problem Relation Age of Onset     Cerebrovascular Disease Father         CVA     Heart Disease Father         heart disease     Hypertension Father      Myocardial Infarction Father         myocardial infarction     Cerebrovascular Disease Mother         CVA     Diabetes Mother      Heart Disease Mother         heart disease     Hypertension Mother      Heart Disease Brother         heart disease     Hypertension Brother        Review of Systems  I personally reviewed the ROS with the patient.    Nursing Notes:   Jayna Ponce LPN  7/13/2022  9:10 AM  Addendum  Chief Complaint   Patient presents with     Consult     Incontinence   Patient presents to the clinic today for a consult for incontinence.    Review of Systems:    Weight loss:    No     Recent fever/chills:  No   Night sweats:   Yes  Current skin rash:  No   Recent hair loss:  No  Heat intolerance:  Yes   Cold intolerance:  No  Chest pain:   No   Palpitations:   Yes  Shortness of breath:  No   Wheezing:   Yes  Constipation:     No   Diarrhea:   No   Nausea:   No   Vomiting:   No   Kidney/side pain:  No   Back pain:   Yes  Frequent headaches:  No   Dizziness:     No  Leg swelling:   Yes   Calf pain:    No    Parents, brothers or sisters with history of kidney cancer:   No  Parents, brothers or sisters with history of bladder cancer: No      Medication Reconciliation: completed   Jayan Ponce, LPN  7/13/2022 8:50 AM       Physical Exam  Vitals:    07/13/22 0908   BP: 128/80   BP Location: Right arm   Patient Position: Sitting   Cuff Size: Adult Regular   Pulse: 84   Resp: 16   SpO2: 98%   Weight: 52.1 kg (114 lb 12.8 oz)     Constitutional: NAD, WDWN  Head: NCAT  Eyes: Conjunctivae normal  Cardiovascular: Regular rate  Pulmonary/Chest: Respirations are even and non-labored bilaterally  Abdominal: Soft with no distension, tenderness, masses, guarding or CVA tenderness  Neurological: A + O x 3,  cranial nerves II-XII grossly intact  Extremities: DACIA x 4, warm, no clubbing, no cyanosis  Skin: Pink, warm, dry with no rash  Psychiatric:  Normal mood and affect  Genitourinary: Nonpalpable bladder    Labs  Results for orders placed or performed in visit on 07/13/22   UA reflex to Microscopic     Status: Abnormal   Result Value Ref Range    Color Urine Straw Colorless, Straw, Light Yellow, Yellow    Appearance Urine Clear Clear    Glucose Urine Negative Negative mg/dL    Bilirubin Urine Negative Negative    Ketones Urine Negative Negative mg/dL    Specific Gravity Urine 1.006 1.003 - 1.035    Blood Urine Negative Negative    pH Urine 6.5 4.7 - 8.0    Protein Albumin Urine Negative Negative mg/dL    Urobilinogen Urine Normal Normal, 2.0 mg/dL    Nitrite Urine Negative Negative    Leukocyte Esterase Urine Small (A) Negative    Bacteria Urine Few (A) None Seen /HPF    RBC Urine 0 <=2 /HPF    WBC Urine 2 <=5 /HPF    Squamous Epithelials Urine 3 (H) <=1 /HPF    Mucus Urine Present (A) None Seen /LPF     Post-Void Residual  A post-void residual was  measured by ultrasonic bladder scanner.  123 mL today    Assessment  Ms. Narayan is a 82 year old female who presents with clinically predominate urge incontinence.  Reviewed UA and PVR.    We discussed treatment options for irritative voiding symptoms such as urinary urgency and frequency and urge incontinence.    We discussed 3 steps of therapy.  The first step includes conservative measures such as weight loss, timed voiding, avoidance of constipation and avoidance of dietary triggers (elimination diet).  The second step includes medications such as anticholinergics and/or mirabegron.  The third step includes Botox intradetrusor injections, Interstim and/or PTNS therapy.    We discussed the importance of clinical bother when deciding the approach to treatment.    The patient was informed of the most common side effects with oral anticholinergic medication such as dry mouth, dry eyes, confusion and constipation.    Plan  Start oxybutynin ER 5mg PO once daily  Follow up in 1-2 months with PVR

## 2022-07-13 NOTE — NURSING NOTE
Chief Complaint   Patient presents with     Consult     Incontinence   Patient presents to the clinic today for a consult for incontinence.    Review of Systems:    Weight loss:    No     Recent fever/chills:  No   Night sweats:   Yes  Current skin rash:  No   Recent hair loss:  No  Heat intolerance:  Yes   Cold intolerance:  No  Chest pain:   No   Palpitations:   Yes  Shortness of breath:  No   Wheezing:   Yes  Constipation:    No   Diarrhea:   No   Nausea:   No   Vomiting:   No   Kidney/side pain:  No   Back pain:   Yes  Frequent headaches:  No   Dizziness:     No  Leg swelling:   Yes   Calf pain:    No    Parents, brothers or sisters with history of kidney cancer:   No  Parents, brothers or sisters with history of bladder cancer: No    Post-Void Residual  A post-void residual was measured by ultrasonic bladder scanner.  123 mL  Jayna Ponce LPN  7/13/2022 9:12 AM    Medication Reconciliation: completed   Jayna Ponce LPN  7/13/2022 8:50 AM

## 2022-07-20 ENCOUNTER — OFFICE VISIT (OUTPATIENT)
Dept: CHIROPRACTIC MEDICINE | Facility: OTHER | Age: 82
End: 2022-07-20
Attending: CHIROPRACTOR
Payer: COMMERCIAL

## 2022-07-20 DIAGNOSIS — M99.03 SEGMENTAL AND SOMATIC DYSFUNCTION OF LUMBAR REGION: Primary | ICD-10-CM

## 2022-07-20 DIAGNOSIS — M54.50 ACUTE BILATERAL LOW BACK PAIN WITHOUT SCIATICA: ICD-10-CM

## 2022-07-20 DIAGNOSIS — M99.02 SEGMENTAL AND SOMATIC DYSFUNCTION OF THORACIC REGION: ICD-10-CM

## 2022-07-20 PROCEDURE — 98940 CHIROPRACT MANJ 1-2 REGIONS: CPT | Mod: AT | Performed by: CHIROPRACTOR

## 2022-08-10 ENCOUNTER — OFFICE VISIT (OUTPATIENT)
Dept: CHIROPRACTIC MEDICINE | Facility: OTHER | Age: 82
End: 2022-08-10
Attending: CHIROPRACTOR
Payer: COMMERCIAL

## 2022-08-10 DIAGNOSIS — M99.02 SEGMENTAL AND SOMATIC DYSFUNCTION OF THORACIC REGION: ICD-10-CM

## 2022-08-10 DIAGNOSIS — M54.50 ACUTE BILATERAL LOW BACK PAIN WITHOUT SCIATICA: ICD-10-CM

## 2022-08-10 DIAGNOSIS — M99.03 SEGMENTAL AND SOMATIC DYSFUNCTION OF LUMBAR REGION: Primary | ICD-10-CM

## 2022-08-10 PROCEDURE — 98940 CHIROPRACT MANJ 1-2 REGIONS: CPT | Mod: AT | Performed by: CHIROPRACTOR

## 2022-08-24 ENCOUNTER — OFFICE VISIT (OUTPATIENT)
Dept: UROLOGY | Facility: OTHER | Age: 82
End: 2022-08-24
Attending: UROLOGY
Payer: COMMERCIAL

## 2022-08-24 VITALS
OXYGEN SATURATION: 97 % | TEMPERATURE: 97.3 F | WEIGHT: 113.4 LBS | SYSTOLIC BLOOD PRESSURE: 130 MMHG | RESPIRATION RATE: 16 BRPM | HEART RATE: 95 BPM | DIASTOLIC BLOOD PRESSURE: 80 MMHG | BODY MASS INDEX: 22.15 KG/M2

## 2022-08-24 DIAGNOSIS — R39.15 URINARY URGENCY: Primary | ICD-10-CM

## 2022-08-24 PROCEDURE — 99214 OFFICE O/P EST MOD 30 MIN: CPT | Performed by: UROLOGY

## 2022-08-24 PROCEDURE — G0463 HOSPITAL OUTPT CLINIC VISIT: HCPCS | Mod: 25

## 2022-08-24 PROCEDURE — G0463 HOSPITAL OUTPT CLINIC VISIT: HCPCS

## 2022-08-24 PROCEDURE — 51798 US URINE CAPACITY MEASURE: CPT | Performed by: UROLOGY

## 2022-08-24 ASSESSMENT — PAIN SCALES - GENERAL: PAINLEVEL: SEVERE PAIN (6)

## 2022-08-24 NOTE — NURSING NOTE
Chief Complaint   Patient presents with     Follow Up     6 week incontinence     Patient presents to the clinic today for a  Week follow up for incontinence    Review of Systems:    Weight loss:    No     Recent fever/chills:  No   Night sweats:   Yes  Current skin rash:  No   Recent hair loss:  No  Heat intolerance:  Yes   Cold intolerance:  No  Chest pain:   No   Palpitations:   Yes  Shortness of breath:  yes   Wheezing:   Yes  Constipation:    No   Diarrhea:   No   Nausea:   No   Vomiting:   No   Kidney/side pain:  No   Back pain:   Yes  Frequent headaches:  No   Dizziness:     No  Leg swelling:   No   Calf pain:    No    Parents,  Medication Reconciliation: completed   Jayna Ponce LPN  8/24/2022 9:31 AM

## 2022-08-24 NOTE — PROGRESS NOTES
Type of Visit  EST    Chief Complaint  Urge incontinence    HPI  Ms. Narayan is a 82 year old female who follows up with urge incontinence.  About 6 weeks ago the patient started oxybutynin XL 10 mg once daily.  She unfortunately developed dose-limiting side effects and had to discontinue the medication after a few weeks.  She developed dry mouth, constipation.  She is also concerned about confusion in the future.  She denies dysuria and gross hematuria.    Her baseline  symptoms are described below and stable from the last visit:  Patient leaks urine about several times a day.  Volume of incontinence is described as medium.  Overall, leaking urine interferes (bother score) with daily living approximately 3/10.  Patient uses 6 pads per day.  Onset was 5 years ago.  Patient denies: dysuria and gross hematuria.  She also has fecal incontinence.      Review of Systems  I personally reviewed the ROS with the patient.    Nursing Notes:   Jayna Ponce LPN  8/24/2022  9:39 AM  Addendum  Chief Complaint   Patient presents with     Follow Up     6 week incontinence     Patient presents to the clinic today for a  Week follow up for incontinence    Review of Systems:    Weight loss:    No     Recent fever/chills:  No   Night sweats:   Yes  Current skin rash:  No   Recent hair loss:  No  Heat intolerance:  Yes   Cold intolerance:  No  Chest pain:   No   Palpitations:   Yes  Shortness of breath:  yes   Wheezing:   Yes  Constipation:    No   Diarrhea:   No   Nausea:   No   Vomiting:   No   Kidney/side pain:  No   Back pain:   Yes  Frequent headaches:  No   Dizziness:     No  Leg swelling:   No   Calf pain:    No    Parents,  Medication Reconciliation: completed   Jayna Ponce LPN  8/24/2022 9:31 AM       Physical Exam  Vitals:    08/24/22 0937   BP: 130/80   BP Location: Right arm   Patient Position: Sitting   Cuff Size: Adult Regular   Pulse: 95   Resp: 16   Temp: 97.3  F (36.3  C)   TempSrc: Tympanic   SpO2: 97%   Weight:  51.4 kg (113 lb 6.4 oz)   Constitutional: NAD, WDWN  Pulmonary/Chest: Respirations are even and non-labored bilaterally  Abdominal: Soft with no distension, tenderness, masses, guarding or CVA tenderness  Extremities: DACIA x 4, warm, no clubbing, no cyanosis  Skin: Pink, warm, dry with no rash  Genitourinary: Nonpalpable bladder    Labs   07/13/22 09:01   Color Urine Straw   Appearance Urine Clear   Glucose Urine Negative   Bilirubin Urine Negative   Ketones Urine Negative   Specific Gravity Urine 1.006   pH Urine 6.5   Protein Albumin Urine Negative   Urobilinogen mg/dL Normal   Nitrite Urine Negative   Blood Urine Negative   Leukocyte Esterase Urine Small !   WBC Urine 2   RBC Urine 0   Bacteria Urine Few !   Squamous Epithelial /HPF Urine 3 (H)   Mucus Urine Present !     Post-Void Residual  A post-void residual was measured by ultrasonic bladder scanner.  123 mL (previously recorded)    Assessment  Ms. Narayan is a 82 year old female who follows up with clinically predominate urge incontinence.  Reviewed UA and PVR.    Patient complains of significant urinary frequency, urgency and urge incontinence.    Prior treatments include attempted behavior modification and anticholinergic medication.  Failure of anticholinergics due to poor efficacy and intolerable side effects.  Treatment options were discussed: Botox injection, posterior tibial nerve stimulation, and Interstim neuromodulation.      Patient elected to proceed with intradetrusor Botox injections.  Discussed risks of UTI and/or urinary retention (6%) requiring self cath or indwelling catheter for a temporary period of time.  The benefit lasts about 6 months on average and I explained the need for retreatment.    Plan  Discontinue oxybutynin due to side effects  Intravesical Botox 100 units in the clinic

## 2022-08-24 NOTE — PATIENT INSTRUCTIONS
We put in an order for MRI of your lower back. We put in a referral to [facility], Dr. Resendez  We will call you with your xray results.     We put in an order for bone density     Preventive Health Recommendations    See your health care provider every year to  Review health changes.   Discuss preventive care.    Review your medicines if your doctor has prescribed any.    You no longer need a yearly Pap test unless you've had an abnormal Pap test in the past 10 years. If you have vaginal symptoms, such as bleeding or discharge, be sure to talk with your provider about a Pap test.    Every 1 to 2 years, have a mammogram.  If you are over 69, talk with your health care provider about whether or not you want to continue having screening mammograms.    Every 10 years, have a colonoscopy. Or, have a yearly FIT test (stool test). These exams will check for colon cancer.     Have a cholesterol test every 5 years, or more often if your doctor advises it.     Have a diabetes test (fasting glucose) every three years. If you are at risk for diabetes, you should have this test more often.     At age 65, have a bone density scan (DEXA) to check for osteoporosis (brittle bone disease).    Shots:  Get a flu shot each year.  Get a tetanus shot every 10 years.  Talk to your doctor about your pneumonia vaccines. There are now two you should receive - Pneumovax (PPSV 23) and Prevnar (PCV 13).  Talk to your pharmacist about the shingles vaccine.  Talk to your doctor about the hepatitis B vaccine.    Nutrition:   Eat at least 5 servings of fruits and vegetables each day.    Eat whole-grain bread, whole-wheat pasta and brown rice instead of white grains and rice.    Get adequate about Calcium and Vitamin D.     Lifestyle  Exercise at least 150 minutes a week (30 minutes a day, 5 days a week). This will help you control your weight and prevent disease.    Limit alcohol to one drink per day.    No smoking.     Wear sunscreen to  prevent skin cancer.     See your dentist twice a year for an exam and cleaning.    See your eye doctor every 1 to 2 years to screen for conditions such as glaucoma, macular degeneration, cataracts, etc.    Personalized Prevention Plan  You are due for the preventive services outlined below.  Your care team is available to assist you in scheduling these services.  If you have already completed any of these items, please share that information with your care team to update in your medical record.    Health Maintenance Due   Topic Date Due    Osteoporosis Screening  02/03/2022    COVID-19 Vaccine (4 - Booster for Pfizer series) 02/05/2022    Annual Wellness Visit  06/03/2022    TREATMENT AGREEMENT FOR CHRONIC PAIN MANAGEMENT  08/04/2022

## 2022-08-24 NOTE — PROGRESS NOTES
"SUBJECTIVE:   Demi Naryaan is a 82 year old female who presents for Preventive Visit.      Patient has been advised of split billing requirements and indicates understanding: Yes     Are you in the first 12 months of your Medicare coverage?  No      Healthy Habits:     In general, how would you rate your overall health?  Good    Frequency of exercise:  6-7 days/week    Duration of exercise:  30-45 minutes    Do you usually eat at least 4 servings of fruit and vegetables a day, include whole grains    & fiber and avoid regularly eating high fat or \"junk\" foods?  Yes    Taking medications regularly:  Yes    Medication side effects:  None    Ability to successfully perform activities of daily living:  No assistance needed    Home Safety:  No safety concerns identified    Hearing Impairment:  No hearing concerns    In the past 6 months, have you been bothered by leaking of urine? Yes    In general, how would you rate your overall mental or emotional health?  Good      PHQ-2 Total Score: 0    Additional concerns today:  Yes    Do you feel safe in your environment? Yes    Have you ever done Advance Care Planning? (For example, a Health Directive, POLST, or a discussion with a medical provider or your loved ones about your wishes): No, advance care planning information given to patient to review.  Patient declined advance care planning discussion at this time.       Fall risk  Fallen 2 or more times in the past year?: No  Any fall with injury in the past year?: No    Cognitive Screening   1) Repeat 3 items (Leader, Season, Table)    2) Clock draw: NORMAL  3) 3 item recall: Recalls 1 object   Results: NORMAL clock, 1-2 items recalled: COGNITIVE IMPAIRMENT LESS LIKELY    Mini-CogTM Copyright FEMI Guzman. Licensed by the author for use in [facility]; reprinted with permission (lela@.[facility]). All rights reserved.      Do you have sleep apnea, excessive snoring or daytime drowsiness?: no    Reviewed and updated " as needed this visit by clinical staff   Tobacco  Allergies  Meds  Problems  Med Hx  Surg Hx  Fam Hx  Soc   Hx          Reviewed and updated as needed this visit by Provider   Tobacco  Allergies  Meds  Problems  Med Hx  Surg Hx  Fam Hx           Social History     Tobacco Use     Smoking status: Former Smoker     Packs/day: 0.50     Years: 5.00     Pack years: 2.50     Types: Cigarettes     Start date: 1963     Quit date: 1968     Years since quittin.3     Smokeless tobacco: Never Used   Substance Use Topics     Alcohol use: Yes     Alcohol/week: 0.8 standard drinks     Types: 1 Glasses of wine per week     Comment: daily with dinner     If you drink alcohol do you typically have >3 drinks per day or >7 drinks per week? No    Alcohol Use 2022   Prescreen: >3 drinks/day or >7 drinks/week? No   Prescreen: >3 drinks/day or >7 drinks/week? -   No flowsheet data found.        Hyperlipidemia Follow-Up      Are you regularly taking any medication or supplement to lower your cholesterol?   Yes- Atorvastatin 40 mg    Are you having muscle aches or other side effects that you think could be caused by your cholesterol lowering medication?  No  LDL (3/11/2022): 55      # carotid artery stenosis  - due for repeat US, which was completed at [facility] on 2022. No report has been given to Demi norris.  - US carotid (2022, [facility]):   1. Progressive stenosis due to eccentric calcification left carotid bulb and proximal left ICA causing critical stenosis greater than 70%.   2. Eccentric calcification right carotid bulb and proximal right ICA causing less than 50% stenosis.          # urinary urgency: follows with Dr. Fischer   - tried oxybutynin with side effects  - consideration for botox     Pain History:  When did you first notice your pain? - Chronic Pain   Have you seen this provider for your pain in the past?   Yes   Where in your body do you have pain? Left Hip and left leg  Are you  seeing anyone else for your pain? No    PHQ-9 SCORE 7/16/2019 3/5/2021 5/24/2022   PHQ-9 Total Score - - -   PHQ-9 Total Score 0 0 0       ADONIS-7 SCORE 7/16/2019 3/5/2021 5/24/2022   Total Score 0 0 0           PEG Score 2/22/2022 5/24/2022 8/26/2022   PEG Total Score 2 5 2       Chronic Pain Follow Up:    Location of pain: Back   Analgesia/pain control:    - Recent changes:  Left sciatica pain, getting worse. Pain also in left help   - Overall control: Tolerable with discomfort    - Current treatments: tramadol  Adherence:     - Do you ever take more pain medicine than prescribed? No    - When did you take your last dose of pain medicine?  3AM this morning, takes every 6 hrs   Adverse effects: No   - visited with [facility] in the past, but would like to see someone closer  - back pain is worse with BM  - having left hip pain  - pain is starting to go down left leg     - discussed and signed CSA today    # post menopausal  - no h/o treatment   - takes calcium 600mg w/ vitamin d bid     # Pain in middle back after going to festival   - still has gallbladder  - another episode from middle back which progress to right shoulder   - FHx of gallbladder issues     # Palpation at night:  - son w/ MI with similar symptoms  - continues to have symptoms at night, declines medications d/t reassuring cardiac work up    # weight   - stable, good appetite    Wt Readings from Last 4 Encounters:   08/26/22 52.3 kg (115 lb 4 oz)   08/24/22 51.4 kg (113 lb 6.4 oz)   07/13/22 52.1 kg (114 lb 12.8 oz)   05/24/22 53.1 kg (117 lb)           PDMP Review       Value Time User    State PDMP site checked  Yes 5/24/2022  1:30 PM Fariba Brown MD        Last CSA Agreement:   CSA -- Patient Level:    Controlled Substance Agreement - Opioid - Scan on 8/4/2021  7:26 AM: OPIOD/OPIOD PLUS CONTROLLED SUBSTANCE AGREEMENT       Last UDS: 6/1/2022      Current providers sharing in care for this patient include:   Patient Care  Team:  Fariba Brown MD as PCP - General (Family Medicine)  Nicole Joseph RN as Registered Nurse  Nicole Stern APRN CNS as Assigned Surgical Provider  Fariba Brown MD as Assigned PCP    The following health maintenance items are reviewed in Epic and correct as of today:  Health Maintenance Due   Topic Date Due     DEXA  02/03/2022     COVID-19 Vaccine (4 - Booster for Pfizer series) 02/05/2022     TREATMENT AGREEMENT FOR CHRONIC PAIN MANAGEMENT  08/04/2022     INFLUENZA VACCINE (1) 09/01/2022     BP Readings from Last 3 Encounters:   08/26/22 132/64   08/24/22 130/80   07/13/22 128/80    Wt Readings from Last 3 Encounters:   08/26/22 52.3 kg (115 lb 4 oz)   08/24/22 51.4 kg (113 lb 6.4 oz)   07/13/22 52.1 kg (114 lb 12.8 oz)                  Patient Active Problem List   Diagnosis     H/O diverticulitis, S/P bowel resection     Dyslipidemia     Fibrocystic breast disease (FCBD)     Low back pain, chronic     GERD (gastroesophageal reflux)     Osteoarthritis, multiple joints     Comprehensive Medical Examination     Post-menopause     Diaphragmatic hernia     ACP (advance care planning)     Scoliosis, throracolumbar     Chronic, continuous use of opioids     Lyme disease     Stenosis of left carotid artery - >70% left. no sx recommended. Repeat US (6/2022)     Urinary urgency     Past Surgical History:   Procedure Laterality Date     ------------OTHER-------------      colonoscopy with biopsy - diverticulitis, hemorrhoids     ------------OTHER-------------  4/19/1994    sigmoidoscopy - abdominal pain, diverticula     ABDOMEN SURGERY      colon removed 2nd to diverticulitis     APPENDECTOMY       ARTHROSCOPY SHOULDER  11/20/2013    Procedure: ARTHROSCOPY SHOULDER;  Right Shoulder Arthroscopy Sub-Acromial Decompression Rotator Cuff Repair;  Surgeon: Lenny Trammell MD;  Location: [facility] OR     [facility]     Diverticulitis      bowel  resection     EGD with biopsy       ENDOSCOPY UPPER WITH PANCREATIC STIMULATION       ENDOSCOPY UPPER, COLONOSCOPY, COMBINED  2014    Procedure: COMBINED ENDOSCOPY UPPER, COLONOSCOPY;  Surgeon: Israel Feliciano MD;  Location: [facility] OR     ENT SURGERY      tonsilectomy     ESOPHAGOSCOPY, GASTROSCOPY, DUODENOSCOPY (EGD), COMBINED N/A 2017    Procedure: COMBINED ESOPHAGOSCOPY, GASTROSCOPY, DUODENOSCOPY (EGD);  UPPER ENDOSCOPY WITH BIOPSY AND POLYPECTOMY;  Surgeon: Gallito Alvarado DO;  Location: [facility] OR     GYN SURGERY      hysterectomy with bladder and rectal repair     IR CONSULTATION FOR IR EXAM  3/11/2019     ORTHOPEDIC SURGERY  2013    right rotator cuff surgery     TVH with ovarian preservation         Social History     Tobacco Use     Smoking status: Former Smoker     Packs/day: 0.50     Years: 5.00     Pack years: 2.50     Types: Cigarettes     Start date: 1963     Quit date: 1968     Years since quittin.3     Smokeless tobacco: Never Used   Substance Use Topics     Alcohol use: Yes     Alcohol/week: 0.8 standard drinks     Types: 1 Glasses of wine per week     Comment: daily with dinner     Family History   Problem Relation Age of Onset     Cerebrovascular Disease Father         CVA     Heart Disease Father         heart disease     Hypertension Father      Myocardial Infarction Father         myocardial infarction     Cerebrovascular Disease Mother         CVA     Diabetes Mother      Heart Disease Mother         heart disease     Hypertension Mother      Heart Disease Brother         heart disease     Hypertension Brother          Current Outpatient Medications   Medication Sig Dispense Refill     aspirin 81 MG EC tablet Take 81 mg by mouth daily       atorvastatin (LIPITOR) 20 MG tablet Take 1 tablet by mouth once daily 90 tablet 2     Calcium Carbonate-Vitamin D (CALCIUM + D PO) Take 600 mg by mouth.       estradiol (ESTRACE) 0.5 MG tablet Take 1 Tablet by mouth one time a  day. 90 tablet 1     GLUCOSAMINE SULFATE PO Take  by mouth daily.       latanoprost (XALATAN) 0.005 % ophthalmic solution        Multiple Vitamins-Minerals (MULTIVITAL PO) Take 1 tablet by mouth daily.       Omega-3 Fatty Acids (OMEGA-3 FISH OIL PO) Take  by mouth daily.       pantoprazole (PROTONIX) 40 MG EC tablet TAKE 1 TABLET BY MOUTH IN THE MORNING BEFORE BREAKFAST 90 tablet 2     traMADol (ULTRAM) 50 MG tablet TAKE 1 TO 2 TABLETS BY MOUTH EVERY 6 TO 8 HOURS AS NEEDED 120 tablet 2     Wheat Dextrin (BENEFIBER) POWD Take by mouth daily       No Known Allergies      Mammogram Screening - Patient over age 75, has elected to continue with screening.  Pertinent mammograms are reviewed under the imaging tab.    Review of Systems   Constitutional: Negative for chills and fever.   HENT: Negative for congestion.    Respiratory: Negative for shortness of breath.    Cardiovascular: Positive for palpitations (still having issues, not every night. deep breathing helps). Negative for chest pain.   Gastrointestinal: Negative for abdominal pain.   Genitourinary: Positive for frequency and urgency.   Musculoskeletal: Positive for arthralgias.         OBJECTIVE:   /64   Pulse 70   Temp 97.9  F (36.6  C) (Tympanic)   Resp 18   Wt 52.3 kg (115 lb 4 oz)   SpO2 98%   BMI 22.51 kg/m   Estimated body mass index is 22.51 kg/m  as calculated from the following:    Height as of 12/10/21: 1.524 m (5').    Weight as of this encounter: 52.3 kg (115 lb 4 oz).  Physical Exam  Constitutional:       General: She is not in acute distress.     Appearance: She is well-developed.      Comments: thin   HENT:      Head: Normocephalic and atraumatic.      Right Ear: Hearing and tympanic membrane normal.      Left Ear: Hearing and tympanic membrane normal.      Mouth/Throat:      Mouth: Mucous membranes are moist.      Pharynx: No oropharyngeal exudate.   Eyes:      Extraocular Movements: Extraocular movements intact.       Conjunctiva/sclera: Conjunctivae normal.   Neck:      Thyroid: No thyromegaly.   Cardiovascular:      Rate and Rhythm: Normal rate and regular rhythm.      Pulses: Normal pulses.      Heart sounds: Normal heart sounds. No murmur heard.  Pulmonary:      Effort: Pulmonary effort is normal. No respiratory distress.      Breath sounds: Normal breath sounds. No wheezing or rales.   Abdominal:      General: Bowel sounds are normal. There is no distension.      Palpations: Abdomen is soft.      Tenderness: There is no abdominal tenderness. There is no guarding.   Musculoskeletal:         General: Normal range of motion.      Cervical back: Normal range of motion and neck supple.      Right lower leg: No edema.      Left lower leg: No edema.      Comments: Back with curvature. Ambulates with discomfort    Lymphadenopathy:      Cervical: No cervical adenopathy.   Skin:     General: Skin is dry.   Neurological:      Mental Status: She is alert.   Psychiatric:         Mood and Affect: Mood normal.       Diagnostic Test Results:  Labs reviewed in Epic    XR Hip Left 2-3 Views (Clinic Performed)    Result Date: 8/26/2022  PROCEDURE: XR HIP LEFT 2-3 VIEWS 8/26/2022 8:52 AM HISTORY: Hip pain, left COMPARISONS: None. TECHNIQUE: 2 views. FINDINGS: There is fairly severe degenerative change in the visualized lower lumbar spine. There is at least moderate degenerative change in the left hip. No fracture or dislocation is seen. There is no focal bone lesion.        IMPRESSION: Degenerative change without acute abnormality. LACIE JONES MD   SYSTEM ID:  [id number]      ASSESSMENT / PLAN:   (Z00.00) Routine general medical examination at a health care facility  (primary encounter diagnosis)  Comment: UTD with preventive cares  Plan: repeat wellness exam in one year     (F11.90) Chronic, continuous use of opioids / (M54.42,  G89.29) Chronic left-sided low back pain with left-sided sciatica  Comment: CSA discussed and signed today.  UTD with UDS.  [place] PDMP reviewed and appropriate   Plan: c/w tramadol qty 120 / month  Update MR Lumbar Spine w/o Contrast, Orthopedic  Referral to Dr. Resendez    (E78.2) Dyslipidemia  Comment: LDL (3/11/2022): 55  Plan: c/w atorvastatin 20mg    (I65.22) Stenosis of left carotid artery - US (8/24/2022) critical stenosis >70%  Comment: progressing. Follows with [facility] vascular. Imaging has not been resulted yet  Plan: recheck next week to ensure imaging has been resulted. C/w statin / ASA 81    (R39.15) Urinary urgency  Comment: Follows with Dr. Fischer. Failed trial of oxybutynin.   Plan: plans to proceed with a trial of botox    (M25.552) Hip pain, left  Comment: xray with moderate arthritis   Plan: XR Hip Left 2-3 Views (Clinic Performed),         Orthopedic  Referral, OA    (Z78.0) Post-menopausal  Comment: update dexa scan. Suspect progression. No h/o bone density tx  Plan: DX Hip/Pelvis/Spine        C/w calcium  / vitamin d    # SVT  - ziopatch (3/8/2022): 373 runs of SVT with longest 16 beats  - Echo (3/8/2022): EF 60 to 65%      COUNSELING:  Reviewed preventive health counseling, as reflected in patient instructions    Estimated body mass index is 22.51 kg/m  as calculated from the following:    Height as of 12/10/21: 1.524 m (5').    Weight as of this encounter: 52.3 kg (115 lb 4 oz).    Weight management plan noted, stable and monitoring    She reports that she quit smoking about 54 years ago. Her smoking use included cigarettes. She started smoking about 59 years ago. She has a 2.50 pack-year smoking history. She has never used smokeless tobacco.      Appropriate preventive services were discussed with this patient, including applicable screening as appropriate for cardiovascular disease, diabetes, osteopenia/osteoporosis, and glaucoma.  As appropriate for age/gender, discussed screening for colorectal cancer, prostate cancer, breast cancer, and cervical cancer. Checklist reviewing  preventive services available has been given to the patient.    Reviewed patients plan of care and provided an AVS. The Intermediate Care Plan ( asthma action plan, low back pain action plan, and migraine action plan) for Demi meets the Care Plan requirement. This Care Plan has been established and reviewed with the Patient.    Counseling Resources:  ATP IV Guidelines  Pooled Cohorts Equation Calculator  Breast Cancer Risk Calculator  Breast Cancer: Medication to Reduce Risk  FRAX Risk Assessment  ICSI Preventive Guidelines  Dietary Guidelines for Americans, 2010  [place]'s MyPlate  ASA Prophylaxis  Lung CA Screening    Fariba Brown MD  [facility] - [place]    Identified Health Risks:

## 2022-08-25 ENCOUNTER — TELEPHONE (OUTPATIENT)
Dept: FAMILY MEDICINE | Facility: OTHER | Age: 82
End: 2022-08-25

## 2022-08-25 NOTE — TELEPHONE ENCOUNTER
I called Demi for her reminder call and she asked that lab orders be entered, she is coming in fasting to take care of her labs prior to her appt.  Thank you!

## 2022-08-26 ENCOUNTER — OFFICE VISIT (OUTPATIENT)
Dept: FAMILY MEDICINE | Facility: OTHER | Age: 82
End: 2022-08-26
Attending: FAMILY MEDICINE
Payer: COMMERCIAL

## 2022-08-26 ENCOUNTER — ANCILLARY PROCEDURE (OUTPATIENT)
Dept: GENERAL RADIOLOGY | Facility: OTHER | Age: 82
End: 2022-08-26
Attending: FAMILY MEDICINE
Payer: COMMERCIAL

## 2022-08-26 VITALS
OXYGEN SATURATION: 98 % | HEART RATE: 70 BPM | BODY MASS INDEX: 22.51 KG/M2 | RESPIRATION RATE: 18 BRPM | TEMPERATURE: 97.9 F | DIASTOLIC BLOOD PRESSURE: 64 MMHG | SYSTOLIC BLOOD PRESSURE: 132 MMHG | WEIGHT: 115.25 LBS

## 2022-08-26 DIAGNOSIS — M54.42 CHRONIC LEFT-SIDED LOW BACK PAIN WITH LEFT-SIDED SCIATICA: ICD-10-CM

## 2022-08-26 DIAGNOSIS — Z78.0 POST-MENOPAUSAL: ICD-10-CM

## 2022-08-26 DIAGNOSIS — R39.15 URINARY URGENCY: ICD-10-CM

## 2022-08-26 DIAGNOSIS — G89.29 CHRONIC LEFT-SIDED LOW BACK PAIN WITH LEFT-SIDED SCIATICA: ICD-10-CM

## 2022-08-26 DIAGNOSIS — I65.22 STENOSIS OF LEFT CAROTID ARTERY: ICD-10-CM

## 2022-08-26 DIAGNOSIS — Z00.00 ROUTINE GENERAL MEDICAL EXAMINATION AT A HEALTH CARE FACILITY: Primary | ICD-10-CM

## 2022-08-26 DIAGNOSIS — F11.90 CHRONIC, CONTINUOUS USE OF OPIOIDS: ICD-10-CM

## 2022-08-26 DIAGNOSIS — E78.2 MIXED HYPERLIPIDEMIA: ICD-10-CM

## 2022-08-26 DIAGNOSIS — I47.10 SVT (SUPRAVENTRICULAR TACHYCARDIA) (H): ICD-10-CM

## 2022-08-26 DIAGNOSIS — M25.552 HIP PAIN, LEFT: ICD-10-CM

## 2022-08-26 PROCEDURE — G0463 HOSPITAL OUTPT CLINIC VISIT: HCPCS

## 2022-08-26 PROCEDURE — G0438 PPPS, INITIAL VISIT: HCPCS | Performed by: FAMILY MEDICINE

## 2022-08-26 PROCEDURE — 73502 X-RAY EXAM HIP UNI 2-3 VIEWS: CPT | Mod: TC

## 2022-08-26 PROCEDURE — G0463 HOSPITAL OUTPT CLINIC VISIT: HCPCS | Mod: 25

## 2022-08-26 PROCEDURE — 99214 OFFICE O/P EST MOD 30 MIN: CPT | Mod: 25 | Performed by: FAMILY MEDICINE

## 2022-08-26 ASSESSMENT — ENCOUNTER SYMPTOMS
ABDOMINAL PAIN: 0
SHORTNESS OF BREATH: 0
ARTHRALGIAS: 1
FEVER: 0
PALPITATIONS: 1
FREQUENCY: 1
CHILLS: 0

## 2022-08-26 ASSESSMENT — ACTIVITIES OF DAILY LIVING (ADL): CURRENT_FUNCTION: NO ASSISTANCE NEEDED

## 2022-08-26 NOTE — LETTER
Opioid / Opioid Plus Controlled Substance Agreement    This is an agreement between you and your provider about the safe and appropriate use of controlled substance/opioids prescribed by your care team. Controlled substances are medicines that can cause physical and mental dependence (abuse).    There are strict laws about having and using these medicines. We here at [facility] are committing to working with you in your efforts to get better. To support you in this work, we ll help you schedule regular office appointments for medicine refills. If we must cancel or change your appointment for any reason, we ll make sure you have enough medicine to last until your next appointment.     As a Provider, I will:    Listen carefully to your concerns and treat you with respect.     Recommend a treatment plan that I believe is in your best interest. This plan may involve therapies other than opioid pain medication.     Talk with you often about the possible benefits, and the risk of harm of any medicine that we prescribe for you.     Provide a plan on how to taper (discontinue or go off) using this medicine if the decision is made to stop its use.    As a Patient, I understand that opioid(s):     Are a controlled substance prescribed by my care team to help me function or work and manage my condition(s).     Are strong medicines and can cause serious side effects such as:    Drowsiness, which can seriously affect my driving ability    A lower breathing rate, enough to cause death    Harm to my thinking ability     Depression     Abuse of and addiction to this medicine    Need to be taken exactly as prescribed. Combining opioids with certain medicines or chemicals (such as illegal drugs, sedatives, sleeping pills, and benzodiazepines) can be dangerous or even fatal. If I stop opioids suddenly, I may have severe withdrawal symptoms.    Do not work for all types of pain nor for all patients. If they re not helpful, I may  be asked to stop them.    {Benzo / Stimulant (Optional):017722}    The risks, benefits and side effects of these medicine(s) were explained to me. I agree that:  1. I will take part in other treatments as advised by my care team. This may be psychiatry or counseling, physical therapy, behavioral therapy, group treatment or a referral to a specialist.     2. I will keep all my appointments. I understand that this is part of the monitoring of opioids. My care team may require an office visit for EVERY opioid/controlled substance refill. If I miss appointments or don t follow instructions, my care team may stop my medicine.    3. I will take my medicines as prescribed. I will not change the dose or schedule unless my care team tells me to. There will be no refills if I run out early.     4. I may be asked to come to the clinic and complete a urine drug test or complete a pill count at any time. If I don t give a urine sample or participate in a pill count, the care team may stop my medicine.    5. I will only receive prescriptions from this clinic for chronic pain. If I am treated by another provider for acute pain issues, I will tell them that I am taking opioid pain medication for chronic pain and that I have a treatment agreement with this provider. I will inform my [facility] care team within one business day if I am given a prescription for any pain medication by another healthcare provider. My [facility] care team can contact other providers and pharmacists about my use of any medicines.    6. It is up to me to make sure that I don t run out of my medicines on weekends or holidays. If my care team is willing to refill my opioid prescription without a visit, I must request refills only during office hours. Refills may take up to 3 business days to process. I will use one pharmacy to fill all my opioid and other controlled substance prescriptions. I will notify the clinic about any changes to my  insurance or medication availability.    7. I am responsible for my prescriptions. If the medicine/prescription is lost, stolen or destroyed, it will not be replaced. I also agree not to share controlled substance medicines with anyone.    8. I am aware I should not use any illegal or recreational drugs. I agree not to drink alcohol unless my care team says I can.       9. If I enroll in the [place] [facility] Cannabis program, I will tell my care team prior to my next refill.     10. I will tell my care team right away if I become pregnant, have a new medical problem treated outside of my regular clinic, or have a change in my medications.    11. I understand that this medicine can affect my thinking, judgment and reaction time. Alcohol and drugs affect the brain and body, which can affect the safety of my driving. Being under the influence of alcohol or drugs can affect my decision-making, behaviors, personal safety, and the safety of others. Driving while impaired (DWI) can occur if a person is driving, operating, or in physical control of a car, motorcycle, boat, snowmobile, ATV, motorbike, off-road vehicle, or any other motor vehicle ([place] Statute 169A.20). I understand the risk if I choose to drive or operate any vehicle or machinery.    I understand that if I do not follow any of the conditions above, my prescriptions or treatment may be stopped or changed.          Opioids  What You Need to Know    What are opioids?   Opioids are pain medicines that must be prescribed by a doctor. They are also known as narcotics.     Examples are:   1. morphine (MS Contin, Arina)  2. oxycodone (Oxycontin)  3. oxycodone and acetaminophen (Percocet)  4. hydrocodone and acetaminophen (Vicodin, Norco)   5. fentanyl patch (Duragesic)   6. hydromorphone (Dilaudid)   7. methadone  8. codeine (Tylenol #3)     What do opioids do well?   Opioids are best for severe short-term pain such as after a surgery or injury. They may work well  for cancer pain. They may help some people with long-lasting (chronic) pain.     What do opioids NOT do well?   Opioids never get rid of pain entirely, and they don t work well for most patients with chronic pain. Opioids don t reduce swelling, one of the causes of pain.                                    Other ways to manage chronic pain and improve function include:       Treat the health problem that may be causing pain    Anti-inflammation medicines, which reduce swelling and tenderness, such as ibuprofen (Advil, Motrin) or naproxen (Aleve)    Acetaminophen (Tylenol)    Antidepressants and anti-seizure medicines, especially for nerve pain    Topical treatments such as patches or creams    Injections or nerve blocks    Chiropractic or osteopathic treatment    Acupuncture, massage, deep breathing, meditation, visual imagery, aromatherapy    Use heat or ice at the pain site    Physical therapy     Exercise    Stop smoking    Take part in therapy       Risks and side effects     Talk to your doctor before you start or decide to keep taking opioids. Possible side effects include:      Lowering your breathing rate enough to cause death    Overdose, including death, especially if taking higher than prescribed doses    Worse depression symptoms; less pleasure in things you usually enjoy    Feeling tired or sluggish    Slower thoughts or cloudy thinking    Being more sensitive to pain over time; pain is harder to control    Trouble sleeping or restless sleep    Changes in hormone levels (for example, less testosterone)    Changes in sex drive or ability to have sex    Constipation    Unsafe driving    Itching and sweating    Dizziness    Nausea, throwing up and dry mouth    What else should I know about opioids?    Opioids may lead to dependence, tolerance, or addiction.      Dependence means that if you stop or reduce the medicine too quickly, you will have withdrawal symptoms. These include loose poop (diarrhea),  jitters, flu-like symptoms, nervousness and tremors. Dependence is not the same as addiction.                       Tolerance means needing higher doses over time to get the same effect. This may increase the chance of serious side effects.      Addiction is when people improperly use a substance that harms their body, their mind or their relations with others. Use of opiates can cause a relapse of addiction if you have a history of drug or alcohol abuse.      People who have used opioids for a long time may have a lower quality of life, worse depression, higher levels of pain and more visits to doctors.    You can overdose on opioids. Take these steps to lower your risk of overdose:    1. Recognize the signs:  Signs of overdose include decrease or loss of consciousness (blackout), slowed breathing, trouble waking up and blue lips. If someone is worried about overdose, they should call 911.    2. Talk to your doctor about Narcan (naloxone).   If you are at risk for overdose, you may be given a prescription for Narcan. This medicine very quickly reverses the effects of opioids.   If you overdose, a friend or family member can give you Narcan while waiting for the ambulance. They need to know the signs of overdose and how to give Narcan.     3. Don't use alcohol or street drugs.   Taking them with opioids can cause death.    4. Do not take any of these medicines unless your doctor says it s OK. Taking these with opioids can cause death:    Benzodiazepines, such as lorazepam (Ativan), alprazolam (Xanax) or diazepam (Valium)    Muscle relaxers, such as cyclobenzaprine (Flexeril)    Sleeping pills like zolpidem (Ambien)     Other opioids      How to keep you and other people safe while taking opioids:    1. Never share your opioids with others.  Opioid medicines are regulated by the Drug Enforcement Agency (MICHELLE). Selling or sharing medications is a criminal act.    2. Be sure to store opioids in a secure place, locked up  if possible. Young children can easily swallow them and overdose.    3. When you are traveling with your medicines, keep them in the original bottles. If you use a pill box, be sure you also carry a copy of your medicine list from your clinic or pharmacy.    4. Safe disposal of opioids    Most pharmacies have places to get rid of medicine, called disposal kiosks. Medicine disposal options are also available in every [facility]. Search your county and  medication disposal  to find more options. You can find more details at:  https://www.pca.[facility].[place]./living-green/managing-unwanted-medications     I agree that my provider, clinic care team, and pharmacy may work with any city, state or federal law enforcement agency that investigates the misuse, sale, or other diversion of my controlled medicine. I will allow my provider to discuss my care with, or share a copy of, this agreement with any other treating provider, pharmacy or emergency room where I receive care.    I have read this agreement and have asked questions about anything I did not understand.    _______________________________________________________  Patient Signature - Demi Narayan _____________________                   Date     _______________________________________________________  Provider Signature - Fariba Brown MD   _____________________                   Date     _______________________________________________________  Witness Signature (required if provider not present while patient signing)   _____________________                   Date

## 2022-08-26 NOTE — NURSING NOTE
Chief Complaint   Patient presents with     Physical       Initial /64   Pulse 70   Temp 97.9  F (36.6  C) (Tympanic)   Resp 18   Wt 52.3 kg (115 lb 4 oz)   SpO2 98%   BMI 22.51 kg/m   Estimated body mass index is 22.51 kg/m  as calculated from the following:    Height as of 12/10/21: 1.524 m (5').    Weight as of this encounter: 52.3 kg (115 lb 4 oz).  Medication Reconciliation: complete  Manuela Nelson LPN

## 2022-08-31 ENCOUNTER — OFFICE VISIT (OUTPATIENT)
Dept: CHIROPRACTIC MEDICINE | Facility: OTHER | Age: 82
End: 2022-08-31
Attending: CHIROPRACTOR
Payer: COMMERCIAL

## 2022-08-31 DIAGNOSIS — M99.03 SEGMENTAL AND SOMATIC DYSFUNCTION OF LUMBAR REGION: Primary | ICD-10-CM

## 2022-08-31 DIAGNOSIS — M54.50 ACUTE BILATERAL LOW BACK PAIN WITHOUT SCIATICA: ICD-10-CM

## 2022-08-31 DIAGNOSIS — M99.02 SEGMENTAL AND SOMATIC DYSFUNCTION OF THORACIC REGION: ICD-10-CM

## 2022-08-31 PROCEDURE — 98940 CHIROPRACT MANJ 1-2 REGIONS: CPT | Mod: AT | Performed by: CHIROPRACTOR

## 2022-09-02 DIAGNOSIS — S33.8XXD SPRAIN OF OTHER PARTS OF LUMBAR SPINE AND PELVIS, SUBSEQUENT ENCOUNTER: ICD-10-CM

## 2022-09-02 RX ORDER — TRAMADOL HYDROCHLORIDE 50 MG/1
TABLET ORAL
Qty: 120 TABLET | Refills: 0 | Status: SHIPPED | OUTPATIENT
Start: 2022-09-02 | End: 2022-10-03

## 2022-09-02 NOTE — TELEPHONE ENCOUNTER
Tramadol      Last Written Prescription Date:  6/3/22  Last Fill Quantity: 120,   # refills: 2  Last Office Visit: 8/26/22  Future Office visit:    Next 5 appointments (look out 90 days)    Sep 21, 2022 11:00 AM  Return Visit with Curt Encarnacion DC  [facility] [place] ([facility] ) [address] [place] [address]  [place] [address]  [phone]           Routing refill request to provider for review/approval because:

## 2022-09-08 ENCOUNTER — TELEPHONE (OUTPATIENT)
Dept: FAMILY MEDICINE | Facility: OTHER | Age: 82
End: 2022-09-08

## 2022-09-12 ENCOUNTER — TELEPHONE (OUTPATIENT)
Dept: FAMILY MEDICINE | Facility: OTHER | Age: 82
End: 2022-09-12

## 2022-09-12 NOTE — TELEPHONE ENCOUNTER
Called patient, no answer, DENEEN.   Will need more clarification. Does she have an appointment with Dr Springer?

## 2022-09-12 NOTE — TELEPHONE ENCOUNTER
10:20 AM    Reason for Call: Phone Call    Description: pt wants to know if Dr Brown can call and see if an apt for Dr Springer from [facility] is set up and she needs lab work done before wed for dr springer. Pt stated someone from [facility] was suppose to call and hasn't heard from anyone. Pt will be gone from 1-5    Was an appointment offered for this call? No  If yes : Appointment type              Date    Preferred method for responding to this message: Telephone Call  What is your phone number ?[phone]    If we cannot reach you directly, may we leave a detailed response at the number you provided? No    Can this message wait until your PCP/provider returns, if available today? No    Eugenia Gates

## 2022-09-19 ENCOUNTER — IMMUNIZATION (OUTPATIENT)
Dept: FAMILY MEDICINE | Facility: OTHER | Age: 82
End: 2022-09-19
Attending: FAMILY MEDICINE
Payer: COMMERCIAL

## 2022-09-19 DIAGNOSIS — Z23 IMMUNIZATION DUE: Primary | ICD-10-CM

## 2022-09-19 PROCEDURE — 91312 COVID-19,PF,PFIZER BOOSTER BIVALENT: CPT

## 2022-09-19 PROCEDURE — 0124A COVID-19,PF,PFIZER BOOSTER BIVALENT: CPT

## 2022-09-22 ENCOUNTER — TRANSFERRED RECORDS (OUTPATIENT)
Dept: HEALTH INFORMATION MANAGEMENT | Facility: CLINIC | Age: 82
End: 2022-09-22

## 2022-09-22 ENCOUNTER — MEDICAL CORRESPONDENCE (OUTPATIENT)
Dept: MRI IMAGING | Facility: HOSPITAL | Age: 82
End: 2022-09-22

## 2022-09-22 ENCOUNTER — OFFICE VISIT (OUTPATIENT)
Dept: CHIROPRACTIC MEDICINE | Facility: OTHER | Age: 82
End: 2022-09-22
Attending: CHIROPRACTOR
Payer: COMMERCIAL

## 2022-09-22 DIAGNOSIS — M99.02 SEGMENTAL AND SOMATIC DYSFUNCTION OF THORACIC REGION: ICD-10-CM

## 2022-09-22 DIAGNOSIS — M99.03 SEGMENTAL AND SOMATIC DYSFUNCTION OF LUMBAR REGION: Primary | ICD-10-CM

## 2022-09-22 DIAGNOSIS — M54.50 ACUTE BILATERAL LOW BACK PAIN WITHOUT SCIATICA: ICD-10-CM

## 2022-09-22 PROCEDURE — 98940 CHIROPRACT MANJ 1-2 REGIONS: CPT | Mod: AT | Performed by: CHIROPRACTOR

## 2022-09-30 ENCOUNTER — HOSPITAL ENCOUNTER (OUTPATIENT)
Dept: MRI IMAGING | Facility: HOSPITAL | Age: 82
Discharge: HOME | End: 2022-09-30
Attending: ORTHOPAEDIC SURGERY | Admitting: ORTHOPAEDIC SURGERY
Payer: COMMERCIAL

## 2022-09-30 DIAGNOSIS — M79.606 LEG PAIN: ICD-10-CM

## 2022-09-30 DIAGNOSIS — M48.061 LUMBAR STENOSIS: ICD-10-CM

## 2022-09-30 DIAGNOSIS — M54.50 LOW BACK PAIN: ICD-10-CM

## 2022-09-30 PROCEDURE — 72148 MRI LUMBAR SPINE W/O DYE: CPT

## 2022-10-01 DIAGNOSIS — S33.8XXD SPRAIN OF OTHER PARTS OF LUMBAR SPINE AND PELVIS, SUBSEQUENT ENCOUNTER: ICD-10-CM

## 2022-10-03 RX ORDER — TRAMADOL HYDROCHLORIDE 50 MG/1
TABLET ORAL
Qty: 120 TABLET | Refills: 0 | Status: SHIPPED | OUTPATIENT
Start: 2022-10-03 | End: 2022-11-03

## 2022-10-03 NOTE — TELEPHONE ENCOUNTER
traMADol      Last Written Prescription Date:  9/2/22  Last Fill Quantity: 120,   # refills: 0  Last Office Visit: 9/23/22  Future Office visit:    Next 5 appointments (look out 90 days)    Oct 11, 2022 11:10 AM  Return Visit with Curt Encarnacion DC  [facility] [place] ([facility] ) [address] [place] [address]  [place] [address]  [phone]           Routing refill request to provider for review/approval because:

## 2022-10-11 ENCOUNTER — OFFICE VISIT (OUTPATIENT)
Dept: CHIROPRACTIC MEDICINE | Facility: OTHER | Age: 82
End: 2022-10-11
Attending: CHIROPRACTOR
Payer: COMMERCIAL

## 2022-10-11 DIAGNOSIS — M99.03 SEGMENTAL AND SOMATIC DYSFUNCTION OF LUMBAR REGION: Primary | ICD-10-CM

## 2022-10-11 DIAGNOSIS — M99.02 SEGMENTAL AND SOMATIC DYSFUNCTION OF THORACIC REGION: ICD-10-CM

## 2022-10-11 DIAGNOSIS — M54.50 ACUTE BILATERAL LOW BACK PAIN WITHOUT SCIATICA: ICD-10-CM

## 2022-10-11 PROCEDURE — 98940 CHIROPRACT MANJ 1-2 REGIONS: CPT | Mod: AT | Performed by: CHIROPRACTOR

## 2022-10-13 NOTE — PATIENT INSTRUCTIONS
Preparing for Your Surgery  Getting started  A nurse will call you to review your health history and instructions. They will give you an arrival time based on your scheduled surgery time. Please be ready to share:    Your doctor's clinic name and phone number    Your medical, surgical and anesthesia history    A list of allergies and sensitivities    A list of medicines, including herbal treatments and over-the-counter drugs    Whether the patient has a legal guardian (ask how to send us the papers in advance)  Please tell us if you're pregnant--or if there's any chance you might be pregnant. Some surgeries may injure a fetus (unborn baby), so they require a pregnancy test. Surgeries that are safe for a fetus don't always need a test, and you can choose whether to have one.   If you have a child who's having surgery, please ask for a copy of Preparing for Your Child's Surgery.    Preparing for surgery    Within 10 to 30 days of surgery: Have a pre-op exam (sometimes called an H&P, or History and Physical). This can be done at a clinic or pre-operative center.  ? If you're having a , you may not need this exam. Talk to your care team.    At your pre-op exam, talk to your care team about all medicines you take. If you need to stop any medicines before surgery, ask when to start taking them again.  ? We do this for your safety. Many medicines can make you bleed too much during surgery. Some change how well surgery (anesthesia) drugs work.    Call your insurance company to let them know you're having surgery. (If you don't have insurance, call [phone].)    Call your clinic if there's any change in your health. This includes signs of a cold or flu (sore throat, runny nose, cough, rash, fever). It also includes a scrape or scratch near the surgery site.    If you have questions on the day of surgery, call your hospital or surgery center.  COVID testing  You may need to be tested for COVID-19 before having  surgery. If so, we will give you instructions (or click here).  Eating and drinking guidelines  For your safety: Unless your surgeon tells you otherwise, follow the guidelines below.    Eat and drink as usual until 8 hours before surgery. After that, no food or milk.    Drink clear liquids until 2 hours before surgery. These are liquids you can see through, like water, Gatorade and Propel Water. You may also have black coffee and tea (no cream or milk).    Nothing by mouth within 2 hours of surgery. This includes gum, candy and breath mints.    If you drink alcohol: Stop drinking it the night before surgery.    If your care team tells you to take medicine on the morning of surgery, it's okay to take it with a sip of water.  Preventing infection    Shower or bathe the night before and morning of your surgery. Follow the instructions your clinic gave you. (If no instructions, use regular soap.)    Don't shave or clip hair near your surgery site. We'll remove the hair if needed.    Don't smoke or vape the morning of surgery. You may chew nicotine gum up to 2 hours before surgery. A nicotine patch is okay.  ? Note: Some surgeries require you to completely quit smoking and nicotine. Check with your surgeon.    Your care team will make every effort to keep you safe from infection. We will:  ? Clean our hands often with soap and water (or an alcohol-based hand rub).  ? Clean the skin at your surgery site with a special soap that kills germs.  ? Give you a special gown to keep you warm. (Cold raises the risk of infection.)  ? Wear special hair covers, masks, gowns and gloves during surgery.  ? Give antibiotic medicine, if prescribed. Not all surgeries need antibiotics.  What to bring on the day of surgery    Photo ID and insurance card    Copy of your health care directive, if you have one    Glasses and hearing aides (bring cases)  ? You can't wear contacts during surgery    Inhaler and eye drops, if you use them (tell us  about these when you arrive)    CPAP machine or breathing device, if you use them    A few personal items, if spending the night    If you have . . .  ? A pacemaker, ICD (cardiac defibrillator) or other implant: Bring the ID card.  ? An implanted stimulator: Bring the remote control.  ? A legal guardian: Bring a copy of the certified (court-stamped) guardianship papers.  Please remove any jewelry, including body piercings. Leave jewelry and other valuables at home.  If you're going home the day of surgery    You must have a responsible adult drive you home. They should stay with you overnight as well.    If you don't have someone to stay with you, and you aren't safe to go home alone, we may keep you overnight. Insurance often won't pay for this.  After surgery  If it's hard to control your pain or you need more pain medicine, please call your surgeon's office.  Questions?   If you have any questions for your care team, list them here: _________________________________________________________________________________________________________________________________________________________________________ ____________________________________ ____________________________________ ____________________________________  For informational purposes only. Not to replace the advice of your health care provider. Copyright   2003, 2019 [facility]. All rights reserved. Clinically reviewed by Joyce Montanez MD. [employer] [id number] - REV 07/22.

## 2022-10-13 NOTE — PROGRESS NOTES
[facility] [place]  [address]  [place] [address]  Phone: [phone]  Primary Provider: Fariba Brown  Pre-op Performing Provider: SCOTT PALOMO      PREOPERATIVE EVALUATION:  Today's date: 10/14/2022    Demi Narayan is a 82 year old female who presents for a preoperative evaluation.    Surgical Information:  Surgery/Procedure: L carotoid endartectomy  Surgery Location: [facility] [place]   Surgeon: Dr Springer  Surgery Date: 10/17/2022   Time of Surgery: 800 am  Where patient plans to recover: At home with family  Fax number for surgical facility:[phone]    Type of Anesthesia Anticipated: to be determined    Assessment & Plan     The proposed surgical procedure is considered INTERMEDIATE risk.    Preop general physical exam  - EKG 12-lead complete w/read - (Clinic Performed)  - Comprehensive metabolic panel (BMP + Alb, Alk Phos, ALT, AST, Total. Bili, TP)  - CBC with platelets and differential  - Asymptomatic COVID-19 Virus (Coronavirus) by PCR Nose    Stenosis of left carotid artery - >70% left. no sx recommended. Repeat US (6/2022)  Chronic, continuous use of opioids  GERD (gastroesophageal reflux)  Dyslipidemia      Risks and Recommendations:  The patient has the following additional risks and recommendations for perioperative complications:   - No identified additional risk factors other than previously addressed    Medication Instructions:  Patient is to take all scheduled medications on the day of surgery    RECOMMENDATION:  APPROVAL GIVEN to proceed with proposed procedure, without further diagnostic evaluation.      Subjective     HPI related to upcoming procedure: preop left carotid endarterectomy.  She reports a couple years ago her PCP heard a bruit, and with serial monitoring it was determined time to have surgery.  Denies previous CV events.      Preop Questions 10/14/2022   1. Have you ever had a heart attack or stroke? No   2. Have you ever had surgery on your heart or  blood vessels, such as a stent placement, a coronary artery bypass, or surgery on an artery in your head, neck, heart, or legs? No   3. Do you have chest pain with activity? No   4. Do you have a history of  heart failure? No   5. Do you currently have a cold, bronchitis or symptoms of other infection? No   6. Do you have a cough, shortness of breath, or wheezing? No   7. Do you or anyone in your family have previous history of blood clots? No   8. Do you or does anyone in your family have a serious bleeding problem such as prolonged bleeding following surgeries or cuts? No   9. Have you ever had problems with anemia or been told to take iron pills? YES - in teens   10. Have you had any abnormal blood loss such as black, tarry or bloody stools, or abnormal vaginal bleeding? No   11. Have you ever had a blood transfusion? No   12. Are you willing to have a blood transfusion if it is medically needed before, during, or after your surgery? Yes   13. Have you or any of your relatives ever had problems with anesthesia? No   14. Do you have sleep apnea, excessive snoring or daytime drowsiness? No   15. Do you have any artifical heart valves or other implanted medical devices like a pacemaker, defibrillator, or continuous glucose monitor? No   16. Do you have artificial joints? No   17. Are you allergic to latex? No     Health Care Directive:  Patient does not have a Health Care Directive or Living Will: Discussed advance care planning with patient; however, patient declined at this time.    Preoperative Review of :   reviewed - controlled substances reflected in medication list.    Status of Chronic Conditions:  Carotid Stenosis  Chart Dx history of SVT, patient unclear on this, not on any HR lowering meds.  HLP  Tramadol for back pain.  GERD/hiatal hernia, controlled with PPI.  Estrace for vaginal atrophy    Review of Systems   Constitutional: Negative for fever.   Respiratory: Negative for shortness of breath.     Cardiovascular: Negative for chest pain, palpitations and leg swelling.   Gastrointestinal: Negative for abdominal pain.   Neurological: Negative for light-headedness and headaches.         Patient Active Problem List    Diagnosis Date Noted     Urinary urgency 08/26/2022     Priority: Medium     SVT (supraventricular tachycardia) (H) 08/26/2022     Priority: Medium     Stenosis of left carotid artery - >70% left. no sx recommended. Repeat US (6/2022) 02/22/2022     Priority: Medium     Lyme disease 09/11/2017     Priority: Medium     Chronic, continuous use of opioids 07/03/2017     Priority: Medium     Patient is followed by Virgil Mackay MD for ongoing prescription of pain medication.  All refills should only be approved by this provider, or covering partner.    Medication(s): Ultram 50mg.   Maximum quantity per month: #120  Clinic visit frequency required: Q 3 months     Controlled substance agreement:  Encounter-Level CSA - 06/30/2017:          Controlled Substance Agreement - Scan on 7/5/2017  4:11 PM : CONTROLLED SUBSTANCE AGREEMENT (below)              Pain Clinic evaluation in the past: No    DIRE Total Score(s):  No flowsheet data found.    Last [facility] website verification:  Done on 7.31.18   [email or web address]         Scoliosis, throracolumbar 02/05/2017     Priority: Medium     ACP (advance care planning) 08/05/2016     Priority: Medium     Advance Care Planning 8/5/2016: ACP Review of Chart / Resources Provided:  Reviewed chart for advance care plan.  Demi ANKIT Narayan has no plan or code status on file. Discussed available resources and provided with information. Confirmed code status reflects current choices pending further ACP discussions.  Confirmed/documented legally designated decision makers.  Added by Ava Padgett             Diaphragmatic hernia 04/25/2016     Priority: Medium     Post-menopause 11/09/2015     Priority: Medium     Comprehensive Medical Examination 05/08/2015      Priority: Medium     Osteoarthritis, multiple joints      Priority: Medium     GERD (gastroesophageal reflux) 02/10/2012     Priority: Medium     Fibrocystic breast disease (FCBD) 07/12/2011     Priority: Medium     Low back pain, chronic 07/12/2011     Priority: Medium     Dyslipidemia 06/20/2006     Priority: Medium     H/O diverticulitis, S/P bowel resection 02/06/2002     Priority: Medium               Past Medical History:   Diagnosis Date     Chronic/recurrent back pain 7/12/2011     Diverticulitis      Diverticulitis, recurrent 2/6/2002    bowel resection     Dyslipidemia 6/20/2006     Fibrocystic breast disease 7/12/2011     Gastro-oesophageal reflux disease      GERD 2/10/2012     Hiatal hernia 2/10/2012     Hormone replacement therapy, postmenopausal 7/12/2011     Osteoarthritis      Past Surgical History:   Procedure Laterality Date     ------------OTHER-------------      colonoscopy with biopsy - diverticulitis, hemorrhoids     ------------OTHER-------------  4/19/1994    sigmoidoscopy - abdominal pain, diverticula     ABDOMEN SURGERY      colon removed 2nd to diverticulitis     APPENDECTOMY       ARTHROSCOPY SHOULDER  11/20/2013    Procedure: ARTHROSCOPY SHOULDER;  Right Shoulder Arthroscopy Sub-Acromial Decompression Rotator Cuff Repair;  Surgeon: Lenny Trammell MD;  Location: [facility]     [facility]     Diverticulitis      bowel resection     EGD with biopsy  2011     ENDOSCOPY UPPER WITH PANCREATIC STIMULATION       ENDOSCOPY UPPER, COLONOSCOPY, COMBINED  7/18/2014    Procedure: COMBINED ENDOSCOPY UPPER, COLONOSCOPY;  Surgeon: Israel Feliciano MD;  Location: [facility] OR     ENT SURGERY      tonsilectomy     ESOPHAGOSCOPY, GASTROSCOPY, DUODENOSCOPY (EGD), COMBINED N/A 9/27/2017    Procedure: COMBINED ESOPHAGOSCOPY, GASTROSCOPY, DUODENOSCOPY (EGD);  UPPER ENDOSCOPY WITH BIOPSY AND POLYPECTOMY;  Surgeon: Gallito Alvarado DO;  Location: [facility] OR      GYN SURGERY      hysterectomy with bladder and rectal repair     IR CONSULTATION FOR IR EXAM  3/11/2019     ORTHOPEDIC SURGERY  2013    right rotator cuff surgery     TVH with ovarian preservation       Current Outpatient Medications   Medication Sig Dispense Refill     aspirin 81 MG EC tablet Take 81 mg by mouth daily       atorvastatin (LIPITOR) 20 MG tablet Take 1 tablet by mouth once daily 90 tablet 2     Calcium Carbonate-Vitamin D (CALCIUM + D PO) Take 600 mg by mouth.       estradiol (ESTRACE) 0.5 MG tablet Take 1 tablet by mouth once daily 90 tablet 1     GLUCOSAMINE SULFATE PO Take  by mouth daily.       latanoprost (XALATAN) 0.005 % ophthalmic solution Inject 1 drop into the eye At Bedtime       Multiple Vitamins-Minerals (MULTIVITAL PO) Take 1 tablet by mouth daily.       Omega-3 Fatty Acids (OMEGA-3 FISH OIL PO) Take  by mouth daily.       pantoprazole (PROTONIX) 40 MG EC tablet TAKE 1 TABLET BY MOUTH IN THE MORNING BEFORE BREAKFAST 90 tablet 2     traMADol (ULTRAM) 50 MG tablet TAKE 1 TO 2 TABLETS BY MOUTH EVERY 6 TO 8 HOURS AS NEEDED 120 tablet 0     Wheat Dextrin (BENEFIBER) POWD Take by mouth daily         No Known Allergies     Social History     Tobacco Use     Smoking status: Former     Packs/day: 0.50     Years: 5.00     Pack years: 2.50     Types: Cigarettes     Start date: 1963     Quit date: 1968     Years since quittin.4     Smokeless tobacco: Never   Substance Use Topics     Alcohol use: Yes     Alcohol/week: 0.8 standard drinks     Types: 1 Glasses of wine per week     Comment: daily with dinner       History   Drug Use No         Objective     /64   Pulse 82   Temp 97.5  F (36.4  C) (Tympanic)   Resp 18   Wt 51.3 kg (113 lb)   SpO2 98%   BMI 22.07 kg/m      Physical Exam  Constitutional:       General: She is not in acute distress.     Appearance: Normal appearance.   HENT:      Head: Normocephalic and atraumatic.      Right Ear: Tympanic membrane and  external ear normal.      Left Ear: Tympanic membrane and external ear normal.      Mouth/Throat:      Pharynx: No oropharyngeal exudate or posterior oropharyngeal erythema.   Eyes:      Conjunctiva/sclera: Conjunctivae normal.      Pupils: Pupils are equal, round, and reactive to light.   Neck:      Comments: Obvious carotid bruit on left, questionable slight bruit on right  Cardiovascular:      Rate and Rhythm: Normal rate and regular rhythm.      Heart sounds: Normal heart sounds. No murmur heard.  Pulmonary:      Effort: Pulmonary effort is normal.      Breath sounds: Normal breath sounds. No wheezing.   Abdominal:      General: Bowel sounds are normal.      Palpations: Abdomen is soft.      Tenderness: There is no abdominal tenderness.   Musculoskeletal:      Cervical back: Normal range of motion.      Right lower leg: No edema.      Left lower leg: No edema.   Lymphadenopathy:      Cervical: No cervical adenopathy.   Neurological:      Mental Status: She is alert and oriented to person, place, and time.   Psychiatric:         Mood and Affect: Mood normal.         Thought Content: Thought content normal.         Judgment: Judgment normal.           Diagnostics:  Recent Results (from the past 24 hour(s))   Comprehensive metabolic panel (BMP + Alb, Alk Phos, ALT, AST, Total. Bili, TP)    Collection Time: 10/14/22  8:29 AM   Result Value Ref Range    Sodium 139 136 - 145 mmol/L    Potassium 3.9 3.4 - 5.3 mmol/L    Chloride 102 98 - 107 mmol/L    Carbon Dioxide (CO2) 31 (H) 22 - 29 mmol/L    Anion Gap 6 (L) 7 - 15 mmol/L    Urea Nitrogen 12.3 8.0 - 23.0 mg/dL    Creatinine 0.86 0.51 - 0.95 mg/dL    Calcium 9.4 8.8 - 10.2 mg/dL    Glucose 58 (L) 70 - 99 mg/dL    Alkaline Phosphatase 56 35 - 104 U/L    AST 35 10 - 35 U/L    ALT 26 10 - 35 U/L    Protein Total 7.0 6.4 - 8.3 g/dL    Albumin 4.3 3.5 - 5.2 g/dL    Bilirubin Total 0.5 <=1.2 mg/dL    GFR Estimate 67 >60 mL/min/1.73m2   CBC with platelets and differential     Collection Time: 10/14/22  8:29 AM   Result Value Ref Range    WBC Count 5.5 4.0 - 11.0 10e3/uL    RBC Count 5.09 3.80 - 5.20 10e6/uL    Hemoglobin 14.9 11.7 - 15.7 g/dL    Hematocrit 44.8 35.0 - 47.0 %    MCV 88 78 - 100 fL    MCH 29.3 26.5 - 33.0 pg    MCHC 33.3 31.5 - 36.5 g/dL    RDW 13.4 10.0 - 15.0 %    Platelet Count 246 150 - 450 10e3/uL    % Neutrophils 44 %    % Lymphocytes 46 %    % Monocytes 8 %    % Eosinophils 2 %    % Basophils 0 %    % Immature Granulocytes 0 %    NRBCs per 100 WBC 0 <1 /100    Absolute Neutrophils 2.4 1.6 - 8.3 10e3/uL    Absolute Lymphocytes 2.5 0.8 - 5.3 10e3/uL    Absolute Monocytes 0.4 0.0 - 1.3 10e3/uL    Absolute Eosinophils 0.1 0.0 - 0.7 10e3/uL    Absolute Basophils 0.0 0.0 - 0.2 10e3/uL    Absolute Immature Granulocytes 0.0 <=0.4 10e3/uL    Absolute NRBCs 0.0 10e3/uL   Asymptomatic COVID-19 Virus (Coronavirus) by PCR Nose    Collection Time: 10/14/22  8:33 AM    Specimen: Nose; Swab   Result Value Ref Range    SARS CoV2 PCR Negative Negative        EKG NSR possible LAE rate 67 QTc 403    Revised Cardiac Risk Index (RCRI):  The patient has the following serious cardiovascular risks for perioperative complications:   - No serious cardiac risks = 0 points     RCRI Interpretation: 0 points: Class I (very low risk - 0.4% complication rate)           Signed Electronically by: SCOTT PALOMO DO  Copy of this evaluation report is provided to requesting physician.

## 2022-10-14 ENCOUNTER — OFFICE VISIT (OUTPATIENT)
Dept: FAMILY MEDICINE | Facility: OTHER | Age: 82
End: 2022-10-14
Attending: FAMILY MEDICINE
Payer: COMMERCIAL

## 2022-10-14 ENCOUNTER — TELEPHONE (OUTPATIENT)
Dept: FAMILY MEDICINE | Facility: OTHER | Age: 82
End: 2022-10-14

## 2022-10-14 VITALS
OXYGEN SATURATION: 98 % | TEMPERATURE: 97.5 F | RESPIRATION RATE: 18 BRPM | DIASTOLIC BLOOD PRESSURE: 64 MMHG | BODY MASS INDEX: 22.07 KG/M2 | WEIGHT: 113 LBS | HEART RATE: 82 BPM | SYSTOLIC BLOOD PRESSURE: 114 MMHG

## 2022-10-14 DIAGNOSIS — F11.90 CHRONIC, CONTINUOUS USE OF OPIOIDS: ICD-10-CM

## 2022-10-14 DIAGNOSIS — E78.2 MIXED HYPERLIPIDEMIA: ICD-10-CM

## 2022-10-14 DIAGNOSIS — K21.9 GASTROESOPHAGEAL REFLUX DISEASE WITHOUT ESOPHAGITIS: ICD-10-CM

## 2022-10-14 DIAGNOSIS — I65.22 STENOSIS OF LEFT CAROTID ARTERY: ICD-10-CM

## 2022-10-14 DIAGNOSIS — Z01.818 PREOP GENERAL PHYSICAL EXAM: Primary | ICD-10-CM

## 2022-10-14 LAB
ALBUMIN SERPL BCG-MCNC: 4.3 G/DL (ref 3.5–5.2)
ALP SERPL-CCNC: 56 U/L (ref 35–104)
ALT SERPL W P-5'-P-CCNC: 26 U/L (ref 10–35)
ANION GAP SERPL CALCULATED.3IONS-SCNC: 6 MMOL/L (ref 7–15)
AST SERPL W P-5'-P-CCNC: 35 U/L (ref 10–35)
BASOPHILS # BLD AUTO: 0 10E3/UL (ref 0–0.2)
BASOPHILS NFR BLD AUTO: 0 %
BILIRUB SERPL-MCNC: 0.5 MG/DL
BUN SERPL-MCNC: 12.3 MG/DL (ref 8–23)
CALCIUM SERPL-MCNC: 9.4 MG/DL (ref 8.8–10.2)
CHLORIDE SERPL-SCNC: 102 MMOL/L (ref 98–107)
CREAT SERPL-MCNC: 0.86 MG/DL (ref 0.51–0.95)
DEPRECATED HCO3 PLAS-SCNC: 31 MMOL/L (ref 22–29)
EOSINOPHIL # BLD AUTO: 0.1 10E3/UL (ref 0–0.7)
EOSINOPHIL NFR BLD AUTO: 2 %
ERYTHROCYTE [DISTWIDTH] IN BLOOD BY AUTOMATED COUNT: 13.4 % (ref 10–15)
GFR SERPL CREATININE-BSD FRML MDRD: 67 ML/MIN/1.73M2
GLUCOSE SERPL-MCNC: 58 MG/DL (ref 70–99)
HCT VFR BLD AUTO: 44.8 % (ref 35–47)
HGB BLD-MCNC: 14.9 G/DL (ref 11.7–15.7)
IMM GRANULOCYTES # BLD: 0 10E3/UL
IMM GRANULOCYTES NFR BLD: 0 %
LYMPHOCYTES # BLD AUTO: 2.5 10E3/UL (ref 0.8–5.3)
LYMPHOCYTES NFR BLD AUTO: 46 %
MCH RBC QN AUTO: 29.3 PG (ref 26.5–33)
MCHC RBC AUTO-ENTMCNC: 33.3 G/DL (ref 31.5–36.5)
MCV RBC AUTO: 88 FL (ref 78–100)
MONOCYTES # BLD AUTO: 0.4 10E3/UL (ref 0–1.3)
MONOCYTES NFR BLD AUTO: 8 %
NEUTROPHILS # BLD AUTO: 2.4 10E3/UL (ref 1.6–8.3)
NEUTROPHILS NFR BLD AUTO: 44 %
NRBC # BLD AUTO: 0 10E3/UL
NRBC BLD AUTO-RTO: 0 /100
PLATELET # BLD AUTO: 246 10E3/UL (ref 150–450)
POTASSIUM SERPL-SCNC: 3.9 MMOL/L (ref 3.4–5.3)
PROT SERPL-MCNC: 7 G/DL (ref 6.4–8.3)
RBC # BLD AUTO: 5.09 10E6/UL (ref 3.8–5.2)
SARS-COV-2 RNA RESP QL NAA+PROBE: NEGATIVE
SODIUM SERPL-SCNC: 139 MMOL/L (ref 136–145)
WBC # BLD AUTO: 5.5 10E3/UL (ref 4–11)

## 2022-10-14 PROCEDURE — 93005 ELECTROCARDIOGRAM TRACING: CPT | Performed by: FAMILY MEDICINE

## 2022-10-14 PROCEDURE — U0005 INFEC AGEN DETEC AMPLI PROBE: HCPCS | Mod: ZL | Performed by: FAMILY MEDICINE

## 2022-10-14 PROCEDURE — 93010 ELECTROCARDIOGRAM REPORT: CPT | Mod: 77 | Performed by: INTERNAL MEDICINE

## 2022-10-14 PROCEDURE — 36415 COLL VENOUS BLD VENIPUNCTURE: CPT | Mod: ZL | Performed by: FAMILY MEDICINE

## 2022-10-14 PROCEDURE — 99214 OFFICE O/P EST MOD 30 MIN: CPT | Mod: 25 | Performed by: FAMILY MEDICINE

## 2022-10-14 PROCEDURE — 85025 COMPLETE CBC W/AUTO DIFF WBC: CPT | Mod: ZL | Performed by: FAMILY MEDICINE

## 2022-10-14 PROCEDURE — 80053 COMPREHEN METABOLIC PANEL: CPT | Mod: ZL | Performed by: FAMILY MEDICINE

## 2022-10-14 PROCEDURE — G0463 HOSPITAL OUTPT CLINIC VISIT: HCPCS | Mod: 25

## 2022-10-14 RX ORDER — LATANOPROST 50 UG/ML
1 SOLUTION/ DROPS OPHTHALMIC AT BEDTIME
COMMUNITY

## 2022-10-14 RX ORDER — ATORVASTATIN CALCIUM 20 MG/1
20 TABLET, FILM COATED ORAL
COMMUNITY
Start: 2022-07-26 | End: 2022-10-14

## 2022-10-14 RX ORDER — ESTRADIOL 0.5 MG/1
1 TABLET ORAL DAILY
COMMUNITY
Start: 2021-09-21 | End: 2022-10-14

## 2022-10-14 RX ORDER — PANTOPRAZOLE SODIUM 40 MG/1
TABLET, DELAYED RELEASE ORAL
COMMUNITY
Start: 2022-09-22 | End: 2022-10-14

## 2022-10-14 ASSESSMENT — ENCOUNTER SYMPTOMS
SHORTNESS OF BREATH: 0
ABDOMINAL PAIN: 0
PALPITATIONS: 0
HEADACHES: 0
FEVER: 0
LIGHT-HEADEDNESS: 0

## 2022-10-14 ASSESSMENT — PAIN SCALES - GENERAL: PAINLEVEL: MODERATE PAIN (4)

## 2022-10-14 NOTE — NURSING NOTE
Chief Complaint   Patient presents with     Pre-Op Exam     Left Carotid Endartectomy       Initial /64   Pulse 82   Temp 97.5  F (36.4  C) (Tympanic)   Resp 18   Wt 51.3 kg (113 lb)   SpO2 98%   BMI 22.07 kg/m   Estimated body mass index is 22.07 kg/m  as calculated from the following:    Height as of 12/10/21: 1.524 m (5').    Weight as of this encounter: 51.3 kg (113 lb).  Medication Reconciliation: complete  Moose Seymour

## 2022-10-26 ENCOUNTER — HOSPITAL ENCOUNTER (OUTPATIENT)
Dept: BONE DENSITY | Facility: HOSPITAL | Age: 82
Discharge: HOME | End: 2022-10-26
Attending: FAMILY MEDICINE | Admitting: FAMILY MEDICINE
Payer: COMMERCIAL

## 2022-10-26 DIAGNOSIS — Z78.0 POST-MENOPAUSAL: ICD-10-CM

## 2022-10-26 PROCEDURE — 77080 DXA BONE DENSITY AXIAL: CPT

## 2022-11-01 ENCOUNTER — OFFICE VISIT (OUTPATIENT)
Dept: CHIROPRACTIC MEDICINE | Facility: OTHER | Age: 82
End: 2022-11-01
Attending: CHIROPRACTOR
Payer: COMMERCIAL

## 2022-11-01 DIAGNOSIS — M54.50 ACUTE BILATERAL LOW BACK PAIN WITHOUT SCIATICA: ICD-10-CM

## 2022-11-01 DIAGNOSIS — M99.03 SEGMENTAL AND SOMATIC DYSFUNCTION OF LUMBAR REGION: Primary | ICD-10-CM

## 2022-11-01 DIAGNOSIS — S33.8XXD SPRAIN OF OTHER PARTS OF LUMBAR SPINE AND PELVIS, SUBSEQUENT ENCOUNTER: ICD-10-CM

## 2022-11-01 DIAGNOSIS — M99.02 SEGMENTAL AND SOMATIC DYSFUNCTION OF THORACIC REGION: ICD-10-CM

## 2022-11-01 PROCEDURE — 98940 CHIROPRACT MANJ 1-2 REGIONS: CPT | Mod: AT | Performed by: CHIROPRACTOR

## 2022-11-03 RX ORDER — TRAMADOL HYDROCHLORIDE 50 MG/1
TABLET ORAL
Qty: 120 TABLET | Refills: 2 | Status: SHIPPED | OUTPATIENT
Start: 2022-11-03 | End: 2023-01-31

## 2022-11-03 NOTE — TELEPHONE ENCOUNTER
traMADol (ULTRAM) 50 MG tablet      Last Written Prescription Date:  10-3-22  Last Fill Quantity: 120,   # refills: 0  Last Office Visit: 8-26-22  Future Office visit:    Next 5 appointments (look out 90 days)    Nov 22, 2022 11:00 AM  Return Visit with Curt Encarnacion [facility] [place] ([facility]  [place] ) [address]  [place] [address]  [phone]           Routing refill request to provider for review/approval because:  Drug not on the [facility], [facility] or [facility] refill protocol or controlled substance

## 2022-11-22 ENCOUNTER — OFFICE VISIT (OUTPATIENT)
Dept: CHIROPRACTIC MEDICINE | Facility: OTHER | Age: 82
End: 2022-11-22
Attending: CHIROPRACTOR
Payer: COMMERCIAL

## 2022-11-22 DIAGNOSIS — M99.01 SEGMENTAL AND SOMATIC DYSFUNCTION OF CERVICAL REGION: ICD-10-CM

## 2022-11-22 DIAGNOSIS — M99.02 SEGMENTAL AND SOMATIC DYSFUNCTION OF THORACIC REGION: ICD-10-CM

## 2022-11-22 DIAGNOSIS — M99.03 SEGMENTAL AND SOMATIC DYSFUNCTION OF LUMBAR REGION: Primary | ICD-10-CM

## 2022-11-22 DIAGNOSIS — M54.50 ACUTE BILATERAL LOW BACK PAIN WITHOUT SCIATICA: ICD-10-CM

## 2022-11-22 PROCEDURE — 98940 CHIROPRACT MANJ 1-2 REGIONS: CPT | Mod: AT | Performed by: CHIROPRACTOR

## 2022-11-30 NOTE — PROGRESS NOTES
Demi is a 82 year old who is being evaluated via a billable telephone visit.      What phone number would you like to be contacted at? [phone]  How would you like to obtain your AVS? Mail a copy    Assessment & Plan     Low back pain, chronic / Chronic, continuous use of opioids  [place] PDMP reviewed and appropriate    - okay to c/w tramadol qty 120 per month   - Orthopedic  Referral; OA Dr. Resendez  - MRI completed on 9/30/2022    Stenosis of left carotid artery - s/p left endarterectomy  Follows with [facility] vascular  Follow up in 2 years     Skin lesion  Due to size and rapid growth, referral placed to general sx for removal.   Since this was a telephone visit, no picture was available.   - Adult General Surg Referral    Osteopenia of multiple sites  Discussed options of conservative care vs fosamax. Due to T score of -1.9 and +3.6 will start with Bone Builders         See Patient Instructions    Return in about 3 months (around 3/1/2023).    Fariba Brown MD  [facility] - [place]    Subjective   Demi is a 82 year old, presenting for the following health issues:  No chief complaint on file.      HPI       # left carotid endarterectomy (10/17/2022)  - repeat US 2 year - order placed at [facility] by vascular    # urinary issues: botox - has not done  - using Azo     Pain History:  When did you first notice your pain? - Chronic Pain   Have you seen this provider for your pain in the past?   Yes   Where in your body do you have pain? Lower back bilateral  Are you seeing anyone else for your pain? Yes - Dr. Ferraro ([facility])    PHQ-9 SCORE 7/16/2019 3/5/2021 5/24/2022   PHQ-9 Total Score - - -   PHQ-9 Total Score 0 0 0       ADONIS-7 SCORE 7/16/2019 3/5/2021 5/24/2022   Total Score 0 0 0           PEG Score 2/22/2022 5/24/2022 8/26/2022   PEG Total Score 2 5 2       Chronic Pain Follow Up:    Location of pain: back  Analgesia/pain control:    - Recent changes:  Worse with weather  "changes    - Overall control: Tolerable with discomfort    - Current treatments: tramadol    Adherence:     - Do you ever take more pain medicine than prescribed? No    - When did you take your last dose of pain medicine?     Adverse effects: No     PDMP Review       Value Time User    State PDMP site checked  Yes 11/3/2022  7:30 PM Fariba Brown MD        Last CSA Agreement:   CSA -- Patient Level:     [Media Unavailable] Controlled Substance Agreement - Opioid - Scan on 8/29/2022  1:12 PM: OPIOD/OPIOD PLUS CONTROLLED SUBSTANCE AGREEMENT   [Media Unavailable] Controlled Substance Agreement - Opioid - Scan on 8/4/2021  7:26 AM: OPIOD/OPIOD PLUS CONTROLLED SUBSTANCE AGREEMENT       Last UDS: 6/1/2022    # Osteoporosis   - no h/o treatment   - discussed options    # skin lesion: located on shoulder. 1/2\" crusted with fluid underneath, rapidly growing.     Review of Systems   Constitutional: Negative for chills and fever.   HENT: Negative for congestion.    Respiratory: Negative for shortness of breath.    Cardiovascular: Negative for chest pain and palpitations.   Gastrointestinal: Negative for abdominal pain.   Genitourinary: Positive for frequency and urgency.          Objective           Vitals:  No vitals were obtained today due to virtual visit.    Physical Exam   healthy, alert and no distress  PSYCH: Alert and oriented times 3; coherent speech, normal   rate and volume, able to articulate logical thoughts, able   to abstract reason, no tangential thoughts, no hallucinations   or delusions  Her affect is normal  RESP: No cough, no audible wheezing, able to talk in full sentences  Remainder of exam unable to be completed due to telephone visits          Phone call duration: 12 minutes    "

## 2022-12-01 ENCOUNTER — VIRTUAL VISIT (OUTPATIENT)
Dept: FAMILY MEDICINE | Facility: OTHER | Age: 82
End: 2022-12-01
Attending: FAMILY MEDICINE
Payer: COMMERCIAL

## 2022-12-01 DIAGNOSIS — F11.90 CHRONIC, CONTINUOUS USE OF OPIOIDS: ICD-10-CM

## 2022-12-01 DIAGNOSIS — M54.50 CHRONIC BILATERAL LOW BACK PAIN WITHOUT SCIATICA: Primary | ICD-10-CM

## 2022-12-01 DIAGNOSIS — L98.9 SKIN LESION: ICD-10-CM

## 2022-12-01 DIAGNOSIS — I65.22 STENOSIS OF LEFT CAROTID ARTERY: ICD-10-CM

## 2022-12-01 DIAGNOSIS — G89.29 CHRONIC BILATERAL LOW BACK PAIN WITHOUT SCIATICA: Primary | ICD-10-CM

## 2022-12-01 DIAGNOSIS — M85.89 OSTEOPENIA OF MULTIPLE SITES: ICD-10-CM

## 2022-12-01 PROCEDURE — 99442 PR PHYSICIAN TELEPHONE EVALUATION 11-20 MIN: CPT | Mod: 95 | Performed by: FAMILY MEDICINE

## 2022-12-01 PROCEDURE — G0463 HOSPITAL OUTPT CLINIC VISIT: HCPCS | Mod: 25,TEL

## 2022-12-01 ASSESSMENT — ENCOUNTER SYMPTOMS
FREQUENCY: 1
PALPITATIONS: 0
ABDOMINAL PAIN: 0
SHORTNESS OF BREATH: 0
CHILLS: 0
FEVER: 0

## 2022-12-08 ENCOUNTER — OFFICE VISIT (OUTPATIENT)
Dept: SURGERY | Facility: OTHER | Age: 82
End: 2022-12-08
Attending: FAMILY MEDICINE
Payer: COMMERCIAL

## 2022-12-08 VITALS
BODY MASS INDEX: 21.6 KG/M2 | SYSTOLIC BLOOD PRESSURE: 136 MMHG | HEART RATE: 98 BPM | TEMPERATURE: 97.6 F | DIASTOLIC BLOOD PRESSURE: 74 MMHG | OXYGEN SATURATION: 96 % | WEIGHT: 110 LBS | HEIGHT: 60 IN

## 2022-12-08 DIAGNOSIS — L98.9 SKIN LESION: ICD-10-CM

## 2022-12-08 PROCEDURE — 11403 EXC TR-EXT B9+MARG 2.1-3CM: CPT

## 2022-12-08 PROCEDURE — 88305 TISSUE EXAM BY PATHOLOGIST: CPT | Mod: TC | Performed by: CLINICAL NURSE SPECIALIST

## 2022-12-08 PROCEDURE — 88305 TISSUE EXAM BY PATHOLOGIST: CPT | Mod: 26 | Performed by: PATHOLOGY

## 2022-12-08 PROCEDURE — 11403 EXC TR-EXT B9+MARG 2.1-3CM: CPT | Performed by: CLINICAL NURSE SPECIALIST

## 2022-12-08 PROCEDURE — G0463 HOSPITAL OUTPT CLINIC VISIT: HCPCS

## 2022-12-08 ASSESSMENT — PAIN SCALES - GENERAL: PAINLEVEL: NO PAIN (0)

## 2022-12-08 NOTE — PATIENT INSTRUCTIONS
POST PROCEDURE INSTRUCTIONS     Remove your dressing in 24 hours (If you have one)  Wash incision with gentle soap daily  Apply Bacitracin Ointment Three Times Daily until the stitches are removed   Cover with a clean dressing if in a dirty coby environment or when wet/soiled  Keep incision clean and dry              Do NOT soak in water such as a tub bath or swimming              Do NOT put make-up, powders, hairspray, lotions, etc on the incision     You can use acetaminophen(Tylenol) or the prescription you received for pain.      If you have any bleeding, cover the wound with clean gauze and hold pressure for 10 Minutes. If the bleeding does not stop or is heavy and profuse, call the clinic or go to the Urgent Care/Emergency Department.     SIGNS OF INFECTION ARE:  Redness, swelling, red streaks, pus, drainage, warmth, fever, increased pain, foul smell.   Contact your primary health care provider if you notice any of the warning signs.      FOLLOW - UP  Follow-up in clinic for a nurse only visit in 7 days for suture removal.   Pathology results will be called to you when they are back. Usually 7-10 days.        6 WEEKS POST PROCEDURE     Apply ANY type of lotion to the suture site  Massage the surgical area 1-2 times daily in a circular motion for 5 minutes, for a period of 2 months. This will help the scar heal better.

## 2022-12-08 NOTE — PROGRESS NOTES
Surgery Consult Clinic Note      RE: Demi Narayan  : 1940  RICHIE: 2022      Chief Complaint:  Right shoulder skin lesion    History of Present Illness:  I am seeing Demi Narayan at the request of Dr. Brown for evaluation of right shoulder lesion for which she is requesting evaluation and consideration for removal. This area has grown in size and occasionally bleeds.        She denies a personal or family history of skin cancer.  She reports that she has not had sunburns with blistering in this location in the past.    Medical history:  Past Medical History:   Diagnosis Date     Chronic/recurrent back pain 2011     Diverticulitis      Diverticulitis, recurrent 2002    bowel resection     Dyslipidemia 2006     Fibrocystic breast disease 2011     Gastro-oesophageal reflux disease      GERD 2/10/2012     Hiatal hernia 2/10/2012     Hormone replacement therapy, postmenopausal 2011     Osteoarthritis        Surgical history:  Past Surgical History:   Procedure Laterality Date     ------------OTHER-------------      colonoscopy with biopsy - diverticulitis, hemorrhoids     ------------OTHER-------------  1994    sigmoidoscopy - abdominal pain, diverticula     ABDOMEN SURGERY      colon removed 2nd to diverticulitis     APPENDECTOMY       ARTHROSCOPY SHOULDER  2013    Procedure: ARTHROSCOPY SHOULDER;  Right Shoulder Arthroscopy Sub-Acromial Decompression Rotator Cuff Repair;  Surgeon: Lenny Trammell MD;  Location: [facility]     [facility]     Diverticulitis      bowel resection     EGD with biopsy  2011     ENDARTERECTOMY CAROTID Left 10/17/2022    [facility]. Dr. Springer     ENDOSCOPY UPPER WITH PANCREATIC STIMULATION       ENDOSCOPY UPPER, COLONOSCOPY, COMBINED  2014    Procedure: COMBINED ENDOSCOPY UPPER, COLONOSCOPY;  Surgeon: Israel Feliciano MD;  Location: [facility]     ENT SURGERY      tonsilectomy      ESOPHAGOSCOPY, GASTROSCOPY, DUODENOSCOPY (EGD), COMBINED N/A 09/27/2017    Procedure: COMBINED ESOPHAGOSCOPY, GASTROSCOPY, DUODENOSCOPY (EGD);  UPPER ENDOSCOPY WITH BIOPSY AND POLYPECTOMY;  Surgeon: Gallito Alvarado DO;  Location: [facility]     GYN SURGERY      hysterectomy with bladder and rectal repair     IR CONSULTATION FOR IR EXAM  03/11/2019     ORTHOPEDIC SURGERY  11/20/2013    right rotator cuff surgery     TVH with ovarian preservation         Family history:  Family History   Problem Relation Age of Onset     Cerebrovascular Disease Father         CVA     Heart Disease Father         heart disease     Hypertension Father      Myocardial Infarction Father         myocardial infarction     Cerebrovascular Disease Mother         CVA     Diabetes Mother      Heart Disease Mother         heart disease     Hypertension Mother      Heart Disease Brother         heart disease     Hypertension Brother        Medications:  Current Outpatient Medications   Medication Sig Dispense Refill     aspirin 81 MG EC tablet Take 81 mg by mouth daily       atorvastatin (LIPITOR) 20 MG tablet Take 1 tablet by mouth once daily 90 tablet 2     Calcium Carbonate-Vitamin D (CALCIUM + D PO) Take 600 mg by mouth.       estradiol (ESTRACE) 0.5 MG tablet Take 1 tablet by mouth once daily 90 tablet 1     GLUCOSAMINE SULFATE PO Take  by mouth daily.       latanoprost (XALATAN) 0.005 % ophthalmic solution Inject 1 drop into the eye At Bedtime       Multiple Vitamins-Minerals (MULTIVITAL PO) Take 1 tablet by mouth daily.       Omega-3 Fatty Acids (OMEGA-3 FISH OIL PO) Take  by mouth daily.       pantoprazole (PROTONIX) 40 MG EC tablet TAKE 1 TABLET BY MOUTH IN THE MORNING BEFORE BREAKFAST 90 tablet 2     traMADol (ULTRAM) 50 MG tablet TAKE 1 TO 2 TABLETS BY MOUTH EVERY 6 TO 8 HOURS AS NEEDED 120 tablet 2     Wheat Dextrin (BENEFIBER) POWD Take by mouth daily       Allergies:  The patient has No Known Allergies.  .  Social history:  Social  History     Tobacco Use     Smoking status: Former     Packs/day: 0.50     Years: 5.00     Pack years: 2.50     Types: Cigarettes     Start date: 1963     Quit date: 1968     Years since quittin.6     Smokeless tobacco: Never   Substance Use Topics     Alcohol use: Yes     Alcohol/week: 0.8 standard drinks     Types: 1 Glasses of wine per week     Comment: daily with dinner     Marital status: .      Review of Systems:  Constitutional, HEENT, cardiovascular, pulmonary, gi and gu systems are negative, except as otherwise noted.    Physical Examination:  /74 (BP Location: Right arm, Patient Position: Chair, Cuff Size: Adult Small)   Pulse 98   Temp 97.6  F (36.4  C) (Tympanic)   Ht 1.524 m (5')   Wt 49.9 kg (110 lb)   SpO2 96%   BMI 21.48 kg/m    General: Alert and orientedx4, no acute distress, well-developed/well-nourished, ambulating without assistance  HEENT: normocephalic atraumatic, extraocular movements intact, Trachea mideline  Chest:   Respirations even and unlabored  Extremities: Cursory exam unremarkable.  No peripheral edema noted.  Skin: Warm, dry, less than 2 sec cap refill  Neuro: CN 2-12 grossly intact, no focal deficit, GCS 15  Psych: Pleasant, calm, asks appropriate questions      Assessment/Plan:  #1 Skin lesion excision    After discussing the risks of excision including but not limited to, bleeding,  recurrence, need for further resection, wound healing, scar formation and chronic pain she wishes to proceed with the lesion removal.  Her questions were asked and answered.    A procedural pause was conducted.  The site was prepped and draped in the normal sterile fashion.   I then infiltrated the tissue with 1% lidocaine with 1:100,000 epinephrine.  I then used a #15 scalpel to create an incision around the lesion.  I elevated the tissue with pick ups and cut the tissue with the #15 scalpel, edges were undermined.  Biopsy was completed to site.  Hemostasis was  achieved with direct pressure and 4 sutures using 4-0 ethilon.  The specimen(s) was placed in formalin and sent to pathology.     The patient tolerated the procedure without complaint.  We reviewed signs/symptoms of infection and bleeding.  She will return to clinic in 7-10 days days to have the sutures removed.      [facility]  [address] [place] [address]    Referring Provider:  Fariba Brown MD  [address] [place] [address]     Primary Care Provider:  Fariba Brown

## 2022-12-13 ENCOUNTER — OFFICE VISIT (OUTPATIENT)
Dept: CHIROPRACTIC MEDICINE | Facility: OTHER | Age: 82
End: 2022-12-13
Attending: CHIROPRACTOR
Payer: COMMERCIAL

## 2022-12-13 DIAGNOSIS — M99.03 SEGMENTAL AND SOMATIC DYSFUNCTION OF LUMBAR REGION: Primary | ICD-10-CM

## 2022-12-13 DIAGNOSIS — M54.50 ACUTE BILATERAL LOW BACK PAIN WITHOUT SCIATICA: ICD-10-CM

## 2022-12-13 DIAGNOSIS — M99.02 SEGMENTAL AND SOMATIC DYSFUNCTION OF THORACIC REGION: ICD-10-CM

## 2022-12-13 PROCEDURE — 98940 CHIROPRACT MANJ 1-2 REGIONS: CPT | Mod: AT | Performed by: CHIROPRACTOR

## 2022-12-14 LAB
PATH REPORT.COMMENTS IMP SPEC: NORMAL
PATH REPORT.FINAL DX SPEC: NORMAL
PATH REPORT.GROSS SPEC: NORMAL
PATH REPORT.MICROSCOPIC SPEC OTHER STN: NORMAL
PATH REPORT.RELEVANT HX SPEC: NORMAL
PHOTO IMAGE: NORMAL

## 2022-12-15 ENCOUNTER — ALLIED HEALTH/NURSE VISIT (OUTPATIENT)
Dept: SURGERY | Facility: OTHER | Age: 82
End: 2022-12-15
Attending: SURGERY
Payer: COMMERCIAL

## 2022-12-15 DIAGNOSIS — L98.9 SKIN LESION: Primary | ICD-10-CM

## 2022-12-15 DIAGNOSIS — Z48.02 VISIT FOR SUTURE REMOVAL: ICD-10-CM

## 2022-12-15 NOTE — PROGRESS NOTES
Patient presented to the clinic to have her sutures removed from her wound on her right shoulder.  There were 5 black sutures.  Patient had the wound covered with a band aid. When that was removed, the site was noted to be slightly pink around the sutures, but had no drainage.  5 black sutures were removed without difficulty.  Steri strips were placed over the wound, and a band aid was placed over it, also.  No further questions or concerns from the patient at this time, and she was told to contact this office if any did arise.  GABY RAMIREZ LPN

## 2023-01-03 ENCOUNTER — OFFICE VISIT (OUTPATIENT)
Dept: CHIROPRACTIC MEDICINE | Facility: OTHER | Age: 83
End: 2023-01-03
Attending: CHIROPRACTOR
Payer: COMMERCIAL

## 2023-01-03 DIAGNOSIS — M99.02 SEGMENTAL AND SOMATIC DYSFUNCTION OF THORACIC REGION: ICD-10-CM

## 2023-01-03 DIAGNOSIS — M54.50 ACUTE BILATERAL LOW BACK PAIN WITHOUT SCIATICA: ICD-10-CM

## 2023-01-03 DIAGNOSIS — M99.03 SEGMENTAL AND SOMATIC DYSFUNCTION OF LUMBAR REGION: Primary | ICD-10-CM

## 2023-01-03 PROCEDURE — 98940 CHIROPRACT MANJ 1-2 REGIONS: CPT | Mod: AT | Performed by: CHIROPRACTOR

## 2023-01-26 ENCOUNTER — OFFICE VISIT (OUTPATIENT)
Dept: CHIROPRACTIC MEDICINE | Facility: OTHER | Age: 83
End: 2023-01-26
Attending: CHIROPRACTOR
Payer: COMMERCIAL

## 2023-01-26 DIAGNOSIS — M99.01 SEGMENTAL AND SOMATIC DYSFUNCTION OF CERVICAL REGION: ICD-10-CM

## 2023-01-26 DIAGNOSIS — M54.50 ACUTE BILATERAL LOW BACK PAIN WITHOUT SCIATICA: ICD-10-CM

## 2023-01-26 DIAGNOSIS — M99.02 SEGMENTAL AND SOMATIC DYSFUNCTION OF THORACIC REGION: ICD-10-CM

## 2023-01-26 DIAGNOSIS — M99.03 SEGMENTAL AND SOMATIC DYSFUNCTION OF LUMBAR REGION: Primary | ICD-10-CM

## 2023-01-26 PROCEDURE — 98941 CHIROPRACT MANJ 3-4 REGIONS: CPT | Mod: AT | Performed by: CHIROPRACTOR

## 2023-01-30 DIAGNOSIS — S33.8XXD SPRAIN OF OTHER PARTS OF LUMBAR SPINE AND PELVIS, SUBSEQUENT ENCOUNTER: ICD-10-CM

## 2023-01-31 RX ORDER — TRAMADOL HYDROCHLORIDE 50 MG/1
TABLET ORAL
Qty: 120 TABLET | Refills: 0 | Status: SHIPPED | OUTPATIENT
Start: 2023-01-31 | End: 2023-03-01

## 2023-01-31 NOTE — TELEPHONE ENCOUNTER
traMADol (ULTRAM) 50 MG tablet      Last Written Prescription Date:  11-3-2022  Last Fill Quantity: 120,   # refills: 2  Last Office Visit: 12-1-2022  Future Office visit:    Next 5 appointments (look out 90 days)    Feb 16, 2023 10:40 AM  Return Visit with Curt Encarnacion DC  [facility] [place] ([facility] ) [address]  [facility] [address]  [phone]   Mar 02, 2023 10:30 AM  (Arrive by 10:15 AM)  SHORT with Fariba Brown MD  [facility] ([facility] ) [address]  [place] [address]  [phone]           Routing refill request to provider for review/approval because:  Drug not on the [facility], [facility] or [facility] refill protocol or controlled substance

## 2023-02-09 ENCOUNTER — TRANSFERRED RECORDS (OUTPATIENT)
Dept: HEALTH INFORMATION MANAGEMENT | Facility: CLINIC | Age: 83
End: 2023-02-09

## 2023-02-16 DIAGNOSIS — I65.22 LEFT CAROTID STENOSIS: ICD-10-CM

## 2023-02-17 NOTE — TELEPHONE ENCOUNTER
lipitor      Last Written Prescription Date:  4/13/22  Last Fill Quantity: 90,   # refills: 2  Last Office Visit: 12/1/22  Future Office visit:    Next 5 appointments (look out 90 days)    Feb 20, 2023 10:50 AM  Return Visit with Curt Encarnacion DC  [facility] [address] [place] [address]  [phone]   Mar 02, 2023 10:30 AM  (Arrive by 10:15 AM)  SHORT with Fariba Brown MD  [facility] [address]  [place] [address]  [phone]

## 2023-02-20 ENCOUNTER — OFFICE VISIT (OUTPATIENT)
Dept: CHIROPRACTIC MEDICINE | Facility: OTHER | Age: 83
End: 2023-02-20
Payer: COMMERCIAL

## 2023-02-20 DIAGNOSIS — M99.03 SEGMENTAL AND SOMATIC DYSFUNCTION OF LUMBAR REGION: Primary | ICD-10-CM

## 2023-02-20 DIAGNOSIS — M99.02 SEGMENTAL AND SOMATIC DYSFUNCTION OF THORACIC REGION: ICD-10-CM

## 2023-02-20 DIAGNOSIS — M54.50 ACUTE BILATERAL LOW BACK PAIN WITHOUT SCIATICA: ICD-10-CM

## 2023-02-20 PROCEDURE — 98940 CHIROPRACT MANJ 1-2 REGIONS: CPT | Mod: AT | Performed by: CHIROPRACTOR

## 2023-02-20 RX ORDER — ATORVASTATIN CALCIUM 20 MG/1
TABLET, FILM COATED ORAL
Qty: 90 TABLET | Refills: 0 | Status: SHIPPED | OUTPATIENT
Start: 2023-02-20 | End: 2023-06-12

## 2023-02-28 DIAGNOSIS — S33.8XXD SPRAIN OF OTHER PARTS OF LUMBAR SPINE AND PELVIS, SUBSEQUENT ENCOUNTER: ICD-10-CM

## 2023-03-01 RX ORDER — TRAMADOL HYDROCHLORIDE 50 MG/1
TABLET ORAL
Qty: 120 TABLET | Refills: 2 | Status: SHIPPED | OUTPATIENT
Start: 2023-03-01 | End: 2023-06-02

## 2023-03-01 NOTE — PROGRESS NOTES
Assessment & Plan     Low back pain, chronic / Chronic, continuous use of opioids / Scoliosis, throracolumbar  Stable.   [place] PDMP reviewed and appropriate    - okay to c/w tramadol qty 120 per month  - handicap parking pass paperwork completed    Dyslipidemia  Update labs at next visit   - c/w atorvastatin 20mg   - c/w ASA 81mg     See Patient Instructions    Return in about 6 months (around 9/2/2023) for Physical Exam, Lab Work, chronic pain .    Fariba Brown MD  [facility] - KRYS Simms is a 82 year old, presenting for the following health issues:  Pain and Lipids      HPI     Hyperlipidemia Follow-Up      Are you regularly taking any medication or supplement to lower your cholesterol?   Yes- Atorvastatin 20 mg    Are you having muscle aches or other side effects that you think could be caused by your cholesterol lowering medication?  No    - due for lipid panel    Chronic/Recurring Back Pain Follow Up      Where is your back pain located? (Select all that apply) low back bilateral    How would you describe your back pain?  Feels like a hot poker constant    Where does your back pain spread? nowhere    Since your last clinic visit for back pain, how has your pain changed? always present, but gets better and worse    Does your back pain interfere with your job? Not applicable    Since your last visit, have you tried any new treatment? No    - visited with Dr. Leonid Resendez and recommendation for PT  - already does home exercises  - requesting handicap parking pass      Review of Systems   Constitutional: Negative for chills and fever.   HENT: Negative for congestion.    Respiratory: Negative for shortness of breath.    Cardiovascular: Positive for peripheral edema (left foot, resolves with compression socks). Negative for chest pain and palpitations.   Gastrointestinal: Negative for abdominal pain.   Musculoskeletal: Positive for back pain.          Objective    /74  (BP Location: Left arm, Patient Position: Chair, Cuff Size: Adult Regular)   Pulse 83   Temp 98.4  F (36.9  C) (Tympanic)   Resp 18   Wt 51.9 kg (114 lb 8 oz)   SpO2 98%   BMI 22.36 kg/m    Body mass index is 22.36 kg/m .  Physical Exam  Constitutional:       General: She is not in acute distress.     Appearance: She is not ill-appearing.   Cardiovascular:      Rate and Rhythm: Normal rate and regular rhythm.      Heart sounds: No murmur heard.  Pulmonary:      Effort: Pulmonary effort is normal. No respiratory distress.      Breath sounds: No wheezing or rales.   Neurological:      Mental Status: She is alert.   Psychiatric:         Mood and Affect: Mood normal.          Recent Labs   Lab Test 03/11/22  0809 06/03/21  0752 04/22/16  0740 05/08/15  0752   CHOL 157 245*   < > 234*   HDL 81 74   < > 74   LDL 55 140*   < > 126   TRIG 106 155*   < > 170*   CHOLHDLRATIO  --   --   --  3.2    < > = values in this interval not displayed.

## 2023-03-02 ENCOUNTER — OFFICE VISIT (OUTPATIENT)
Dept: FAMILY MEDICINE | Facility: OTHER | Age: 83
End: 2023-03-02
Attending: FAMILY MEDICINE
Payer: COMMERCIAL

## 2023-03-02 VITALS
HEART RATE: 83 BPM | RESPIRATION RATE: 18 BRPM | SYSTOLIC BLOOD PRESSURE: 136 MMHG | TEMPERATURE: 98.4 F | DIASTOLIC BLOOD PRESSURE: 74 MMHG | OXYGEN SATURATION: 98 % | WEIGHT: 114.5 LBS | BODY MASS INDEX: 22.36 KG/M2

## 2023-03-02 DIAGNOSIS — G89.29 CHRONIC BILATERAL LOW BACK PAIN WITHOUT SCIATICA: Primary | ICD-10-CM

## 2023-03-02 DIAGNOSIS — E78.2 MIXED HYPERLIPIDEMIA: ICD-10-CM

## 2023-03-02 DIAGNOSIS — F11.90 CHRONIC, CONTINUOUS USE OF OPIOIDS: ICD-10-CM

## 2023-03-02 DIAGNOSIS — M54.50 CHRONIC BILATERAL LOW BACK PAIN WITHOUT SCIATICA: Primary | ICD-10-CM

## 2023-03-02 DIAGNOSIS — M41.25 OTHER IDIOPATHIC SCOLIOSIS, THORACOLUMBAR REGION: ICD-10-CM

## 2023-03-02 PROBLEM — I65.23 BILATERAL CAROTID ARTERY STENOSIS: Status: ACTIVE | Noted: 2022-10-12

## 2023-03-02 PROCEDURE — 99213 OFFICE O/P EST LOW 20 MIN: CPT | Performed by: FAMILY MEDICINE

## 2023-03-02 PROCEDURE — G0463 HOSPITAL OUTPT CLINIC VISIT: HCPCS

## 2023-03-02 PROCEDURE — G0463 HOSPITAL OUTPT CLINIC VISIT: HCPCS | Mod: 25

## 2023-03-02 ASSESSMENT — PAIN SCALES - GENERAL: PAINLEVEL: MODERATE PAIN (5)

## 2023-03-02 ASSESSMENT — ANXIETY QUESTIONNAIRES
IF YOU CHECKED OFF ANY PROBLEMS ON THIS QUESTIONNAIRE, HOW DIFFICULT HAVE THESE PROBLEMS MADE IT FOR YOU TO DO YOUR WORK, TAKE CARE OF THINGS AT HOME, OR GET ALONG WITH OTHER PEOPLE: NOT DIFFICULT AT ALL
2. NOT BEING ABLE TO STOP OR CONTROL WORRYING: NOT AT ALL
1. FEELING NERVOUS, ANXIOUS, OR ON EDGE: NOT AT ALL
5. BEING SO RESTLESS THAT IT IS HARD TO SIT STILL: NOT AT ALL
6. BECOMING EASILY ANNOYED OR IRRITABLE: NOT AT ALL
4. TROUBLE RELAXING: NOT AT ALL
3. WORRYING TOO MUCH ABOUT DIFFERENT THINGS: NOT AT ALL

## 2023-03-02 ASSESSMENT — ENCOUNTER SYMPTOMS
BACK PAIN: 1
CHILLS: 0
PALPITATIONS: 0
ABDOMINAL PAIN: 0
SHORTNESS OF BREATH: 0
FEVER: 0

## 2023-03-06 DIAGNOSIS — Z79.890 HORMONE REPLACEMENT THERAPY (HRT): ICD-10-CM

## 2023-03-06 RX ORDER — ESTRADIOL 0.5 MG/1
TABLET ORAL
Qty: 90 TABLET | Refills: 0 | Status: SHIPPED | OUTPATIENT
Start: 2023-03-06 | End: 2023-06-10

## 2023-03-20 ENCOUNTER — OFFICE VISIT (OUTPATIENT)
Dept: CHIROPRACTIC MEDICINE | Facility: OTHER | Age: 83
End: 2023-03-20
Attending: CHIROPRACTOR
Payer: COMMERCIAL

## 2023-03-20 DIAGNOSIS — M99.03 SEGMENTAL AND SOMATIC DYSFUNCTION OF LUMBAR REGION: Primary | ICD-10-CM

## 2023-03-20 DIAGNOSIS — M99.02 SEGMENTAL AND SOMATIC DYSFUNCTION OF THORACIC REGION: ICD-10-CM

## 2023-03-20 DIAGNOSIS — M54.50 ACUTE BILATERAL LOW BACK PAIN WITHOUT SCIATICA: ICD-10-CM

## 2023-03-20 PROCEDURE — 98940 CHIROPRACT MANJ 1-2 REGIONS: CPT | Mod: AT | Performed by: CHIROPRACTOR

## 2023-03-23 DIAGNOSIS — Z78.0 POST-MENOPAUSE: ICD-10-CM

## 2023-03-23 DIAGNOSIS — M41.35 THORACOGENIC SCOLIOSIS OF THORACOLUMBAR REGION: ICD-10-CM

## 2023-03-23 RX ORDER — PANTOPRAZOLE SODIUM 40 MG/1
TABLET, DELAYED RELEASE ORAL
Qty: 90 TABLET | Refills: 1 | Status: SHIPPED | OUTPATIENT
Start: 2023-03-23 | End: 2023-09-21

## 2023-04-12 ENCOUNTER — OFFICE VISIT (OUTPATIENT)
Dept: CHIROPRACTIC MEDICINE | Facility: OTHER | Age: 83
End: 2023-04-12
Attending: CHIROPRACTOR
Payer: COMMERCIAL

## 2023-04-12 DIAGNOSIS — M54.50 ACUTE BILATERAL LOW BACK PAIN WITHOUT SCIATICA: ICD-10-CM

## 2023-04-12 DIAGNOSIS — M99.02 SEGMENTAL AND SOMATIC DYSFUNCTION OF THORACIC REGION: ICD-10-CM

## 2023-04-12 DIAGNOSIS — M99.03 SEGMENTAL AND SOMATIC DYSFUNCTION OF LUMBAR REGION: Primary | ICD-10-CM

## 2023-04-12 PROCEDURE — 98940 CHIROPRACT MANJ 1-2 REGIONS: CPT | Mod: AT | Performed by: CHIROPRACTOR

## 2023-04-12 NOTE — PROGRESS NOTES
Subjective Finding:    Chief compalint: Patient presents with:  Back Pain: Feels better with chiro tx.  Has scoliosis and kyphosis that does limit the imprement  , Pain Scale: 4/10, Intensity: sharp, Duration: 2 months, Change since last visit: , Radiating: no.    Date of injury:     Activities that the pain restricts:   Home/household activities: yes.  Work duties: no.  Hobbies/social: yes.  Sleep: yes.  Makes symptoms better: ice  and rest.  Makes symptoms worse: activity and bending.  Have you seen anyone else for the symptoms? No.  Work related: no.  Automobile related injury: no.    Objective and Assessment:    Posture Analysis:   High shoulder: right.  Head tilt: right.  High iliac crest: left.  Head carriage: forward.  Thoracic Kyphosis: neutral.  Lumbar Lordosis: neutral.    Lumbar Range of Motion: extension decreased.  Cervical Range of Motion: .  Thoracic Range of Motion: .  Extremity Range of Motion: hip decreased.    Palpation:   Quad lumb: left, referred pain: yes  TFL: left, referred pain: yes     Segmental dysfunction pre-treatment: T10    L35.       Assessment post-treatment:  Cervical: ROM increased.  Thoracic: ROM increased.  Lumbar: ROM increased and pain and tenderness decreased.    Comments: .      Complicating Factors: .    Plan / Procedure:  81418        CMT   L T spine as indicated above    Expected release date: .  Treatment plan: 1 time per month.  Instructed patient: stretch as instructed at visit.  Short term goals: reduce pain.  Long term goals: restore normal function.  Prognosis: excellent.

## 2023-04-13 ENCOUNTER — TRANSFERRED RECORDS (OUTPATIENT)
Dept: HEALTH INFORMATION MANAGEMENT | Facility: CLINIC | Age: 83
End: 2023-04-13

## 2023-05-03 ENCOUNTER — TELEPHONE (OUTPATIENT)
Dept: FAMILY MEDICINE | Facility: OTHER | Age: 83
End: 2023-05-03

## 2023-05-03 ENCOUNTER — OFFICE VISIT (OUTPATIENT)
Dept: CHIROPRACTIC MEDICINE | Facility: OTHER | Age: 83
End: 2023-05-03
Attending: CHIROPRACTOR
Payer: COMMERCIAL

## 2023-05-03 DIAGNOSIS — M99.02 SEGMENTAL AND SOMATIC DYSFUNCTION OF THORACIC REGION: ICD-10-CM

## 2023-05-03 DIAGNOSIS — M54.50 ACUTE BILATERAL LOW BACK PAIN WITHOUT SCIATICA: ICD-10-CM

## 2023-05-03 DIAGNOSIS — M99.03 SEGMENTAL AND SOMATIC DYSFUNCTION OF LUMBAR REGION: Primary | ICD-10-CM

## 2023-05-03 PROCEDURE — 98940 CHIROPRACT MANJ 1-2 REGIONS: CPT | Mod: AT | Performed by: CHIROPRACTOR

## 2023-05-03 NOTE — TELEPHONE ENCOUNTER
Received a call from patient, she was inquiring if there is a gastroenterologist here in town or is the closest [place]?

## 2023-05-03 NOTE — PROGRESS NOTES
Subjective Finding:    Chief compalint: Patient presents with:  Back Pain: Some pain in low back ,  overall doing good with stretches and exercises    , Pain Scale: 4/10, Intensity: sharp, Duration: 2 days, Change since last visit: , Radiating: no.    Date of injury:     Activities that the pain restricts:   Home/household activities: yes.  Work duties: no.  Hobbies/social: yes.  Sleep: yes.  Makes symptoms better: ice  and rest.  Makes symptoms worse: activity and bending.  Have you seen anyone else for the symptoms? No.  Work related: no.  Automobile related injury: no.    Objective and Assessment:    Posture Analysis:   High shoulder: right.  Head tilt: right.  High iliac crest: left.  Head carriage: forward.  Thoracic Kyphosis: neutral.  Lumbar Lordosis: neutral.    Lumbar Range of Motion: extension decreased.  Cervical Range of Motion: .  Thoracic Range of Motion: .  Extremity Range of Motion: hip decreased.    Palpation:   Quad lumb: left, referred pain: yes  TFL: left, referred pain: yes     Segmental dysfunction pre-treatment: T10    L35.       Assessment post-treatment:  Cervical: ROM increased.  Thoracic: ROM increased.  Lumbar: ROM increased and pain and tenderness decreased.    Comments: .      Complicating Factors: .    Plan / Procedure:  98335        CMT   L T spine as indicated above    Expected release date: .  Treatment plan: 1 time per month.  Instructed patient: stretch as instructed at visit.  Short term goals: reduce pain.  Long term goals: restore normal function.  Prognosis: excellent.

## 2023-05-19 ENCOUNTER — IMMUNIZATION (OUTPATIENT)
Dept: FAMILY MEDICINE | Facility: OTHER | Age: 83
End: 2023-05-19
Attending: FAMILY MEDICINE
Payer: COMMERCIAL

## 2023-05-19 DIAGNOSIS — Z23 ENCOUNTER FOR IMMUNIZATION: Primary | ICD-10-CM

## 2023-05-19 PROCEDURE — 0124A COVID-19 BIVALENT 12+ (PFIZER): CPT

## 2023-05-24 ENCOUNTER — OFFICE VISIT (OUTPATIENT)
Dept: CHIROPRACTIC MEDICINE | Facility: OTHER | Age: 83
End: 2023-05-24
Attending: CHIROPRACTOR
Payer: COMMERCIAL

## 2023-05-24 DIAGNOSIS — M54.50 ACUTE BILATERAL LOW BACK PAIN WITHOUT SCIATICA: ICD-10-CM

## 2023-05-24 DIAGNOSIS — M99.02 SEGMENTAL AND SOMATIC DYSFUNCTION OF THORACIC REGION: ICD-10-CM

## 2023-05-24 DIAGNOSIS — M99.03 SEGMENTAL AND SOMATIC DYSFUNCTION OF LUMBAR REGION: Primary | ICD-10-CM

## 2023-05-24 PROCEDURE — 98940 CHIROPRACT MANJ 1-2 REGIONS: CPT | Mod: AT | Performed by: CHIROPRACTOR

## 2023-05-25 NOTE — PROGRESS NOTES
Subjective Finding:    Chief compalint: Patient presents with:  Back Pain: Stiffness in low back  , Pain Scale: 4/10, Intensity: sharp, Duration: 2 days, Change since last visit: , Radiating: no.    Date of injury:     Activities that the pain restricts:   Home/household activities: yes.  Work duties: no.  Hobbies/social: yes.  Sleep: yes.  Makes symptoms better: ice  and rest.  Makes symptoms worse: activity and bending.  Have you seen anyone else for the symptoms? No.  Work related: no.  Automobile related injury: no.    Objective and Assessment:    Posture Analysis:   High shoulder: right.  Head tilt: right.  High iliac crest: left.  Head carriage: forward.  Thoracic Kyphosis: neutral.  Lumbar Lordosis: neutral.    Lumbar Range of Motion: extension decreased.  Cervical Range of Motion: .  Thoracic Range of Motion: .  Extremity Range of Motion: hip decreased.    Palpation:   Quad lumb: left, referred pain: yes  TFL: left, referred pain: yes     Segmental dysfunction pre-treatment: T10    L35.       Assessment post-treatment:  Cervical: ROM increased.  Thoracic: ROM increased.  Lumbar: ROM increased and pain and tenderness decreased.    Comments: .      Complicating Factors: .    Plan / Procedure:  56846        CMT   L T spine as indicated above    Expected release date: .  Treatment plan: 1 time per month.  Instructed patient: stretch as instructed at visit.  Short term goals: reduce pain.  Long term goals: restore normal function.  Prognosis: excellent.

## 2023-06-02 DIAGNOSIS — S33.8XXD SPRAIN OF OTHER PARTS OF LUMBAR SPINE AND PELVIS, SUBSEQUENT ENCOUNTER: ICD-10-CM

## 2023-06-02 RX ORDER — TRAMADOL HYDROCHLORIDE 50 MG/1
TABLET ORAL
Qty: 120 TABLET | Refills: 0 | Status: SHIPPED | OUTPATIENT
Start: 2023-06-02 | End: 2023-07-03

## 2023-06-02 NOTE — TELEPHONE ENCOUNTER
Tramadol (Ultram) 50 MG tablet    Last Written Prescription Date:  03/01/2023  Last Fill Quantity: 120,   # refills: 0  Last Office Visit: 03/02/2023  Future Office visit:       Routing refill request to provider for review/approval because:  Drug not on the [facility], [facility] or [facility] refill protocol or controlled substance

## 2023-06-07 DIAGNOSIS — Z79.890 HORMONE REPLACEMENT THERAPY (HRT): ICD-10-CM

## 2023-06-10 RX ORDER — ESTRADIOL 0.5 MG/1
TABLET ORAL
Qty: 90 TABLET | Refills: 2 | Status: SHIPPED | OUTPATIENT
Start: 2023-06-10 | End: 2024-03-04

## 2023-06-16 ENCOUNTER — TELEPHONE (OUTPATIENT)
Dept: GENERAL RADIOLOGY | Facility: HOSPITAL | Age: 83
End: 2023-06-16

## 2023-06-16 ENCOUNTER — ANCILLARY PROCEDURE (OUTPATIENT)
Dept: MAMMOGRAPHY | Facility: OTHER | Age: 83
End: 2023-06-16
Attending: FAMILY MEDICINE
Payer: COMMERCIAL

## 2023-06-16 DIAGNOSIS — Z12.31 VISIT FOR SCREENING MAMMOGRAM: ICD-10-CM

## 2023-06-16 PROCEDURE — 77067 SCR MAMMO BI INCL CAD: CPT | Mod: TC

## 2023-06-28 ENCOUNTER — OFFICE VISIT (OUTPATIENT)
Dept: CHIROPRACTIC MEDICINE | Facility: OTHER | Age: 83
End: 2023-06-28
Attending: CHIROPRACTOR
Payer: COMMERCIAL

## 2023-06-28 DIAGNOSIS — M99.02 SEGMENTAL AND SOMATIC DYSFUNCTION OF THORACIC REGION: ICD-10-CM

## 2023-06-28 DIAGNOSIS — M54.50 ACUTE BILATERAL LOW BACK PAIN WITHOUT SCIATICA: ICD-10-CM

## 2023-06-28 DIAGNOSIS — M99.03 SEGMENTAL AND SOMATIC DYSFUNCTION OF LUMBAR REGION: Primary | ICD-10-CM

## 2023-06-28 PROCEDURE — 98940 CHIROPRACT MANJ 1-2 REGIONS: CPT | Mod: AT | Performed by: CHIROPRACTOR

## 2023-06-28 NOTE — PROGRESS NOTES
Subjective Finding:    Chief compalint: Patient presents with:  Back Pain  , Pain Scale: 6/10, Intensity: sharp, Duration: 5 days, Change since last visit: , Radiating: no.    Date of injury:     Activities that the pain restricts:   Home/household activities: yes.  Work duties: no.  Hobbies/social: yes.  Sleep: yes.  Makes symptoms better: ice  and rest.  Makes symptoms worse: activity and bending.  Have you seen anyone else for the symptoms? No.  Work related: no.  Automobile related injury: no.    Objective and Assessment:    Posture Analysis:   High shoulder: right.  Head tilt: right.  High iliac crest: left.  Head carriage: forward.  Thoracic Kyphosis: neutral.  Lumbar Lordosis: neutral.    Lumbar Range of Motion: extension decreased.  Cervical Range of Motion: .  Thoracic Range of Motion: .  Extremity Range of Motion: hip decreased.    Palpation:   Quad lumb: left, referred pain: yes  TFL: left, referred pain: yes     Segmental dysfunction pre-treatment: T10    L35.       Assessment post-treatment:  Cervical: ROM increased.  Thoracic: ROM increased.  Lumbar: ROM increased and pain and tenderness decreased.    Comments: .      Complicating Factors: .    Plan / Procedure:  74266        CMT   L T spine as indicated above    Expected release date: .  Treatment plan: 1 time per month.  Instructed patient: stretch as instructed at visit.  Short term goals: reduce pain.  Long term goals: restore normal function.  Prognosis: excellent.

## 2023-07-01 DIAGNOSIS — S33.8XXD SPRAIN OF OTHER PARTS OF LUMBAR SPINE AND PELVIS, SUBSEQUENT ENCOUNTER: ICD-10-CM

## 2023-07-03 RX ORDER — TRAMADOL HYDROCHLORIDE 50 MG/1
TABLET ORAL
Qty: 120 TABLET | Refills: 0 | Status: SHIPPED | OUTPATIENT
Start: 2023-07-03 | End: 2023-08-07

## 2023-07-03 NOTE — TELEPHONE ENCOUNTER
Ultram      Last Written Prescription Date:  06/02/23  Last Fill Quantity: 120,   # refills: 0  Last Office Visit: 03/02/23  Future Office visit:    Next 5 appointments (look out 90 days)    Jul 19, 2023 10:50 AM  Return Visit with JOEY Engel ([facility] ) [address] [place] [address]  [place] [address]  [phone]

## 2023-07-19 ENCOUNTER — OFFICE VISIT (OUTPATIENT)
Dept: CHIROPRACTIC MEDICINE | Facility: OTHER | Age: 83
End: 2023-07-19
Attending: CHIROPRACTOR
Payer: COMMERCIAL

## 2023-07-19 DIAGNOSIS — M54.50 ACUTE BILATERAL LOW BACK PAIN WITHOUT SCIATICA: ICD-10-CM

## 2023-07-19 DIAGNOSIS — M99.02 SEGMENTAL AND SOMATIC DYSFUNCTION OF THORACIC REGION: ICD-10-CM

## 2023-07-19 DIAGNOSIS — M99.03 SEGMENTAL AND SOMATIC DYSFUNCTION OF LUMBAR REGION: Primary | ICD-10-CM

## 2023-07-19 PROCEDURE — 98940 CHIROPRACT MANJ 1-2 REGIONS: CPT | Mod: AT | Performed by: CHIROPRACTOR

## 2023-07-24 NOTE — PROGRESS NOTES
Subjective Finding:    Chief compalint: Patient presents with:  Back Pain: Low back tightness.  History of curvature in spine and she is seeing MD for the sx as well   , Pain Scale: 4/10, Intensity: sharp, Duration: 2 weeks, Radiating:  no.    Date of injury:     Activities that the pain restricts:   Home/household/hobbies/social activities: Yes.  Work duties: No.  Sleep: No.  Makes symptoms better: rest.  Makes symptoms worse: activity.  Have you seen anyone else for the symptoms? Yes: MD.  Work related: No.  Automobile related injury: No.    Objective and Assessment:    Posture Analysis:   High shoulder: .  Head tilt: .  High iliac crest: right.  Head carriage: neutral.  Thoracic Kyphosis: forward.  Lumbar Lordosis: forward.    Lumbar Range of Motion: extension decreased.  Cervical Range of Motion: extension decreased.  Thoracic Range of Motion: extension decreased.  Extremity Range of Motion: .    Palpation:   T paraspinals: dull pain, no    Segmental dysfunction pre-treatment and treatment area: T5, T6, L3, and L4.    Assessment post-treatment:  Cervical: .  Thoracic: ROM increased.  Lumbar: ROM increased.    Comments: .      Complicating Factors: .    Procedure(s):  CMT:  48223 Chiropractic manipulative treatment 1-2 regions performed   Thoracic: Diversified, See above for level, Prone and Lumbar: Diversified, See above for level, Side posture    Modalities:  None performed this visit    Therapeutic procedures:  None    Plan:  Treatment plan:  1 times per week for 4 weeks.  Instructed patient: stretch as instructed at visit and walk 10 minutes.  Short term goals: increase ROM.  Long term goals: increase ADL.  Prognosis: good.

## 2023-08-01 DIAGNOSIS — S33.8XXD SPRAIN OF OTHER PARTS OF LUMBAR SPINE AND PELVIS, SUBSEQUENT ENCOUNTER: ICD-10-CM

## 2023-08-04 NOTE — TELEPHONE ENCOUNTER
Disp Refills Start End PRAKASH   traMADol (ULTRAM) 50 MG tablet 120 tablet 0 7/3/2023     Last Office Visit: 03/02/23     Future Office visit:    Next 5 appointments (look out 90 days)      Aug 09, 2023 10:50 AM  Return Visit with Curt Encarnacion DC  [facility] [place] ([facility] ) [address] [place] [address]  [phone]     Aug 29, 2023  7:30 AM  (Arrive by 7:15 AM)  PHYSICAL with Fariba Brown MD  [facility] [facility] [address]  [phone]             Routing refill request to provider for review/approval because:

## 2023-08-07 RX ORDER — TRAMADOL HYDROCHLORIDE 50 MG/1
TABLET ORAL
Qty: 120 TABLET | Refills: 0 | Status: SHIPPED | OUTPATIENT
Start: 2023-08-07 | End: 2023-08-29

## 2023-08-25 NOTE — PROGRESS NOTES
"SUBJECTIVE:   Danielle is a 83 year old who presents for Preventive Visit.      Are you in the first 12 months of your Medicare coverage?  No    Healthy Habits:     In general, how would you rate your overall health?  Good    Frequency of exercise:  6-7 days/week    Duration of exercise:  15-30 minutes    Do you usually eat at least 4 servings of fruit and vegetables a day, include whole grains    & fiber and avoid regularly eating high fat or \"junk\" foods?  Yes    Taking medications regularly:  Yes    Medication side effects:  Not applicable    Ability to successfully perform activities of daily living:  No assistance needed    Home Safety:  No safety concerns identified    Hearing Impairment:  No hearing concerns    In the past 6 months, have you been bothered by leaking of urine? Yes    In general, how would you rate your overall mental or emotional health?  Good    Additional concerns today:  Yes      Have you ever done Advance Care Planning? (For example, a Health Directive, POLST, or a discussion with a medical provider or your loved ones about your wishes): No, advance care planning information given to patient to review.  Patient plans to discuss their wishes with loved ones or provider.      Fall risk  Fallen 2 or more times in the past year?: No  Any fall with injury in the past year?: No    Cognitive Screening   1) Repeat 3 items (Leader, Season, Table)    2) Clock draw: NORMAL  3) 3 item recall: Recalls 3 objects  Results: NORMAL clock, 1-2 items recalled: COGNITIVE IMPAIRMENT LESS LIKELY    Mini-CogTM Copyright FEMI Guzman. Licensed by the author for use in [facility]; reprinted with permission (lela@[email or web address]). All rights reserved.      Do you have sleep apnea, excessive snoring or daytime drowsiness? : no    Reviewed and updated as needed this visit by clinical staff   Tobacco  Allergies  Meds  Problems  Med Hx  Surg Hx  Fam Hx      Answers submitted by the patient for this visit:  Patient " Health Questionnaire (Submitted on 2023)  PHQ9 TOTAL SCORE: 0  ADONIS-7 (Submitted on 2023)  ADONIS 7 TOTAL SCORE: 0      Reviewed and updated as needed this visit by Provider   Tobacco  Allergies  Meds  Problems  Med Hx  Surg Hx  Fam Hx         Social History     Tobacco Use    Smoking status: Former     Packs/day: 0.50     Years: 5.00     Pack years: 2.50     Types: Cigarettes     Start date: 1963     Quit date: 1968     Years since quittin.3    Smokeless tobacco: Never   Substance Use Topics    Alcohol use: Yes     Alcohol/week: 0.8 standard drinks of alcohol     Types: 1 Glasses of wine per week     Comment: daily with dinner             2023     7:39 AM   Alcohol Use   Prescreen: >3 drinks/day or >7 drinks/week? No     Do you have a current opioid prescription? No  Do you use any other controlled substances or medications that are not prescribed by a provider? None      Hypertension Follow-up    Do you check your blood pressure regularly outside of the clinic? Yes   Are you following a low salt diet? Yes  Are your blood pressures ever more than 140 on the top number (systolic) OR more   than 90 on the bottom number (diastolic), for example 140/90? No    - just had coffee before coming in  - home blood pressures are generally at goal   - no HTN medications    BP Readings from Last 6 Encounters:   23 (!) 152/74   23 136/74   22 136/74   10/14/22 114/64   22 132/64   22 130/80         Chronic/Recurring Back Pain Follow Up    Where is your back pain located? (Select all that apply) low back left  How would you describe your back pain?  burning and constant  Where does your back pain spread? nowhere  Since your last clinic visit for back pain, how has your pain changed? always present, but gets better and worse  Does your back pain interfere with your job? Not applicable  Since your last visit, have you tried any new treatment? No    - CSA - signed today     - UDS: no surprises expected  - last tramadol was 2:30AM this morning      # fecal and urinary incontinence   - incomplete bowel movement   - urinary urgency   - colonoscopy (7/2014)   - feels like things are not where they should be     # jaw pain on right side  - one episode last week, which resolved w/o intervention   - previous episode many years ago    # palpations  - palpations at nights  - s/p ziopatch (3/8/2022) PVC, SVT    # Wellness:  - Mammogram (6/16/2023) : birads 2  - Immunizations: UTD  - Lipids: updating today  - Dexa scan (10/26/2022): lowest T score -1.9. major 15%. Hip 4.4%. d/t improvement of bone density will continue to monitor   - Colon cancer screening: colonoscopy (7/18/2014) w/ Collagenous colitis   - Lung cancer screening: quit 1968  - AAA screening:       Current providers sharing in care for this patient include:   Patient Care Team:  Fariba Brown MD as PCP - General (Family Medicine)  Nicole Joseph RN as Registered Nurse  Fariba Brown MD as Assigned PCP  Fariba Brown MD as Assigned Pain Medication Provider  oHlden Fischer MD as Assigned Surgical Provider    The following health maintenance items are reviewed in Epic and correct as of today:  Health Maintenance   Topic Date Due    Pneumococcal Vaccine: 65+ Years (2 - PPSV23 if available, else PCV20) 11/17/2017    URINE DRUG SCREEN  05/24/2023    TREATMENT AGREEMENT FOR CHRONIC PAIN MANAGEMENT  08/29/2023    INFLUENZA VACCINE (1) 09/01/2023    MEDICARE ANNUAL WELLNESS VISIT  08/29/2024    ADONIS ASSESSMENT  08/29/2024    FALL RISK ASSESSMENT  08/29/2024    PHQ-9  08/29/2024    DEXA  10/26/2027    ADVANCE CARE PLANNING  08/29/2028    DTAP/TDAP/TD IMMUNIZATION (2 - Td or Tdap) 02/22/2032    PHQ-2 (once per calendar year)  Completed    ZOSTER IMMUNIZATION  Completed    COVID-19 Vaccine  Completed    IPV IMMUNIZATION  Aged Out    MENINGITIS IMMUNIZATION  Aged Out    MAMMO SCREENING  Discontinued    COLORECTAL  CANCER SCREENING  Discontinued     BP Readings from Last 3 Encounters:   08/29/23 (!) 152/74   03/02/23 136/74   12/08/22 136/74    Wt Readings from Last 3 Encounters:   08/29/23 52.3 kg (115 lb 4.8 oz)   03/02/23 51.9 kg (114 lb 8 oz)   12/08/22 49.9 kg (110 lb)                  Patient Active Problem List   Diagnosis    H/O diverticulitis, S/P bowel resection    Dyslipidemia    Fibrocystic breast disease (FCBD)    Low back pain, chronic    GERD (gastroesophageal reflux)    Osteoarthritis, multiple joints    Comprehensive Medical Examination    Post-menopause    Diaphragmatic hernia    ACP (advance care planning)    Scoliosis, throracolumbar    Chronic, continuous use of opioids    Lyme disease    Stenosis of left carotid artery - s/p left endarterectomy. US due 11/2024    Urinary urgency    SVT (supraventricular tachycardia) (H)    Osteopenia of multiple sites    Bilateral carotid artery stenosis     Past Surgical History:   Procedure Laterality Date    ------------OTHER-------------      colonoscopy with biopsy - diverticulitis, hemorrhoids    ------------OTHER-------------  04/19/1994    sigmoidoscopy - abdominal pain, diverticula    ABDOMEN SURGERY      colon removed 2nd to diverticulitis    APPENDECTOMY      ARTHROSCOPY SHOULDER  11/20/2013    Procedure: ARTHROSCOPY SHOULDER;  Right Shoulder Arthroscopy Sub-Acromial Decompression Rotator Cuff Repair;  Surgeon: Lenny Trammell MD;  Location: [facility]    [facility]    Divert[facility]itis      bowel resection    EGD with biopsy  01/01/2011    ENDARTERECTOMY CAROTID Left 10/17/2022    [facility]. Dr. Springer    ENDOSCOPY UPPER WITH PANCREATIC STIMULATION      ENDOSCOPY UPPER, COLONOSCOPY, COMBINED  07/18/2014    Procedure: COMBINED ENDOSCOPY UPPER, COLONOSCOPY;  Surgeon: Israel Feliciano MD;  Location: [facility] OR    ENT SURGERY      tonsilectomy    ESOPHAGOSCOPY, GASTROSCOPY, DUODENOSCOPY (EGD), COMBINED N/A 09/27/2017     Procedure: COMBINED ESOPHAGOSCOPY, GASTROSCOPY, DUODENOSCOPY (EGD);  UPPER ENDOSCOPY WITH BIOPSY AND POLYPECTOMY;  Surgeon: Gallito Alvarado DO;  Location: [facility] OR    GYN SURGERY      hysterectomy with bladder and rectal repair    IR CONSULTATION FOR IR EXAM  2019    ORTHOPEDIC SURGERY  2013    right rotator cuff surgery    TVH with ovarian preservation         Social History     Tobacco Use    Smoking status: Former     Packs/day: 0.50     Years: 5.00     Pack years: 2.50     Types: Cigarettes     Start date: 1963     Quit date: 1968     Years since quittin.3    Smokeless tobacco: Never   Substance Use Topics    Alcohol use: Yes     Alcohol/week: 0.8 standard drinks of alcohol     Types: 1 Glasses of wine per week     Comment: daily with dinner     Family History   Problem Relation Age of Onset    Cerebrovascular Disease Father         CVA    Heart Disease Father         heart disease    Hypertension Father     Myocardial Infarction Father         myocardial infarction    Cerebrovascular Disease Mother         CVA    Diabetes Mother     Heart Disease Mother         heart disease    Hypertension Mother     Heart Disease Brother         heart disease    Hypertension Brother          Current Outpatient Medications   Medication Sig Dispense Refill    aspirin 81 MG EC tablet Take 81 mg by mouth daily      atorvastatin (LIPITOR) 20 MG tablet Take 1 tablet by mouth once daily 90 tablet 3    Calcium Carbonate-Vitamin D (CALCIUM + D PO) Take 600 mg by mouth.      estradiol (ESTRACE) 0.5 MG tablet Take 1 tablet by mouth once daily 90 tablet 2    GLUCOSAMINE SULFATE PO Take  by mouth daily.      latanoprost (XALATAN) 0.005 % ophthalmic solution Inject 1 drop into the eye At Bedtime      Multiple Vitamins-Minerals (MULTIVITAL PO) Take 1 tablet by mouth daily.      Omega-3 Fatty Acids (OMEGA-3 FISH OIL PO) Take  by mouth daily.      oxyBUTYnin ER (DITROPAN XL) 5 MG 24 hr tablet Take 1 tablet (5 mg) by  mouth daily 90 tablet 1    pantoprazole (PROTONIX) 40 MG EC tablet TAKE 1 TABLET BY MOUTH IN THE MORNING BEFORE BREAKFAST 90 tablet 1    traMADol (ULTRAM) 50 MG tablet TAKE 1 TO 2 TABLETS BY MOUTH EVERY 6 TO 8 HOURS AS NEEDED 120 tablet 0    Wheat Dextrin (BENEFIBER) POWD Take by mouth daily       No Known Allergies    Mammogram Screening - Patient over age 75, has elected to continue with screening.  Pertinent mammograms are reviewed under the imaging tab.    Review of Systems   Constitutional:  Negative for chills and fever.   HENT:  Negative for congestion, ear pain, hearing loss and sore throat.    Eyes:  Negative for pain and visual disturbance.   Respiratory:  Negative for cough and shortness of breath.    Cardiovascular:  Positive for palpitations and peripheral edema. Negative for chest pain.   Gastrointestinal:  Negative for abdominal pain, constipation, diarrhea, heartburn, hematochezia and nausea.        Fecal incontinence, feeling full      Breasts:  Negative for tenderness, breast mass and discharge.   Genitourinary:  Positive for frequency and urgency. Negative for dysuria, genital sores, hematuria, vaginal bleeding and vaginal discharge.        Urgency    Musculoskeletal:  Negative for arthralgias, joint swelling and myalgias.   Skin:  Negative for rash.   Neurological:  Positive for weakness. Negative for dizziness, headaches and paresthesias.   Psychiatric/Behavioral:  Negative for mood changes. The patient is not nervous/anxious.          OBJECTIVE:   BP (!) 152/74   Pulse 71   Temp 98  F (36.7  C) (Tympanic)   Resp 18   Wt 52.3 kg (115 lb 4.8 oz)   SpO2 95%   BMI 22.52 kg/m   Estimated body mass index is 22.52 kg/m  as calculated from the following:    Height as of 12/8/22: 1.524 m (5').    Weight as of this encounter: 52.3 kg (115 lb 4.8 oz).  Physical Exam  Constitutional:       General: She is not in acute distress.     Appearance: She is well-developed. She is not ill-appearing.   HENT:       Head: Normocephalic and atraumatic.      Right Ear: Hearing normal. There is impacted cerumen.      Left Ear: Hearing and tympanic membrane normal.      Mouth/Throat:      Mouth: Mucous membranes are moist.      Pharynx: No oropharyngeal exudate.   Eyes:      Extraocular Movements: Extraocular movements intact.      Conjunctiva/sclera: Conjunctivae normal.   Neck:      Thyroid: No thyromegaly.   Cardiovascular:      Rate and Rhythm: Normal rate and regular rhythm.      Pulses: Normal pulses.      Heart sounds: Normal heart sounds. No murmur heard.  Pulmonary:      Effort: Pulmonary effort is normal. No respiratory distress.      Breath sounds: Normal breath sounds. No wheezing or rales.   Abdominal:      General: Bowel sounds are normal. There is no distension.      Palpations: Abdomen is soft.      Tenderness: There is abdominal tenderness (h/o diverticulosis) in the left lower quadrant. There is no guarding.   Musculoskeletal:         General: Normal range of motion.      Cervical back: Normal range of motion and neck supple.      Right lower leg: No edema.      Left lower leg: No edema.   Lymphadenopathy:      Cervical: No cervical adenopathy.   Skin:     General: Skin is dry.   Neurological:      Mental Status: She is alert.   Psychiatric:         Mood and Affect: Mood normal.         Diagnostic Test Results:  Results for orders placed or performed in visit on 08/29/23 (from the past 24 hour(s))   Lipid Profile   Result Value Ref Range    Cholesterol 162 <200 mg/dL    Triglycerides 121 <150 mg/dL    Direct Measure HDL 70 >=50 mg/dL    LDL Cholesterol Calculated 68 <=100 mg/dL    Non HDL Cholesterol 92 <130 mg/dL    Narrative    Cholesterol  Desirable:  <200 mg/dL    Triglycerides  Normal:  Less than 150 mg/dL  Borderline High:  150-199 mg/dL  High:  200-499 mg/dL  Very High:  Greater than or equal to 500 mg/dL    Direct Measure HDL  Female:  Greater than or equal to 50 mg/dL   Male:  Greater than or equal  to 40 mg/dL    LDL Cholesterol  Desirable:  <100mg/dL  Above Desirable:  100-129 mg/dL   Borderline High:  130-159 mg/dL   High:  160-189 mg/dL   Very High:  >= 190 mg/dL    Non HDL Cholesterol  Desirable:  130 mg/dL  Above Desirable:  130-159 mg/dL  Borderline High:  160-189 mg/dL  High:  190-219 mg/dL  Very High:  Greater than or equal to 220 mg/dL   CBC with platelets   Result Value Ref Range    WBC Count 5.7 4.0 - 11.0 10e3/uL    RBC Count 5.02 3.80 - 5.20 10e6/uL    Hemoglobin 14.7 11.7 - 15.7 g/dL    Hematocrit 44.0 35.0 - 47.0 %    MCV 88 78 - 100 fL    MCH 29.3 26.5 - 33.0 pg    MCHC 33.4 31.5 - 36.5 g/dL    RDW 13.4 10.0 - 15.0 %    Platelet Count 256 150 - 450 10e3/uL   Basic metabolic panel   Result Value Ref Range    Sodium 141 136 - 145 mmol/L    Potassium 3.8 3.4 - 5.3 mmol/L    Chloride 104 98 - 107 mmol/L    Carbon Dioxide (CO2) 28 22 - 29 mmol/L    Anion Gap 9 7 - 15 mmol/L    Urea Nitrogen 11.9 8.0 - 23.0 mg/dL    Creatinine 0.87 0.51 - 0.95 mg/dL    Calcium 9.5 8.8 - 10.2 mg/dL    Glucose 86 70 - 99 mg/dL    GFR Estimate 65 >60 mL/min/1.73m2   Drug Confirmation Panel Urine with Creatinine    Narrative    The following orders were created for panel order Drug Confirmation Panel Urine with Creatinine.  Procedure                               Abnormality         Status                     ---------                               -----------         ------                     Urine Drug Confirmation ...[[id number]]                      In process                 Urine Creatinine for Joe...[[id number]]                      Final result                 Please view results for these tests on the individual orders.   Urine Creatinine for Drug Screen Panel   Result Value Ref Range    Creatinine Urine for Drug Screen 76 mg/dL       ASSESSMENT / PLAN:   (Z00.00) Routine general medical examination at a health care facility  (primary encounter diagnosis)  Comment: Danielle is doing well.  Preventive care orders  placed  Plan: repeat wellness exam in one year     (E78.2) Dyslipidemia  Comment: LDL today of 68  Plan: Basic metabolic panel, CBC with platelets,         Lipid Profile        C/w atorvastatin     (I65.23) Bilateral carotid artery stenosis / (I65.22) Stenosis of left carotid artery - s/p left endarterectomy. US due 11/2024  Comment: follows with [facility] vascular  Plan: due for repeat US 11/2024      (I47.1) SVT (supraventricular tachycardia) (H)  Comment: chronic. Some symptoms at night when it quiet. No symptoms during the day   Plan: ziopatch (3/8/2022) 373 SVT runs    (M54.50,  G89.29) Low back pain, chronic / (S33.8XXD) Sprain of other parts of lumbar spine and pelvis, subsequent encounter / (Z51.81) Encounter for therapeutic drug monitoring  Comment: CSA discussed and signed today. UDS updating today  Plan: [place] PDMP reviewed and appropriate. Refilled tramadol, Drug Confirmation Panel Urine with Creatinine          (N39.46) Mixed incontinence  Comment:   Plan: colonoscopy d/t concern for chronic colitis. Oxybutynin for urine    (N32.81) OAB (overactive bladder)  Comment: will trial oxybutynin for urgency. No issues with stress incontinence   Plan: oxyBUTYnin ER (DITROPAN XL) 5 MG 24 hr tablet    (R19.4) Bowel habit changes / (K52.831) Collagenous colitis /(Z12.11) Special screening for malignant neoplasms, colon  Comment: last colonoscopy (2014) with colitis   Plan: Adult General Surg Referral    (M85.89) Osteopenia of multiple sites  Comment: due to improvement from previous. Continue with calcium / vitamin d/ exercise  Plan: repeat dexa 10/2024      COUNSELING:  Reviewed preventive health counseling, as reflected in patient instructions        She reports that she quit smoking about 55 years ago. Her smoking use included cigarettes. She started smoking about 60 years ago. She has a 2.50 pack-year smoking history. She has never used smokeless tobacco.      Appropriate preventive services were discussed with  this patient, including applicable screening as appropriate for cardiovascular disease, diabetes, osteopenia/osteoporosis, and glaucoma.  As appropriate for age/gender, discussed screening for colorectal cancer, prostate cancer, breast cancer, and cervical cancer. Checklist reviewing preventive services available has been given to the patient.    Reviewed patients plan of care and provided an AVS. The Intermediate Care Plan ( asthma action plan, low back pain action plan, and migraine action plan) for Demi meets the Care Plan requirement. This Care Plan has been established and reviewed with the Patient.          Fariba Brown MD  [facility] - [place]    Identified Health Risks:  I have reviewed Opioid Use Disorder and Substance Use Disorder risk factors and made any needed referrals. - no referrals required

## 2023-08-25 NOTE — PATIENT INSTRUCTIONS
Try oxybutynin for your bladder urgency     We  put in a referral to general surgery for colonoscopy    Preventive Health Recommendations    See your health care provider every year to  Review health changes.   Discuss preventive care.    Review your medicines if your doctor has prescribed any.    You no longer need a yearly Pap test unless you've had an abnormal Pap test in the past 10 years. If you have vaginal symptoms, such as bleeding or discharge, be sure to talk with your provider about a Pap test.    Every 1 to 2 years, have a mammogram.  If you are over 69, talk with your health care provider about whether or not you want to continue having screening mammograms.    Every 10 years, have a colonoscopy. Or, have a yearly FIT test (stool test). These exams will check for colon cancer.     Have a cholesterol test every 5 years, or more often if your doctor advises it.     Have a diabetes test (fasting glucose) every three years. If you are at risk for diabetes, you should have this test more often.     At age 65, have a bone density scan (DEXA) to check for osteoporosis (brittle bone disease).    Shots:  Get a flu shot each year.  Get a tetanus shot every 10 years.  Talk to your doctor about your pneumonia vaccines. There are now two you should receive - Pneumovax (PPSV 23) and Prevnar (PCV 13).  Talk to your pharmacist about the shingles vaccine.  Talk to your doctor about the hepatitis B vaccine.    Nutrition:   Eat at least 5 servings of fruits and vegetables each day.    Eat whole-grain bread, whole-wheat pasta and brown rice instead of white grains and rice.    Get adequate about Calcium and Vitamin D.     Lifestyle  Exercise at least 150 minutes a week (30 minutes a day, 5 days a week). This will help you control your weight and prevent disease.    Limit alcohol to one drink per day.    No smoking.     Wear sunscreen to prevent skin cancer.     See your dentist twice a year for an exam and cleaning.    See  your eye doctor every 1 to 2 years to screen for conditions such as glaucoma, macular degeneration, cataracts, etc.    Personalized Prevention Plan  You are due for the preventive services outlined below.  Your care team is available to assist you in scheduling these services.  If you have already completed any of these items, please share that information with your care team to update in your medical record.    Health Maintenance Due   Topic Date Due    Pneumococcal Vaccine (2 - PPSV23 if available, else PCV20) 11/17/2017    Anxiety Assessment  05/24/2023    URINE DRUG SCREEN  05/24/2023    Depression Assessment  05/24/2023    TREATMENT AGREEMENT FOR CHRONIC PAIN MANAGEMENT  08/29/2023    Flu Vaccine (1) 09/01/2023

## 2023-08-29 ENCOUNTER — APPOINTMENT (OUTPATIENT)
Dept: LAB | Facility: OTHER | Age: 83
End: 2023-08-29
Attending: FAMILY MEDICINE
Payer: COMMERCIAL

## 2023-08-29 ENCOUNTER — OFFICE VISIT (OUTPATIENT)
Dept: FAMILY MEDICINE | Facility: OTHER | Age: 83
End: 2023-08-29
Attending: FAMILY MEDICINE
Payer: COMMERCIAL

## 2023-08-29 VITALS
TEMPERATURE: 98 F | HEART RATE: 71 BPM | OXYGEN SATURATION: 95 % | SYSTOLIC BLOOD PRESSURE: 152 MMHG | WEIGHT: 115.3 LBS | BODY MASS INDEX: 22.52 KG/M2 | RESPIRATION RATE: 18 BRPM | DIASTOLIC BLOOD PRESSURE: 74 MMHG

## 2023-08-29 DIAGNOSIS — N32.81 OAB (OVERACTIVE BLADDER): ICD-10-CM

## 2023-08-29 DIAGNOSIS — K52.831 COLLAGENOUS COLITIS: ICD-10-CM

## 2023-08-29 DIAGNOSIS — Z51.81 ENCOUNTER FOR THERAPEUTIC DRUG MONITORING: ICD-10-CM

## 2023-08-29 DIAGNOSIS — G89.29 CHRONIC BILATERAL LOW BACK PAIN WITHOUT SCIATICA: ICD-10-CM

## 2023-08-29 DIAGNOSIS — E78.2 MIXED HYPERLIPIDEMIA: ICD-10-CM

## 2023-08-29 DIAGNOSIS — M85.89 OSTEOPENIA OF MULTIPLE SITES: ICD-10-CM

## 2023-08-29 DIAGNOSIS — I65.22 STENOSIS OF LEFT CAROTID ARTERY: ICD-10-CM

## 2023-08-29 DIAGNOSIS — M54.50 CHRONIC BILATERAL LOW BACK PAIN WITHOUT SCIATICA: ICD-10-CM

## 2023-08-29 DIAGNOSIS — I47.10 SVT (SUPRAVENTRICULAR TACHYCARDIA) (H): ICD-10-CM

## 2023-08-29 DIAGNOSIS — S33.8XXD SPRAIN OF OTHER PARTS OF LUMBAR SPINE AND PELVIS, SUBSEQUENT ENCOUNTER: ICD-10-CM

## 2023-08-29 DIAGNOSIS — N39.46 MIXED INCONTINENCE: ICD-10-CM

## 2023-08-29 DIAGNOSIS — R19.4 BOWEL HABIT CHANGES: ICD-10-CM

## 2023-08-29 DIAGNOSIS — I65.23 BILATERAL CAROTID ARTERY STENOSIS: ICD-10-CM

## 2023-08-29 DIAGNOSIS — Z12.11 SPECIAL SCREENING FOR MALIGNANT NEOPLASMS, COLON: ICD-10-CM

## 2023-08-29 DIAGNOSIS — Z00.00 ROUTINE GENERAL MEDICAL EXAMINATION AT A HEALTH CARE FACILITY: Primary | ICD-10-CM

## 2023-08-29 LAB
ANION GAP SERPL CALCULATED.3IONS-SCNC: 9 MMOL/L (ref 7–15)
BUN SERPL-MCNC: 11.9 MG/DL (ref 8–23)
CALCIUM SERPL-MCNC: 9.5 MG/DL (ref 8.8–10.2)
CHLORIDE SERPL-SCNC: 104 MMOL/L (ref 98–107)
CHOLEST SERPL-MCNC: 162 MG/DL
CREAT SERPL-MCNC: 0.87 MG/DL (ref 0.51–0.95)
CREAT UR-MCNC: 76 MG/DL
DEPRECATED HCO3 PLAS-SCNC: 28 MMOL/L (ref 22–29)
ERYTHROCYTE [DISTWIDTH] IN BLOOD BY AUTOMATED COUNT: 13.4 % (ref 10–15)
GFR SERPL CREATININE-BSD FRML MDRD: 65 ML/MIN/1.73M2
GLUCOSE SERPL-MCNC: 86 MG/DL (ref 70–99)
HCT VFR BLD AUTO: 44 % (ref 35–47)
HDLC SERPL-MCNC: 70 MG/DL
HGB BLD-MCNC: 14.7 G/DL (ref 11.7–15.7)
LDLC SERPL CALC-MCNC: 68 MG/DL
MCH RBC QN AUTO: 29.3 PG (ref 26.5–33)
MCHC RBC AUTO-ENTMCNC: 33.4 G/DL (ref 31.5–36.5)
MCV RBC AUTO: 88 FL (ref 78–100)
NONHDLC SERPL-MCNC: 92 MG/DL
PLATELET # BLD AUTO: 256 10E3/UL (ref 150–450)
POTASSIUM SERPL-SCNC: 3.8 MMOL/L (ref 3.4–5.3)
RBC # BLD AUTO: 5.02 10E6/UL (ref 3.8–5.2)
SODIUM SERPL-SCNC: 141 MMOL/L (ref 136–145)
TRIGL SERPL-MCNC: 121 MG/DL
WBC # BLD AUTO: 5.7 10E3/UL (ref 4–11)

## 2023-08-29 PROCEDURE — 80048 BASIC METABOLIC PNL TOTAL CA: CPT | Mod: ZL | Performed by: FAMILY MEDICINE

## 2023-08-29 PROCEDURE — 80360 METHYLPHENIDATE: CPT | Mod: ZL | Performed by: FAMILY MEDICINE

## 2023-08-29 PROCEDURE — 36415 COLL VENOUS BLD VENIPUNCTURE: CPT | Mod: ZL | Performed by: FAMILY MEDICINE

## 2023-08-29 PROCEDURE — 85027 COMPLETE CBC AUTOMATED: CPT | Mod: ZL | Performed by: FAMILY MEDICINE

## 2023-08-29 PROCEDURE — 80061 LIPID PANEL: CPT | Mod: ZL | Performed by: FAMILY MEDICINE

## 2023-08-29 PROCEDURE — G0439 PPPS, SUBSEQ VISIT: HCPCS | Performed by: FAMILY MEDICINE

## 2023-08-29 PROCEDURE — 99213 OFFICE O/P EST LOW 20 MIN: CPT | Mod: 25 | Performed by: FAMILY MEDICINE

## 2023-08-29 PROCEDURE — 80359 METHYLENEDIOXYAMPHETAMINES: CPT | Mod: ZL | Performed by: FAMILY MEDICINE

## 2023-08-29 PROCEDURE — G0463 HOSPITAL OUTPT CLINIC VISIT: HCPCS

## 2023-08-29 PROCEDURE — 80357 KETAMINE AND NORKETAMINE: CPT | Mod: ZL | Performed by: FAMILY MEDICINE

## 2023-08-29 RX ORDER — TRAMADOL HYDROCHLORIDE 50 MG/1
TABLET ORAL
Qty: 120 TABLET | Refills: 0 | Status: SHIPPED | OUTPATIENT
Start: 2023-08-29 | End: 2023-10-02

## 2023-08-29 RX ORDER — OXYBUTYNIN CHLORIDE 5 MG/1
5 TABLET, EXTENDED RELEASE ORAL DAILY
Qty: 90 TABLET | Refills: 1 | Status: SHIPPED | OUTPATIENT
Start: 2023-08-29 | End: 2024-03-04

## 2023-08-29 ASSESSMENT — ANXIETY QUESTIONNAIRES
6. BECOMING EASILY ANNOYED OR IRRITABLE: NOT AT ALL
4. TROUBLE RELAXING: NOT AT ALL
GAD7 TOTAL SCORE: 0
3. WORRYING TOO MUCH ABOUT DIFFERENT THINGS: NOT AT ALL
1. FEELING NERVOUS, ANXIOUS, OR ON EDGE: NOT AT ALL
GAD7 TOTAL SCORE: 0
7. FEELING AFRAID AS IF SOMETHING AWFUL MIGHT HAPPEN: NOT AT ALL
2. NOT BEING ABLE TO STOP OR CONTROL WORRYING: NOT AT ALL
5. BEING SO RESTLESS THAT IT IS HARD TO SIT STILL: NOT AT ALL

## 2023-08-29 ASSESSMENT — ENCOUNTER SYMPTOMS
PARESTHESIAS: 0
HEMATOCHEZIA: 0
JOINT SWELLING: 0
NERVOUS/ANXIOUS: 0
DIARRHEA: 0
MYALGIAS: 0
WEAKNESS: 1
SORE THROAT: 0
CHILLS: 0
HEARTBURN: 0
EYE PAIN: 0
CONSTIPATION: 0
SHORTNESS OF BREATH: 0
FEVER: 0
DIZZINESS: 0
HEADACHES: 0
BREAST MASS: 0
HEMATURIA: 0
DYSURIA: 0
ARTHRALGIAS: 0
ABDOMINAL PAIN: 0
NAUSEA: 0
COUGH: 0
PALPITATIONS: 1
FREQUENCY: 1

## 2023-08-29 ASSESSMENT — ACTIVITIES OF DAILY LIVING (ADL): CURRENT_FUNCTION: NO ASSISTANCE NEEDED

## 2023-08-29 ASSESSMENT — PATIENT HEALTH QUESTIONNAIRE - PHQ9
SUM OF ALL RESPONSES TO PHQ QUESTIONS 1-9: 0
SUM OF ALL RESPONSES TO PHQ QUESTIONS 1-9: 0

## 2023-08-29 ASSESSMENT — PAIN SCALES - GENERAL: PAINLEVEL: MILD PAIN (3)

## 2023-08-29 NOTE — LETTER
Opioid / Opioid Plus Controlled Substance Agreement    This is an agreement between you and your provider about the safe and appropriate use of controlled substance/opioids prescribed by your care team. Controlled substances are medicines that can cause physical and mental dependence (abuse).    There are strict laws about having and using these medicines. We here at [facility] are committing to working with you in your efforts to get better. To support you in this work, we ll help you schedule regular office appointments for medicine refills. If we must cancel or change your appointment for any reason, we ll make sure you have enough medicine to last until your next appointment.     As a Provider, I will:  Listen carefully to your concerns and treat you with respect.   Recommend a treatment plan that I believe is in your best interest. This plan may involve therapies other than opioid pain medication.   Talk with you often about the possible benefits, and the risk of harm of any medicine that we prescribe for you.   Provide a plan on how to taper (discontinue or go off) using this medicine if the decision is made to stop its use.    As a Patient, I understand that opioid(s):   Are a controlled substance prescribed by my care team to help me function or work and manage my condition(s).   Are strong medicines and can cause serious side effects such as:  Drowsiness, which can seriously affect my driving ability  A lower breathing rate, enough to cause death  Harm to my thinking ability   Depression   Abuse of and addiction to this medicine  Need to be taken exactly as prescribed. Combining opioids with certain medicines or chemicals (such as illegal drugs, sedatives, sleeping pills, and benzodiazepines) can be dangerous or even fatal. If I stop opioids suddenly, I may have severe withdrawal symptoms.  Do not work for all types of pain nor for all patients. If they re not helpful, I may be asked to stop  them.        The risks, benefits and side effects of these medicine(s) were explained to me. I agree that:  I will take part in other treatments as advised by my care team. This may be psychiatry or counseling, physical therapy, behavioral therapy, group treatment or a referral to a specialist.     I will keep all my appointments. I understand that this is part of the monitoring of opioids. My care team may require an office visit for EVERY opioid/controlled substance refill. If I miss appointments or don t follow instructions, my care team may stop my medicine.    I will take my medicines as prescribed. I will not change the dose or schedule unless my care team tells me to. There will be no refills if I run out early.     I may be asked to come to the clinic and complete a urine drug test or complete a pill count at any time. If I don t give a urine sample or participate in a pill count, the care team may stop my medicine.    I will only receive prescriptions from this clinic for chronic pain. If I am treated by another provider for acute pain issues, I will tell them that I am taking opioid pain medication for chronic pain and that I have a treatment agreement with this provider. I will inform my [facility] care team within one business day if I am given a prescription for any pain medication by another healthcare provider. My [facility] care team can contact other providers and pharmacists about my use of any medicines.    It is up to me to make sure that I don t run out of my medicines on weekends or holidays. If my care team is willing to refill my opioid prescription without a visit, I must request refills only during office hours. Refills may take up to 3 business days to process. I will use one pharmacy to fill all my opioid and other controlled substance prescriptions. I will notify the clinic about any changes to my insurance or medication availability.    I am responsible for my  prescriptions. If the medicine/prescription is lost, stolen or destroyed, it will not be replaced. I also agree not to share controlled substance medicines with anyone.    I am aware I should not use any illegal or recreational drugs. I agree not to drink alcohol unless my care team says I can.       If I enroll in the [place] [facility] Cann[facility] program, I will tell my care team prior to my next refill.     I will tell my care team right away if I become pregnant, have a new medical problem treated outside of my regular clinic, or have a change in my medications.    I understand that this medicine can affect my thinking, judgment and reaction time. Alcohol and drugs affect the brain and body, which can affect the safety of my driving. Being under the influence of alcohol or drugs can affect my decision-making, behaviors, personal safety, and the safety of others. Driving while impaired (DWI) can occur if a person is driving, operating, or in physical control of a car, motorcycle, boat, snowmobile, ATV, motorbike, off-road vehicle, or any other motor vehicle ([place] Statute 169A.20). I understand the risk if I choose to drive or operate any vehicle or machinery.    I understand that if I do not follow any of the conditions above, my prescriptions or treatment may be stopped or changed.          Opioids  What You Need to Know    What are opioids?   Opioids are pain medicines that must be prescribed by a doctor. They are also known as narcotics.     Examples are:   morphine (MS Contin, Arina)  oxycodone (Oxycontin)  oxycodone and acetaminophen (Percocet)  hydrocodone and acetaminophen (Vicodin, Norco)   fentanyl patch (Duragesic)   hydromorphone (Dilaudid)   methadone  codeine (Tylenol #3)     What do opioids do well?   Opioids are best for severe short-term pain such as after a surgery or injury. They may work well for cancer pain. They may help some people with long-lasting (chronic) pain.     What do opioids NOT do  well?   Opioids never get rid of pain entirely, and they don t work well for most patients with chronic pain. Opioids don t reduce swelling, one of the causes of pain.                                    Other ways to manage chronic pain and improve function include:     Treat the health problem that may be causing pain  Anti-inflammation medicines, which reduce swelling and tenderness, such as ibuprofen (Advil, Motrin) or naproxen (Aleve)  Acetaminophen (Tylenol)  Antidepressants and anti-seizure medicines, especially for nerve pain  Topical treatments such as patches or creams  Injections or nerve blocks  Chiropractic or osteopathic treatment  Acupuncture, massage, deep breathing, meditation, visual imagery, aromatherapy  Use heat or ice at the pain site  Physical therapy   Exercise  Stop smoking  Take part in therapy       Risks and side effects     Talk to your doctor before you start or decide to keep taking opioids. Possible side effects include:    Lowering your breathing rate enough to cause death  Overdose, including death, especially if taking higher than prescribed doses  Worse depression symptoms; less pleasure in things you usually enjoy  Feeling tired or sluggish  Slower thoughts or cloudy thinking  Being more sensitive to pain over time; pain is harder to control  Trouble sleeping or restless sleep  Changes in hormone levels (for example, less testosterone)  Changes in sex drive or ability to have sex  Constipation  Unsafe driving  Itching and sweating  Dizziness  Nausea, throwing up and dry mouth    What else should I know about opioids?    Opioids may lead to dependence, tolerance, or addiction.    Dependence means that if you stop or reduce the medicine too quickly, you will have withdrawal symptoms. These include loose poop (diarrhea), jitters, flu-like symptoms, nervousness and tremors. Dependence is not the same as addiction.                     Tolerance means needing higher doses over time to  get the same effect. This may increase the chance of serious side effects.    Addiction is when people improperly use a substance that harms their body, their mind or their relations with others. Use of opiates can cause a relapse of addiction if you have a history of drug or alcohol abuse.    People who have used opioids for a long time may have a lower quality of life, worse depression, higher levels of pain and more visits to doctors.    You can overdose on opioids. Take these steps to lower your risk of overdose:    Recognize the signs:  Signs of overdose include decrease or loss of consciousness (blackout), slowed breathing, trouble waking up and blue lips. If someone is worried about overdose, they should call 911.    Talk to your doctor about Narcan (naloxone).   If you are at risk for overdose, you may be given a prescription for Narcan. This medicine very quickly reverses the effects of opioids.   If you overdose, a friend or family member can give you Narcan while waiting for the ambulance. They need to know the signs of overdose and how to give Narcan.     Don't use alcohol or street drugs.   Taking them with opioids can cause death.    Do not take any of these medicines unless your doctor says it s OK. Taking these with opioids can cause death:  Benzodiazepines, such as lorazepam (Ativan), alprazolam (Xanax) or diazepam (Valium)  Muscle relaxers, such as cyclobenzaprine (Flexeril)  Sleeping pills like zolpidem (Ambien)   Other opioids      How to keep you and other people safe while taking opioids:    Never share your opioids with others.  Opioid medicines are regulated by the Drug Enforcement Agency (MICHELLE). Selling or sharing medications is a criminal act.    2. Be sure to store opioids in a secure place, locked up if possible. Young children can easily swallow them and overdose.    3. When you are traveling with your medicines, keep them in the original bottles. If you use a pill box, be sure you also  carry a copy of your medicine list from your clinic or pharmacy.    4. Safe disposal of opioids    Most pharmacies have places to get rid of medicine, called disposal kiosks. Medicine disposal options are also available in every [facility]. Search your county and  medication disposal  to find more options. You can find more details at:  https://[email or web address]-green/managing-unwanted-medications     I agree that my provider, clinic care team, and pharmacy may work with any city, state or federal law enforcement agency that investigates the misuse, sale, or other diversion of my controlled medicine. I will allow my provider to discuss my care with, or share a copy of, this agreement with any other treating provider, pharmacy or emergency room where I receive care.    I have read this agreement and have asked questions about anything I did not understand.    _______________________________________________________  Patient Signature - Demi Narayan _____________________                   Date     _______________________________________________________  Provider Signature - Fariba Brown MD   _____________________                   Date     _______________________________________________________  Witness Signature (required if provider not present while patient signing)   _____________________                   Date

## 2023-08-31 ENCOUNTER — OFFICE VISIT (OUTPATIENT)
Dept: CHIROPRACTIC MEDICINE | Facility: OTHER | Age: 83
End: 2023-08-31
Attending: CHIROPRACTOR
Payer: COMMERCIAL

## 2023-08-31 DIAGNOSIS — M54.50 ACUTE BILATERAL LOW BACK PAIN WITHOUT SCIATICA: ICD-10-CM

## 2023-08-31 DIAGNOSIS — M99.03 SEGMENTAL AND SOMATIC DYSFUNCTION OF LUMBAR REGION: Primary | ICD-10-CM

## 2023-08-31 DIAGNOSIS — M99.02 SEGMENTAL AND SOMATIC DYSFUNCTION OF THORACIC REGION: ICD-10-CM

## 2023-08-31 PROCEDURE — 98940 CHIROPRACT MANJ 1-2 REGIONS: CPT | Mod: AT | Performed by: CHIROPRACTOR

## 2023-09-06 NOTE — PROGRESS NOTES
Subjective Finding:    Chief compalint: Patient presents with:  Back Pain  , Pain Scale: 4/10, Intensity: sharp, Duration: 2 weeks, Radiating:  no.    Date of injury:     Activities that the pain restricts:   Home/household/hobbies/social activities: Yes.  Work duties: No.  Sleep: No.  Makes symptoms better: rest.  Makes symptoms worse: activity.  Have you seen anyone else for the symptoms? Yes: MD.  Work related: No.  Automobile related injury: No.    Objective and Assessment:    Posture Analysis:   High shoulder: .  Head tilt: .  High iliac crest: right.  Head carriage: neutral.  Thoracic Kyphosis: forward.  Lumbar Lordosis: forward.    Lumbar Range of Motion: extension decreased.  Cervical Range of Motion: extension decreased.  Thoracic Range of Motion: extension decreased.  Extremity Range of Motion: .    Palpation:   T paraspinals: dull pain, no    Segmental dysfunction pre-treatment and treatment area: T5, T6, L3, and L4.    Assessment post-treatment:  Cervical: .  Thoracic: ROM increased.  Lumbar: ROM increased.    Comments: .      Complicating Factors: .    Procedure(s):  CMT:  19270 Chiropractic manipulative treatment 1-2 regions performed   Thoracic: Diversified, See above for level, Prone and Lumbar: Diversified, See above for level, Side posture    Modalities:  None performed this visit    Therapeutic procedures:  None    Plan:  Treatment plan:  1 times per week for 4 weeks.  Instructed patient: stretch as instructed at visit and walk 10 minutes.  Short term goals: increase ROM.  Long term goals: increase ADL.  Prognosis: good.

## 2023-09-07 ENCOUNTER — PREP FOR PROCEDURE (OUTPATIENT)
Dept: SURGERY | Facility: OTHER | Age: 83
End: 2023-09-07

## 2023-09-07 ENCOUNTER — OFFICE VISIT (OUTPATIENT)
Dept: SURGERY | Facility: OTHER | Age: 83
End: 2023-09-07
Attending: CLINICAL NURSE SPECIALIST
Payer: COMMERCIAL

## 2023-09-07 VITALS
WEIGHT: 110 LBS | HEIGHT: 60 IN | OXYGEN SATURATION: 99 % | BODY MASS INDEX: 21.6 KG/M2 | TEMPERATURE: 97.5 F | DIASTOLIC BLOOD PRESSURE: 68 MMHG | SYSTOLIC BLOOD PRESSURE: 116 MMHG | HEART RATE: 78 BPM | RESPIRATION RATE: 15 BRPM

## 2023-09-07 DIAGNOSIS — R19.4 CHANGE IN BOWEL HABITS: Primary | ICD-10-CM

## 2023-09-07 DIAGNOSIS — R19.4 BOWEL HABIT CHANGES: Primary | ICD-10-CM

## 2023-09-07 DIAGNOSIS — K52.831 COLLAGENOUS COLITIS: ICD-10-CM

## 2023-09-07 DIAGNOSIS — Z12.11 SPECIAL SCREENING FOR MALIGNANT NEOPLASMS, COLON: ICD-10-CM

## 2023-09-07 PROCEDURE — 99213 OFFICE O/P EST LOW 20 MIN: CPT | Performed by: CLINICAL NURSE SPECIALIST

## 2023-09-07 PROCEDURE — G0463 HOSPITAL OUTPT CLINIC VISIT: HCPCS

## 2023-09-07 PROCEDURE — G0463 HOSPITAL OUTPT CLINIC VISIT: HCPCS | Mod: 25

## 2023-09-07 ASSESSMENT — PAIN SCALES - GENERAL: PAINLEVEL: MILD PAIN (3)

## 2023-09-07 NOTE — PATIENT INSTRUCTIONS
Thank you for allowing [facility] and our surgical team to participate in your care. Please call our health unit coordinator at [phone] with scheduling questions or the nurse at [phone] with any other questions or concerns.      You have been scheduled for: COLONOSCOPY with  on OCTOBER 6TH.  SURGERY NURSE TO CALL ON SEPT 29 10:00    You will use GATORADE bowel prep.    Please see handout for additional instruction.  You WILL  need a pre-operative appointment with your primary care provider.  You may call [phone] or [phone] with any questions.

## 2023-09-13 NOTE — PROGRESS NOTES
"Surgery Consult Clinic Note      RE: Demi Narayan  : 1940  RICHIE: 2023      Chief Complaint:  Stool leakage    History of Present Illness:  I am seeing Demi Narayan at the request of Dr. Brown regarding chronic stool leakage.   She reports stool leakage for many years.  She was placing toilet tissue in her underwear to prevent staining.  When I saw her in 2021, I asked her to increase her fiber intake.  She started taking one teaspoon of Benefiber daily and she reported an improvement in the stool leakage.  When I saw her in 2021, I asked her to increase her Benefiber to 2 tablespoons daily.  She tells me she is \"almost\" at that dose (she continues to take a little less than one tablespoon).  She reports regular, soft, bowel movements, but reports the stool leakage is \"worse than ever\".   She would like a colonoscopy.      She had a colonoscopy in  with random biopsies, which revealed collagenous colitis - she does not remember any recommended treatment for the collagenous colitis.  She denies family history of colon or rectal cancer, blood in stool, weight loss, abdominal pain.  Previous abdominal surgeries include hysterectomy and hemicolectomy due to diverticulitis.   She specifically denies fevers, chills, nausea, vomiting, chest pain, shortness of breath, palpitations, sore throat, cough, or generalized feeling ill.       Medical history:  Past Medical History:   Diagnosis Date    Chronic/recurrent back pain 2011    Diverticulitis     Diverticulitis, recurrent 2002    bowel resection    Dyslipidemia 2006    Fibrocystic breast disease 2011    Gastro-oesophageal reflux disease     GERD 2/10/2012    Hiatal hernia 2/10/2012    Hormone replacement therapy, postmenopausal 2011    Osteoarthritis        Surgical history:  Past Surgical History:   Procedure Laterality Date    ------------OTHER-------------      colonoscopy with biopsy - diverticulitis, " hemorrhoids    ------------OTHER-------------  04/19/1994    sigmoidoscopy - abdominal pain, diverticula    ABDOMEN SURGERY      colon removed 2nd to diverticulitis    APPENDECTOMY      ARTHROSCOPY SHOULDER  11/20/2013    Procedure: ARTHROSCOPY SHOULDER;  Right Shoulder Arthroscopy Sub-Acromial Decompression Rotator Cuff Repair;  Surgeon: Lenny Trammell MD;  Location: [facility]    [facility]    Di[facility]itis      bowel resection    EGD with biopsy  01/01/2011    ENDARTERECTOMY CAROTID Left 10/17/2022    [facility]. Dr. Springer    ENDOSCOPY UPPER WITH PANCREATIC STIMULATION      ENDOSCOPY UPPER, COLONOSCOPY, COMBINED  07/18/2014    Procedure: COMBINED ENDOSCOPY UPPER, COLONOSCOPY;  Surgeon: Israel Feliciano MD;  Location: [facility] OR    ENT SURGERY      tonsilectomy    ESOPHAGOSCOPY, GASTROSCOPY, DUODENOSCOPY (EGD), COMBINED N/A 09/27/2017    Procedure: COMBINED ESOPHAGOSCOPY, GASTROSCOPY, DUODENOSCOPY (EGD);  UPPER ENDOSCOPY WITH BIOPSY AND POLYPECTOMY;  Surgeon: Gallito Alvarado DO;  Location: [facility] OR    GYN SURGERY      hysterectomy with bladder and rectal repair    IR CONSULTATION FOR IR EXAM  03/11/2019    ORTHOPEDIC SURGERY  11/20/2013    right rotator cuff surgery    TVH with ovarian preservation         Family history:  Family History   Problem Relation Age of Onset    Cerebrovascular Disease Father         CVA    Heart Disease Father         heart disease    Hypertension Father     Myocardial Infarction Father         myocardial infarction    Cerebrovascular Disease Mother         CVA    Diabetes Mother     Heart Disease Mother         heart disease    Hypertension Mother     Heart Disease Brother         heart disease    Hypertension Brother        Medications:  Prior to Admission medications    Medication Sig Start Date End Date Taking? Authorizing Provider   aspirin 81 MG EC tablet Take 81 mg by mouth daily   Yes Reported, Patient   atorvastatin (LIPITOR) 20  MG tablet Take 1 tablet by mouth once daily 23  Yes Fariba Brown MD   Calcium Carbonate-Vitamin D (CALCIUM + D PO) Take 600 mg by mouth.   Yes Reported, Patient   estradiol (ESTRACE) 0.5 MG tablet Take 1 tablet by mouth once daily 6/10/23  Yes Fariba Brown MD   GLUCOSAMINE SULFATE PO Take  by mouth daily.   Yes Reported, Patient   latanoprost (XALATAN) 0.005 % ophthalmic solution Inject 1 drop into the eye At Bedtime   Yes Reported, Patient   Multiple Vitamins-Minerals (MULTIVITAL PO) Take 1 tablet by mouth daily.   Yes Reported, Patient   Omega-3 Fatty Acids (OMEGA-3 FISH OIL PO) Take  by mouth daily.   Yes Reported, Patient   oxyBUTYnin ER (DITROPAN XL) 5 MG 24 hr tablet Take 1 tablet (5 mg) by mouth daily 23  Yes Fariba Brown MD   pantoprazole (PROTONIX) 40 MG EC tablet TAKE 1 TABLET BY MOUTH IN THE MORNING BEFORE BREAKFAST 3/23/23  Yes Fariba Brown MD   traMADol (ULTRAM) 50 MG tablet TAKE 1 TO 2 TABLETS BY MOUTH EVERY 6 TO 8 HOURS AS NEEDED 23  Yes Fariba Brown MD   Wheat Dextrin (BENEFIBER) POWD Take by mouth daily   Yes Reported, Patient       Allergies:  The patient has No Known Allergies.  .  Social history:  Social History     Tobacco Use    Smoking status: Former     Packs/day: 0.50     Years: 5.00     Pack years: 2.50     Types: Cigarettes     Start date: 1963     Quit date: 1968     Years since quittin.3    Smokeless tobacco: Never   Substance Use Topics    Alcohol use: Yes     Alcohol/week: 0.8 standard drinks of alcohol     Types: 1 Glasses of wine per week     Comment: daily with dinner     Marital status: .    Review of Systems:    Constitutional, HEENT, cardiovascular, pulmonary, gi and gu systems are negative, except as otherwise noted.     Physical Examination:  /68 (BP Location: Right arm, Cuff Size: Adult Regular)   Pulse 78   Temp 97.5  F (36.4  C) (Tympanic)   Resp 15   Ht 1.524 m (5')   Wt 49.9 kg  (110 lb)   SpO2 99%   BMI 21.48 kg/m    General: Alert and orientedx4, no acute distress, well-developed/well-nourished, ambulating without assistance, slight kyphosis  HEENT: normocephalic atraumatic, extraocular movements intact, sclerae anicteric, Trachea mideline  Chest:   Clear to auscultation bilaterally.  Cardiac: S1S2 , regular rate and rhythm without additional sounds  Abdomen: Soft, non-tender, non-distended  Extremities: Cursory exam unremarkable.  No peripheral edema noted.  Skin: Warm, dry, less than 2 sec cap refill  Neuro: CN 2-12 grossly intact, no focal deficit, GCS 15  Psych: Pleasant, calm, asks appropriate questions      Assessment/Plan:  #1 Colonoscopy  #2 Stool leakage  #3 Change in bowel habits  #4 Personal history of collagenous colitis    Demi Narayan and I had a discussion about colonoscopies.  The indications, risks, benefits, althernatives and technical aspects of whole colon colonoscopy were outlined with risks including, but not limited to, perforation, bleeding and inability to visualize entire colon.  Management of each was reviewed including the risk for life saving surgery and possible admittance to the hospital.  The need of mechanical preparation of the colon was reviewed along with the use of monitored anesthetic care.  Her questions were asked and answered.  We will proceed with scheduling a colonoscopy with Dr. Fraser.    Nicole Stern [facility]  [address] [place], [place] [id number]    Referring Provider:  Fariba Brown MD  [address] [place] [address]     Primary Care Provider:  Fariba Brown

## 2023-09-19 DIAGNOSIS — M41.35 THORACOGENIC SCOLIOSIS OF THORACOLUMBAR REGION: ICD-10-CM

## 2023-09-19 DIAGNOSIS — Z78.0 POST-MENOPAUSE: ICD-10-CM

## 2023-09-21 RX ORDER — PANTOPRAZOLE SODIUM 40 MG/1
TABLET, DELAYED RELEASE ORAL
Qty: 90 TABLET | Refills: 3 | Status: SHIPPED | OUTPATIENT
Start: 2023-09-21 | End: 2024-09-10

## 2023-09-26 NOTE — H&P (VIEW-ONLY)
[facility] [place]  [address]  [place] [address]  Phone: [phone]  Primary Provider: Fariba Brown  Pre-op Performing Provider: FARIBA BROWN      PREOPERATIVE EVALUATION:  Today's date: 9/28/2023    Danielle is a 83 year old female who presents for a preoperative evaluation.      Surgical Information:  Surgery/Procedure: colonoscopy  Surgery Location: [facility] OR  Surgeon: Dr Fraser  Surgery Date: 10/6/2023  Time of Surgery: 2PM  Where patient plans to recover: At home with family  Fax number for surgical facility: Note does not need to be faxed, will be available electronically in Epic.    Assessment & Plan     The proposed surgical procedure is considered LOW risk.    Preop general physical exam  EKG NSR, lab wnl (including BNP), no concerning findings on exam. New wheezing at night, will update echo. Consideration for GERD. Palpations at night chronic and unchanged.   - EKG 12-lead complete w/read - (Clinic Performed)  - Basic metabolic panel; Future  - CBC with platelets; Future  - Basic metabolic panel  - CBC with platelets    Incontinence of feces, unspecified fecal incontinence type / Collagenous colitis / H/O diverticulitis, S/P bowel resection  Reason for the procedure     GERD (gastroesophageal reflux)  On protonix  Last EGD (9/27/2017): irregular GE junction, hiatal hernia  Message sent to surgery to request add on EGD d/t wheezing at night    Dyslipidemia  LDL (8/29/2023) 68  - on atorvastatin     Bilateral carotid artery stenosis  Follows with [facility]   Due for repeat imaging 11/2024    SVT (supraventricular tachycardia) (H)  No changes     Chronic, continuous use of opioids / low back pain / scoliosis   On tramadol     Orthopnea / Other form of dyspnea  Wheezing at night - consideration for GERD, scoliosis vs cardiac issues (less likely BNP wnl)  - Echocardiogram Complete; Future  - BNP-N terminal pro    Risks and Recommendations:  The patient has the  following additional risks and recommendations for perioperative complications:  Social and Substance:    - High tolerance to opioid analgesics due to chronic use of tramadol     Antiplatelet or Anticoagulation Medication Instructions:   - aspirin: Bleeding risk is low for this procedure and daily aspirin may be continued without modification.     Additional Medication Instructions:  Patient is to take all scheduled medications on the day of surgery    RECOMMENDATION:  Danielle is optimized to proceed with proposed procedure, without further diagnostic evaluation.    Subjective       HPI related to upcoming procedure: Danielle will be undergoing colonoscopy due to stool leakage. Previous bx (2014) with collagenous colitis          9/28/2023     8:45 AM   Preop Questions   1. Have you ever had a heart attack or stroke? No   2. Have you ever had surgery on your heart or blood vessels, such as a stent placement, a coronary artery bypass, or surgery on an artery in your head, neck, heart, or legs? YES - endarterectomy    3. Do you have chest pain with activity? No   4. Do you have a history of  heart failure? No   5. Do you currently have a cold, bronchitis or symptoms of other infection? No   6. Do you have a cough, shortness of breath, or wheezing? YES - wheezing at night for 6 months   7. Do you or anyone in your family have previous history of blood clots? YES - Dad h/o stroke   8. Do you or does anyone in your family have a serious bleeding problem such as prolonged bleeding following surgeries or cuts? No   9. Have you ever had problems with anemia or been told to take iron pills? YES - after childbirth   10. Have you had any abnormal blood loss such as black, tarry or bloody stools, or abnormal vaginal bleeding? No   11. Have you ever had a blood transfusion? No   12. Are you willing to have a blood transfusion if it is medically needed before, during, or after your surgery? Yes   13. Have you or any of your relatives  ever had problems with anesthesia? No   14. Do you have sleep apnea, excessive snoring or daytime drowsiness? No   15. Do you have any artifical heart valves or other implanted medical devices like a pacemaker, defibrillator, or continuous glucose monitor? No   16. Do you have artificial joints? No   17. Are you allergic to latex? No     Health Care Directive:  Patient does not have a Health Care Directive or Living Will: Discussed advance care planning with patient; however, patient declined at this time.    Preoperative Review of :   reviewed - controlled substances reflected in medication list.      Status of Chronic Conditions:  See problem list for active medical problems.  Problems all longstanding and stable, except as noted/documented.  See ROS for pertinent symptoms related to these conditions.    HYPERLIPIDEMIA - Patient has a long history of significant Hyperlipidemia requiring medication for treatment with recent good control. Patient reports no problems or side effects with the medication.     # Wheezing at night  - Danielle is concerned for heart failure d/t family history  - wheezing at night has been for ~ 6 months    Review of Systems   Constitutional:  Negative for chills and fever.   HENT:  Negative for congestion.    Respiratory:  Positive for wheezing (at night). Negative for cough and shortness of breath.    Cardiovascular:  Positive for palpitations (at night, chronic issue. unchanged). Negative for chest pain.   Gastrointestinal:  Negative for abdominal pain.   Genitourinary:  Negative for difficulty urinating.         Patient Active Problem List    Diagnosis Date Noted    Osteopenia of multiple sites 12/01/2022     Priority: Medium    Bilateral carotid artery stenosis 10/12/2022     Priority: Medium     Formatting of this note might be different from the original.  Added automatically from request for surgery [id number]      Urinary urgency 08/26/2022     Priority: Medium    SVT  (supraventricular tachycardia) (H) 08/26/2022     Priority: Medium    Stenosis of left carotid artery - s/p left endarterectomy. US due 11/2024 02/22/2022     Priority: Medium    Lyme disease 09/11/2017     Priority: Medium    Chronic, continuous use of opioids 07/03/2017     Priority: Medium     Patient is followed by Virgil Mackay MD for ongoing prescription of pain medication.  All refills should only be approved by this provider, or covering partner.    Medication(s): Ultram 50mg.   Maximum quantity per month: #120  Clinic visit frequency required: Q 3 months     Controlled substance agreement:  Encounter-Level CSA - 06/30/2017:             Controlled Substance Agreement - Scan on 7/5/2017  4:11 PM : CONTROLLED SUBSTANCE AGREEMENT (below)         Pain Clinic evaluation in the past: No    DIRE Total Score(s):  No flowsheet data found.    Last [facility] website verification:  Done on 7.31.18   [email or web address]        Scoliosis, throracolumbar 02/05/2017     Priority: Medium    ACP (advance care planning) 08/05/2016     Priority: Medium     Advance Care Planning 8/5/2016: ACP Review of Chart / Resources Provided:  Reviewed chart for advance care plan.  Demi ANKIT Narayan has no plan or code status on file. Discussed available resources and provided with information. Confirmed code status reflects current choices pending further ACP discussions.  Confirmed/documented legally designated decision makers.  Added by Ava Padgett            Diaphragmatic hernia 04/25/2016     Priority: Medium    Post-menopause 11/09/2015     Priority: Medium    Comprehensive Medical Examination 05/08/2015     Priority: Medium    Osteoarthritis, multiple joints      Priority: Medium    GERD (gastroesophageal reflux) 02/10/2012     Priority: Medium    Fibrocystic breast disease (FCBD) 07/12/2011     Priority: Medium    Low back pain, chronic 07/12/2011     Priority: Medium    Dyslipidemia 06/20/2006     Priority: Medium     H/O diverticulitis, S/P bowel resection 02/06/2002     Priority: Medium               Past Medical History:   Diagnosis Date    Chronic/recurrent back pain 7/12/2011    Diverticulitis     Diverticulitis, recurrent 2/6/2002    bowel resection    Dyslipidemia 6/20/2006    Fibrocystic breast disease 7/12/2011    Gastro-oesophageal reflux disease     GERD 2/10/2012    Hiatal hernia 2/10/2012    Hormone replacement therapy, postmenopausal 7/12/2011    Osteoarthritis      Past Surgical History:   Procedure Laterality Date    ------------OTHER-------------      colonoscopy with biopsy - diverticulitis, hemorrhoids    ------------OTHER-------------  04/19/1994    sigmoidoscopy - abdominal pain, diverticula    ABDOMEN SURGERY      colon removed 2nd to diverticulitis    APPENDECTOMY      ARTHROSCOPY SHOULDER  11/20/2013    Procedure: ARTHROSCOPY SHOULDER;  Right Shoulder Arthroscopy Sub-Acromial Decompression Rotator Cuff Repair;  Surgeon: Lenny Trammell MD;  Location: [facility] OR    [facility]  [facility]    Diverticulitis      bowel resection    EGD with biopsy  01/01/2011    ENDARTERECTOMY CAROTID Left 10/17/2022    [facility]. Dr. Springer    ENDOSCOPY UPPER WITH PANCREATIC STIMULATION      ENDOSCOPY UPPER, COLONOSCOPY, COMBINED  07/18/2014    Procedure: COMBINED ENDOSCOPY UPPER, COLONOSCOPY;  Surgeon: Israel Feliciano MD;  Location: [facility] OR    ENT SURGERY      tonsilectomy    ESOPHAGOSCOPY, GASTROSCOPY, DUODENOSCOPY (EGD), COMBINED N/A 09/27/2017    Procedure: COMBINED ESOPHAGOSCOPY, GASTROSCOPY, DUODENOSCOPY (EGD);  UPPER ENDOSCOPY WITH BIOPSY AND POLYPECTOMY;  Surgeon: Gallito Alvarado DO;  Location: [facility] OR    GYN SURGERY      hysterectomy with bladder and rectal repair    IR CONSULTATION FOR IR EXAM  03/11/2019    ORTHOPEDIC SURGERY  11/20/2013    right rotator cuff surgery    TVH with ovarian preservation       Current Outpatient Medications   Medication Sig Dispense Refill     aspirin 81 MG EC tablet Take 81 mg by mouth daily      atorvastatin (LIPITOR) 20 MG tablet Take 1 tablet by mouth once daily 90 tablet 3    Calcium Carbonate-Vitamin D (CALCIUM + D PO) Take 600 mg by mouth.      estradiol (ESTRACE) 0.5 MG tablet Take 1 tablet by mouth once daily 90 tablet 2    GLUCOSAMINE SULFATE PO Take  by mouth daily.      latanoprost (XALATAN) 0.005 % ophthalmic solution Inject 1 drop into the eye At Bedtime      Multiple Vitamins-Minerals (MULTIVITAL PO) Take 1 tablet by mouth daily.      Omega-3 Fatty Acids (OMEGA-3 FISH OIL PO) Take  by mouth daily.      oxyBUTYnin ER (DITROPAN XL) 5 MG 24 hr tablet Take 1 tablet (5 mg) by mouth daily 90 tablet 1    pantoprazole (PROTONIX) 40 MG EC tablet TAKE 1 TABLET BY MOUTH IN THE MORNING BEFORE BREAKFAST 90 tablet 3    traMADol (ULTRAM) 50 MG tablet TAKE 1 TO 2 TABLETS BY MOUTH EVERY 6 TO 8 HOURS AS NEEDED 120 tablet 0    Wheat Dextrin (BENEFIBER) POWD Take by mouth daily         No Known Allergies     Social History     Tobacco Use    Smoking status: Former     Packs/day: 0.50     Years: 5.00     Pack years: 2.50     Types: Cigarettes     Start date: 1963     Quit date: 1968     Years since quittin.4    Smokeless tobacco: Never   Substance Use Topics    Alcohol use: Yes     Alcohol/week: 0.8 standard drinks of alcohol     Types: 1 Glasses of wine per week     Comment: daily with dinner     Family History   Problem Relation Age of Onset    Cerebrovascular Disease Father         CVA    Heart Disease Father         heart disease    Hypertension Father     Myocardial Infarction Father         myocardial infarction    Cerebrovascular Disease Mother         CVA    Diabetes Mother     Heart Disease Mother         heart disease    Hypertension Mother     Heart Disease Brother         heart disease    Hypertension Brother      History   Drug Use No         Objective     /68   Pulse 88   Temp 98  F (36.7  C) (Tympanic)   Resp 17   Wt 52.1  kg (114 lb 14.4 oz)   SpO2 98%   BMI 22.44 kg/m      Physical Exam  Constitutional:       General: She is not in acute distress.     Appearance: She is well-developed.   HENT:      Head: Normocephalic and atraumatic.      Right Ear: Hearing and tympanic membrane normal.      Left Ear: Hearing and tympanic membrane normal.      Mouth/Throat:      Mouth: Mucous membranes are moist.      Pharynx: No oropharyngeal exudate.   Eyes:      Extraocular Movements: Extraocular movements intact.      Conjunctiva/sclera: Conjunctivae normal.   Neck:      Thyroid: No thyromegaly.   Cardiovascular:      Rate and Rhythm: Normal rate and regular rhythm.      Pulses: Normal pulses.      Heart sounds: Normal heart sounds. No murmur heard.     Comments: Trace edema  Pulmonary:      Effort: Pulmonary effort is normal. No respiratory distress.      Breath sounds: Normal breath sounds. No wheezing or rales.   Abdominal:      General: Bowel sounds are normal. There is no distension.      Palpations: Abdomen is soft.      Tenderness: There is no abdominal tenderness. There is no guarding.   Musculoskeletal:         General: Normal range of motion.      Cervical back: Normal range of motion and neck supple.      Right lower leg: Edema present.      Left lower leg: Edema present.   Lymphadenopathy:      Cervical: No cervical adenopathy.   Skin:     General: Skin is dry.   Neurological:      Mental Status: She is alert.   Psychiatric:         Mood and Affect: Mood normal.           Recent Labs   Lab Test 08/29/23  0840 10/14/22  0829   HGB 14.7 14.9    246    139   POTASSIUM 3.8 3.9   CR 0.87 0.86        Diagnostics:  Recent Results (from the past 24 hour(s))   EKG 12-lead complete w/read - (Clinic Performed)    Collection Time: 09/28/23  9:24 AM   Result Value Ref Range    Systolic Blood Pressure  mmHg    Diastolic Blood Pressure  mmHg    Ventricular Rate 71 BPM    Atrial Rate 71 BPM    NH Interval 186 ms    QRS Duration 86 ms      ms    QTc 430 ms    P Axis 60 degrees    R AXIS 37 degrees    T Axis 59 degrees    Interpretation ECG       Sinus rhythm  Normal ECG  No previous ECGs available     Basic metabolic panel    Collection Time: 09/28/23  9:32 AM   Result Value Ref Range    Sodium 141 135 - 145 mmol/L    Potassium 3.4 3.4 - 5.3 mmol/L    Chloride 103 98 - 107 mmol/L    Carbon Dioxide (CO2) 28 22 - 29 mmol/L    Anion Gap 10 7 - 15 mmol/L    Urea Nitrogen 12.4 8.0 - 23.0 mg/dL    Creatinine 0.80 0.51 - 0.95 mg/dL    GFR Estimate 72 >60 mL/min/1.73m2    Calcium 9.8 8.8 - 10.2 mg/dL    Glucose 77 70 - 99 mg/dL   CBC with platelets    Collection Time: 09/28/23  9:32 AM   Result Value Ref Range    WBC Count 6.5 4.0 - 11.0 10e3/uL    RBC Count 5.23 (H) 3.80 - 5.20 10e6/uL    Hemoglobin 15.3 11.7 - 15.7 g/dL    Hematocrit 46.4 35.0 - 47.0 %    MCV 89 78 - 100 fL    MCH 29.3 26.5 - 33.0 pg    MCHC 33.0 31.5 - 36.5 g/dL    RDW 13.6 10.0 - 15.0 %    Platelet Count 267 150 - 450 10e3/uL   BNP-N terminal pro    Collection Time: 09/28/23  9:32 AM   Result Value Ref Range    N Terminal Pro BNP Outpatient 211 0 - 1,800 pg/mL      EKG (9/28/2023): Normal Sinus Rhythm, normal axis, normal intervals, no acute ST/T changes c/w ischemia, no LVH by voltage criteria, unchanged from previous tracings (10/14/2022)    Echo (3/8/2022)   Interpretation Summary  Global and regional left ventricular function is normal with an EF of 60-65%.  Trace mitral insufficiency is present.  The aortic valve is tricuspid.  Mild to moderate aortic insufficiency is present.  Mild tricuspid insufficiency is present.  Right ventricular systolic pressure is 15mmHg above the right atrial pressure.  Mild pulmonic insufficiency is present.    Ziopatch (3/8/2022): 373 runs of SVT    Revised Cardiac Risk Index (RCRI):  The patient has the following serious cardiovascular risks for perioperative complications:   - No serious cardiac risks = 0 points     RCRI Interpretation: 0  points: Class I (very low risk - 0.4% complication rate)         Signed Electronically by: Fariba Brown MD  Copy of this evaluation report is provided to requesting physician.

## 2023-09-26 NOTE — PROGRESS NOTES
[facility] [place]  [address]  [facility] [place] [address]  Phone: [phone]  Primary Provider: Fariba Brown  Pre-op Performing Provider: FARIBA BROWN      PREOPERATIVE EVALUATION:  Today's date: 9/28/2023    Danielle is a 83 year old female who presents for a preoperative evaluation.      Surgical Information:  Surgery/Procedure: colonoscopy  Surgery Location: [facility] OR  Surgeon: Dr Fraser  Surgery Date: 10/6/2023  Time of Surgery: 2PM  Where patient plans to recover: At home with family  Fax number for surgical facility: Note does not need to be faxed, will be available electronically in Epic.    Assessment & Plan     The proposed surgical procedure is considered LOW risk.    Preop general physical exam  EKG NSR, lab wnl (including BNP), no concerning findings on exam. New wheezing at night, will update echo. Consideration for GERD. Palpations at night chronic and unchanged.   - EKG 12-lead complete w/read - (Clinic Performed)  - Basic metabolic panel; Future  - CBC with platelets; Future  - Basic metabolic panel  - CBC with platelets    Incontinence of feces, unspecified fecal incontinence type / Collagenous colitis / H/O diverticulitis, S/P bowel resection  Reason for the procedure     GERD (gastroesophageal reflux)  On protonix  Last EGD (9/27/2017): irregular GE junction, hiatal hernia  Message sent to surgery to request add on EGD d/t wheezing at night    Dyslipidemia  LDL (8/29/2023) 68  - on atorvastatin     Bilateral carotid artery stenosis  Follows with [facility]   Due for repeat imaging 11/2024    SVT (supraventricular tachycardia) (H)  No changes     Chronic, continuous use of opioids / low back pain / scoliosis   On tramadol     Orthopnea / Other form of dyspnea  Wheezing at night - consideration for GERD, scoliosis vs cardiac issues (less likely BNP wnl)  - Echocardiogram Complete; Future  - BNP-N terminal pro    Risks and Recommendations:  The patient has the  following additional risks and recommendations for perioperative complications:  Social and Substance:    - High tolerance to opioid analgesics due to chronic use of tramadol     Antiplatelet or Anticoagulation Medication Instructions:   - aspirin: Bleeding risk is low for this procedure and daily aspirin may be continued without modification.     Additional Medication Instructions:  Patient is to take all scheduled medications on the day of surgery    RECOMMENDATION:  Danielle is optimized to proceed with proposed procedure, without further diagnostic evaluation.    Subjective       HPI related to upcoming procedure: Danielle will be undergoing colonoscopy due to stool leakage. Previous bx (2014) with collagenous colitis          9/28/2023     8:45 AM   Preop Questions   1. Have you ever had a heart attack or stroke? No   2. Have you ever had surgery on your heart or blood vessels, such as a stent placement, a coronary artery bypass, or surgery on an artery in your head, neck, heart, or legs? YES - endarterectomy    3. Do you have chest pain with activity? No   4. Do you have a history of  heart failure? No   5. Do you currently have a cold, bronchitis or symptoms of other infection? No   6. Do you have a cough, shortness of breath, or wheezing? YES - wheezing at night for 6 months   7. Do you or anyone in your family have previous history of blood clots? YES - Dad h/o stroke   8. Do you or does anyone in your family have a serious bleeding problem such as prolonged bleeding following surgeries or cuts? No   9. Have you ever had problems with anemia or been told to take iron pills? YES - after childbirth   10. Have you had any abnormal blood loss such as black, tarry or bloody stools, or abnormal vaginal bleeding? No   11. Have you ever had a blood transfusion? No   12. Are you willing to have a blood transfusion if it is medically needed before, during, or after your surgery? Yes   13. Have you or any of your relatives  ever had problems with anesthesia? No   14. Do you have sleep apnea, excessive snoring or daytime drowsiness? No   15. Do you have any artifical heart valves or other implanted medical devices like a pacemaker, defibrillator, or continuous glucose monitor? No   16. Do you have artificial joints? No   17. Are you allergic to latex? No     Health Care Directive:  Patient does not have a Health Care Directive or Living Will: Discussed advance care planning with patient; however, patient declined at this time.    Preoperative Review of :   reviewed - controlled substances reflected in medication list.      Status of Chronic Conditions:  See problem list for active medical problems.  Problems all longstanding and stable, except as noted/documented.  See ROS for pertinent symptoms related to these conditions.    HYPERLIPIDEMIA - Patient has a long history of significant Hyperlipidemia requiring medication for treatment with recent good control. Patient reports no problems or side effects with the medication.     # Wheezing at night  - Danielle is concerned for heart failure d/t family history  - wheezing at night has been for ~ 6 months    Review of Systems   Constitutional:  Negative for chills and fever.   HENT:  Negative for congestion.    Respiratory:  Positive for wheezing (at night). Negative for cough and shortness of breath.    Cardiovascular:  Positive for palpitations (at night, chronic issue. unchanged). Negative for chest pain.   Gastrointestinal:  Negative for abdominal pain.   Genitourinary:  Negative for difficulty urinating.         Patient Active Problem List    Diagnosis Date Noted    Osteopenia of multiple sites 12/01/2022     Priority: Medium    Bilateral carotid artery stenosis 10/12/2022     Priority: Medium     Formatting of this note might be different from the original.  Added automatically from request for surgery [id number]      Urinary urgency 08/26/2022     Priority: Medium    SVT  (supraventricular tachycardia) (H) 08/26/2022     Priority: Medium    Stenosis of left carotid artery - s/p left endarterectomy. US due 11/2024 02/22/2022     Priority: Medium    Lyme disease 09/11/2017     Priority: Medium    Chronic, continuous use of opioids 07/03/2017     Priority: Medium     Patient is followed by Virgil Mackay MD for ongoing prescription of pain medication.  All refills should only be approved by this provider, or covering partner.    Medication(s): Ultram 50mg.   Maximum quantity per month: #120  Clinic visit frequency required: Q 3 months     Controlled substance agreement:  Encounter-Level CSA - 06/30/2017:             Controlled Substance Agreement - Scan on 7/5/2017  4:11 PM : CONTROLLED SUBSTANCE AGREEMENT (below)         Pain Clinic evaluation in the past: No    DIRE Total Score(s):  No flowsheet data found.    Last [facility] website verification:  Done on 7.31.18   [email or web address]        Scoliosis, throracolumbar 02/05/2017     Priority: Medium    ACP (advance care planning) 08/05/2016     Priority: Medium     Advance Care Planning 8/5/2016: ACP Review of Chart / Resources Provided:  Reviewed chart for advance care plan.  Demi ANKIT Narayan has no plan or code status on file. Discussed available resources and provided with information. Confirmed code status reflects current choices pending further ACP discussions.  Confirmed/documented legally designated decision makers.  Added by Ava Padgett            Diaphragmatic hernia 04/25/2016     Priority: Medium    Post-menopause 11/09/2015     Priority: Medium    Comprehensive Medical Examination 05/08/2015     Priority: Medium    Osteoarthritis, multiple joints      Priority: Medium    GERD (gastroesophageal reflux) 02/10/2012     Priority: Medium    Fibrocystic breast disease (FCBD) 07/12/2011     Priority: Medium    Low back pain, chronic 07/12/2011     Priority: Medium    Dyslipidemia 06/20/2006     Priority: Medium     H/O diverticulitis, S/P bowel resection 02/06/2002     Priority: Medium               Past Medical History:   Diagnosis Date    Chronic/recurrent back pain 7/12/2011    Diverticulitis     Diverticulitis, recurrent 2/6/2002    bowel resection    Dyslipidemia 6/20/2006    Fibrocystic breast disease 7/12/2011    Gastro-oesophageal reflux disease     GERD 2/10/2012    Hiatal hernia 2/10/2012    Hormone replacement therapy, postmenopausal 7/12/2011    Osteoarthritis      Past Surgical History:   Procedure Laterality Date    ------------OTHER-------------      colonoscopy with biopsy - diverticulitis, hemorrhoids    ------------OTHER-------------  04/19/1994    sigmoidoscopy - abdominal pain, diverticula    ABDOMEN SURGERY      colon removed 2nd to diverticulitis    APPENDECTOMY      ARTHROSCOPY SHOULDER  11/20/2013    Procedure: ARTHROSCOPY SHOULDER;  Right Shoulder Arthroscopy Sub-Acromial Decompression Rotator Cuff Repair;  Surgeon: Lenny Trammell MD;  Location: [facility] OR    [facility]    Divert[facility]itis      bowel resection    EGD with biopsy  01/01/2011    ENDARTERECTOMY CAROTID Left 10/17/2022    [facility]. Dr. Springer    ENDOSCOPY UPPER WITH PANCREATIC STIMULATION      ENDOSCOPY UPPER, COLONOSCOPY, COMBINED  07/18/2014    Procedure: COMBINED ENDOSCOPY UPPER, COLONOSCOPY;  Surgeon: Israel Feliciano MD;  Location: [facility] OR    ENT SURGERY      tonsilectomy    ESOPHAGOSCOPY, GASTROSCOPY, DUODENOSCOPY (EGD), COMBINED N/A 09/27/2017    Procedure: COMBINED ESOPHAGOSCOPY, GASTROSCOPY, DUODENOSCOPY (EGD);  UPPER ENDOSCOPY WITH BIOPSY AND POLYPECTOMY;  Surgeon: Gallito Alvarado DO;  Location: [facility] OR    GYN SURGERY      hysterectomy with bladder and rectal repair    IR CONSULTATION FOR IR EXAM  03/11/2019    ORTHOPEDIC SURGERY  11/20/2013    right rotator cuff surgery    TVH with ovarian preservation       Current Outpatient Medications   Medication Sig Dispense Refill     aspirin 81 MG EC tablet Take 81 mg by mouth daily      atorvastatin (LIPITOR) 20 MG tablet Take 1 tablet by mouth once daily 90 tablet 3    Calcium Carbonate-Vitamin D (CALCIUM + D PO) Take 600 mg by mouth.      estradiol (ESTRACE) 0.5 MG tablet Take 1 tablet by mouth once daily 90 tablet 2    GLUCOSAMINE SULFATE PO Take  by mouth daily.      latanoprost (XALATAN) 0.005 % ophthalmic solution Inject 1 drop into the eye At Bedtime      Multiple Vitamins-Minerals (MULTIVITAL PO) Take 1 tablet by mouth daily.      Omega-3 Fatty Acids (OMEGA-3 FISH OIL PO) Take  by mouth daily.      oxyBUTYnin ER (DITROPAN XL) 5 MG 24 hr tablet Take 1 tablet (5 mg) by mouth daily 90 tablet 1    pantoprazole (PROTONIX) 40 MG EC tablet TAKE 1 TABLET BY MOUTH IN THE MORNING BEFORE BREAKFAST 90 tablet 3    traMADol (ULTRAM) 50 MG tablet TAKE 1 TO 2 TABLETS BY MOUTH EVERY 6 TO 8 HOURS AS NEEDED 120 tablet 0    Wheat Dextrin (BENEFIBER) POWD Take by mouth daily         No Known Allergies     Social History     Tobacco Use    Smoking status: Former     Packs/day: 0.50     Years: 5.00     Pack years: 2.50     Types: Cigarettes     Start date: 1963     Quit date: 1968     Years since quittin.4    Smokeless tobacco: Never   Substance Use Topics    Alcohol use: Yes     Alcohol/week: 0.8 standard drinks of alcohol     Types: 1 Glasses of wine per week     Comment: daily with dinner     Family History   Problem Relation Age of Onset    Cerebrovascular Disease Father         CVA    Heart Disease Father         heart disease    Hypertension Father     Myocardial Infarction Father         myocardial infarction    Cerebrovascular Disease Mother         CVA    Diabetes Mother     Heart Disease Mother         heart disease    Hypertension Mother     Heart Disease Brother         heart disease    Hypertension Brother      History   Drug Use No         Objective     /68   Pulse 88   Temp 98  F (36.7  C) (Tympanic)   Resp 17   Wt 52.1  kg (114 lb 14.4 oz)   SpO2 98%   BMI 22.44 kg/m      Physical Exam  Constitutional:       General: She is not in acute distress.     Appearance: She is well-developed.   HENT:      Head: Normocephalic and atraumatic.      Right Ear: Hearing and tympanic membrane normal.      Left Ear: Hearing and tympanic membrane normal.      Mouth/Throat:      Mouth: Mucous membranes are moist.      Pharynx: No oropharyngeal exudate.   Eyes:      Extraocular Movements: Extraocular movements intact.      Conjunctiva/sclera: Conjunctivae normal.   Neck:      Thyroid: No thyromegaly.   Cardiovascular:      Rate and Rhythm: Normal rate and regular rhythm.      Pulses: Normal pulses.      Heart sounds: Normal heart sounds. No murmur heard.     Comments: Trace edema  Pulmonary:      Effort: Pulmonary effort is normal. No respiratory distress.      Breath sounds: Normal breath sounds. No wheezing or rales.   Abdominal:      General: Bowel sounds are normal. There is no distension.      Palpations: Abdomen is soft.      Tenderness: There is no abdominal tenderness. There is no guarding.   Musculoskeletal:         General: Normal range of motion.      Cervical back: Normal range of motion and neck supple.      Right lower leg: Edema present.      Left lower leg: Edema present.   Lymphadenopathy:      Cervical: No cervical adenopathy.   Skin:     General: Skin is dry.   Neurological:      Mental Status: She is alert.   Psychiatric:         Mood and Affect: Mood normal.           Recent Labs   Lab Test 08/29/23  0840 10/14/22  0829   HGB 14.7 14.9    246    139   POTASSIUM 3.8 3.9   CR 0.87 0.86        Diagnostics:  Recent Results (from the past 24 hour(s))   EKG 12-lead complete w/read - (Clinic Performed)    Collection Time: 09/28/23  9:24 AM   Result Value Ref Range    Systolic Blood Pressure  mmHg    Diastolic Blood Pressure  mmHg    Ventricular Rate 71 BPM    Atrial Rate 71 BPM    KY Interval 186 ms    QRS Duration 86 ms      ms    QTc 430 ms    P Axis 60 degrees    R AXIS 37 degrees    T Axis 59 degrees    Interpretation ECG       Sinus rhythm  Normal ECG  No previous ECGs available     Basic metabolic panel    Collection Time: 09/28/23  9:32 AM   Result Value Ref Range    Sodium 141 135 - 145 mmol/L    Potassium 3.4 3.4 - 5.3 mmol/L    Chloride 103 98 - 107 mmol/L    Carbon Dioxide (CO2) 28 22 - 29 mmol/L    Anion Gap 10 7 - 15 mmol/L    Urea Nitrogen 12.4 8.0 - 23.0 mg/dL    Creatinine 0.80 0.51 - 0.95 mg/dL    GFR Estimate 72 >60 mL/min/1.73m2    Calcium 9.8 8.8 - 10.2 mg/dL    Glucose 77 70 - 99 mg/dL   CBC with platelets    Collection Time: 09/28/23  9:32 AM   Result Value Ref Range    WBC Count 6.5 4.0 - 11.0 10e3/uL    RBC Count 5.23 (H) 3.80 - 5.20 10e6/uL    Hemoglobin 15.3 11.7 - 15.7 g/dL    Hematocrit 46.4 35.0 - 47.0 %    MCV 89 78 - 100 fL    MCH 29.3 26.5 - 33.0 pg    MCHC 33.0 31.5 - 36.5 g/dL    RDW 13.6 10.0 - 15.0 %    Platelet Count 267 150 - 450 10e3/uL   BNP-N terminal pro    Collection Time: 09/28/23  9:32 AM   Result Value Ref Range    N Terminal Pro BNP Outpatient 211 0 - 1,800 pg/mL      EKG (9/28/2023): Normal Sinus Rhythm, normal axis, normal intervals, no acute ST/T changes c/w ischemia, no LVH by voltage criteria, unchanged from previous tracings (10/14/2022)    Echo (3/8/2022)   Interpretation Summary  Global and regional left ventricular function is normal with an EF of 60-65%.  Trace mitral insufficiency is present.  The aortic valve is tricuspid.  Mild to moderate aortic insufficiency is present.  Mild tricuspid insufficiency is present.  Right ventricular systolic pressure is 15mmHg above the right atrial pressure.  Mild pulmonic insufficiency is present.    Ziopatch (3/8/2022): 373 runs of SVT    Revised Cardiac Risk Index (RCRI):  The patient has the following serious cardiovascular risks for perioperative complications:   - No serious cardiac risks = 0 points     RCRI Interpretation: 0  points: Class I (very low risk - 0.4% complication rate)         Signed Electronically by: Fariba Brown MD  Copy of this evaluation report is provided to requesting physician.

## 2023-09-26 NOTE — PATIENT INSTRUCTIONS
Preparing for Your Surgery  Getting started  A nurse will call you to review your health history and instructions. They will give you an arrival time based on your scheduled surgery time. Please be ready to share:  Your doctor's clinic name and phone number  Your medical, surgical, and anesthesia history  A list of allergies and sensitivities  A list of medicines, including herbal treatments and over-the-counter drugs  Whether the patient has a legal guardian (ask how to send us the papers in advance)  Please tell us if you're pregnant--or if there's any chance you might be pregnant. Some surgeries may injure a fetus (unborn baby), so they require a pregnancy test. Surgeries that are safe for a fetus don't always need a test, and you can choose whether to have one.   If you have a child who's having surgery, please ask for a copy of Preparing for Your Child's Surgery.    Preparing for surgery  Within 10 to 30 days of surgery: Have a pre-op exam (sometimes called an H&P, or History and Physical). This can be done at a clinic or pre-operative center.  If you're having a , you may not need this exam. Talk to your care team.  At your pre-op exam, talk to your care team about all medicines you take. If you need to stop any medicines before surgery, ask when to start taking them again.  We do this for your safety. Many medicines can make you bleed too much during surgery. Some change how well surgery (anesthesia) drugs work.  Call your insurance company to let them know you're having surgery. (If you don't have insurance, call [phone].)  Call your clinic if there's any change in your health. This includes signs of a cold or flu (sore throat, runny nose, cough, rash, fever). It also includes a scrape or scratch near the surgery site.  If you have questions on the day of surgery, call your hospital or surgery center.  Eating and drinking guidelines  For your safety: Unless your surgeon tells you otherwise,  follow the guidelines below.  Eat and drink as usual until 8 hours before you arrive for surgery. After that, no food or milk.  Drink clear liquids until 2 hours before you arrive. These are liquids you can see through, like water, Gatorade, and Propel Water. They also include plain black coffee and tea (no cream or milk), candy, and breath mints. You can spit out gum when you arrive.  If you drink alcohol: Stop drinking it the night before surgery.  If your care team tells you to take medicine on the morning of surgery, it's okay to take it with a sip of water.  Preventing infection  Shower or bathe the night before and morning of your surgery. Follow the instructions your clinic gave you. (If no instructions, use regular soap.)  Don't shave or clip hair near your surgery site. We'll remove the hair if needed.  Don't smoke or vape the morning of surgery. You may chew nicotine gum up to 2 hours before surgery. A nicotine patch is okay.  Note: Some surgeries require you to completely quit smoking and nicotine. Check with your surgeon.  Your care team will make every effort to keep you safe from infection. We will:  Clean our hands often with soap and water (or an alcohol-based hand rub).  Clean the skin at your surgery site with a special soap that kills germs.  Give you a special gown to keep you warm. (Cold raises the risk of infection.)  Wear special hair covers, masks, gowns and gloves during surgery.  Give antibiotic medicine, if prescribed. Not all surgeries need antibiotics.  What to bring on the day of surgery  Photo ID and insurance card  Copy of your health care directive, if you have one  Glasses and hearing aids (bring cases)  You can't wear contacts during surgery  Inhaler and eye drops, if you use them (tell us about these when you arrive)  CPAP machine or breathing device, if you use them  A few personal items, if spending the night  If you have . . .  A pacemaker, ICD (cardiac defibrillator) or other  implant: Bring the ID card.  An implanted stimulator: Bring the remote control.  A legal guardian: Bring a copy of the certified (court-stamped) guardianship papers.  Please remove any jewelry, including body piercings. Leave jewelry and other valuables at home.  If you're going home the day of surgery  You must have a responsible adult drive you home. They should stay with you overnight as well.  If you don't have someone to stay with you, and you aren't safe to go home alone, we may keep you overnight. Insurance often won't pay for this.  After surgery  If it's hard to control your pain or you need more pain medicine, please call your surgeon's office.  Questions?   If you have any questions for your care team, list them here: _________________________________________________________________________________________________________________________________________________________________________ ____________________________________ ____________________________________ ____________________________________  For informational purposes only. Not to replace the advice of your health care provider. Copyright   2003, 2019 [facility]. All rights reserved. Clinically reviewed by Joyce Montanez MD. SMARTworks 080236 - REV 12/22.

## 2023-09-28 ENCOUNTER — OFFICE VISIT (OUTPATIENT)
Dept: FAMILY MEDICINE | Facility: OTHER | Age: 83
End: 2023-09-28
Attending: FAMILY MEDICINE
Payer: COMMERCIAL

## 2023-09-28 VITALS
TEMPERATURE: 98 F | BODY MASS INDEX: 22.44 KG/M2 | HEART RATE: 88 BPM | RESPIRATION RATE: 17 BRPM | WEIGHT: 114.9 LBS | DIASTOLIC BLOOD PRESSURE: 68 MMHG | OXYGEN SATURATION: 98 % | SYSTOLIC BLOOD PRESSURE: 128 MMHG

## 2023-09-28 DIAGNOSIS — K21.9 GASTROESOPHAGEAL REFLUX DISEASE WITHOUT ESOPHAGITIS: ICD-10-CM

## 2023-09-28 DIAGNOSIS — K52.831 COLLAGENOUS COLITIS: ICD-10-CM

## 2023-09-28 DIAGNOSIS — G89.29 CHRONIC BILATERAL LOW BACK PAIN WITHOUT SCIATICA: ICD-10-CM

## 2023-09-28 DIAGNOSIS — F11.90 CHRONIC, CONTINUOUS USE OF OPIOIDS: ICD-10-CM

## 2023-09-28 DIAGNOSIS — M54.50 CHRONIC BILATERAL LOW BACK PAIN WITHOUT SCIATICA: ICD-10-CM

## 2023-09-28 DIAGNOSIS — R06.01 ORTHOPNEA: ICD-10-CM

## 2023-09-28 DIAGNOSIS — M41.25 OTHER IDIOPATHIC SCOLIOSIS, THORACOLUMBAR REGION: ICD-10-CM

## 2023-09-28 DIAGNOSIS — I65.23 BILATERAL CAROTID ARTERY STENOSIS: ICD-10-CM

## 2023-09-28 DIAGNOSIS — R06.09 OTHER FORM OF DYSPNEA: ICD-10-CM

## 2023-09-28 DIAGNOSIS — Z01.818 PREOP GENERAL PHYSICAL EXAM: Primary | ICD-10-CM

## 2023-09-28 DIAGNOSIS — I47.10 SVT (SUPRAVENTRICULAR TACHYCARDIA) (H): ICD-10-CM

## 2023-09-28 DIAGNOSIS — R15.9 INCONTINENCE OF FECES, UNSPECIFIED FECAL INCONTINENCE TYPE: ICD-10-CM

## 2023-09-28 DIAGNOSIS — K57.32 DIVERTICULITIS OF COLON: ICD-10-CM

## 2023-09-28 DIAGNOSIS — E78.2 MIXED HYPERLIPIDEMIA: ICD-10-CM

## 2023-09-28 LAB
ANION GAP SERPL CALCULATED.3IONS-SCNC: 10 MMOL/L (ref 7–15)
BUN SERPL-MCNC: 12.4 MG/DL (ref 8–23)
CALCIUM SERPL-MCNC: 9.8 MG/DL (ref 8.8–10.2)
CHLORIDE SERPL-SCNC: 103 MMOL/L (ref 98–107)
CREAT SERPL-MCNC: 0.8 MG/DL (ref 0.51–0.95)
EGFRCR SERPLBLD CKD-EPI 2021: 72 ML/MIN/1.73M2
ERYTHROCYTE [DISTWIDTH] IN BLOOD BY AUTOMATED COUNT: 13.6 % (ref 10–15)
GLUCOSE SERPL-MCNC: 77 MG/DL (ref 70–99)
HCO3 SERPL-SCNC: 28 MMOL/L (ref 22–29)
HCT VFR BLD AUTO: 46.4 % (ref 35–47)
HGB BLD-MCNC: 15.3 G/DL (ref 11.7–15.7)
MCH RBC QN AUTO: 29.3 PG (ref 26.5–33)
MCHC RBC AUTO-ENTMCNC: 33 G/DL (ref 31.5–36.5)
MCV RBC AUTO: 89 FL (ref 78–100)
NT-PROBNP SERPL-MCNC: 211 PG/ML (ref 0–1800)
PLATELET # BLD AUTO: 267 10E3/UL (ref 150–450)
POTASSIUM SERPL-SCNC: 3.4 MMOL/L (ref 3.4–5.3)
RBC # BLD AUTO: 5.23 10E6/UL (ref 3.8–5.2)
SODIUM SERPL-SCNC: 141 MMOL/L (ref 135–145)
WBC # BLD AUTO: 6.5 10E3/UL (ref 4–11)

## 2023-09-28 PROCEDURE — 85027 COMPLETE CBC AUTOMATED: CPT | Mod: ZL | Performed by: FAMILY MEDICINE

## 2023-09-28 PROCEDURE — 80048 BASIC METABOLIC PNL TOTAL CA: CPT | Mod: ZL | Performed by: FAMILY MEDICINE

## 2023-09-28 PROCEDURE — 93010 ELECTROCARDIOGRAM REPORT: CPT | Mod: 77 | Performed by: HOSPITALIST

## 2023-09-28 PROCEDURE — 99214 OFFICE O/P EST MOD 30 MIN: CPT | Performed by: FAMILY MEDICINE

## 2023-09-28 PROCEDURE — G0463 HOSPITAL OUTPT CLINIC VISIT: HCPCS | Mod: 25 | Performed by: FAMILY MEDICINE

## 2023-09-28 PROCEDURE — 93005 ELECTROCARDIOGRAM TRACING: CPT | Performed by: FAMILY MEDICINE

## 2023-09-28 PROCEDURE — 83880 ASSAY OF NATRIURETIC PEPTIDE: CPT | Mod: ZL | Performed by: FAMILY MEDICINE

## 2023-09-28 PROCEDURE — G0463 HOSPITAL OUTPT CLINIC VISIT: HCPCS | Performed by: FAMILY MEDICINE

## 2023-09-28 PROCEDURE — 36415 COLL VENOUS BLD VENIPUNCTURE: CPT | Mod: ZL | Performed by: FAMILY MEDICINE

## 2023-09-28 ASSESSMENT — ENCOUNTER SYMPTOMS
WHEEZING: 1
SHORTNESS OF BREATH: 0
FEVER: 0
PALPITATIONS: 1
CHILLS: 0
COUGH: 0
ABDOMINAL PAIN: 0
DIFFICULTY URINATING: 0

## 2023-09-28 ASSESSMENT — PAIN SCALES - GENERAL: PAINLEVEL: MODERATE PAIN (5)

## 2023-09-28 NOTE — COMMUNITY RESOURCES LIST (ENGLISH)
09/28/2023   [facility]  N/A  For questions about this resource list or additional care needs, please contact your primary care clinic or care manager.  Phone: [phone]   Email: N/A   Address: [address] [place] [address]   Hours: N/A        Housing       Coordinated Entry access point  1  [facility] Administrative Office Distance: 20.16 miles      In-Person   [address] [place] [address]  Language: English  Hours: Mon - Fri 8:00 AM - 4:30 PM  Fees: Free   Phone: (560) 431-1668 Email: [email or web address] Website: [email or web address]     Housing search assistance  2  [facility] Shelter Distance: 2.45 miles      In-Person   [address] [place], [place] [id number]  Language: English  Hours: Mon - Sun Open 24 Hours  Fees: Free   Phone: (539) 664-8399 Website: http://www.[email or web address]     3  [facility] Administrative Office Distance: 20.16 miles      Phone/Virtual   [address] [place] [address]  Language: English  Hours: Mon - Fri 8:00 AM - 4:30 PM  Fees: Free   Phone: (831) 935-4698 Email: [email or web address] Website: [email or web address].[email or web address]     Shelter for families  4  [facility] Shelter Distance: 2.45 miles      In-Person   [address] [place] [address]  Language: English  Hours: Mon - Sun Open 24 Hours  Fees: Free   Phone: (373) 615-6399 Website: http://www.[employer]ider[employer].org     5  [facility] Distance: 20.38 miles      In-Person   [address] [place] [address]  Language: English  Hours: Mon - Sun Open 24 Hours  Fees: Free   Phone: (176) 572-6783 Website: http://www.SpiderSuite.org     Shelter for individuals  6  [facility] Shelter Distance: 2.45 miles      In-Person   [address] [place] [address]  Language: English  Hours: Mon - Sun  Open 24 Hours  Fees: Free   Phone: (993) 362-2417 Website: http://www.Pivotshare     7  [employer] [facility] Distance: 20.38 miles      In-Person   [address] [place] [address]  Language: English  Hours: Mon - Sun Open 24 Hours  Fees: Free   Phone: (187) 379-1008 Website: http://wwwWestcrete          Important Numbers & Websites       Emergency Services   911  [facility] Services   311  Poison Control   (369) 109-9322  Suicide Prevention Lifeline   (897) 759-5701 (TALK)  Child Abuse Hotline   (182) 698-6638 (4-A-Child)  Sexual Assault Hotline   (140) 864-2718 (HOPE)  National Runaway Safeline   (188) 365-2884 (RUNAWAY)  All-Options Talkline   (364) 729-2331  Substance Abuse Referral   (537) 374-9459 (HELP)

## 2023-09-29 NOTE — OR NURSING
Instructed to hold NSAIDs, Fish Oil, vitamins & supplements starting today 9/29, Continue other prescribed medications, including  ASA 81 mg up to day before procedure, May take estradiol day of procedure.

## 2023-10-02 DIAGNOSIS — S33.8XXD SPRAIN OF OTHER PARTS OF LUMBAR SPINE AND PELVIS, SUBSEQUENT ENCOUNTER: ICD-10-CM

## 2023-10-02 RX ORDER — TRAMADOL HYDROCHLORIDE 50 MG/1
TABLET ORAL
Qty: 120 TABLET | Refills: 0 | Status: SHIPPED | OUTPATIENT
Start: 2023-10-02 | End: 2023-11-01

## 2023-10-02 NOTE — TELEPHONE ENCOUNTER
Ultram      Last Written Prescription Date:  8.29.23  Last Fill Quantity: #120,   # refills: 0  Last Office Visit: 9.28.23  Future Office visit:       Routing refill request to provider for review/approval because:  Drug not on the [facility], [facility] or [facility] refill protocol or controlled substance

## 2023-10-03 ENCOUNTER — HOSPITAL ENCOUNTER (OUTPATIENT)
Dept: CARDIOLOGY | Facility: HOSPITAL | Age: 83
Discharge: HOME | End: 2023-10-03
Attending: FAMILY MEDICINE | Admitting: INTERNAL MEDICINE
Payer: COMMERCIAL

## 2023-10-03 ENCOUNTER — ANESTHESIA EVENT (OUTPATIENT)
Dept: SURGERY | Facility: HOSPITAL | Age: 83
End: 2023-10-03
Payer: COMMERCIAL

## 2023-10-03 DIAGNOSIS — R06.09 OTHER FORM OF DYSPNEA: ICD-10-CM

## 2023-10-03 DIAGNOSIS — R06.01 ORTHOPNEA: ICD-10-CM

## 2023-10-03 LAB — LVEF ECHO: NORMAL

## 2023-10-03 PROCEDURE — 93306 TTE W/DOPPLER COMPLETE: CPT | Mod: 26 | Performed by: INTERNAL MEDICINE

## 2023-10-03 PROCEDURE — 93306 TTE W/DOPPLER COMPLETE: CPT

## 2023-10-03 ASSESSMENT — LIFESTYLE VARIABLES: TOBACCO_USE: 1

## 2023-10-03 ASSESSMENT — ENCOUNTER SYMPTOMS: DYSRHYTHMIAS: 1

## 2023-10-03 NOTE — ANESTHESIA PREPROCEDURE EVALUATION
Anesthesia Pre-Procedure Evaluation    Patient: Demi Narayan   MRN: [id number] : 1940        Procedure : Procedure(s):  Upper Endoscopy and Colonoscopy with possible biopsy, possible polypectomy          Past Medical History:   Diagnosis Date     Chronic/recurrent back pain 2011     Diverticulitis      Diverticulitis, recurrent 2002    bowel resection     Dyslipidemia 2006     Fibrocystic breast disease 2011     Gastro-oesophageal reflux disease      GERD 2/10/2012     Hiatal hernia 2/10/2012     Hormone replacement therapy, postmenopausal 2011     Osteoarthritis       Past Surgical History:   Procedure Laterality Date     ------------OTHER-------------      colonoscopy with biopsy - diverticulitis, hemorrhoids     ------------OTHER-------------  1994    sigmoidoscopy - abdominal pain, diverticula     ABDOMEN SURGERY      colon removed 2nd to diverticulitis     APPENDECTOMY       ARTHROSCOPY SHOULDER  2013    Procedure: ARTHROSCOPY SHOULDER;  Right Shoulder Arthroscopy Sub-Acromial Decompression Rotator Cuff Repair;  Surgeon: Lenny Trammell MD;  Location: [facility]     [facility]     Diverticulitis      bowel resection     EGD with biopsy  2011     ENDARTERECTOMY CAROTID Left 10/17/2022    [facility]. Dr. Springer     ENDOSCOPY UPPER WITH PANCREATIC STIMULATION       ENDOSCOPY UPPER, COLONOSCOPY, COMBINED  2014    Procedure: COMBINED ENDOSCOPY UPPER, COLONOSCOPY;  Surgeon: Israel Feliciano MD;  Location: [facility] OR     ENT SURGERY      tonsilectomy     ESOPHAGOSCOPY, GASTROSCOPY, DUODENOSCOPY (EGD), COMBINED N/A 2017    Procedure: COMBINED ESOPHAGOSCOPY, GASTROSCOPY, DUODENOSCOPY (EGD);  UPPER ENDOSCOPY WITH BIOPSY AND POLYPECTOMY;  Surgeon: Gallito Alvarado DO;  Location: [facility] OR     GYN SURGERY      hysterectomy with bladder and rectal repair     IR CONSULTATION FOR IR EXAM  2019     ORTHOPEDIC  SURGERY  2013    right rotator cuff surgery     TVH with ovarian preservation        No Known Allergies   Social History     Tobacco Use     Smoking status: Former     Packs/day: 0.50     Years: 5.00     Pack years: 2.50     Types: Cigarettes     Start date: 1963     Quit date: 1968     Years since quittin.4     Smokeless tobacco: Never   Substance Use Topics     Alcohol use: Yes     Alcohol/week: 0.8 standard drinks of alcohol     Types: 1 Glasses of wine per week     Comment: daily with dinner      Wt Readings from Last 1 Encounters:   23 52.1 kg (114 lb 14.4 oz)        Anesthesia Evaluation   Pt has had prior anesthetic. Type: General.    No history of anesthetic complications       ROS/MED HX  ENT/Pulmonary: Comment: Wheezes as night BNP WNL  Expiratory wheeze per patient, notices before bed--lungs clear today      (+)                tobacco use, Past use,                      Neurologic:  - neg neurologic ROS     Cardiovascular: Comment: Left endarterectomy next US due   Edema both lower extremities     (+) Dyslipidemia - Peripheral Vascular Disease-- Carotid Stenosis.   -  - -                        dysrhythmias (SVT), Other, Irregular Heartbeat/Palpitations,       Previous cardiac testing   Echo: Date: 3/2022 Results:  Echo (3/8/2022)   Interpretation Summary  Global and regional left ventricular function is normal with an EF of 60-65%.  Trace mitral insufficiency is present.  The aortic valve is tricuspid.  Mild to moderate aortic insufficiency is present.  Mild tricuspid insufficiency is present.  Right ventricular systolic pressure is 15mmHg above the right atrial pressure.  Mild pulmonic insufficiency is present.     Ziopatch (3/8/2022): 373 runs of SVT    Stress Test:  Date: Results:    ECG Reviewed:  Date:  Results:  EKG (2023): Normal Sinus Rhythm, normal axis, normal intervals, no acute ST/T changes c/w ischemia, no LVH by voltage criteria, unchanged from previous  tracings (10/14/2022)  Cath:  Date: Results:      METS/Exercise Tolerance: 4 - Raking leaves, gardening Comment: Per patient can climb stairs but not walk more than two blocks due to back pain, uses cane. Gardens and vacuums  with  back brace   Hematologic:  - neg hematologic  ROS     Musculoskeletal: Comment:   TMJ  scoliosis  (+)  arthritis,             GI/Hepatic: Comment:   Incontinence of feces  S/p bowel resection    (+) GERD, Asymptomatic on medication,    hiatal hernia, Inflammatory bowel disease, bowel prep,            Renal/Genitourinary: Comment: Fibrocystic breast      Endo:  - neg endo ROS     Psychiatric/Substance Use:     (+)    H/O chronic opiod use  (took ultram this am).     Infectious Disease:  - neg infectious disease ROS     Malignancy:  - neg malignancy ROS     Other:  - neg other ROS    (+)  , H/O Chronic Pain (low back pain 0/10),         Physical Exam    Airway      Comment: TMJ     Mallampati: II   TM distance: > 3 FB   Neck ROM: full   Mouth opening: < 3 cm    Respiratory Devices and Support         Dental       (+) Modest Abnormalities - crowns, retainers, 1 or 2 missing teeth      Cardiovascular   cardiovascular exam normal       Rhythm and rate: regular and normal     Pulmonary   pulmonary exam normal        breath sounds clear to auscultation       OUTSIDE LABS:  CBC:   Lab Results   Component Value Date    WBC 6.5 09/28/2023    WBC 5.7 08/29/2023    HGB 15.3 09/28/2023    HGB 14.7 08/29/2023    HCT 46.4 09/28/2023    HCT 44.0 08/29/2023     09/28/2023     08/29/2023     BMP:   Lab Results   Component Value Date     09/28/2023     08/29/2023    POTASSIUM 3.4 09/28/2023    POTASSIUM 3.8 08/29/2023    CHLORIDE 103 09/28/2023    CHLORIDE 104 08/29/2023    CO2 28 09/28/2023    CO2 28 08/29/2023    BUN 12.4 09/28/2023    BUN 11.9 08/29/2023    CR 0.80 09/28/2023    CR 0.87 08/29/2023    GLC 77 09/28/2023    GLC 86 08/29/2023     COAGS: No results found for: PTT,  INR, FIBR  POC: No results found for: BGM, HCG, HCGS  HEPATIC:   Lab Results   Component Value Date    ALBUMIN 4.3 10/14/2022    PROTTOTAL 7.0 10/14/2022    ALT 26 10/14/2022    AST 35 10/14/2022    ALKPHOS 56 10/14/2022    BILITOTAL 0.5 10/14/2022     OTHER:   Lab Results   Component Value Date    LACT 1.7 07/05/2016    NAYELY 9.8 09/28/2023    MAG 2.0 03/11/2022    LIPASE 312 04/29/2013    AMYLASE 67 04/29/2013    TSH 1.92 06/03/2021    .0 (H) 07/07/2016    SED 45 (H) 07/07/2016       Anesthesia Plan    ASA Status:  3    NPO Status:  NPO Appropriate    Anesthesia Type: MAC.     - Reason for MAC: straight local not clinically adequate              Consents    Anesthesia Plan(s) and associated risks, benefits, and realistic alternatives discussed. Questions answered and patient/representative(s) expressed understanding.     - Discussed:     - Discussed with:  Patient            Postoperative Care            Comments:    Other Comments: Kevin 9/28/23  Per  EGD d/t wheezing at night    Risks and benefits of MAC anesthetic discussed including dental damage, aspiration, loss of airway, conversion to general anesthetic, CV complications, MI, stroke, death. Pt wishes to proceed.             FREEDOM Robles CRNA

## 2023-10-05 LAB
ATRIAL RATE - MUSE: 71 BPM
DIASTOLIC BLOOD PRESSURE - MUSE: NORMAL MMHG
INTERPRETATION ECG - MUSE: NORMAL
P AXIS - MUSE: 60 DEGREES
PR INTERVAL - MUSE: 186 MS
QRS DURATION - MUSE: 86 MS
QT - MUSE: 396 MS
QTC - MUSE: 430 MS
R AXIS - MUSE: 37 DEGREES
SYSTOLIC BLOOD PRESSURE - MUSE: NORMAL MMHG
T AXIS - MUSE: 59 DEGREES
VENTRICULAR RATE- MUSE: 71 BPM

## 2023-10-06 ENCOUNTER — HOSPITAL ENCOUNTER (OUTPATIENT)
Facility: HOSPITAL | Age: 83
Discharge: HOME | End: 2023-10-06
Attending: SURGERY | Admitting: SURGERY
Payer: COMMERCIAL

## 2023-10-06 ENCOUNTER — ANESTHESIA (OUTPATIENT)
Dept: SURGERY | Facility: HOSPITAL | Age: 83
End: 2023-10-06
Payer: COMMERCIAL

## 2023-10-06 VITALS
HEART RATE: 80 BPM | RESPIRATION RATE: 16 BRPM | SYSTOLIC BLOOD PRESSURE: 131 MMHG | DIASTOLIC BLOOD PRESSURE: 80 MMHG | TEMPERATURE: 97 F | OXYGEN SATURATION: 99 % | WEIGHT: 111 LBS | HEIGHT: 60 IN | BODY MASS INDEX: 21.79 KG/M2

## 2023-10-06 PROCEDURE — 99100 ANES PT EXTEME AGE<1 YR&>70: CPT | Performed by: NURSE ANESTHETIST, CERTIFIED REGISTERED

## 2023-10-06 PROCEDURE — 999N000141 HC STATISTIC PRE-PROCEDURE NURSING ASSESSMENT: Performed by: SURGERY

## 2023-10-06 PROCEDURE — 88305 TISSUE EXAM BY PATHOLOGIST: CPT | Mod: TC | Performed by: SURGERY

## 2023-10-06 PROCEDURE — 88305 TISSUE EXAM BY PATHOLOGIST: CPT | Mod: 26 | Performed by: PATHOLOGY

## 2023-10-06 PROCEDURE — 45380 COLONOSCOPY AND BIOPSY: CPT | Mod: PT | Performed by: SURGERY

## 2023-10-06 PROCEDURE — 45380 COLONOSCOPY AND BIOPSY: CPT | Performed by: NURSE ANESTHETIST, CERTIFIED REGISTERED

## 2023-10-06 PROCEDURE — 258N000003 HC RX IP 258 OP 636: Performed by: NURSE ANESTHETIST, CERTIFIED REGISTERED

## 2023-10-06 PROCEDURE — 43239 EGD BIOPSY SINGLE/MULTIPLE: CPT | Performed by: SURGERY

## 2023-10-06 PROCEDURE — 250N000009 HC RX 250: Performed by: NURSE ANESTHETIST, CERTIFIED REGISTERED

## 2023-10-06 PROCEDURE — 250N000011 HC RX IP 250 OP 636: Performed by: NURSE ANESTHETIST, CERTIFIED REGISTERED

## 2023-10-06 PROCEDURE — 360N000075 HC SURGERY LEVEL 2, PER MIN: Performed by: SURGERY

## 2023-10-06 PROCEDURE — 272N000001 HC OR GENERAL SUPPLY STERILE: Performed by: SURGERY

## 2023-10-06 PROCEDURE — 370N000017 HC ANESTHESIA TECHNICAL FEE, PER MIN: Performed by: SURGERY

## 2023-10-06 PROCEDURE — 710N000012 HC RECOVERY PHASE 2, PER MINUTE: Performed by: SURGERY

## 2023-10-06 RX ORDER — FENTANYL CITRATE-0.9 % NACL/PF 10 MCG/ML
PLASTIC BAG, INJECTION (ML) INTRAVENOUS PRN
Status: DISCONTINUED | OUTPATIENT
Start: 2023-10-06 | End: 2023-10-06

## 2023-10-06 RX ORDER — EPHEDRINE SULFATE 50 MG/ML
INJECTION, SOLUTION INTRAMUSCULAR; INTRAVENOUS; SUBCUTANEOUS PRN
Status: DISCONTINUED | OUTPATIENT
Start: 2023-10-06 | End: 2023-10-06

## 2023-10-06 RX ORDER — NALOXONE HYDROCHLORIDE 0.4 MG/ML
0.4 INJECTION, SOLUTION INTRAMUSCULAR; INTRAVENOUS; SUBCUTANEOUS
Status: CANCELLED | OUTPATIENT
Start: 2023-10-06

## 2023-10-06 RX ORDER — ONDANSETRON 2 MG/ML
4 INJECTION INTRAMUSCULAR; INTRAVENOUS EVERY 30 MIN PRN
Status: DISCONTINUED | OUTPATIENT
Start: 2023-10-06 | End: 2023-10-06 | Stop reason: HOSPADM

## 2023-10-06 RX ORDER — SODIUM CHLORIDE, SODIUM LACTATE, POTASSIUM CHLORIDE, CALCIUM CHLORIDE 600; 310; 30; 20 MG/100ML; MG/100ML; MG/100ML; MG/100ML
INJECTION, SOLUTION INTRAVENOUS CONTINUOUS
Status: DISCONTINUED | OUTPATIENT
Start: 2023-10-06 | End: 2023-10-06 | Stop reason: HOSPADM

## 2023-10-06 RX ORDER — PROPOFOL 10 MG/ML
INJECTION, EMULSION INTRAVENOUS PRN
Status: DISCONTINUED | OUTPATIENT
Start: 2023-10-06 | End: 2023-10-06

## 2023-10-06 RX ORDER — FLUMAZENIL 0.1 MG/ML
0.2 INJECTION, SOLUTION INTRAVENOUS
Status: CANCELLED | OUTPATIENT
Start: 2023-10-06 | End: 2023-10-06

## 2023-10-06 RX ORDER — LIDOCAINE HYDROCHLORIDE 20 MG/ML
INJECTION, SOLUTION INFILTRATION; PERINEURAL PRN
Status: DISCONTINUED | OUTPATIENT
Start: 2023-10-06 | End: 2023-10-06

## 2023-10-06 RX ORDER — NALOXONE HYDROCHLORIDE 0.4 MG/ML
0.2 INJECTION, SOLUTION INTRAMUSCULAR; INTRAVENOUS; SUBCUTANEOUS
Status: CANCELLED | OUTPATIENT
Start: 2023-10-06

## 2023-10-06 RX ORDER — LIDOCAINE 40 MG/G
CREAM TOPICAL
Status: DISCONTINUED | OUTPATIENT
Start: 2023-10-06 | End: 2023-10-06 | Stop reason: HOSPADM

## 2023-10-06 RX ORDER — ONDANSETRON 4 MG/1
4 TABLET, ORALLY DISINTEGRATING ORAL EVERY 30 MIN PRN
Status: DISCONTINUED | OUTPATIENT
Start: 2023-10-06 | End: 2023-10-06 | Stop reason: HOSPADM

## 2023-10-06 RX ADMIN — LIDOCAINE HYDROCHLORIDE 60 MG: 20 INJECTION, SOLUTION INFILTRATION; PERINEURAL at 08:49

## 2023-10-06 RX ADMIN — PROPOFOL 30 MG: 10 INJECTION, EMULSION INTRAVENOUS at 09:11

## 2023-10-06 RX ADMIN — PROPOFOL 30 MG: 10 INJECTION, EMULSION INTRAVENOUS at 08:51

## 2023-10-06 RX ADMIN — Medication 10 MG: at 09:08

## 2023-10-06 RX ADMIN — PROPOFOL 50 MG: 10 INJECTION, EMULSION INTRAVENOUS at 08:57

## 2023-10-06 RX ADMIN — PROPOFOL 70 MG: 10 INJECTION, EMULSION INTRAVENOUS at 08:49

## 2023-10-06 RX ADMIN — PROPOFOL 30 MG: 10 INJECTION, EMULSION INTRAVENOUS at 09:16

## 2023-10-06 RX ADMIN — PROPOFOL 50 MG: 10 INJECTION, EMULSION INTRAVENOUS at 08:53

## 2023-10-06 RX ADMIN — Medication 100 MCG: at 09:07

## 2023-10-06 RX ADMIN — SODIUM CHLORIDE, POTASSIUM CHLORIDE, SODIUM LACTATE AND CALCIUM CHLORIDE: 600; 310; 30; 20 INJECTION, SOLUTION INTRAVENOUS at 08:11

## 2023-10-06 ASSESSMENT — ACTIVITIES OF DAILY LIVING (ADL): ADLS_ACUITY_SCORE: 35

## 2023-10-06 NOTE — OP NOTE
Demi Narayan MRN# [id number]   YOB: 1940 Age: 83 year old      Date of Admission:  10/6/2023  Date of Service:   10/06/23    Primary care provider: Fariba Brown    PREOPERATIVE DIAGNOSIS:  Reflux, epigastric bloating, stool leakage.        POSTOPERATIVE DIAGNOSIS:  Diverticulosis, hiatal hernia, reflux          PROCEDURE:  Colonoscopy with biopsies, upper endoscopy with biopsies.           INDICATIONS:  Screening colonoscopy.      Specimen:   ID Type Source Tests Collected by Time Destination   1 :  Biopsy Stomach, Antrum SURGICAL PATHOLOGY EXAM Carlos Fraser MD 10/6/2023  8:53 AM    2 :  Biopsy Stomach, Cardia SURGICAL PATHOLOGY EXAM Carlos Fraser MD 10/6/2023  8:54 AM    3 :  Biopsy Gastric Esophageal Junction SURGICAL PATHOLOGY EXAM Carlos Fraser MD 10/6/2023  8:55 AM    4 : random colon biopsies Biopsy Large Intestine, Colon SURGICAL PATHOLOGY EXAM Carlos Fraser MD 10/6/2023  9:15 AM        SURGEON: Carlos Fraser MD        PROCEDURE:  With the patient in the supine position on the transport cart, IV sedation was administered by the nurse anesthetist.  The patient's correct identity and planned procedure were confirmed during a requisite timeout pause.  A bite block was placed and the fiberoptic endoscope was introduced and negotiated through the cricopharyngeus without difficulty.  The length of the esophagus was examined without findings.  The gastroesophageal junction was noted 30 cm from the incisors; the Z line was noted to have an area of hyperplastic tissue.  without ulceration, bleeding source or stricture.  There was possible  evidence of Andrade's change. There was noted to be a 5 cm hiatal hernia.   The stomach was entered and the pylorus was traversed easily.  The gastric mucosa was normal; there was no evidence of ulcer or bleeding source.  The duodenum was similarly without finding.  The endoscope was returned to the body of the stomach and retroflex  showed some hypertrophied tissue that was biopsied as well as large hiatal hernia.      Random cold forceps biopsies were obtained of the antrum for H. pylori.  Hemostasis was judged adequate on visualization.   The endoscope was returned to the GE junction. Biopsy of prior seen tissue was taken, this appeared to take some underlying tissue with it right at z-line, did not appear full thickness.   A second circumferential examination of the esophagus was performed on slow withdrawal of the endoscope without additional finding.        Our attention then turned to the colonoscopy, the external anus was inspected and was noted to have small burden of hemorrhoids. Digital rectal exam was normal. The colonoscope was inserted and advanced under direct visualization to the level of the cecum which was identified by the appendiceal orifice and the ileocecal valve. The prep was poor with opaque fluid and larger chunks in sigmoid. Upon slow withdrawal of the colonoscope, approximately 80% of the mucosa was directly visualized. There was diverticulosis throughout otherwise appeared grossly normal. 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  The rest of the colon was without mucosal abnormality. There was no evidence of further polyps, inflammation, bleeding or AVMs. Retroflexion of the rectum was normal. The extra air was removed from the colon, and the colonoscope withdrawn. The patient tolerated the procedure well and was taken to postanesthesia care unit.     No longer needs colonoscopies.     Carlos Fraser MD

## 2023-10-06 NOTE — DISCHARGE INSTRUCTIONS
Post-Anesthesia Patient Instructions    IMMEDIATELY FOLLOWING SURGERY:  Do not drive or operate machinery for the first twenty four hours after surgery.  Do not make any important decisions for twenty four hours after surgery or while taking narcotic pain medications or sedatives.  If you develop intractable nausea and vomiting or a severe headache please notify your doctor immediately.    FOLLOW-UP:  Please make an appointment with your surgeon as instructed. You do not need to follow up with anesthesia unless specifically instructed to do so.    WOUND CARE INSTRUCTIONS (if applicable):  Keep a dry clean dressing on the anesthesia/puncture wound site if there is drainage.  Once the wound has quit draining you may leave it open to air.  Generally you should leave the bandage intact for twenty four hours unless there is drainage.  If the epidural site drains for more than 36-48 hours please call the anesthesia department.    QUESTIONS?:  Please feel free to call your physician or the hospital  if you have any questions, and they will be happy to assist you.       Colonoscopy: What to Expect at Home  Your Recovery  After a colonoscopy, you'll stay at the clinic until you wake up. Then you can go home. But you'll need to arrange for a ride. Your doctor will tell you when you can eat and do your other usual activities.  Your doctor will talk to you about when you'll need your next colonoscopy. Your doctor can help you decide how often you need to be checked. This will depend on the results of your test and your risk for colorectal cancer.  After the test, you may be bloated or have gas pains. You may need to pass gas. If a biopsy was done or a polyp was removed, you may have streaks of blood in your stool (feces) for a few days. Problems such as heavy rectal bleeding may not occur until several weeks after the test. This isn't common. But it can happen after polyps are removed.  This care sheet gives you a  general idea about how long it will take for you to recover. But each person recovers at a different pace. Follow the steps below to get better as quickly as possible.  How can you care for yourself at home?  Activity    Rest when you feel tired.     You can do your normal activities when it feels okay to do so.   Diet    Follow your doctor's directions for eating.     Unless your doctor has told you not to, drink plenty of fluids. This helps to replace the fluids that were lost during the colon prep.     Do not drink alcohol.   Medicines    Your doctor will tell you if and when you can restart your medicines. You will also be given instructions about taking any new medicines.     If you stopped taking aspirin or some other blood thinner, your doctor will tell you when to start taking it again.     If polyps were removed or a biopsy was done during the test, your doctor may tell you not to take aspirin or other anti-inflammatory medicines for a few days. These include ibuprofen (Advil, Motrin) and naproxen (Aleve).   Other instructions    For your safety, do not drive or operate machinery until the medicine wears off and you can think clearly. Your doctor may tell you not to drive or operate machinery until the day after your test.     Do not sign legal documents or make major decisions until the medicine wears off and you can think clearly. The anesthesia can make it hard for you to fully understand what you are agreeing to.   Follow-up care is a key part of your treatment and safety. Be sure to make and go to all appointments, and call your doctor if you are having problems. It's also a good idea to know your test results and keep a list of the medicines you take.  When should you call for help?   Call 911 anytime you think you may need emergency care. For example, call if:    You passed out (lost consciousness).     You pass maroon or bloody stools.     You have trouble breathing.   Call your doctor now or seek  "immediate medical care if:    You have pain that does not get better after you take pain medicine.     You are sick to your stomach or cannot drink fluids.     You have new or worse belly pain.     You have blood in your stools.     You have a fever.     You cannot pass stools or gas.   Watch closely for changes in your health, and be sure to contact your doctor if you have any problems.  Where can you learn more?  Go to https://www.[employer].net/patiented  Enter E264 in the search box to learn more about \"Colonoscopy: What to Expect at Home.\"  Current as of: March 1, 2023               Content Version: 13.7    9027-9581 [employer].   Care instructions adapted under license by your healthcare professional. If you have questions about a medical condition or this instruction, always ask your healthcare professional. [employer] disclaims any warranty or liability for your use of this information.    Upper GI Endoscopy: What to Expect at Home  Your Recovery  You had an upper GI endoscopy. Your doctor used a thin, lighted tube that bends to look at the inside of your esophagus, your stomach, and the first part of the small intestine, called the duodenum.  After you have an endoscopy, you will stay at the hospital or clinic for 1 to 2 hours. This will allow the medicine to wear off. You will be able to go home after your doctor or nurse checks to make sure that you're not having any problems.  You may have to stay overnight if you had treatment during the test. You may have a sore throat for a day or two after the test.  This care sheet gives you a general idea about what to expect after the test.  How can you care for yourself at home?  Activity   Rest as much as you need to after you go home.  You should be able to go back to your usual activities the day after the test.  Diet   Follow your doctor's directions for eating after the test.  Drink plenty of fluids (unless your doctor has told " "you not to).  Medications   If you have a sore throat the day after the test, use an over-the-counter spray to numb your throat.  Follow-up care is a key part of your treatment and safety. Be sure to make and go to all appointments, and call your doctor if you are having problems. It's also a good idea to know your test results and keep a list of the medicines you take.  When should you call for help?   Call 911 anytime you think you may need emergency care. For example, call if:    You passed out (lost consciousness).     You have trouble breathing.     You pass maroon or bloody stools.   Call your doctor now or seek immediate medical care if:    You have pain that does not get better after your take pain medicine.     You have new or worse belly pain.     You have blood in your stools.     You are sick to your stomach and cannot keep fluids down.     You have a fever.     You cannot pass stools or gas.   Watch closely for changes in your health, and be sure to contact your doctor if:    Your throat still hurts after a day or two.     You do not get better as expected.   Where can you learn more?  Go to https://www.[employer].net/patiented  Enter J454 in the search box to learn more about \"Upper GI Endoscopy: What to Expect at Home.\"  Current as of: March 22, 2023               Content Version: 13.7    3203-6176 [employer].   Care instructions adapted under license by your healthcare professional. If you have questions about a medical condition or this instruction, always ask your healthcare professional. [employer] disclaims any warranty or liability for your use of this information.          "

## 2023-10-06 NOTE — ANESTHESIA POSTPROCEDURE EVALUATION
Patient: Demi Narayan    Procedure: Procedure(s):  Upper Endoscopy with biopsies and Colonoscopy with biopsies       Anesthesia Type:  MAC    Note:  Disposition: Outpatient   Postop Pain Control: Uneventful            Sign Out: Well controlled pain   PONV: No   Neuro/Psych: Uneventful            Sign Out: Acceptable/Baseline neuro status   Airway/Respiratory: Uneventful            Sign Out: Acceptable/Baseline resp. status   CV/Hemodynamics: Uneventful            Sign Out: Acceptable CV status; No obvious hypovolemia; No obvious fluid overload   Other NRE: NONE   DID A NON-ROUTINE EVENT OCCUR? No       Last vitals:  Vitals Value Taken Time   /80 10/06/23 0950   Temp 97  F (36.1  C) 10/06/23 0950   Pulse 80 10/06/23 0950   Resp 16 10/06/23 0950   SpO2 99 % 10/06/23 0950       Electronically Signed By: FREEODM Fernandez CRNA  October 6, 2023  11:10 AM

## 2023-10-06 NOTE — OR NURSING
Patient doing well.  Up to bathroom to void.  Notes that the pain/cramping she was having is now gone.  VSS.  Patient able to eat/drink without difficulties.   at bedside.  Discharge instructions reviewed with both; no further questions and or concerns.  Dr. Fraser at bedside.    Patient and responsible adult given discharge instructions with no questions regarding instructions. [place] score 19. Pain level 0/10.  Discharged from unit via ambulatory. Patient discharged to home with .

## 2023-10-06 NOTE — ANESTHESIA CARE TRANSFER NOTE
Patient: Demi Narayan    Procedure: Procedure(s):  Upper Endoscopy with biopsies and Colonoscopy with biopsies       Diagnosis: Change in bowel habits [R19.4]  Hx of gastroesophageal reflux (GERD) [Z87.19]  Diagnosis Additional Information: No value filed.    Anesthesia Type:   MAC     Note:    Oropharynx: spontaneously breathing  Level of Consciousness: awake and drowsy  Oxygen Supplementation: nasal cannula    Independent Airway: airway patency satisfactory and stable  Dentition: dentition unchanged  Vital Signs Stable: post-procedure vital signs reviewed and stable  Report to RN Given: handoff report given  Patient transferred to: Phase II    Handoff Report: Identifed the Patient, Identified the Reponsible Provider, Reviewed the pertinent medical history, Discussed the surgical course, Reviewed Intra-OP anesthesia mangement and issues during anesthesia, Set expectations for post-procedure period and Allowed opportunity for questions and acknowledgement of understanding    Vitals:  Vitals Value Taken Time   BP     Temp     Pulse     Resp     SpO2         Electronically Signed By: FREEDOM Fernandez CRNA  October 6, 2023  9:22 AM

## 2023-10-31 DIAGNOSIS — S33.8XXD SPRAIN OF OTHER PARTS OF LUMBAR SPINE AND PELVIS, SUBSEQUENT ENCOUNTER: ICD-10-CM

## 2023-11-01 RX ORDER — TRAMADOL HYDROCHLORIDE 50 MG/1
TABLET ORAL
Qty: 120 TABLET | Refills: 2 | Status: SHIPPED | OUTPATIENT
Start: 2023-11-01 | End: 2024-02-01

## 2023-11-01 NOTE — TELEPHONE ENCOUNTER
Tramadol 50 mg tab      Last Written Prescription Date:  10/2/23  Last Fill Quantity: 120,   # refills: 0  Last Office Visit: 9/28/23

## 2023-12-07 ENCOUNTER — IMMUNIZATION (OUTPATIENT)
Dept: FAMILY MEDICINE | Facility: OTHER | Age: 83
End: 2023-12-07
Attending: FAMILY MEDICINE
Payer: COMMERCIAL

## 2023-12-07 PROCEDURE — 91320 SARSCV2 VAC 30MCG TRS-SUC IM: CPT

## 2024-01-03 ENCOUNTER — OFFICE VISIT (OUTPATIENT)
Dept: CHIROPRACTIC MEDICINE | Facility: OTHER | Age: 84
End: 2024-01-03
Attending: CHIROPRACTOR
Payer: COMMERCIAL

## 2024-01-03 DIAGNOSIS — M99.03 SEGMENTAL AND SOMATIC DYSFUNCTION OF LUMBAR REGION: Primary | ICD-10-CM

## 2024-01-03 DIAGNOSIS — M54.50 ACUTE BILATERAL LOW BACK PAIN WITHOUT SCIATICA: ICD-10-CM

## 2024-01-03 DIAGNOSIS — M99.02 SEGMENTAL AND SOMATIC DYSFUNCTION OF THORACIC REGION: ICD-10-CM

## 2024-01-03 PROCEDURE — 98940 CHIROPRACT MANJ 1-2 REGIONS: CPT | Mod: AT | Performed by: CHIROPRACTOR

## 2024-01-03 NOTE — TELEPHONE ENCOUNTER
Tramadol      Last Written Prescription Date:  2/1/22  Last Fill Quantity: 120,   # refills: 0  Last Office Visit: 2/22/22  Future Office visit:    Next 5 appointments (look out 90 days)    Mar 16, 2022 11:00 AM  Return Visit with Curt Encarnacion DC  [facility] [place] ([facility]  [place] ) [address] [place] [address]  [place] [address]  [phone]   May 24, 2022  1:00 PM  (Arrive by 12:45 PM)  SHORT with Fariba Brown MD  [facility] [place] ([facility] ) [address]  [place] [address]  [phone]                No

## 2024-01-03 NOTE — PROGRESS NOTES
Subjective Finding:    Chief compalint: Patient presents with:  Back Pain: Tightness in lower backl  , Pain Scale: 5/10, Intensity: stiff sharp, Duration: 1 weeks, Radiating:  no.    Date of injury:     Activities that the pain restricts:   Home/household/hobbies/social activities: Yes.  Work duties: No.  Sleep: No.  Makes symptoms better: rest.  Makes symptoms worse: activity.  Have you seen anyone else for the symptoms? Yes: MD.  Work related: No.  Automobile related injury: No.    Objective and Assessment:    Posture Analysis:   High shoulder: .  Head tilt: .  High iliac crest: right.  Head carriage: neutral.  Thoracic Kyphosis: forward.  Lumbar Lordosis: forward.    Lumbar Range of Motion: extension decreased.  Cervical Range of Motion: .  Thoracic Range of Motion: extension decreased.  Extremity Range of Motion: .    Palpation:   T paraspinals: dull pain, no    Segmental dysfunction pre-treatment and treatment area: T10  L5.    Assessment post-treatment:  Cervical: .  Thoracic: ROM increased.  Lumbar: ROM increased.    Comments: .      Complicating Factors: .    Procedure(s):  CMT:  47234 Chiropractic manipulative treatment 1-2 regions performed   Thoracic: Diversified, See above for level, Prone and Lumbar: Diversified, See above for level, Side posture    Modalities:  None performed this visit    Therapeutic procedures:  None    Plan:  Treatment plan:  1 times per week for 4 weeks.  Instructed patient: stretch as instructed at visit and walk 10 minutes.  Short term goals: increase ROM.  Long term goals: increase ADL.  Prognosis: good.

## 2024-01-08 ENCOUNTER — OFFICE VISIT (OUTPATIENT)
Dept: CHIROPRACTIC MEDICINE | Facility: OTHER | Age: 84
End: 2024-01-08
Attending: CHIROPRACTOR
Payer: COMMERCIAL

## 2024-01-08 DIAGNOSIS — M54.50 ACUTE BILATERAL LOW BACK PAIN WITHOUT SCIATICA: ICD-10-CM

## 2024-01-08 DIAGNOSIS — M99.02 SEGMENTAL AND SOMATIC DYSFUNCTION OF THORACIC REGION: ICD-10-CM

## 2024-01-08 DIAGNOSIS — M99.03 SEGMENTAL AND SOMATIC DYSFUNCTION OF LUMBAR REGION: Primary | ICD-10-CM

## 2024-01-08 PROCEDURE — 98940 CHIROPRACT MANJ 1-2 REGIONS: CPT | Mod: AT | Performed by: CHIROPRACTOR

## 2024-01-10 NOTE — PROGRESS NOTES
Subjective Finding:    Chief compalint: Patient presents with:  Back Pain: Tightness in lower back.  Good relief with tx.  Curvature and dd of lumbar  , Pain Scale: 4/10, Intensity: stiff sharp, Duration: 3 days, Radiating:  no.    Date of injury:     Activities that the pain restricts:   Home/household/hobbies/social activities: Yes.  Work duties: No.  Sleep: No.  Makes symptoms better: rest.  Makes symptoms worse: activity.  Have you seen anyone else for the symptoms? no.  Work related: No.  Automobile related injury: No.    Objective and Assessment:    Posture Analysis:   High shoulder: .  Head tilt: .  High iliac crest: right.  Head carriage: neutral.  Thoracic Kyphosis: forward.  Lumbar Lordosis: forward.    Lumbar Range of Motion: extension decreased.  Cervical Range of Motion: .  Thoracic Range of Motion: extension decreased.  Extremity Range of Motion: .    Palpation:   T paraspinals: dull pain, no    Segmental dysfunction pre-treatment and treatment area: T10  L5.    Assessment post-treatment:  Cervical: .  Thoracic: ROM increased.  Lumbar: ROM increased.    Comments: .      Complicating Factors: .  Significant DD of lumbar and scoliotic curve in T and l spine        Procedure(s):  CMT:  18864 Chiropractic manipulative treatment 1-2 regions performed   Thoracic: Diversified, See above for level, Prone and Lumbar: Diversified, See above for level, Side posture    Modalities:  None performed this visit    Therapeutic procedures:  None    Plan:  Treatment plan: 1 time 3 weeks.  Instructed patient: stretch as instructed at visit and walk 10 minutes.  Short term goals: increase ROM.  Long term goals: increase ADL.  Prognosis: good.

## 2024-01-19 ENCOUNTER — TELEPHONE (OUTPATIENT)
Dept: FAMILY MEDICINE | Facility: OTHER | Age: 84
End: 2024-01-19

## 2024-01-19 NOTE — TELEPHONE ENCOUNTER
Reason for call:  RESULTS    What is the test that was ordered? Carotid Ultrasound  Who ordered your test? Dr Springer  When was the test performed?  [facility]  Description: Patient hasn't heard from Dr Springer office about these results and she is wondering if Dr Brown received the results and can she give her the results?  Was an appointment offered for this a call? No  If Yes :  Appointment type                 Date    Preferred method for responding to this message: Telephone Call  What is your phone number ? [phone]    If we cannot reach you directly, may we leave a detailed response at the number you provided? Yes    Can this message wait until your PCP/Provider returns if not available today? NO

## 2024-01-22 ENCOUNTER — OFFICE VISIT (OUTPATIENT)
Dept: CHIROPRACTIC MEDICINE | Facility: OTHER | Age: 84
End: 2024-01-22
Attending: CHIROPRACTOR
Payer: COMMERCIAL

## 2024-01-22 DIAGNOSIS — M99.03 SEGMENTAL AND SOMATIC DYSFUNCTION OF LUMBAR REGION: Primary | ICD-10-CM

## 2024-01-22 DIAGNOSIS — M54.50 ACUTE BILATERAL LOW BACK PAIN WITHOUT SCIATICA: ICD-10-CM

## 2024-01-22 DIAGNOSIS — M99.02 SEGMENTAL AND SOMATIC DYSFUNCTION OF THORACIC REGION: ICD-10-CM

## 2024-01-22 PROCEDURE — 98940 CHIROPRACT MANJ 1-2 REGIONS: CPT | Mod: AT | Performed by: CHIROPRACTOR

## 2024-01-24 NOTE — PROGRESS NOTES
Subjective Finding:    Chief compalint: Patient presents with:  Back Pain: Tightness in lower back.  She is out walking as much as tolerable with her back    , Pain Scale: 3/10, Intensity: stiff sharp, Duration: 1 days, Radiating:  no.    Date of injury:     Activities that the pain restricts:   Home/household/hobbies/social activities: Yes.  Work duties: No.  Sleep: No.  Makes symptoms better: rest.  Makes symptoms worse: activity.  Have you seen anyone else for the symptoms? no.  Work related: No.  Automobile related injury: No.    Objective and Assessment:    Posture Analysis:   High shoulder: .  Head tilt: .  High iliac crest: right.  Head carriage: neutral.  Thoracic Kyphosis: forward.  Lumbar Lordosis: forward.    Lumbar Range of Motion: extension decreased.  Cervical Range of Motion: .  Thoracic Range of Motion: extension decreased.  Extremity Range of Motion: .    Palpation:   T paraspinals: dull pain, no    Segmental dysfunction pre-treatment and treatment area: T67  L5.    Assessment post-treatment:  Cervical: .  Thoracic: ROM increased.  Lumbar: ROM increased.    Comments: .  Walking helps.  She is getting her walking in.        Complicating Factors: .  Significant DD of lumbar and scoliotic curve in T and l spine        Procedure(s):  CMT:  42101 Chiropractic manipulative treatment 1-2 regions performed   Thoracic: Diversified, See above for level, Prone and Lumbar: Diversified, See above for level, Side posture    Modalities:  None performed this visit    Therapeutic procedures:  None    Plan:  Treatment plan: 1 time 3 weeks.  Instructed patient: stretch as instructed at visit and walk 10 minutes.  Short term goals: increase ROM.  Long term goals: increase ADL.  Prognosis: good.

## 2024-02-01 DIAGNOSIS — S33.8XXD SPRAIN OF OTHER PARTS OF LUMBAR SPINE AND PELVIS, SUBSEQUENT ENCOUNTER: ICD-10-CM

## 2024-02-01 RX ORDER — TRAMADOL HYDROCHLORIDE 50 MG/1
TABLET ORAL
Qty: 120 TABLET | Refills: 0 | Status: SHIPPED | OUTPATIENT
Start: 2024-02-01 | End: 2024-03-01

## 2024-02-01 NOTE — TELEPHONE ENCOUNTER
Tramadol      Last Written Prescription Date:  11/1/23  Last Fill Quantity: 120,   # refills: 2  Last Office Visit: 9/28/23  Future Office visit:    Next 5 appointments (look out 90 days)      Feb 05, 2024 11:10 AM  Return Visit with Curt Encarnacion DC  [facility] ([facility] ) [address] [place] [address]  [phone]     Mar 04, 2024 11:30 AM  (Arrive by 11:15 AM)  SHORT with Fariba Brown MD  [facility] ([facility] ) [address] [facility] [address]  [phone]             Routing refill request to provider for review/approval because:

## 2024-02-05 ENCOUNTER — OFFICE VISIT (OUTPATIENT)
Dept: CHIROPRACTIC MEDICINE | Facility: OTHER | Age: 84
End: 2024-02-05
Attending: CHIROPRACTOR
Payer: COMMERCIAL

## 2024-02-05 DIAGNOSIS — M99.03 SEGMENTAL AND SOMATIC DYSFUNCTION OF LUMBAR REGION: Primary | ICD-10-CM

## 2024-02-05 DIAGNOSIS — M54.50 ACUTE BILATERAL LOW BACK PAIN WITHOUT SCIATICA: ICD-10-CM

## 2024-02-05 DIAGNOSIS — M99.02 SEGMENTAL AND SOMATIC DYSFUNCTION OF THORACIC REGION: ICD-10-CM

## 2024-02-05 PROCEDURE — 98940 CHIROPRACT MANJ 1-2 REGIONS: CPT | Mod: AT | Performed by: CHIROPRACTOR

## 2024-02-07 NOTE — PROGRESS NOTES
Subjective Finding:    Chief compalint: Patient presents with:  Back Pain  , Pain Scale: 4/10, Intensity: stiff sharp, Duration: 4 days, Radiating:  no.    Date of injury:     Activities that the pain restricts:   Home/household/hobbies/social activities: Yes.  Work duties: No.  Sleep: No.  Makes symptoms better: rest.  Makes symptoms worse: activity.  Have you seen anyone else for the symptoms? no.  Work related: No.  Automobile related injury: No.    Objective and Assessment:    Posture Analysis:   High shoulder: .  Head tilt: .  High iliac crest: right.  Head carriage: neutral.  Thoracic Kyphosis: forward.  Lumbar Lordosis: forward.    Lumbar Range of Motion: extension decreased.  Cervical Range of Motion: .  Thoracic Range of Motion: extension decreased.  Extremity Range of Motion: .    Palpation:   T paraspinals: dull pain, no    Segmental dysfunction pre-treatment and treatment area: T67  L5.    Assessment post-treatment:  Cervical: .  Thoracic: ROM increased.  Lumbar: ROM increased.    Comments: .  Walking helps.  She is getting her walking in.        Complicating Factors: .  Significant DD of lumbar and scoliotic curve in T and l spine        Procedure(s):  CMT:  78130 Chiropractic manipulative treatment 1-2 regions performed   Thoracic: Diversified, See above for level, Prone and Lumbar: Diversified, See above for level, Side posture    Modalities:  None performed this visit    Therapeutic procedures:  None    Plan:  Treatment plan: 1 time 3 weeks.  Instructed patient: stretch as instructed at visit and walk 10 minutes.  Short term goals: increase ROM.  Long term goals: increase ADL.  Prognosis: good.

## 2024-02-26 ENCOUNTER — OFFICE VISIT (OUTPATIENT)
Dept: CHIROPRACTIC MEDICINE | Facility: OTHER | Age: 84
End: 2024-02-26
Attending: CHIROPRACTOR
Payer: COMMERCIAL

## 2024-02-26 DIAGNOSIS — M54.50 ACUTE BILATERAL LOW BACK PAIN WITHOUT SCIATICA: ICD-10-CM

## 2024-02-26 DIAGNOSIS — M99.03 SEGMENTAL AND SOMATIC DYSFUNCTION OF LUMBAR REGION: Primary | ICD-10-CM

## 2024-02-26 DIAGNOSIS — M99.02 SEGMENTAL AND SOMATIC DYSFUNCTION OF THORACIC REGION: ICD-10-CM

## 2024-02-26 PROCEDURE — 98940 CHIROPRACT MANJ 1-2 REGIONS: CPT | Mod: AT | Performed by: CHIROPRACTOR

## 2024-02-26 NOTE — PROGRESS NOTES
Subjective Finding:    Chief compalint: Patient presents with:  Back Pain: Low back stiffness , Pain Scale: 4/10, Intensity: dull and ache, Duration: 1 days, Radiating:  bilateral buttock.    Date of injury:     Activities that the pain restricts:   Home/household/hobbies/social activities: Yes.  Work duties: Yes.  Sleep: No.  Makes symptoms better: rest.  Makes symptoms worse: walking.  Have you seen anyone else for the symptoms? No.  Work related: No.  Automobile related injury: No.    Objective and Assessment:    Posture Analysis:   High shoulder: .  Head tilt: .  High iliac crest: .  Head carriage: neutral.  Thoracic Kyphosis: neutral.  Lumbar Lordosis: forward.    Lumbar Range of Motion: extension decreased.  Cervical Range of Motion: .  Thoracic Range of Motion: .  Extremity Range of Motion: .    Palpation:   Piriformis: right, referred pain: no  Quad lumb: bilateral, referred pain: no    Segmental dysfunction pre-treatment and treatment area: T7, L4, and L5.    Assessment post-treatment:  Cervical: .  Thoracic: ROM increased.  Lumbar: ROM increased.    Comments: .      Complicating Factors: .    Procedure(s):  CMT:  23465 Chiropractic manipulative treatment 1-2 regions performed   Thoracic: Diversified, See above for level, Prone and Lumbar: Diversified, See above for level, Side posture    Modalities:  None performed this visit    Therapeutic procedures:  None    Plan:  Treatment plan: PRN.  Instructed patient: walk 10 minutes.  Short term goals: reduce pain.  Long term goals: restore normal function.  Prognosis: very good.

## 2024-02-27 PROBLEM — I50.32 CHRONIC HEART FAILURE WITH PRESERVED EJECTION FRACTION (H): Status: ACTIVE | Noted: 2024-02-27

## 2024-02-27 NOTE — PROGRESS NOTES
Assessment & Plan     Low back pain, chronic  No changes  - okay to continue with tramadol qty 120 per month refills until next visit in 9/2024    Chronic heart failure with preserved ejection fraction (H)  Occasional wheeze at night   Will hold off on added spironolactone at this time    Hormone replacement therapy (HRT)  - estradiol (ESTRACE) 0.5 MG tablet; Take 1 tablet by mouth once daily    Vertigo  - Adult ENT  Referral; Future    Incontinence of feces, unspecified fecal incontinence type / Urinary incontinence, unspecified type  Consideration for prolapse  - Ob/Gyn  Referral    GERD (gastroesophageal reflux)  Consideration for etiology of right elbow / across the chest pain vs cervical etiology   Duration of many years, occurs with standing, unlikely cardiac   Stress test (9/2020) negative      See Patient Instructions    Return if symptoms worsen or fail to improve.  Next visit 9/3/2024    Misa Santos is a 83 year old, presenting for the following health issues:  Pain    HPI       Pain History:  When did you first notice your pain? F/U  Have you seen this provider for your pain in the past? Yes   Where in your body do you have pain? LOWER BACK  Are you seeing anyone else for your pain? No        3/5/2021     2:00 PM 5/24/2022    12:00 PM 8/29/2023     7:29 AM   PHQ-9 SCORE   PHQ-9 Total Score MyChart   0   PHQ-9 Total Score 0 0 0           3/5/2021     2:00 PM 5/24/2022    12:00 PM 8/29/2023     7:31 AM   ADONIS-7 SCORE   Total Score   0 (minimal anxiety)   Total Score 0 0 0             5/24/2022    12:52 PM 8/26/2022     7:57 AM 8/29/2023     7:28 AM   PEG Score   PEG Total Score 5 2 2.33       Chronic Pain Follow Up:    Location of pain: LOWER BACK  Analgesia/pain control:    - Recent changes:  No changes    - Overall control: Tolerable with discomfort    - Current treatments: Chiro, tramadol   Adherence:     - Do you ever take more pain medicine than prescribed? No    - When did  "you take your last dose of pain medicine?  This am   Adverse effects: No     - follows with Dr. Encarnacion    PDMP Review         Value Time User    State PDMP site checked  Yes 2/1/2024 10:00 AM Fariba Brown MD          Last CSA Agreement:   CSA -- Patient Level:     [Media Unavailable] Controlled Substance Agreement - Opioid - Scan on 8/30/2023 11:46 AM: OPIOID/OPIOID PLUS CONTROLLED SUBSTANCE AGREEMENT   [Media Unavailable] Controlled Substance Agreement - Opioid - Scan on 8/29/2022  1:12 PM: OPIOD/OPIOD PLUS CONTROLLED SUBSTANCE AGREEMENT   [Media Unavailable] Controlled Substance Agreement - Opioid - Scan on 8/4/2021  7:26 AM: OPIOD/OPIOD PLUS CONTROLLED SUBSTANCE AGREEMENT       Last UDS: 8/31/2023      # IBS  - colonoscopy (10/6/2023): bx benign   - EGD (10/6/2023): bx consistent with acid reflux    # Derm:    -bx benign     # Wheezing at night:HFpEF: occasional   - Echo (10/3/2023) EF 50 to 55%, G1DD    BP Readings from Last 6 Encounters:   03/04/24 (!) 142/80   10/06/23 131/80   09/28/23 128/68   09/07/23 116/68   08/29/23 (!) 152/74   03/02/23 136/74     # pain   - starts on right elbow, goes up to shoulder, across chest  - odd sensation   - duration of many years  - standing washing dishes, generally feels it standing  - lasts only moments, then resolves w/o intervention  - does have acid reflux      # incontinence urine / fecal   - has been thru PT  - has seen urology  w/ recommendation for botox     # vertigo  - related to left ear   - feels like there is \"something alive\" in her left ear    #       Review of Systems  Constitutional, HEENT, cardiovascular, pulmonary, gi and gu systems are negative, except as otherwise noted.    Review of Systems   Gastrointestinal:  Positive for abdominal distention.        Acid reflux   Genitourinary:         Incontinence of fecal and urine    Musculoskeletal:         Right elbow/shoulder / chest pain   Neurological:  Positive for dizziness.         "   Objective    BP (!) 142/80   Pulse 73   Temp 97.9  F (36.6  C) (Tympanic)   Ht 1.524 m (5')   Wt 52.4 kg (115 lb 9.6 oz)   SpO2 97%   BMI 22.58 kg/m    Body mass index is 22.58 kg/m .  Physical Exam  Constitutional:       General: She is not in acute distress.     Appearance: She is not ill-appearing.   Cardiovascular:      Rate and Rhythm: Normal rate and regular rhythm.      Heart sounds: No murmur heard.  Pulmonary:      Effort: Pulmonary effort is normal. No respiratory distress.      Breath sounds: No wheezing or rales.   Musculoskeletal:      Comments: Scoliosis of back   Neurological:      Mental Status: She is alert.   Psychiatric:         Mood and Affect: Mood normal.            Signed Electronically by: Fariba Brown MD

## 2024-02-29 DIAGNOSIS — S33.8XXD SPRAIN OF OTHER PARTS OF LUMBAR SPINE AND PELVIS, SUBSEQUENT ENCOUNTER: ICD-10-CM

## 2024-03-01 RX ORDER — TRAMADOL HYDROCHLORIDE 50 MG/1
TABLET ORAL
Qty: 120 TABLET | Refills: 0 | Status: SHIPPED | OUTPATIENT
Start: 2024-03-01 | End: 2024-04-02

## 2024-03-01 NOTE — TELEPHONE ENCOUNTER
Tramadol      Last Written Prescription Date:  2/1/24  Last Fill Quantity: 120,   # refills: 0  Last Office Visit: 9/28/23  Future Office visit:    Next 5 appointments (look out 90 days)      Mar 04, 2024 11:30 AM  (Arrive by 11:15 AM)  SHORT with Fariba Brown MD  [facility] ([facility] ) [address] [place] [address]  [phone]     Mar 18, 2024 11:10 AM  Return Visit with Curt Enc[facility]acion DC  [facility] ) [address]  [facility] [address]  [phone]             Routing refill request to provider for review/approval because:

## 2024-03-04 ENCOUNTER — OFFICE VISIT (OUTPATIENT)
Dept: FAMILY MEDICINE | Facility: OTHER | Age: 84
End: 2024-03-04
Attending: FAMILY MEDICINE
Payer: COMMERCIAL

## 2024-03-04 VITALS
BODY MASS INDEX: 22.7 KG/M2 | DIASTOLIC BLOOD PRESSURE: 80 MMHG | HEART RATE: 73 BPM | HEIGHT: 60 IN | SYSTOLIC BLOOD PRESSURE: 142 MMHG | TEMPERATURE: 97.9 F | OXYGEN SATURATION: 97 % | WEIGHT: 115.6 LBS

## 2024-03-04 DIAGNOSIS — R42 VERTIGO: ICD-10-CM

## 2024-03-04 DIAGNOSIS — M54.50 CHRONIC BILATERAL LOW BACK PAIN WITHOUT SCIATICA: Primary | ICD-10-CM

## 2024-03-04 DIAGNOSIS — K21.9 GASTROESOPHAGEAL REFLUX DISEASE WITHOUT ESOPHAGITIS: ICD-10-CM

## 2024-03-04 DIAGNOSIS — Z79.890 HORMONE REPLACEMENT THERAPY (HRT): ICD-10-CM

## 2024-03-04 DIAGNOSIS — R15.9 INCONTINENCE OF FECES, UNSPECIFIED FECAL INCONTINENCE TYPE: ICD-10-CM

## 2024-03-04 DIAGNOSIS — I50.32 CHRONIC HEART FAILURE WITH PRESERVED EJECTION FRACTION (H): ICD-10-CM

## 2024-03-04 DIAGNOSIS — G89.29 CHRONIC BILATERAL LOW BACK PAIN WITHOUT SCIATICA: Primary | ICD-10-CM

## 2024-03-04 DIAGNOSIS — R32 URINARY INCONTINENCE, UNSPECIFIED TYPE: ICD-10-CM

## 2024-03-04 PROCEDURE — G0463 HOSPITAL OUTPT CLINIC VISIT: HCPCS | Mod: 25

## 2024-03-04 PROCEDURE — 99214 OFFICE O/P EST MOD 30 MIN: CPT | Performed by: FAMILY MEDICINE

## 2024-03-04 PROCEDURE — G0463 HOSPITAL OUTPT CLINIC VISIT: HCPCS

## 2024-03-04 RX ORDER — ESTRADIOL 0.5 MG/1
TABLET ORAL
Qty: 90 TABLET | Refills: 3 | Status: SHIPPED | OUTPATIENT
Start: 2024-03-04

## 2024-03-04 ASSESSMENT — ENCOUNTER SYMPTOMS
DIZZINESS: 1
ABDOMINAL DISTENTION: 1
ROS GI COMMENTS: ACID REFLUX

## 2024-03-04 ASSESSMENT — PAIN SCALES - PAIN ENJOYMENT GENERAL ACTIVITY SCALE (PEG)
AVG_PAIN_PASTWEEK: 6
PEG_TOTALSCORE: INCOMPLETE
INTERFERED_GENERAL_ACTIVITY: 3
INTERFERED_GENERAL_ACTIVITY: 3
AVG_PAIN_PASTWEEK: 6

## 2024-03-04 ASSESSMENT — PAIN SCALES - GENERAL: PAINLEVEL: SEVERE PAIN (6)

## 2024-03-04 NOTE — PATIENT INSTRUCTIONS
We put in a referral to ENT for your left ear issues  We put in a referral to ob/gyn for urine and stool issues

## 2024-03-18 ENCOUNTER — OFFICE VISIT (OUTPATIENT)
Dept: CHIROPRACTIC MEDICINE | Facility: OTHER | Age: 84
End: 2024-03-18
Attending: CHIROPRACTOR
Payer: COMMERCIAL

## 2024-03-18 DIAGNOSIS — M54.50 ACUTE BILATERAL LOW BACK PAIN WITHOUT SCIATICA: ICD-10-CM

## 2024-03-18 DIAGNOSIS — M99.03 SEGMENTAL AND SOMATIC DYSFUNCTION OF LUMBAR REGION: Primary | ICD-10-CM

## 2024-03-18 DIAGNOSIS — M99.02 SEGMENTAL AND SOMATIC DYSFUNCTION OF THORACIC REGION: ICD-10-CM

## 2024-03-18 PROCEDURE — 98940 CHIROPRACT MANJ 1-2 REGIONS: CPT | Mod: AT | Performed by: CHIROPRACTOR

## 2024-03-18 NOTE — PROGRESS NOTES
Subjective Finding:    Chief compalint: Patient presents with:  Back Pain: Tightness in lower back , Pain Scale: 4/10, Intensity: dull, Duration: 1 weeks, Radiating: bilateral buttock.    Date of injury:     Activities that the pain restricts:   Home/household/hobbies/social activities: Yes.  Work duties: No.  Sleep: No.  Makes symptoms better: rest.  Makes symptoms worse: walking.  Have you seen anyone else for the symptoms? No.  Work related: No.  Automobile related injury: No.    Objective and Assessment:    Posture Analysis:   High shoulder: .  Head tilt: .  High iliac crest: .  Head carriage: neutral.  Thoracic Kyphosis: neutral.  Lumbar Lordosis: forward.    Lumbar Range of Motion: extension decreased.  Cervical Range of Motion: .  Thoracic Range of Motion: .  Extremity Range of Motion: .    Palpation:   Quad lumb: bilateral, referred pain: no    Segmental dysfunction pre-treatment and treatment area: T7, L5, and Sacrum.    Assessment post-treatment:  Cervical: .  Thoracic: ROM increased.  Lumbar: ROM increased.    Comments: .      Complicating Factors: .    Procedure(s):  CMT:  60026 Chiropractic manipulative treatment 1-2 regions performed   Thoracic: Diversified, See above for level, Prone and Lumbar: Diversified, See above for level, Side posture    Modalities:  None performed this visit    Therapeutic procedures:  None    Plan:  Treatment plan:  1 times per week for 3 weeks.  Instructed patient: walk 10 minutes.  Short term goals: increase ROM.  Long term goals: .  Prognosis: very good.

## 2024-03-22 ENCOUNTER — OFFICE VISIT (OUTPATIENT)
Dept: OBGYN | Facility: OTHER | Age: 84
End: 2024-03-22
Attending: OBSTETRICS & GYNECOLOGY
Payer: COMMERCIAL

## 2024-03-22 VITALS — HEART RATE: 86 BPM | SYSTOLIC BLOOD PRESSURE: 120 MMHG | DIASTOLIC BLOOD PRESSURE: 70 MMHG

## 2024-03-22 DIAGNOSIS — R15.9 INCONTINENCE OF FECES, UNSPECIFIED FECAL INCONTINENCE TYPE: Primary | ICD-10-CM

## 2024-03-22 DIAGNOSIS — N95.2 VAGINAL ATROPHY: ICD-10-CM

## 2024-03-22 DIAGNOSIS — R32 URINARY INCONTINENCE, UNSPECIFIED TYPE: ICD-10-CM

## 2024-03-22 PROCEDURE — G0463 HOSPITAL OUTPT CLINIC VISIT: HCPCS | Mod: 25

## 2024-03-22 PROCEDURE — 99203 OFFICE O/P NEW LOW 30 MIN: CPT | Performed by: OBSTETRICS & GYNECOLOGY

## 2024-03-22 PROCEDURE — G0463 HOSPITAL OUTPT CLINIC VISIT: HCPCS

## 2024-03-22 RX ORDER — ESTRADIOL 0.1 MG/G
2 CREAM VAGINAL
Qty: 42.5 G | Refills: 4 | Status: SHIPPED | OUTPATIENT
Start: 2024-03-25 | End: 2024-08-20

## 2024-03-22 ASSESSMENT — PAIN SCALES - GENERAL: PAINLEVEL: NO PAIN (0)

## 2024-03-22 NOTE — PROGRESS NOTES
CC:  Consult from Fariba Brown M.D. for urinary incontinence as well as fecal incontinence.  HPI:  Demi Narayan is a 83 year old female P 3.  Who had a total vaginal hysterectomy when she was 34 years of age for low back pain according to her.  According to her she also did have some vaginal wall repair as well.  And about 20 years ago during a time when she had a partial colectomy for diverticulitis she also had a bilateral salpingo-oophorectomy.  She presents today with a 5-year history of both urinary urgency as well as urge incontinence over the last 5 years.  Along with that she also admits to fecal incontinence initially when it started 5 years ago it was loose and now it is solid but she does not realize that she has had fecal incontinence until later on in the day.  She had been tried on anticholinergic medications but apparently because of the side effects of dry mouth she stopped them and they were not very effective.  She did see a urologist who advised her about Botox but she did not wish to proceed with that.  Otherwise she uses Estrace 0.5 mg tablet for vaginal atrophy.      Past GYN history:        Last PAP smear:     Patients records are available and reviewed at today's visit.    Past Medical History:   Diagnosis Date    Chronic/recurrent back pain 7/12/2011    Diverticulitis     Diverticulitis, recurrent 2/6/2002    bowel resection    Dyslipidemia 6/20/2006    Fibrocystic breast disease 7/12/2011    Gastro-oesophageal reflux disease     GERD 2/10/2012    Hiatal hernia 2/10/2012    Hormone replacement therapy, postmenopausal 7/12/2011    Osteoarthritis        Past Surgical History:   Procedure Laterality Date    ------------OTHER-------------      colonoscopy with biopsy - diverticulitis, hemorrhoids    ------------OTHER-------------  04/19/1994    sigmoidoscopy - abdominal pain, diverticula    ABDOMEN SURGERY      colon removed 2nd to diverticulitis    APPENDECTOMY      ARTHROSCOPY SHOULDER   11/20/2013    Procedure: ARTHROSCOPY SHOULDER;  Right Shoulder Arthroscopy Sub-Acromial Decompression Rotator Cuff Repair;  Surgeon: Lenny Trammell MD;  Location: [facility]    [facility]    Di[facility]itis      bowel resection    EGD with biopsy  01/01/2011    ENDARTERECTOMY CAROTID Left 10/17/2022    [facility]. Dr. Springer    ENDOSCOPY UPPER WITH PANCREATIC STIMULATION      ENDOSCOPY UPPER, COLONOSCOPY, COMBINED  07/18/2014    Procedure: COMBINED ENDOSCOPY UPPER, COLONOSCOPY;  Surgeon: Israel Feliciano MD;  Location: [facility] OR    ENDOSCOPY UPPER, COLONOSCOPY, COMBINED N/A 10/6/2023    Procedure: Upper Endoscopy with biopsies and Colonoscopy with biopsies;  Surgeon: Carlos Fraser MD;  Location: [facility] OR    ENT SURGERY      tonsilectomy    ESOPHAGOSCOPY, GASTROSCOPY, DUODENOSCOPY (EGD), COMBINED N/A 09/27/2017    Procedure: COMBINED ESOPHAGOSCOPY, GASTROSCOPY, DUODENOSCOPY (EGD);  UPPER ENDOSCOPY WITH BIOPSY AND POLYPECTOMY;  Surgeon: Gallito Alvarado DO;  Location: [facility] OR    GYN SURGERY      hysterectomy with bladder and rectal repair    IR CONSULTATION FOR IR EXAM  03/11/2019    ORTHOPEDIC SURGERY  11/20/2013    right rotator cuff surgery    TVH with ovarian preservation         Family History   Problem Relation Age of Onset    Cerebrovascular Disease Father         CVA    Heart Disease Father         heart disease    Hypertension Father     Myocardial Infarction Father         myocardial infarction    Cerebrovascular Disease Mother         CVA    Diabetes Mother     Heart Disease Mother         heart disease    Hypertension Mother     Heart Disease Brother         heart disease    Hypertension Brother        Current Outpatient Medications   Medication Sig Dispense Refill    aspirin 81 MG EC tablet Take 81 mg by mouth daily      atorvastatin (LIPITOR) 20 MG tablet Take 1 tablet by mouth once daily 90 tablet 3    Calcium Carbonate-Vitamin D (CALCIUM + D PO) Take 600  mg by mouth.      estradiol (ESTRACE) 0.5 MG tablet Take 1 tablet by mouth once daily 90 tablet 3    FP GLUCOSAMINE SULFATE OR Take  by mouth 2 (two) times a day.      GLUCOSAMINE SULFATE PO Take  by mouth daily.      latanoprost (XALATAN) 0.005 % ophthalmic solution Inject 1 drop into the eye At Bedtime      Multiple Vitamins-Minerals (MULTIVITAL PO) Take 1 tablet by mouth daily.      Omega-3 Fatty Acids (OMEGA-3 FISH OIL PO) Take  by mouth daily.      pantoprazole (PROTONIX) 40 MG EC tablet TAKE 1 TABLET BY MOUTH IN THE MORNING BEFORE BREAKFAST 90 tablet 3    traMADol (ULTRAM) 50 MG tablet TAKE 1 TO 2 TABLETS BY MOUTH EVERY 6 TO 8 HOURS AS NEEDED 120 tablet 0    Wheat Dextrin (BENEFIBER) POWD Take by mouth daily         Allergies: Patient has no known allergies.    ROS:  CONSTITUTIONAL: NEGATIVE for fever, chills, change in weight  RESP: NEGATIVE for significant cough or SOB  CV: NEGATIVE for chest pain, palpitations or peripheral edema  GI: NEGATIVE for nausea, abdominal pain, heartburn, or change in bowel habits  : NEGATIVE for frequency, dysuria, hematuria, vaginal discharge  PSYCHIATRIC: NEGATIVE for changes in mood or affect    EXAM:  not currently breastfeeding.   BMI= There is no height or weight on file to calculate BMI.  General - pleasant female in no acute distress.  Abdomen - soft, nontender, nondistended, no hepatosplenomegaly.  Pelvic -  Vulva: No lesions  Vagina: With maximal Valsalva the patient does have a mild cystocele and a mild rectocele.  There is no vaginal wall prolapse however.  Rectal exam: Mild rectocele is confirmed    ASSESSMENT:  1   History of mild cystocele and rectocele  2.  Urinary and fecal incontinence      PLAN:  With regards to her history of mild cystocele and rectocele since she is not complaining of that I would not recommend any management at this time.  Briefly discussed managing it with a ring pessary but given her age and the likelihood that she would have to come  back and have it removed cleaned and reinserted every few weeks I do not believe that this would be an option for her at this time.  As far as the urinary frequency and urgency she does have what appears to be an overactive bladder.  She did apparently try the anticholinergics and did not do very well with them.  I talked to her about consideration for the InterStim device which has been beneficial in managing patients with overactive bladder's.  And given the fact that she does have fecal incontinence the InterStim device would work very well for that as well.  However because she does have fecal incontinence that has not been fully evaluated I do recommend a GI evaluation including the possibility of an anal rectal manometry.  If GI does clear her then she would benefit from an InterStim device for both urinary frequency urgency as well as incontinence along with fecal incontinence.  I have already placed the referrals for both GI as well as urology in [place] to hopefully see her and evaluate her for those options.  In the meantime since she is taking the estradiol pill for vaginal atrophy I did recommend consideration for vaginal Estrace.  If it is covered with her insurance I did prescribe her Estrace cream to apply 2 g every night for 2 weeks followed by twice a week thereafter.  If she is unable to have that covered then she can continue to resume the Estrace pill.    Full 40 minutes was spent with the patient reviewing her history, reviewing her chart, examination along with discussion of management options.    Abraham Philippe M.D.

## 2024-04-02 DIAGNOSIS — S33.8XXD SPRAIN OF OTHER PARTS OF LUMBAR SPINE AND PELVIS, SUBSEQUENT ENCOUNTER: ICD-10-CM

## 2024-04-02 RX ORDER — TRAMADOL HYDROCHLORIDE 50 MG/1
TABLET ORAL
Qty: 120 TABLET | Refills: 3 | Status: SHIPPED | OUTPATIENT
Start: 2024-04-02 | End: 2024-08-01

## 2024-04-02 NOTE — TELEPHONE ENCOUNTER
Tramadol      Last Written Prescription Date:  03/01/24  Last Fill Quantity: 120,   # refills: 0  Last Office Visit: 03/04/24  Future Office visit:    Next 5 appointments (look out 90 days)      Apr 08, 2024 11:10 AM  Return Visit with JOEY Engel ([facility] ) [address] [place] [address]  [place] [address]  [phone]

## 2024-04-09 ENCOUNTER — OFFICE VISIT (OUTPATIENT)
Dept: CHIROPRACTIC MEDICINE | Facility: OTHER | Age: 84
End: 2024-04-09
Payer: COMMERCIAL

## 2024-04-09 DIAGNOSIS — M54.50 ACUTE BILATERAL LOW BACK PAIN WITHOUT SCIATICA: ICD-10-CM

## 2024-04-09 DIAGNOSIS — M99.02 SEGMENTAL AND SOMATIC DYSFUNCTION OF THORACIC REGION: ICD-10-CM

## 2024-04-09 DIAGNOSIS — M99.03 SEGMENTAL AND SOMATIC DYSFUNCTION OF LUMBAR REGION: Primary | ICD-10-CM

## 2024-04-09 PROCEDURE — 98940 CHIROPRACT MANJ 1-2 REGIONS: CPT | Mod: AT | Performed by: CHIROPRACTOR

## 2024-04-09 NOTE — PROGRESS NOTES
Subjective Finding:    Chief compalint: Patient presents with:  Back Pain: Tightness in low back , Pain Scale: 7/10, Intensity: sharp, Duration: 1 weeks, Radiating:  no.    Date of injury:     Activities that the pain restricts:   Home/household/hobbies/social activities: Yes.  Work duties: Yes.  Sleep: No.  Makes symptoms better: rest.  Makes symptoms worse: lumbar flexion.  Have you seen anyone else for the symptoms? No.  Work related: No.  Automobile related injury: No.    Objective and Assessment:    Posture Analysis:   High shoulder: .  Head tilt: .  High iliac crest: .  Head carriage: neutral.  Thoracic Kyphosis: neutral.  Lumbar Lordosis: forward.    Lumbar Range of Motion: extension decreased.  Cervical Range of Motion: .  Thoracic Range of Motion: .  Extremity Range of Motion: .    Palpation:   Quad lumb: bilateral, referred pain: no    Segmental dysfunction pre-treatment and treatment area: T4 and L5.    Assessment post-treatment:  Cervical: .  Thoracic: ROM increased.  Lumbar: ROM increased.    Comments: .      Complicating Factors: .    Procedure(s):  CMT:  98021 Chiropractic manipulative treatment 1-2 regions performed   Thoracic: Diversified, See above for level, Prone and Lumbar: Diversified, See above for level, Side posture    Modalities:  None performed this visit    Therapeutic procedures:  None    Plan:  Treatment plan: PRN.  Instructed patient: stretch as instructed at visit.  Short term goals: increase ROM.  Long term goals: restore normal function.  Prognosis: good.

## 2024-06-16 DIAGNOSIS — I65.22 LEFT CAROTID STENOSIS: ICD-10-CM

## 2024-06-17 RX ORDER — ATORVASTATIN CALCIUM 20 MG/1
TABLET, FILM COATED ORAL
Qty: 90 TABLET | Refills: 0 | Status: SHIPPED | OUTPATIENT
Start: 2024-06-17

## 2024-06-17 NOTE — TELEPHONE ENCOUNTER
Lipitor      Last Written Prescription Date:  6/12/23  Last Fill Quantity: 90,   # refills: 3  Last Office Visit: 3/4/24  Future Office visit:    Next 5 appointments (look out 90 days)      Sep 03, 2024 10:30 AM  (Arrive by 10:15 AM)  Adult Preventative Visit with Fariba Brown MD  [facility] [place] ([facility] [place] ) [address]  [place] [address]  [phone]             Routing refill request to provider for review/approval because:

## 2024-08-01 DIAGNOSIS — S33.8XXD SPRAIN OF OTHER PARTS OF LUMBAR SPINE AND PELVIS, SUBSEQUENT ENCOUNTER: ICD-10-CM

## 2024-08-01 RX ORDER — TRAMADOL HYDROCHLORIDE 50 MG/1
50-100 TABLET ORAL EVERY 6 HOURS PRN
Qty: 120 TABLET | Refills: 2 | Status: SHIPPED | OUTPATIENT
Start: 2024-08-01

## 2024-08-01 NOTE — TELEPHONE ENCOUNTER
traMADol HCl 50 MG Oral Tablet       Last Written Prescription Date:  04/02/2024  Last Fill Quantity: 120,   # refills: 3  Last Office Visit: 03/04/2024  Future Office visit:    Next 5 appointments (look out 90 days)      Sep 03, 2024 10:30 AM  (Arrive by 10:15 AM)  Adult Preventative Visit with Fariba Brown MD  [facility] ([facility] ) [address] [place] [address] AVE  [place] [address]  [phone]             Routing refill request to provider for review/approval because:    Kimberly Boecker, RN

## 2024-08-19 NOTE — PATIENT INSTRUCTIONS
How to Take Your Medication Before Surgery  Preoperative Medication Instructions   Antiplatelet or Anticoagulation Medication Instructions  Continue with ASA d/t low risk of bleed      Additional Medication Instructions  Take all scheduled medications on the day of surgery   - Estrogens: Continue without modification.    - Herbal medications and vitamins: DO NOT TAKE 14 days prior to surgery.       Patient Education   Preparing for Your Surgery  Getting started  A nurse will call you to review your health history and instructions. They will give you an arrival time based on your scheduled surgery time. Please be ready to share:  Your doctor's clinic name and phone number  Your medical, surgical, and anesthesia history  A list of allergies and sensitivities  A list of medicines, including herbal treatments and over-the-counter drugs  Whether the patient has a legal guardian (ask how to send us the papers in advance)  Please tell us if you're pregnant--or if there's any chance you might be pregnant. Some surgeries may injure a fetus (unborn baby), so they require a pregnancy test. Surgeries that are safe for a fetus don't always need a test, and you can choose whether to have one.   If you have a child who's having surgery, please ask for a copy of Preparing for Your Child's Surgery.    Preparing for surgery  Within 10 to 30 days of surgery: Have a pre-op exam (sometimes called an H&P, or History and Physical). This can be done at a clinic or pre-operative center.  If you're having a , you may not need this exam. Talk to your care team.  At your pre-op exam, talk to your care team about all medicines you take. If you need to stop any medicines before surgery, ask when to start taking them again.  We do this for your safety. Many medicines can make you bleed too much during surgery. Some change how well surgery (anesthesia) drugs work.  Call your insurance company to let them know you're having surgery. (If  you don't have insurance, call [phone].)  Call your clinic if there's any change in your health. This includes signs of a cold or flu (sore throat, runny nose, cough, rash, fever). It also includes a scrape or scratch near the surgery site.  If you have questions on the day of surgery, call your hospital or surgery center.  Eating and drinking guidelines  For your safety: Unless your surgeon tells you otherwise, follow the guidelines below.  Eat and drink as usual until 8 hours before you arrive for surgery. After that, no food or milk.  Drink clear liquids until 2 hours before you arrive. These are liquids you can see through, like water, Gatorade, and Propel Water. They also include plain black coffee and tea (no cream or milk), candy, and breath mints. You can spit out gum when you arrive.  If you drink alcohol: Stop drinking it the night before surgery.  If your care team tells you to take medicine on the morning of surgery, it's okay to take it with a sip of water.  Preventing infection  Shower or bathe the night before and morning of your surgery. Follow the instructions your clinic gave you. (If no instructions, use regular soap.)  Don't shave or clip hair near your surgery site. We'll remove the hair if needed.  Don't smoke or vape the morning of surgery. You may chew nicotine gum up to 2 hours before surgery. A nicotine patch is okay.  Note: Some surgeries require you to completely quit smoking and nicotine. Check with your surgeon.  Your care team will make every effort to keep you safe from infection. We will:  Clean our hands often with soap and water (or an alcohol-based hand rub).  Clean the skin at your surgery site with a special soap that kills germs.  Give you a special gown to keep you warm. (Cold raises the risk of infection.)  Wear special hair covers, masks, gowns and gloves during surgery.  Give antibiotic medicine, if prescribed. Not all surgeries need antibiotics.  What to bring on the  day of surgery  Photo ID and insurance card  Copy of your health care directive, if you have one  Glasses and hearing aids (bring cases)  You can't wear contacts during surgery  Inhaler and eye drops, if you use them (tell us about these when you arrive)  CPAP machine or breathing device, if you use them  A few personal items, if spending the night  If you have . . .  A pacemaker, ICD (cardiac defibrillator) or other implant: Bring the ID card.  An implanted stimulator: Bring the remote control.  A legal guardian: Bring a copy of the certified (court-stamped) guardianship papers.  Please remove any jewelry, including body piercings. Leave jewelry and other valuables at home.  If you're going home the day of surgery  You must have a responsible adult drive you home. They should stay with you overnight as well.  If you don't have someone to stay with you, and you aren't safe to go home alone, we may keep you overnight. Insurance often won't pay for this.  After surgery  If it's hard to control your pain or you need more pain medicine, please call your surgeon's office.  Questions?   If you have any questions for your care team, list them here: _________________________________________________________________________________________________________________________________________________________________________ ____________________________________ ____________________________________ ____________________________________  For informational purposes only. Not to replace the advice of your health care provider. Copyright   2003, 2019 [facility]. All rights reserved. Clinically reviewed by Joyce Montanez MD. [employer] [id number] - REV 12/22.

## 2024-08-19 NOTE — PROGRESS NOTES
Preoperative Evaluation  [facility] [place]  [address]  [place] [address]  Phone: [phone]  Primary Provider: Fariba Brown MD  Pre-op Performing Provider: Fariba Brown MD  Aug 20, 2024             8/20/2024   Surgical Information   What procedure is being done? Eyelid Sx, Left Blepharoplasty    Facility or Hospital where procedure/surgery will be performed: [facility]   Who is doing the procedure / surgery? [facility]    Date of surgery / procedure: 9/11/24   Time of surgery / procedure: tbd   Where do you plan to recover after surgery? at home with family      Fax number for surgical facility: please fax to [facility]    Assessment & Plan     The proposed surgical procedure is considered INTERMEDIATE risk.    Preop general physical exam  No concerning lab, EKG, or exam findings. ROS is chronic and unchanged. Ziopatch (3/2022) with PAC and SVT. Echo (10/2023) with EF 50 to 55%, G1DD. Danielle is active and able to tolerate 4METs w/o issues   - Basic metabolic panel  - CBC with platelets  - EKG 12-lead complete w/read - (Clinic Performed)    Eyelid abnormality  Reason for the procedure     Chronic heart failure with preserved ejection fraction (H)  Echo (10/2023) with EF 50 to 55%, G1DD  No heart failure medications    Stenosis of left carotid artery - s/p left endarterectomy (11/2022). US due 11/2024  US has not been scheduled  Not on plavix  - c/w  ASA    Chronic, continuous use of opioids  On tramadol for back pain     Post-menopause  Caprini score of 5, c/w estrogen     SVT (supraventricular tachycardia) (H24)  EF 50 to 55%  Chronic symptom, unchanged.         Risks and Recommendations  The patient has the following additional risks and recommendations for perioperative complications:   - No identified additional risk factors other than previously addressed    Preoperative Medication Instructions  Antiplatelet or Anticoagulation Medication  Instructions  Continue with ASA d/t low risk of bleed      Additional Medication Instructions  Take all scheduled medications on the day of surgery   - Estrogens: Continue without modification.    - Herbal medications and vitamins: DO NOT TAKE 14 days prior to surgery.    Recommendation  Approval given to proceed with proposed procedure, without further diagnostic evaluation.    Misa Santos is a 84 year old, presenting for the following:  Pre-Op Exam          8/20/2024     1:56 PM   Additional Questions   Roomed by Manuela Fabian   Accompanied by self     HPI related to upcoming procedure: Danielle has had left eyelid drooping issues for many years. 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 She will be undergoing the above procedure         8/20/2024   Pre-Op Questionnaire   Have you ever had a heart attack or stroke? No   Have you ever had surgery on your heart or blood vessels, such as a stent placement, a coronary artery bypass, or surgery on an artery in your head, neck, heart, or legs? No   Do you have chest pain with activity? No   Do you have a history of heart failure? No   Do you currently have a cold, bronchitis or symptoms of other infection? No   Do you have a cough, shortness of breath, or wheezing? No   Do you or anyone in your family have previous history of blood clots? No   Do you or does anyone in your family have a serious bleeding problem such as prolonged bleeding following surgeries or cuts? No   Have you ever had problems with anemia or been told to take iron pills? (!) YES - during pregnancy   Have you had any abnormal blood loss such as black, tarry or bloody stools, or abnormal vaginal bleeding? No   Have you ever had a blood transfusion? No   Are you willing to have a blood transfusion if it is medically needed before, during, or after your surgery? Yes   Have you or any of your relatives ever had problems with anesthesia? No   Do you have sleep apnea, excessive snoring or daytime  drowsiness? No   Do you have any artifical heart valves or other implanted medical devices like a pacemaker, defibrillator, or continuous glucose monitor? No   Do you have artificial joints? No   Are you allergic to latex? No      Health Care Directive  Patient does not have a Health Care Directive or Living Will: Discussed advance care planning with patient; information given to patient to review.    Preoperative Review of   Controlled medications on medication list    Answers submitted by the patient for this visit:  Patient Health Questionnaire (Submitted on 8/20/2024)  PHQ9 TOTAL SCORE: 0        Status of Chronic Conditions:  See problem list for active medical problems.  Problems all longstanding and stable, except as noted/documented.  See ROS for pertinent symptoms related to these conditions.      # Capirini score of 5, okay to c/w estrogen    Body mass index is 22.46 kg/m .      Patient Active Problem List    Diagnosis Date Noted    Chronic heart failure with preserved ejection fraction (H) 02/27/2024     Priority: Medium    Osteopenia of multiple sites 12/01/2022     Priority: Medium    Bilateral carotid artery stenosis 10/12/2022     Priority: Medium     Formatting of this note might be different from the original.  Added automatically from request for surgery [id number]      Urinary urgency 08/26/2022     Priority: Medium    SVT (supraventricular tachycardia) (H24) 08/26/2022     Priority: Medium    Stenosis of left carotid artery - s/p left endarterectomy. US due 11/2024 02/22/2022     Priority: Medium    Lyme disease 09/11/2017     Priority: Medium    Chronic, continuous use of opioids 07/03/2017     Priority: Medium     Patient is followed by Virgil Mackay MD for ongoing prescription of pain medication.  All refills should only be approved by this provider, or covering partner.    Medication(s): Ultram 50mg.   Maximum quantity per month: #120  Clinic visit frequency required: Q 3 months      Controlled substance agreement:  Encounter-Level CSA - 06/30/2017:              Controlled Substance Agreement - Scan on 7/5/2017  4:11 PM : CONTROLLED SUBSTANCE AGREEMENT (below)                Pain Clinic evaluation in the past: No    DIRE Total Score(s):  No flowsheet data found.    Last [facility] website verification:  Done on 7.31.18   [email or web address]        Scoliosis, darciracolumbar 02/05/2017     Priority: Medium    ACP (advance care planning) 08/05/2016     Priority: Medium     Advance Care Planning 8/5/2016: ACP Review of Chart / Resources Provided:  Reviewed chart for advance care plan.  Demi PHAM Sylvain has no plan or code status on file. Discussed available resources and provided with information. Confirmed code status reflects current choices pending further ACP discussions.  Confirmed/documented legally designated decision makers.  Added by Ava Padgett            Diaphragmatic hernia 04/25/2016     Priority: Medium    Post-menopause 11/09/2015     Priority: Medium    Osteoarthritis, multiple joints      Priority: Medium    GERD (gastroesophageal reflux) 02/10/2012     Priority: Medium    Fibrocystic breast disease (FCBD) 07/12/2011     Priority: Medium    Low back pain, chronic 07/12/2011     Priority: Medium    Dyslipidemia 06/20/2006     Priority: Medium    H/O diverticulitis, S/P bowel resection 02/06/2002     Priority: Medium               Past Medical History:   Diagnosis Date    Chronic/recurrent back pain 7/12/2011    Diverticulitis     Diverticulitis, recurrent 2/6/2002    bowel resection    Dyslipidemia 6/20/2006    Fibrocystic breast disease 7/12/2011    Gastro-oesophageal reflux disease     GERD 2/10/2012    Hiatal hernia 2/10/2012    Hormone replacement therapy, postmenopausal 7/12/2011    Osteoarthritis      Past Surgical History:   Procedure Laterality Date    ------------OTHER-------------      colonoscopy with biopsy - diverticulitis, hemorrhoids     ------------OTHER-------------  04/19/1994    sigmoidoscopy - abdominal pain, diverticula    ABDOMEN SURGERY      colon removed 2nd to diverticulitis    APPENDECTOMY      ARTHROSCOPY SHOULDER  11/20/2013    Procedure: ARTHROSCOPY SHOULDER;  Right Shoulder Arthroscopy Sub-Acromial Decompression Rotator Cuff Repair;  Surgeon: Lenny Trammell MD;  Location: [facility] OR    [facility]    Diverticulitis      bowel resection    EGD with biopsy  01/01/2011    ENDARTERECTOMY CAROTID Left 10/17/2022    [facility]. Dr. Springer    ENDOSCOPY UPPER WITH PANCREATIC STIMULATION      ENDOSCOPY UPPER, COLONOSCOPY, COMBINED  07/18/2014    Procedure: COMBINED ENDOSCOPY UPPER, COLONOSCOPY;  Surgeon: Israel Feliciano MD;  Location: [facility] OR    ENDOSCOPY UPPER, COLONOSCOPY, COMBINED N/A 10/6/2023    Procedure: Upper Endoscopy with biopsies and Colonoscopy with biopsies;  Surgeon: Carlos Fraser MD;  Location: [facility] OR    ENT SURGERY      tonsilectomy    ESOPHAGOSCOPY, GASTROSCOPY, DUODENOSCOPY (EGD), COMBINED N/A 09/27/2017    Procedure: COMBINED ESOPHAGOSCOPY, GASTROSCOPY, DUODENOSCOPY (EGD);  UPPER ENDOSCOPY WITH BIOPSY AND POLYPECTOMY;  Surgeon: Gallito Alvarado DO;  Location: [facility] OR    GYN SURGERY      hysterectomy with bladder and rectal repair    IR CONSULTATION FOR IR EXAM  03/11/2019    ORTHOPEDIC SURGERY  11/20/2013    right rotator cuff surgery    TVH with ovarian preservation       Current Outpatient Medications   Medication Sig Dispense Refill    aspirin 81 MG EC tablet Take 81 mg by mouth daily      atorvastatin (LIPITOR) 20 MG tablet Take 1 tablet by mouth once daily 90 tablet 0    Calcium Carbonate-Vitamin D (CALCIUM + D PO) Take 600 mg by mouth.      estradiol (ESTRACE) 0.5 MG tablet Take 1 tablet by mouth once daily 90 tablet 3    GLUCOSAMINE SULFATE PO Take  by mouth daily.      latanoprost (XALATAN) 0.005 % ophthalmic solution Inject 1 drop into the eye At Bedtime       Multiple Vitamins-Minerals (MULTIVITAL PO) Take 1 tablet by mouth daily.      Omega-3 Fatty Acids (OMEGA-3 FISH OIL PO) Take  by mouth daily.      pantoprazole (PROTONIX) 40 MG EC tablet TAKE 1 TABLET BY MOUTH IN THE MORNING BEFORE BREAKFAST 90 tablet 3    traMADol (ULTRAM) 50 MG tablet Take 1-2 tablets ( mg) by mouth every 6 hours as needed for severe pain - may take every 6 to 8 hours as needed for severe pain 120 tablet 2    Wheat Dextrin (BENEFIBER) POWD Take by mouth daily         No Known Allergies     Social History     Tobacco Use    Smoking status: Former     Current packs/day: 0.00     Average packs/day: 0.5 packs/day for 5.3 years (2.7 ttl pk-yrs)     Types: Cigarettes     Start date: 1963     Quit date: 1968     Years since quittin.3    Smokeless tobacco: Never   Substance Use Topics    Alcohol use: Yes     Alcohol/week: 0.8 standard drinks of alcohol     Types: 1 Glasses of wine per week     Comment: daily with dinner       History   Drug Use No             Review of Systems   Constitutional:  Negative for chills and fever.        Cold sweats - wakes up in the night.    HENT:  Negative for congestion.    Respiratory:  Positive for shortness of breath (rare).    Cardiovascular:  Positive for palpitations (at night, chronic issue). Negative for chest pain.   Gastrointestinal:  Positive for abdominal pain (abd pressure, relieved with BM), nausea (consistent with GERD,) and vomiting (episode x2 consistent wth GERD).   Musculoskeletal:  Positive for arthralgias (right shoulder pain, chronic issue).   Psychiatric/Behavioral:  The patient is nervous/anxious.          Objective    /60   Pulse 79   Temp 98.6  F (37  C) (Tympanic)   Resp 17   Ht 1.524 m (5')   Wt 52.2 kg (115 lb)   SpO2 94%   BMI 22.46 kg/m     Estimated body mass index is 22.46 kg/m  as calculated from the following:    Height as of this encounter: 1.524 m (5').    Weight as of this encounter: 52.2 kg (115  lb).  Physical Exam  Constitutional:       General: She is not in acute distress.     Appearance: She is well-developed.   HENT:      Head: Normocephalic and atraumatic.      Right Ear: Hearing and tympanic membrane normal.      Left Ear: Hearing and tympanic membrane normal.      Mouth/Throat:      Mouth: Mucous membranes are moist.      Pharynx: No oropharyngeal exudate.   Eyes:      Extraocular Movements: Extraocular movements intact.      Conjunctiva/sclera: Conjunctivae normal.   Neck:      Thyroid: No thyromegaly.   Cardiovascular:      Rate and Rhythm: Normal rate and regular rhythm.      Pulses: Normal pulses.      Heart sounds: Normal heart sounds. No murmur heard.  Pulmonary:      Effort: Pulmonary effort is normal. No respiratory distress.      Breath sounds: Normal breath sounds. No wheezing or rales.   Abdominal:      General: Bowel sounds are normal. There is no distension.      Palpations: Abdomen is soft.      Tenderness: There is no abdominal tenderness. There is no guarding.   Musculoskeletal:         General: Normal range of motion.      Cervical back: Normal range of motion and neck supple.      Right lower leg: No edema.      Left lower leg: No edema.   Lymphadenopathy:      Cervical: No cervical adenopathy.   Skin:     General: Skin is dry.   Neurological:      Mental Status: She is alert.   Psychiatric:         Mood and Affect: Mood normal.         Recent Labs   Lab Test 09/28/23  0932 08/29/23  0840   HGB 15.3 14.7    256    141   POTASSIUM 3.4 3.8   CR 0.80 0.87        Diagnostics  Recent Results (from the past 24 hour(s))   EKG 12-lead complete w/read - (Clinic Performed)    Collection Time: 08/20/24  2:34 PM   Result Value Ref Range    Systolic Blood Pressure  mmHg    Diastolic Blood Pressure  mmHg    Ventricular Rate 88 BPM    Atrial Rate 88 BPM    IN Interval 188 ms    QRS Duration 86 ms     ms    QTc 462 ms    P Axis 60 degrees    R AXIS 28 degrees    T Axis 57  degrees    Interpretation ECG       Sinus rhythm with marked sinus arrhythmia  Possible Anterior infarct , age undetermined  Abnormal ECG  When compared with ECG of 28-Sep-2023 09:24,  Nonspecific T wave abnormality now evident in Lateral leads  Confirmed by MD Ayan, Mike ([id number]) on 8/20/2024 3:15:22 PM     Basic metabolic panel    Collection Time: 08/20/24  2:44 PM   Result Value Ref Range    Sodium 141 135 - 145 mmol/L    Potassium 3.7 3.4 - 5.3 mmol/L    Chloride 103 98 - 107 mmol/L    Carbon Dioxide (CO2) 28 22 - 29 mmol/L    Anion Gap 10 7 - 15 mmol/L    Urea Nitrogen 15.7 8.0 - 23.0 mg/dL    Creatinine 0.82 0.51 - 0.95 mg/dL    GFR Estimate 70 >60 mL/min/1.73m2    Calcium 9.4 8.8 - 10.4 mg/dL    Glucose 95 70 - 99 mg/dL   CBC with platelets    Collection Time: 08/20/24  2:44 PM   Result Value Ref Range    WBC Count 5.3 4.0 - 11.0 10e3/uL    RBC Count 4.92 3.80 - 5.20 10e6/uL    Hemoglobin 14.4 11.7 - 15.7 g/dL    Hematocrit 43.2 35.0 - 47.0 %    MCV 88 78 - 100 fL    MCH 29.3 26.5 - 33.0 pg    MCHC 33.3 31.5 - 36.5 g/dL    RDW 13.4 10.0 - 15.0 %    Platelet Count 235 150 - 450 10e3/uL      EKG (8/20/2024): Sinus Rhythm with sinus arrhythmia, normal axis, normal intervals, no acute ST/T changes c/w ischemia, no LVH by voltage criteria, unchanged from previous tracings (9/28/2023, except for sinus arrhythmia)    Ziopatch (3/8/2022) 363 runs of SVT, occasional PVCs    Revised Cardiac Risk Index (RCRI)  The patient has the following serious cardiovascular risks for perioperative complications:   - No serious cardiac risks = 0 points     RCRI Interpretation: 0 points: Class I (very low risk - 0.4% complication rate)     The longitudinal plan of care for the diagnosis(es)/condition(s) as documented were addressed during this visit. Due to the added complexity in care, I will continue to support Danielle in the subsequent management and with ongoing continuity of care.    Signed Electronically by: Fariba  Nicole Brown MD  A copy of this evaluation report is provided to the requesting physician.

## 2024-08-20 ENCOUNTER — OFFICE VISIT (OUTPATIENT)
Dept: FAMILY MEDICINE | Facility: OTHER | Age: 84
End: 2024-08-20
Attending: FAMILY MEDICINE
Payer: COMMERCIAL

## 2024-08-20 VITALS
DIASTOLIC BLOOD PRESSURE: 60 MMHG | WEIGHT: 115 LBS | SYSTOLIC BLOOD PRESSURE: 122 MMHG | OXYGEN SATURATION: 94 % | RESPIRATION RATE: 17 BRPM | HEART RATE: 79 BPM | BODY MASS INDEX: 22.58 KG/M2 | HEIGHT: 60 IN | TEMPERATURE: 98.6 F

## 2024-08-20 DIAGNOSIS — I50.32 CHRONIC HEART FAILURE WITH PRESERVED EJECTION FRACTION (H): ICD-10-CM

## 2024-08-20 DIAGNOSIS — F11.90 CHRONIC, CONTINUOUS USE OF OPIOIDS: ICD-10-CM

## 2024-08-20 DIAGNOSIS — I47.10 SVT (SUPRAVENTRICULAR TACHYCARDIA) (H): ICD-10-CM

## 2024-08-20 DIAGNOSIS — Z78.0 POST-MENOPAUSE: ICD-10-CM

## 2024-08-20 DIAGNOSIS — I65.22 STENOSIS OF LEFT CAROTID ARTERY: ICD-10-CM

## 2024-08-20 DIAGNOSIS — H02.9 EYELID ABNORMALITY: ICD-10-CM

## 2024-08-20 DIAGNOSIS — Z01.818 PREOP GENERAL PHYSICAL EXAM: Primary | ICD-10-CM

## 2024-08-20 LAB
ANION GAP SERPL CALCULATED.3IONS-SCNC: 10 MMOL/L (ref 7–15)
ATRIAL RATE - MUSE: 88 BPM
BUN SERPL-MCNC: 15.7 MG/DL (ref 8–23)
CALCIUM SERPL-MCNC: 9.4 MG/DL (ref 8.8–10.4)
CHLORIDE SERPL-SCNC: 103 MMOL/L (ref 98–107)
CREAT SERPL-MCNC: 0.82 MG/DL (ref 0.51–0.95)
DIASTOLIC BLOOD PRESSURE - MUSE: NORMAL MMHG
EGFRCR SERPLBLD CKD-EPI 2021: 70 ML/MIN/1.73M2
ERYTHROCYTE [DISTWIDTH] IN BLOOD BY AUTOMATED COUNT: 13.4 % (ref 10–15)
GLUCOSE SERPL-MCNC: 95 MG/DL (ref 70–99)
HCO3 SERPL-SCNC: 28 MMOL/L (ref 22–29)
HCT VFR BLD AUTO: 43.2 % (ref 35–47)
HGB BLD-MCNC: 14.4 G/DL (ref 11.7–15.7)
INTERPRETATION ECG - MUSE: NORMAL
MCH RBC QN AUTO: 29.3 PG (ref 26.5–33)
MCHC RBC AUTO-ENTMCNC: 33.3 G/DL (ref 31.5–36.5)
MCV RBC AUTO: 88 FL (ref 78–100)
P AXIS - MUSE: 60 DEGREES
PLATELET # BLD AUTO: 235 10E3/UL (ref 150–450)
POTASSIUM SERPL-SCNC: 3.7 MMOL/L (ref 3.4–5.3)
PR INTERVAL - MUSE: 188 MS
QRS DURATION - MUSE: 86 MS
QT - MUSE: 382 MS
QTC - MUSE: 462 MS
R AXIS - MUSE: 28 DEGREES
RBC # BLD AUTO: 4.92 10E6/UL (ref 3.8–5.2)
SODIUM SERPL-SCNC: 141 MMOL/L (ref 135–145)
SYSTOLIC BLOOD PRESSURE - MUSE: NORMAL MMHG
T AXIS - MUSE: 57 DEGREES
VENTRICULAR RATE- MUSE: 88 BPM
WBC # BLD AUTO: 5.3 10E3/UL (ref 4–11)

## 2024-08-20 PROCEDURE — 36415 COLL VENOUS BLD VENIPUNCTURE: CPT | Mod: ZL | Performed by: FAMILY MEDICINE

## 2024-08-20 PROCEDURE — 85027 COMPLETE CBC AUTOMATED: CPT | Mod: ZL | Performed by: FAMILY MEDICINE

## 2024-08-20 PROCEDURE — 93005 ELECTROCARDIOGRAM TRACING: CPT | Performed by: FAMILY MEDICINE

## 2024-08-20 PROCEDURE — 99214 OFFICE O/P EST MOD 30 MIN: CPT | Performed by: FAMILY MEDICINE

## 2024-08-20 PROCEDURE — 82374 ASSAY BLOOD CARBON DIOXIDE: CPT | Mod: ZL | Performed by: FAMILY MEDICINE

## 2024-08-20 PROCEDURE — G0463 HOSPITAL OUTPT CLINIC VISIT: HCPCS | Performed by: FAMILY MEDICINE

## 2024-08-20 PROCEDURE — G2211 COMPLEX E/M VISIT ADD ON: HCPCS | Performed by: FAMILY MEDICINE

## 2024-08-20 PROCEDURE — 93010 ELECTROCARDIOGRAM REPORT: CPT | Performed by: INTERNAL MEDICINE

## 2024-08-20 ASSESSMENT — ENCOUNTER SYMPTOMS
ARTHRALGIAS: 1
FEVER: 0
VOMITING: 1
NAUSEA: 1
PALPITATIONS: 1
SHORTNESS OF BREATH: 1
NERVOUS/ANXIOUS: 1
ABDOMINAL PAIN: 1
CHILLS: 0

## 2024-08-20 ASSESSMENT — PATIENT HEALTH QUESTIONNAIRE - PHQ9
SUM OF ALL RESPONSES TO PHQ QUESTIONS 1-9: 0
SUM OF ALL RESPONSES TO PHQ QUESTIONS 1-9: 0

## 2024-08-20 ASSESSMENT — PAIN SCALES - GENERAL: PAINLEVEL: SEVERE PAIN (6)

## 2024-08-21 ENCOUNTER — OFFICE VISIT (OUTPATIENT)
Dept: CHIROPRACTIC MEDICINE | Facility: OTHER | Age: 84
End: 2024-08-21
Attending: CHIROPRACTOR
Payer: COMMERCIAL

## 2024-08-21 DIAGNOSIS — M99.02 SEGMENTAL AND SOMATIC DYSFUNCTION OF THORACIC REGION: ICD-10-CM

## 2024-08-21 DIAGNOSIS — M99.03 SEGMENTAL AND SOMATIC DYSFUNCTION OF LUMBAR REGION: Primary | ICD-10-CM

## 2024-08-21 DIAGNOSIS — M54.50 ACUTE BILATERAL LOW BACK PAIN WITHOUT SCIATICA: ICD-10-CM

## 2024-08-21 PROCEDURE — 98940 CHIROPRACT MANJ 1-2 REGIONS: CPT | Mod: AT | Performed by: CHIROPRACTOR

## 2024-08-22 ENCOUNTER — OFFICE VISIT (OUTPATIENT)
Dept: CHIROPRACTIC MEDICINE | Facility: OTHER | Age: 84
End: 2024-08-22
Attending: CHIROPRACTOR
Payer: COMMERCIAL

## 2024-08-22 DIAGNOSIS — M99.03 SEGMENTAL AND SOMATIC DYSFUNCTION OF LUMBAR REGION: Primary | ICD-10-CM

## 2024-08-22 DIAGNOSIS — M99.02 SEGMENTAL AND SOMATIC DYSFUNCTION OF THORACIC REGION: ICD-10-CM

## 2024-08-22 DIAGNOSIS — M54.50 ACUTE BILATERAL LOW BACK PAIN WITHOUT SCIATICA: ICD-10-CM

## 2024-08-22 PROCEDURE — 98940 CHIROPRACT MANJ 1-2 REGIONS: CPT | Mod: AT | Performed by: CHIROPRACTOR

## 2024-08-23 ENCOUNTER — TELEPHONE (OUTPATIENT)
Dept: FAMILY MEDICINE | Facility: OTHER | Age: 84
End: 2024-08-23

## 2024-08-28 NOTE — PROGRESS NOTES
Subjective Finding:    Chief compalint: Patient presents with:  Back Pain , Pain Scale: 4/10, Intensity: dull, Duration: 2 weeks, Radiating: bilateral buttock.    Date of injury:     Activities that the pain restricts:   Home/household/hobbies/social activities: Yes.  Work duties: Yes.  Sleep: No.  Makes symptoms better: rest.  Makes symptoms worse: walking.  Have you seen anyone else for the symptoms? No.  Work related: No.  Automobile related injury: No.    Objective and Assessment:    Posture Analysis:   High shoulder: .  Head tilt: .  High iliac crest: .  Head carriage: neutral.  Thoracic Kyphosis: neutral.  Lumbar Lordosis: forward.    Lumbar Range of Motion: flexion decreased and extension decreased.  Cervical Range of Motion: .  Thoracic Range of Motion: .  Extremity Range of Motion: .    Palpation:   Quad lumb: bilateral, referred pain: no    Segmental dysfunction pre-treatment and treatment area: T7, L5, and Sacrum.    Assessment post-treatment:  Cervical: ROM increased.  Thoracic: ROM increased.  Lumbar: ROM increased.    Comments: .      Complicating Factors: .    Procedure(s):  CMT:  66380 Chiropractic manipulative treatment 1-2 regions performed   Thoracic: Diversified, See above for level, Prone and Lumbar: Diversified, See above for level, Side posture    Modalities:  None performed this visit    Therapeutic procedures:  None    Plan:  Treatment plan: PRN.  Instructed patient: stretch as instructed at visit.  Short term goals: reduce pain.  Long term goals: increase ADL.  Prognosis: very good.

## 2024-08-29 NOTE — PROGRESS NOTES
Subjective Finding:    Chief compalint: Patient presents with:  Back Pain  , Pain Scale: 3/10, Intensity: dull, Duration: 2 weeks, Radiating: no.    Date of injury:     Activities that the pain restricts:   Home/household/hobbies/social activities: Yes.  Work duties: Yes.  Sleep: No.  Makes symptoms better: rest.  Makes symptoms worse: walking.  Have you seen anyone else for the symptoms? No.  Work related: No.  Automobile related injury: No.    Objective and Assessment:    Posture Analysis:   High shoulder: .  Head tilt: .  High iliac crest: .  Head carriage: neutral.  Thoracic Kyphosis: neutral.  Lumbar Lordosis: forward.    Lumbar Range of Motion: flexion decreased and extension decreased.  Cervical Range of Motion: .  Thoracic Range of Motion: .  Extremity Range of Motion: .    Palpation:   Quad lumb: bilateral, referred pain: no    Segmental dysfunction pre-treatment and treatment area: T7, L5, and Sacrum.    Assessment post-treatment:  Cervical: ROM increased.  Thoracic: ROM increased.  Lumbar: ROM increased.    Comments: .      Complicating Factors: .    Procedure(s):  CMT:  70652 Chiropractic manipulative treatment 1-2 regions performed   Thoracic: Diversified, See above for level, Prone and Lumbar: Diversified, See above for level, Side posture    Modalities:  None performed this visit    Therapeutic procedures:  None    Plan:  Treatment plan: PRN.  Instructed patient: stretch as instructed at visit.  Short term goals: reduce pain.  Long term goals: increase ADL.  Prognosis: very good.

## 2024-09-09 DIAGNOSIS — Z78.0 POST-MENOPAUSE: ICD-10-CM

## 2024-09-09 DIAGNOSIS — M41.35 THORACOGENIC SCOLIOSIS OF THORACOLUMBAR REGION: ICD-10-CM

## 2024-09-09 NOTE — TELEPHONE ENCOUNTER
Protonix      Last Written Prescription Date:  9/21/23  Last Fill Quantity: 90,   # refills: 3  Last Office Visit: 8/20/24  Future Office visit:    Next 5 appointments (look out 90 days)      Sep 26, 2024 1:00 PM  (Arrive by 12:45 PM)  Adult Preventative Visit with Fariba Brown MD  [facility] ([facility] [place] ) [address]  [place] [address]  [phone]             Routing refill request to provider for review/approval because:

## 2024-09-10 RX ORDER — PANTOPRAZOLE SODIUM 40 MG/1
TABLET, DELAYED RELEASE ORAL
Qty: 90 TABLET | Refills: 3 | Status: SHIPPED | OUTPATIENT
Start: 2024-09-10

## 2024-09-24 NOTE — PATIENT INSTRUCTIONS
Referral place to physical therapy     Patient Education   Preventive Care Advice   This is general advice given by our system to help you stay healthy. However, your care team may have specific advice just for you. Please talk to your care team about your preventive care needs.  Nutrition  Eat 5 or more servings of fruits and vegetables each day.  Try wheat bread, brown rice and whole grain pasta (instead of white bread, rice, and pasta).  Get enough calcium and vitamin D. Check the label on foods and aim for 100% of the RDA (recommended daily allowance).  Lifestyle  Exercise at least 150 minutes each week  (30 minutes a day, 5 days a week).  Do muscle strengthening activities 2 days a week. These help control your weight and prevent disease.  No smoking.  Wear sunscreen to prevent skin cancer.  Have a dental exam and cleaning every 6 months.  Yearly exams  See your health care team every year to talk about:  Any changes in your health.  Any medicines your care team has prescribed.  Preventive care, family planning, and ways to prevent chronic diseases.  Shots (vaccines)   HPV shots (up to age 26), if you've never had them before.  Hepatitis B shots (up to age 59), if you've never had them before.  COVID-19 shot: Get this shot when it's due.  Flu shot: Get a flu shot every year.  Tetanus shot: Get a tetanus shot every 10 years.  Pneumococcal, hepatitis A, and RSV shots: Ask your care team if you need these based on your risk.  Shingles shot (for age 50 and up)  General health tests  Diabetes screening:  Starting at age 35, Get screened for diabetes at least every 3 years.  If you are younger than age 35, ask your care team if you should be screened for diabetes.  Cholesterol test: At age 39, start having a cholesterol test every 5 years, or more often if advised.  Bone density scan (DEXA): At age 50, ask your care team if you should have this scan for osteoporosis (brittle bones).  Hepatitis C: Get tested at least  once in your life.  STIs (sexually transmitted infections)  Before age 24: Ask your care team if you should be screened for STIs.  After age 24: Get screened for STIs if you're at risk. You are at risk for STIs (including HIV) if:  You are sexually active with more than one person.  You don't use condoms every time.  You or a partner was diagnosed with a sexually transmitted infection.  If you are at risk for HIV, ask about PrEP medicine to prevent HIV.  Get tested for HIV at least once in your life, whether you are at risk for HIV or not.  Cancer screening tests  Cervical cancer screening: If you have a cervix, begin getting regular cervical cancer screening tests starting at age 21.  Breast cancer scan (mammogram): If you've ever had breasts, begin having regular mammograms starting at age 40. This is a scan to check for breast cancer.  Colon cancer screening: It is important to start screening for colon cancer at age 45.  Have a colonoscopy test every 10 years (or more often if you're at risk) Or, ask your provider about stool tests like a FIT test every year or Cologuard test every 3 years.  To learn more about your testing options, visit:   .  For help making a decision, visit:   https://bit.ly/lj18159.  Prostate cancer screening test: If you have a prostate, ask your care team if a prostate cancer screening test (PSA) at age 55 is right for you.  Lung cancer screening: If you are a current or former smoker ages 50 to 80, ask your care team if ongoing lung cancer screenings are right for you.  For informational purposes only. Not to replace the advice of your health care provider. Copyright   2023 [employer]. All rights reserved. Clinically reviewed by the [facility] Transitions Program. Realm [id number] - REV 01/24.

## 2024-09-26 ENCOUNTER — LAB (OUTPATIENT)
Dept: LAB | Facility: OTHER | Age: 84
End: 2024-09-26
Attending: FAMILY MEDICINE
Payer: COMMERCIAL

## 2024-09-26 ENCOUNTER — OFFICE VISIT (OUTPATIENT)
Dept: FAMILY MEDICINE | Facility: OTHER | Age: 84
End: 2024-09-26
Attending: FAMILY MEDICINE
Payer: COMMERCIAL

## 2024-09-26 VITALS
BODY MASS INDEX: 22.16 KG/M2 | TEMPERATURE: 97.7 F | HEART RATE: 80 BPM | HEIGHT: 60 IN | WEIGHT: 112.9 LBS | DIASTOLIC BLOOD PRESSURE: 76 MMHG | OXYGEN SATURATION: 97 % | RESPIRATION RATE: 16 BRPM | SYSTOLIC BLOOD PRESSURE: 145 MMHG

## 2024-09-26 DIAGNOSIS — I65.23 BILATERAL CAROTID ARTERY STENOSIS: ICD-10-CM

## 2024-09-26 DIAGNOSIS — M54.50 CHRONIC BILATERAL LOW BACK PAIN WITHOUT SCIATICA: ICD-10-CM

## 2024-09-26 DIAGNOSIS — Z51.81 ENCOUNTER FOR THERAPEUTIC DRUG LEVEL MONITORING: ICD-10-CM

## 2024-09-26 DIAGNOSIS — Z00.00 ENCOUNTER FOR MEDICARE ANNUAL WELLNESS EXAM: Primary | ICD-10-CM

## 2024-09-26 DIAGNOSIS — E78.2 MIXED HYPERLIPIDEMIA: ICD-10-CM

## 2024-09-26 DIAGNOSIS — M41.25 OTHER IDIOPATHIC SCOLIOSIS, THORACOLUMBAR REGION: ICD-10-CM

## 2024-09-26 DIAGNOSIS — R15.9 INCONTINENCE OF FECES, UNSPECIFIED FECAL INCONTINENCE TYPE: ICD-10-CM

## 2024-09-26 DIAGNOSIS — F11.90 CHRONIC, CONTINUOUS USE OF OPIOIDS: ICD-10-CM

## 2024-09-26 DIAGNOSIS — I65.22 STENOSIS OF LEFT CAROTID ARTERY: ICD-10-CM

## 2024-09-26 DIAGNOSIS — M85.89 OSTEOPENIA OF MULTIPLE SITES: ICD-10-CM

## 2024-09-26 DIAGNOSIS — I50.32 CHRONIC HEART FAILURE WITH PRESERVED EJECTION FRACTION (H): ICD-10-CM

## 2024-09-26 DIAGNOSIS — R32 URINARY INCONTINENCE, UNSPECIFIED TYPE: ICD-10-CM

## 2024-09-26 DIAGNOSIS — Z23 NEED FOR PROPHYLACTIC VACCINATION AND INOCULATION AGAINST INFLUENZA: ICD-10-CM

## 2024-09-26 DIAGNOSIS — G89.29 CHRONIC BILATERAL LOW BACK PAIN WITHOUT SCIATICA: ICD-10-CM

## 2024-09-26 LAB
ALBUMIN SERPL BCG-MCNC: 4.4 G/DL (ref 3.5–5.2)
ALP SERPL-CCNC: 64 U/L (ref 40–150)
ALT SERPL W P-5'-P-CCNC: 21 U/L (ref 0–50)
AST SERPL W P-5'-P-CCNC: 37 U/L (ref 0–45)
BILIRUB DIRECT SERPL-MCNC: <0.2 MG/DL (ref 0–0.3)
BILIRUB SERPL-MCNC: 0.4 MG/DL
CHOLEST SERPL-MCNC: 190 MG/DL
CREAT UR-MCNC: 123 MG/DL
FASTING STATUS PATIENT QL REPORTED: NO
HDLC SERPL-MCNC: 71 MG/DL
LDLC SERPL CALC-MCNC: 74 MG/DL
NONHDLC SERPL-MCNC: 119 MG/DL
PROT SERPL-MCNC: 7.2 G/DL (ref 6.4–8.3)
TRIGL SERPL-MCNC: 226 MG/DL

## 2024-09-26 PROCEDURE — 36415 COLL VENOUS BLD VENIPUNCTURE: CPT | Mod: ZL

## 2024-09-26 PROCEDURE — G0463 HOSPITAL OUTPT CLINIC VISIT: HCPCS | Mod: 25

## 2024-09-26 PROCEDURE — 99214 OFFICE O/P EST MOD 30 MIN: CPT | Mod: 25 | Performed by: FAMILY MEDICINE

## 2024-09-26 PROCEDURE — 90662 IIV NO PRSV INCREASED AG IM: CPT

## 2024-09-26 PROCEDURE — 80354 DRUG SCREENING FENTANYL: CPT | Mod: ZL

## 2024-09-26 PROCEDURE — 80353 DRUG SCREENING COCAINE: CPT | Mod: ZL

## 2024-09-26 PROCEDURE — 84155 ASSAY OF PROTEIN SERUM: CPT | Mod: ZL

## 2024-09-26 PROCEDURE — 82248 BILIRUBIN DIRECT: CPT | Mod: ZL

## 2024-09-26 PROCEDURE — 82465 ASSAY BLD/SERUM CHOLESTEROL: CPT | Mod: ZL

## 2024-09-26 PROCEDURE — G0439 PPPS, SUBSEQ VISIT: HCPCS | Performed by: FAMILY MEDICINE

## 2024-09-26 ASSESSMENT — ANXIETY QUESTIONNAIRES
7. FEELING AFRAID AS IF SOMETHING AWFUL MIGHT HAPPEN: NOT AT ALL
GAD7 TOTAL SCORE: 0

## 2024-09-26 NOTE — PROGRESS NOTES
Preventive Care Visit  [facility]  Fariba Brown MD, Family Medicine  Sep 26, 2024      Assessment & Plan     Encounter for Medicare annual wellness exam  Discussed and updated preventive cares  Repeat wellness visit in one year     Low back pain, chronic / Scoliosis, throracolumbar / Chronic, continuous use of opioids / Encounter for therapeutic drug level monitoring  [place] PDMP reviewed and appropriate    - Physical Therapy  Referral; Future  - c/w tramadol qty 120 per month. Okay to refill until next visit   - CSA discussed and signed today   - Drug Confirmation Panel Urine with Creatinine; Future    Dyslipidemia  LDL today of 74  - Lipid Profile; Future  - Hepatic panel (Albumin, ALT, AST, Bili, Alk Phos, TP); Future  - c/w atorvastatin     Bilateral carotid artery stenosis / Stenosis of left carotid artery - s/p left endarterectomy. US due 11/2024  Follows with Dr Springer, [facility] vascular  Due for follow up imaging 12/2025    Chronic heart failure with preserved ejection fraction (H)  No symptoms  Echo (10/3/2023) EF 50 to 55%, G1DD    Osteopenia of multiple sites  Due for dexa  - DX Bone Density; Future    Need for prophylactic vaccination and inoculation against influenza  Updated today     Incontinence of feces, unspecified fecal incontinence type  - s/p evaluation by [facility] GI 5/2/2024. Note reviewed.   S/p GI evaluation. Declining further work up at this time  Colonoscopy UTD    Urinary incontinence, unspecified type  No changes  S/p evaluation by urology with consideration for Interstim. Note reviewed           Counseling  Appropriate preventive services were addressed with this patient via screening, questionnaire, or discussion as appropriate for fall prevention, nutrition, physical activity, Tobacco-use cessation, social engagement, weight loss and cognition.  Checklist reviewing preventive services available has been given to the patient.  Reviewed patient's diet, addressing  concerns and/or questions.   Information on urinary incontinence and treatment options given to patient.   I have reviewed Opioid Use Disorder and Substance Use Disorder risk factors and made any needed referrals.       See Patient Instructions    Return for chronic pain.    Misa Santos is a 84 year old, presenting for the following:  Physical and Imm/Inj (Flu Shot)        9/26/2024    12:49 PM   Additional Questions   Roomed by melissa johnson   Accompanied by none         9/26/2024    12:49 PM   Patient Reported Additional Medications   Patient reports taking the following new medications none       Health Care Directive  Patient does not have a Health Care Directive or Living Will: Patient states has Advance Directive and will bring in a copy to clinic.    HPI    # chronic pain / back pain:   - CSA- discussed and signed today   - UDS - no surprises expects   - requesting ultrasound therapy     Answers submitted by the patient for this visit:  Patient Health Questionnaire (G7) (Submitted on 9/26/2024)  ADONIS 7 TOTAL SCORE: 0    # Carotids  Follows with [facility], Dr Springer.   Last US carotid (12/8/2023) no concerning stenosis - recommendation to repeat in two years     # fecal incontinence   - [facility]. Note reviewed.   - order placed for anorectal manometry - declined  - started kegal exercise have improved symptoms   - lower colon is pushing into vaginal wall, can feel it   - able to empty bladder and bowels   - no stool from vagina    # urine urge  - consideration for INterStim  - took a bath in water than containing shampoo which resulted in irritation   - symptoms are better             9/26/2024   General Health   How would you rate your overall physical health? Good   Feel stress (tense, anxious, or unable to sleep) Not at all            9/26/2024   Nutrition   Diet: Regular (no restrictions)            9/26/2024   Exercise   Days per week of moderate/strenous exercise  7 days   Average minutes spent exercising at this level 20 min            9/26/2024   Social Factors   Frequency of gathering with friends or relatives Twice a week   Worry food won't last until get money to buy more No   Food not last or not have enough money for food? No   Do you have housing? (Housing is defined as stable permanent housing and does not include staying ouside in a car, in a tent, in an abandoned building, in an overnight shelter, or couch-surfing.) Yes   Are you worried about losing your housing? No   Lack of transportation? No   Unable to get utilities (heat,electricity)? No            9/26/2024   Fall Risk   Fallen 2 or more times in the past year? No   Trouble with walking or balance? No             9/26/2024   Activities of Daily Living- Home Safety   Needs help with the following daily activites None of the above   Safety concerns in the home None of the above            9/26/2024   Dental   Dentist two times every year? Yes            9/26/2024   Hearing Screening   Hearing concerns? None of the above            9/26/2024   Driving Risk Screening   Patient/family members have concerns about driving No            9/26/2024   General Alertness/Fatigue Screening   Have you been more tired than usual lately? No            9/26/2024   Urinary Incontinence Screening   Bothered by leaking urine in past 6 months Yes            9/26/2024   TB Screening   Were you born outside of the [place]? No        Today's PHQ-2 Score:       9/26/2024    12:54 PM   PHQ-2 ( 1999 [employer])   Q1: Little interest or pleasure in doing things 0   Q2: Feeling down, depressed or hopeless 0   PHQ-2 Score 0         9/26/2024   Substance Use   Alcohol more than 3/day or more than 7/wk No   Do you have a current opioid prescription? (!) YES   How severe/bad is pain from 1 to 10? 4/10   Do you use any other substances recreationally? No        Social History     Tobacco Use    Smoking status: Former     Current packs/day: 0.00      Average packs/day: 0.5 packs/day for 5.3 years (2.7 ttl pk-yrs)     Types: Cigarettes     Start date: 1963     Quit date: 1968     Years since quittin.4    Smokeless tobacco: Never   Substance Use Topics    Alcohol use: Yes     Alcohol/week: 0.8 standard drinks of alcohol     Types: 1 Glasses of wine per week     Comment: daily with dinner    Drug use: No           2023   LAST FHS-7 RESULTS   1st degree relative breast or ovarian cancer No   Any relative bilateral breast cancer No   Any male have breast cancer No   Any ONE woman have BOTH breast AND ovarian cancer No   Any woman with breast cancer before 50yrs No   2 or more relatives with breast AND/OR ovarian cancer No   2 or more relatives with breast AND/OR bowel cancer No          Mammogram Screening - After age 74- determine frequency with patient based on health status, life expectancy and patient goals      # Wellness:  - Mammogram: scheduled 10/11/2024  - Immunizations: PCV20 (declined) , flu (updated today , covid (updated), RSV (updated)  - Lipids: updating today   - Dexa scan (10/26/2022): lowest T score of -1.9. major 15%, hip 4.4%. 19.7% improvement since 2017. Due for repeat - order placed and dated  - Colon cancer screening: colonoscopy (10/6/2023) bx negative  - Lung cancer screening: quit   - AAA screening:     Reviewed and updated as needed this visit by Provider   Tobacco  Allergies  Meds  Problems  Med Hx  Surg Hx  Fam Hx              Current providers sharing in care for this patient include:  Patient Care Team:  Fariba Brown MD as PCP - General (Family Medicine)  Nicole Joseph, RN as Registered Nurse  Fariba Brown MD as Assigned PCP  Fariba Brown MD as Assigned Pain Medication Provider  Nicole Stern APRN CNS as Assigned Surgical Provider  Abraham Philippe MD as Assigned OBGYN Provider    The following health maintenance items are reviewed in Epic and correct as of  today:  Health Maintenance   Topic Date Due    HF ACTION PLAN  Never done    RSV VACCINE (1 - 1-dose 75+ series) Never done    Pneumococcal Vaccine: 65+ Years (2 of 2 - PPSV23 or PCV20) 11/17/2017    URINE DRUG SCREEN  08/29/2024    CONTROLLED SUBSTANCE AGREEMENT FOR CHRONIC PAIN MANAGEMENT  08/30/2024    INFLUENZA VACCINE (1) 09/01/2024    BMP  02/20/2025    PHQ-9  08/20/2025    CBC  08/20/2025    MEDICARE ANNUAL WELLNESS VISIT  09/26/2025    ALT  09/26/2025    LIPID  09/26/2025    ADONIS ASSESSMENT  09/26/2025    FALL RISK ASSESSMENT  09/26/2025    DEXA  10/26/2027    ADVANCE CARE PLANNING  09/26/2029    DTAP/TDAP/TD IMMUNIZATION (2 - Td or Tdap) 02/22/2032    TSH W/FREE T4 REFLEX  Completed    PHQ-2 (once per calendar year)  Completed    ZOSTER IMMUNIZATION  Completed    COVID-19 Vaccine  Completed    HPV IMMUNIZATION  Aged Out    MENINGITIS IMMUNIZATION  Aged Out    RSV MONOCLONAL ANTIBODY  Aged Out    MAMMO SCREENING  Discontinued    COLORECTAL CANCER SCREENING  Discontinued         Review of Systems  Constitutional, HEENT, cardiovascular, pulmonary, gi and gu systems are negative, except as otherwise noted.     Objective    Exam  BP (!) 145/76 (BP Location: Left arm, Patient Position: Sitting, Cuff Size: Adult Small)   Pulse 80   Temp 97.7  F (36.5  C) (Tympanic)   Resp 16   Ht 1.524 m (5')   Wt 51.2 kg (112 lb 14.4 oz)   SpO2 97%   BMI 22.05 kg/m     Estimated body mass index is 22.05 kg/m  as calculated from the following:    Height as of this encounter: 1.524 m (5').    Weight as of this encounter: 51.2 kg (112 lb 14.4 oz).    Physical Exam  Constitutional:       General: She is not in acute distress.     Appearance: She is well-developed.   HENT:      Head: Normocephalic and atraumatic.      Right Ear: Hearing and tympanic membrane normal.      Left Ear: Hearing and tympanic membrane normal.      Mouth/Throat:      Mouth: Mucous membranes are moist.      Pharynx: No oropharyngeal exudate.   Eyes:       Extraocular Movements: Extraocular movements intact.      Conjunctiva/sclera: Conjunctivae normal.   Neck:      Thyroid: No thyromegaly.   Cardiovascular:      Rate and Rhythm: Normal rate and regular rhythm.      Pulses: Normal pulses.      Heart sounds: Normal heart sounds. No murmur heard.  Pulmonary:      Effort: Pulmonary effort is normal. No respiratory distress.      Breath sounds: Normal breath sounds. No wheezing or rales.   Abdominal:      General: Bowel sounds are normal. There is no distension.      Palpations: Abdomen is soft.      Tenderness: There is no abdominal tenderness. There is no guarding.   Musculoskeletal:         General: Normal range of motion.      Cervical back: Normal range of motion and neck supple.      Right lower leg: No edema.      Left lower leg: No edema.   Lymphadenopathy:      Cervical: No cervical adenopathy.   Skin:     General: Skin is dry.   Neurological:      Mental Status: She is alert.   Psychiatric:         Mood and Affect: Mood normal.       Results for orders placed or performed in visit on 09/26/24   Lipid Profile     Status: Abnormal   Result Value Ref Range    Cholesterol 190 <200 mg/dL    Triglycerides 226 (H) <150 mg/dL    Direct Measure HDL 71 >=50 mg/dL    LDL Cholesterol Calculated 74 <100 mg/dL    Non HDL Cholesterol 119 <130 mg/dL    Patient Fasting > 8hrs? No     Narrative    Cholesterol  Desirable: < 200 mg/dL  Borderline High: 200 - 239 mg/dL  High: >= 240 mg/dL    Triglycerides  Normal: < 150 mg/dL  Borderline High: 150 - 199 mg/dL  High: 200-499 mg/dL  Very High: >= 500 mg/dL    Direct Measure HDL  Female: >= 50 mg/dL   Male: >= 40 mg/dL    LDL Cholesterol  Desirable: < 100 mg/dL  Above Desirable: 100 - 129 mg/dL   Borderline High: 130 - 159 mg/dL   High:  160 - 189 mg/dL   Very High: >= 190 mg/dL    Non HDL Cholesterol  Desirable: < 130 mg/dL  Above Desirable: 130 - 159 mg/dL  Borderline High: 160 - 189 mg/dL  High: 190 - 219 mg/dL  Very High: >= 220  mg/dL   Hepatic panel (Albumin, ALT, AST, Bili, Alk Phos, TP)     Status: Normal   Result Value Ref Range    Protein Total 7.2 6.4 - 8.3 g/dL    Albumin 4.4 3.5 - 5.2 g/dL    Bilirubin Total 0.4 <=1.2 mg/dL    Alkaline Phosphatase 64 40 - 150 U/L    AST 37 0 - 45 U/L    ALT 21 0 - 50 U/L    Bilirubin Direct <0.20 0.00 - 0.30 mg/dL   Urine Creatinine for Drug Screen Panel     Status: None   Result Value Ref Range    Creatinine Urine for Drug Screen 123 mg/dL   Drug Confirmation Panel Urine with Creatinine     Status: None (In process)    Narrative    The following orders were created for panel order Drug Confirmation Panel Urine with Creatinine.  Procedure                               Abnormality         Status                     ---------                               -----------         ------                     Urine Drug Confirmation ...[[id number]]                      In process                 Urine Creatinine for Joe...[[id number]]                      Final result                 Please view results for these tests on the individual orders.             9/26/2024   Mini Cog   Clock Draw Score 2 Normal   3 Item Recall 3 objects recalled   Mini Cog Total Score 5                 Signed Electronically by: Fariba Brown MD

## 2024-09-26 NOTE — LETTER

## 2024-10-11 ENCOUNTER — TELEPHONE (OUTPATIENT)
Dept: MAMMOGRAPHY | Facility: OTHER | Age: 84
End: 2024-10-11

## 2024-10-11 ENCOUNTER — ANCILLARY PROCEDURE (OUTPATIENT)
Dept: MAMMOGRAPHY | Facility: OTHER | Age: 84
End: 2024-10-11
Attending: FAMILY MEDICINE
Payer: COMMERCIAL

## 2024-10-11 DIAGNOSIS — Z12.31 VISIT FOR SCREENING MAMMOGRAM: ICD-10-CM

## 2024-10-11 PROCEDURE — 77063 BREAST TOMOSYNTHESIS BI: CPT | Mod: TC

## 2024-10-14 DIAGNOSIS — I65.22 LEFT CAROTID STENOSIS: ICD-10-CM

## 2024-10-14 RX ORDER — ATORVASTATIN CALCIUM 20 MG/1
TABLET, FILM COATED ORAL
Qty: 90 TABLET | Refills: 3 | Status: SHIPPED | OUTPATIENT
Start: 2024-10-14

## 2024-10-18 ENCOUNTER — APPOINTMENT (OUTPATIENT)
Dept: GENERAL RADIOLOGY | Facility: HOSPITAL | Age: 84
End: 2024-10-18
Attending: NURSE PRACTITIONER
Payer: COMMERCIAL

## 2024-10-18 ENCOUNTER — APPOINTMENT (OUTPATIENT)
Dept: CT IMAGING | Facility: HOSPITAL | Age: 84
End: 2024-10-18
Attending: NURSE PRACTITIONER
Payer: COMMERCIAL

## 2024-10-18 ENCOUNTER — TELEPHONE (OUTPATIENT)
Dept: CARE COORDINATION | Facility: OTHER | Age: 84
End: 2024-10-18

## 2024-10-18 ENCOUNTER — HOSPITAL ENCOUNTER (EMERGENCY)
Facility: HOSPITAL | Age: 84
Discharge: HOME | End: 2024-10-18
Attending: NURSE PRACTITIONER | Admitting: NURSE PRACTITIONER
Payer: COMMERCIAL

## 2024-10-18 ENCOUNTER — TELEPHONE (OUTPATIENT)
Dept: FAMILY MEDICINE | Facility: OTHER | Age: 84
End: 2024-10-18

## 2024-10-18 VITALS
DIASTOLIC BLOOD PRESSURE: 83 MMHG | HEART RATE: 87 BPM | OXYGEN SATURATION: 97 % | RESPIRATION RATE: 16 BRPM | SYSTOLIC BLOOD PRESSURE: 162 MMHG | TEMPERATURE: 98.5 F

## 2024-10-18 DIAGNOSIS — S60.512A ABRASION OF LEFT HAND, INITIAL ENCOUNTER: ICD-10-CM
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
## 2024-10-18 DIAGNOSIS — S05.12XA EYE BRUISE, LEFT, INITIAL ENCOUNTER: ICD-10-CM

## 2024-10-18 DIAGNOSIS — S00.81XA ABRASION OF FACE, INITIAL ENCOUNTER: ICD-10-CM
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
## 2024-10-18 DIAGNOSIS — S00.83XA FACIAL CONTUSION, INITIAL ENCOUNTER: ICD-10-CM

## 2024-10-18 PROCEDURE — 72125 CT NECK SPINE W/O DYE: CPT

## 2024-10-18 PROCEDURE — 99284 EMERGENCY DEPT VISIT MOD MDM: CPT | Mod: 25

## 2024-10-18 PROCEDURE — 73130 X-RAY EXAM OF HAND: CPT | Mod: LT

## 2024-10-18 PROCEDURE — 70486 CT MAXILLOFACIAL W/O DYE: CPT

## 2024-10-18 PROCEDURE — 70450 CT HEAD/BRAIN W/O DYE: CPT

## 2024-10-18 PROCEDURE — 99284 EMERGENCY DEPT VISIT MOD MDM: CPT | Performed by: NURSE PRACTITIONER

## 2024-10-18 ASSESSMENT — ENCOUNTER SYMPTOMS
PSYCHIATRIC NEGATIVE: 1
EYES NEGATIVE: 1
NEUROLOGICAL NEGATIVE: 1
HEMATOLOGIC/LYMPHATIC NEGATIVE: 1
WOUND: 1
MUSCULOSKELETAL NEGATIVE: 1
CARDIOVASCULAR NEGATIVE: 1
GASTROINTESTINAL NEGATIVE: 1
RESPIRATORY NEGATIVE: 1
ENDOCRINE NEGATIVE: 1
ALLERGIC/IMMUNOLOGIC NEGATIVE: 1
CONSTITUTIONAL NEGATIVE: 1

## 2024-10-18 ASSESSMENT — ACTIVITIES OF DAILY LIVING (ADL)
ADLS_ACUITY_SCORE: 35

## 2024-10-18 ASSESSMENT — COLUMBIA-SUICIDE SEVERITY RATING SCALE - C-SSRS
2. HAVE YOU ACTUALLY HAD ANY THOUGHTS OF KILLING YOURSELF IN THE PAST MONTH?: NO
6. HAVE YOU EVER DONE ANYTHING, STARTED TO DO ANYTHING, OR PREPARED TO DO ANYTHING TO END YOUR LIFE?: NO
1. IN THE PAST MONTH, HAVE YOU WISHED YOU WERE DEAD OR WISHED YOU COULD GO TO SLEEP AND NOT WAKE UP?: NO

## 2024-10-18 NOTE — ED TRIAGE NOTES
Pt reports falling and striking her face on concrete yesterday, pt denies any loss of consciousness, denies use of blood thinner. Pt does report a slight headache.

## 2024-10-18 NOTE — CONFIDENTIAL NOTE
Patient called and left VM message on the Nurse Triage Line reporting a fall yesterday and stated she hit her head hard on concrete. Patient asked if Dr Brown would want her to have a CT scan.     RN CC called patient and she did not answer the phone. VM message left with RN CC contact number to call back.    RN noticed patient has PT appointment at 2:15 today with Clarke. RN called and spoke with [facility] and updated him about patient fall and head bump. RN advised Clarke to send patient to ED to be scanned. Clarke agreed with plan.    Patient returned phone call a few minutes later. RN advised patient go to ED and she agreed.    Patient said she was chasing her dog and fell face first into the concrete. She said she still has all of her teeth but her bite is off. She did go see a dentist today.    RN to notify provider.    Kimberly Boecker, RN  Care Coordination

## 2024-10-18 NOTE — DISCHARGE INSTRUCTIONS
Pain control:   If your past medical conditions, allergies, current medications, or current status does not prevent you from using acetaminophen and/or ibuprofen, use the following:   Acetaminophen 650-1000 mg every 6 hours as needed for pain in addition to ibuprofen 400mg every 6 hours as needed for pain.  Take these two medications together if wanted.    Remember that these are for AS NEEDED.  If not needed, do not take.           Wound care:   Wash your wound 2 times each day with soap and water.  After this, apply antibiotic ointment and a dressing (such as a Band-Aid) for 2-3 days.  Otherwise, keep your wound clean and dry.  This means no swimming or extended submersion in water until the wound is completely healed.  Remember, all wounds will leave some sort of scar.    Signs of infection:   Watch for signs of infection which include severe pain at site, increasing redness with pain, pus colored drainage, or if you develop a fever.  If this occurs, come back in for re-evaluation and most likely antibiotic therapy.          Follow-up with your primary care provider for reevaluation.  Contact your primary care provider if you have any questions or concerns.  Do not hesitate to return to the ER if any new or worsening symptoms.     Please read the attached instructions (if any).  They highlight more specific treatments and interventions for you at home.              Thank you for letting me participate in your care and wish you a fast and uneventful recovery,    Leandro LOJA CNP    Do not hesitate to contact me with questions or concerns.  [email or web address]

## 2024-10-18 NOTE — ED NOTES
"Pt ambulatory to ED 10, gait steady, pt uses cane to ambulate. No dizziness. Pt presents with facial abrasions and headache following a fall from standing onto concrete yesterday around 1330. Pt states she landed \"right on my face.\" Pt denies LOC, no blood thinners. Pt states she has a slight headache that is dull and constant. Pt also having neck pain on palpation. No C-collar per provider. Pt having left hand pain with swelling in the 4th and 5th digits. Pt has abrasions to nose, upper lip, and chin. Pt has bruising to the left eye. See images.                "
Bed: ED10a  Expected date:   Expected time:   Means of arrival:   Comments:  Critical  
PACO Padgett CNP assessed patient in triage and determined patient not Urgent Care appropriate. Will be seen in Emergency Department.    
Patient discharged to Home at this time. Patient given written and verbal discharge instructions regarding home care, follow-up, and medications. Patient verbalized understanding of all discharge instructions. Rest and hydration encouraged. Patient encouraged to return to the ED if they experience new, worsening, or concerning symptoms.     Tylenol and ibuprofen for pain control.     Clean wounds with soap and warm water. Apply antibiotic ointment as needed otherwise keep wound clean and dry.    Monitor for signs of infection.    Patient to follow-up with PCP in 1 week.    Pt declined discharge vitals. Pt dressed and ready to go at time of AVS review.   
Provider and this nurse assisted patient with removal of rings. Rings given to patient.   
Trauma Eval called  
Normal rate, regular rhythm, normal S1, S2 heart sounds heard.

## 2024-10-18 NOTE — TELEPHONE ENCOUNTER
Symptom or reason needing to speak to RN: fell     Best number to return call: [phone]     Best time to return call: before 2

## 2024-10-25 ENCOUNTER — THERAPY VISIT (OUTPATIENT)
Dept: PHYSICAL THERAPY | Facility: HOSPITAL | Age: 84
End: 2024-10-25
Attending: FAMILY MEDICINE
Payer: COMMERCIAL

## 2024-10-25 DIAGNOSIS — G89.29 CHRONIC BILATERAL LOW BACK PAIN WITHOUT SCIATICA: Primary | ICD-10-CM

## 2024-10-25 DIAGNOSIS — M54.50 CHRONIC BILATERAL LOW BACK PAIN WITHOUT SCIATICA: Primary | ICD-10-CM

## 2024-10-25 PROCEDURE — 97110 THERAPEUTIC EXERCISES: CPT | Mod: GP

## 2024-10-25 PROCEDURE — 97161 PT EVAL LOW COMPLEX 20 MIN: CPT | Mod: GP

## 2024-10-25 NOTE — PROGRESS NOTES
"PHYSICAL THERAPY EVALUATION  Type of Visit: Evaluation       Fall Risk Screen:  Fall screen completed by: PT  Have you fallen 2 or more times in the past year?: No  Have you fallen and had an injury in the past year?: Yes (Caught toe on the on the sidewlk)  Timed Up and Go score (seconds): NT  Is patient a fall risk?: No    Subjective       Presenting condition or subjective complaint: \"(Patient-Rptd) I have tried various therapy,injections,chiropratics and a    suggested ultrasound.\" Pt. Reports to therapy today for evaluation of chronic back pain with scoliosis. She is seen today on referral of Dr. Brown. Pt. Reports chronic episode of back pain that began about 15 years ago. Pt. 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 Reports that she  did not have a \"curve in her back until that point.\" Pain today is present today in bilateral inferior lumbar spine with radiation into posterior aspect of right leg. She reports pain to be achy but can be like a hot poker at times. Hot poker most likely with prolonged riding in car. She does endorse entirety of leg will go numb when she stands or walks for a long period of time. She does report significant management hx, with injections and also a daily exercise routine. Pt. Negative for cauda equina sx. But does have incontinence issues. Pt. Voices goals for therapy of improving activity, ambulation distance, and improving sleep.     Date of onset: 09/26/24 (Chronic Hx.)    Relevant medical history:     Dates & types of surgery: (Patient-Rptd) hysterectomy-ovarian-colon section-appendix    Prior diagnostic imaging/testing results: (Patient-Rptd) MRI; CT scan; X-ray; Other (Patient-Rptd) chiropractor   Prior therapy history for the same diagnosis, illness or injury: (Patient-Rptd) Yes (Patient-Rptd) physical manipulation    Prior Level of Function  Transfers: Assistive equipment  Ambulation: Assistive equipment  ADL: Assistive " equipment  IADL:     Living Environment  Social support: (Patient-Rptd) With family members   Type of home: (Patient-Rptd) House   Stairs to enter the home:         Ramp: (Patient-Rptd) No   Stairs inside the home: (Patient-Rptd) Yes (Patient-Rptd) 1 Is there a railing: (Patient-Rptd) Yes     Help at home: (Patient-Rptd) None  Equipment owned: (Patient-Rptd) Straight Cane     Employment:      Hobbies/Interests:      Patient goals for therapy:      Pain assessment: Pain present  See objective evaluation for additional pain details     Objective   LUMBAR SPINE EVALUATION  PAIN: Pain Level at Rest: 3/10  Pain Level with Use: 5/10  Pain Location: lumbar spine, hip, knee, and ankle  Pain Quality: Aching, Dull, and Tingling  Pain Frequency: constant  Pain is Worst: daytime or nighttime  Pain is Exacerbated By: Standing, walking, long car rides   Pain is Relieved By: rest and stretch  Pain Progression: Unchanged  INTEGUMENTARY (edema, incisions): WNL  POSTURE:  Pt. With significant thoracic lumbar scoliosis   GAIT:   Weightbearing Status: WBAT  Assistive Device(s): Cane (single end)  Gait Deviations: Antalgic    ROM:   (Degrees) Left AROM Left PROM  Right AROM Right PROM   Hip Flexion WNL tension and pain end range  WNL tension and pain end range  WNL tension and pain end range  v   Hip Abduction WNL WNL WNL WNL   Hip Adduction WNL WNL WNL WNL   Hip Internal Rotation WNL WNL WNL WNL   Hip External Rotation WNL WNL WNL WNL   Knee Flexion WNL WNL WNL WNL   Knee Extension WNL WNL WNL WNL   Lumbar Side glide Mod decrease with pain  Mod decrease with pain    Lumbar Flexion WNL tension end range    Lumbar Extension Mod decrease with pain    Pain:   End feel:   PELVIC/SI SCREEN:     STRENGTH:  Hip abductors 4/5 bilat, ER 4/5 bilat    MYOTOMES:    Left Right   T12-L3 (Hip Flexion) 4, + (mild) 4, + (mild)   L2-4 (Quads)  5 5   L4 (Ankle DF) 4, + (mild) 4, + (mild)   L5 (Great Toe Ext) 4 4   S1 (Toe Raise) 4 4     DTR S:    Left  Right   C5 (Biceps)     C6 (Brachioradialis)     C7 (Triceps)     L4 (Quad) 1 1   S1 (Achilles) 1 1     CORD SIGNS:   DERMATOMES:    Left Right   T12  Normal (light touch) Hypo (light touch)   L1 Normal (light touch) Hypo (light touch)   L2 Normal (light touch) Hypo (light touch)   L3 Normal (light touch) Hypo (light touch)   L4 Normal (light touch) Hypo (light touch)   L5 Normal (light touch) Hypo (light touch)   S1 Normal (light touch) Hypo (light touch)     NEURAL TENSION:    Left Right   SLR Negative  Positive   SLR with DF     Femoral Nerve     Slump Negative  Positive   Tai (Lumbar)     Tai (Thoracic)     Tai (Cervical)     Median     Ulnar     Radial        FLEXIBILITY: Decreased adductors L, Decreased piriformis L, Decreased hip flexors L, Decreased quadriceps L, Decreased hamstrings L, Decreased adductors R, Decreased piriformis R, Decreased hip flexors R, Decreased quadriceps R, Decreased hamstrings R  LUMBAR/HIP Special Tests:    Left Right   NIKITA Negative  Negative    FADIR/Labrum/AVERY Negative  Negative    Femoral Nerve     Nayely's     Piriformis     Quadrant Testing     SLR     Slump     Stork with Extension     Malcolm             PELVIS/SI SPECIAL TESTS: WNL  SPINAL SEGMENTAL CONCLUSIONS:       Assessment & Plan   CLINICAL IMPRESSIONS  Medical Diagnosis: Chronic bilateral low back pain without sciatica (M54.50, G89.29), Other idiopathic scoliosis, thoracolumbar region (M41.25)    Treatment Diagnosis: Lumbar spondylosis with radiating pain and mobility deficits.   Impression/Assessment:     Clinical Decision Making (Complexity):  Clinical Presentation: Stable/Uncomplicated  Clinical Presentation Rationale: based on medical and personal factors listed in PT evaluation  Clinical Decision Making (Complexity): Low complexity    PLAN OF CARE  Treatment Interventions:  Modalities: Cryotherapy, Dry Needling, E-stim, Hot Pack, Mechanical Traction, Ultrasound, Vasoneumatic Device  Interventions: Gait  Training, Manual Therapy, Neuromuscular Re-education, Therapeutic Activity, Therapeutic Exercise, Self-Care/Home Management    Long Term Goals     PT Goal 1  Goal Identifier: LTG #1  Goal Description: Pt. to be independent with correct performance of HEP for lumbar mobility and strenght to begin self management of her condition.  Target Date: 12/20/24  PT Goal 2  Goal Identifier: LTG #2  Goal Description: Pt. to report 50% or greater increase in duration of standing/walking to improve ability for daily mobility.  Target Date: 12/20/24  PT Goal 3  Goal Identifier: LTG #3  Goal Description: Pt. to report improved sleep to 5 or more hours consecutively improving restful quality.  Target Date: 12/20/24  PT Goal 4  Goal Identifier: STG #1  Goal Description: Pt. to report 50% or greater decrease in level of pain at worst.  Target Date: 11/24/24      Frequency of Treatment: 1x week  Duration of Treatment: 8 weeks    Recommended Referrals to Other Professionals:   Education Assessment:   Learner/Method: Patient;Listening;Reading;Demonstration;Pictures/Video;No Barriers to Learning    Risks and benefits of evaluation/treatment have been explained.   Patient/Family/caregiver agrees with Plan of Care.     Evaluation Time:     PT Eval, Low Complexity Minutes (16765): 35       Signing Clinician: Clarke Lacy, PT        [facility]                                                                                   OUTPATIENT PHYSICAL THERAPY      PLAN OF TREATMENT FOR OUTPATIENT REHABILITATION   Patient's Last Name, First Name, Demi Parikh YOB: 1940   Provider's Name   [facility]   Medical Record No.  [id number]     Onset Date: 09/26/24 (Chronic Hx.)  Start of Care Date: 10/25/24     Medical Diagnosis:  Chronic bilateral low back pain without sciatica (M54.50, G89.29), Other idiopathic scoliosis, thoracolumbar region (M41.25)      PT  Treatment Diagnosis:  Lumbar spondylosis with radiating pain and mobility deficits. Plan of Treatment  Frequency/Duration: 1x week/ 8 weeks    Certification date from 10/25/24 to 12/20/24         See note for plan of treatment details and functional goals     Clarke Lacy, PT                         I CERTIFY THE NEED FOR THESE SERVICES FURNISHED UNDER        THIS PLAN OF TREATMENT AND WHILE UNDER MY CARE     (Physician attestation of this document indicates review and certification of the therapy plan).              Referring Provider:  Fariba Brown    Initial Assessment  See Epic Evaluation- Start of Care Date: 10/25/24

## 2024-10-28 ENCOUNTER — HOSPITAL ENCOUNTER (OUTPATIENT)
Dept: BONE DENSITY | Facility: HOSPITAL | Age: 84
Discharge: HOME | End: 2024-10-28
Attending: FAMILY MEDICINE | Admitting: FAMILY MEDICINE
Payer: COMMERCIAL

## 2024-10-28 DIAGNOSIS — M85.89 OSTEOPENIA OF MULTIPLE SITES: ICD-10-CM

## 2024-10-28 PROCEDURE — 77080 DXA BONE DENSITY AXIAL: CPT

## 2024-10-31 ENCOUNTER — THERAPY VISIT (OUTPATIENT)
Dept: PHYSICAL THERAPY | Facility: HOSPITAL | Age: 84
End: 2024-10-31
Attending: FAMILY MEDICINE
Payer: COMMERCIAL

## 2024-10-31 DIAGNOSIS — M54.50 CHRONIC BILATERAL LOW BACK PAIN WITHOUT SCIATICA: Primary | ICD-10-CM

## 2024-10-31 DIAGNOSIS — S33.8XXD SPRAIN OF OTHER PARTS OF LUMBAR SPINE AND PELVIS, SUBSEQUENT ENCOUNTER: ICD-10-CM

## 2024-10-31 DIAGNOSIS — G89.29 CHRONIC BILATERAL LOW BACK PAIN WITHOUT SCIATICA: Primary | ICD-10-CM

## 2024-10-31 PROCEDURE — 97110 THERAPEUTIC EXERCISES: CPT | Mod: GP

## 2024-10-31 PROCEDURE — 97140 MANUAL THERAPY 1/> REGIONS: CPT | Mod: GP

## 2024-10-31 RX ORDER — TRAMADOL HYDROCHLORIDE 50 MG/1
50-100 TABLET ORAL EVERY 6 HOURS PRN
Qty: 120 TABLET | Refills: 0 | Status: SHIPPED | OUTPATIENT
Start: 2024-10-31

## 2024-10-31 NOTE — TELEPHONE ENCOUNTER
Tramadol (Ultram) 50 mg tablet  Take 1 - 2 tablets (50 - 100 mg) by mouth every 6 hours as needed for severe pain - may take every 6 to 8 hours as needed for severe pain     Last Written Prescription Date:  8-1-24  Last Fill Quantity: 120 tablet,   # refills: 2  Last Office Visit: 9-26-24  Future Office visit:    Next 5 appointments (look out 90 days)      Nov 05, 2024 11:00 AM  (Arrive by 10:45 AM)  Provider Visit with Farbia Brown MD  [facility] ([facility] ) [address]  [place] [address]  [phone]

## 2024-11-04 NOTE — PROGRESS NOTES
Assessment & Plan     Osteopenia of multiple sites  Discussed risks and benefits of Reclast including atypical fracture of the femur, osteonecrosis of the jaw, and myalgias/arthralgias during and after the infusion.  - orders placed for reclast  - repeat dexa 11/2027    Urinary urgency  S/p evaluation by [facility] urology, but would like to follow closer to home and discuss other possible options   - Adult Urology  Referral; Future    The longitudinal plan of care for the diagnosis(es)/condition(s) as documented were addressed during this visit. Due to the added complexity in care, I will continue to support Danielle in the subsequent management and with ongoing continuity of care.    The longitudinal plan of care for the diagnosis(es)/condition(s) as documented were addressed during this visit. Due to the added complexity in care, I will continue to support Danielle in the subsequent management and with ongoing continuity of care.    See Patient Instructions    Next visit 3/27/2025    Misa Santos is a 84 year old, presenting for the following health issues:  Osteopenia        11/5/2024    10:54 AM   Additional Questions   Roomed by Manuela Fabian   Accompanied by self     History of Present Illness       Reason for visit:  Follow up She is missing 1 dose(s) of medications per week.         Concern - osteopenia  Onset: 10/28/2024  Description: Low bone mass  Intensity: severe  Progression of Symptoms:  worsening  Accompanying Signs & Symptoms: Weak bone with the risk of increase fractures  Previous history of similar problem: na  Precipitating factors:        Worsened by: na  Alleviating factors:        Improved by: na  Therapies tried and outcome: None    - dexa (10/28/2024) lowest T score of -2. Lumbar spine T score of +3.4. Major 15%, hip 4.7%    - vitamin d supplements  - calcium supplements    - no h/o bone density medications  - sister is on reclast and has not issues     Estimated Creatinine  Clearance: 40.7 mL/min (based on SCr of 0.82 mg/dL).      Review of Systems  Constitutional, HEENT, cardiovascular, pulmonary, gi and gu systems are negative, except as otherwise noted.      Objective    BP (!) 140/72   Pulse 80   Temp 97.8  F (36.6  C) (Tympanic)   Resp 16   Ht 1.524 m (5')   Wt 51.4 kg (113 lb 6.4 oz)   SpO2 96%   BMI 22.15 kg/m    Body mass index is 22.15 kg/m .  Physical Exam  Constitutional:       General: She is not in acute distress.     Appearance: She is not ill-appearing.   Cardiovascular:      Rate and Rhythm: Normal rate and regular rhythm.      Heart sounds: No murmur heard.  Pulmonary:      Effort: Pulmonary effort is normal. No respiratory distress.      Breath sounds: No wheezing or rales.   Neurological:      Mental Status: She is alert.   Psychiatric:         Mood and Affect: Mood normal.              Signed Electronically by: Fariba Brown MD

## 2024-11-05 ENCOUNTER — OFFICE VISIT (OUTPATIENT)
Dept: FAMILY MEDICINE | Facility: OTHER | Age: 84
End: 2024-11-05
Attending: FAMILY MEDICINE
Payer: COMMERCIAL

## 2024-11-05 VITALS
OXYGEN SATURATION: 96 % | HEIGHT: 60 IN | WEIGHT: 113.4 LBS | SYSTOLIC BLOOD PRESSURE: 140 MMHG | RESPIRATION RATE: 16 BRPM | DIASTOLIC BLOOD PRESSURE: 72 MMHG | HEART RATE: 80 BPM | TEMPERATURE: 97.8 F | BODY MASS INDEX: 22.26 KG/M2

## 2024-11-05 DIAGNOSIS — R39.15 URINARY URGENCY: ICD-10-CM

## 2024-11-05 DIAGNOSIS — M85.89 OSTEOPENIA OF MULTIPLE SITES: Primary | ICD-10-CM

## 2024-11-05 PROCEDURE — 99213 OFFICE O/P EST LOW 20 MIN: CPT | Performed by: FAMILY MEDICINE

## 2024-11-05 PROCEDURE — G0463 HOSPITAL OUTPT CLINIC VISIT: HCPCS | Mod: 25

## 2024-11-05 PROCEDURE — G0463 HOSPITAL OUTPT CLINIC VISIT: HCPCS

## 2024-11-05 PROCEDURE — G2211 COMPLEX E/M VISIT ADD ON: HCPCS | Performed by: FAMILY MEDICINE

## 2024-11-05 RX ORDER — HEPARIN SODIUM,PORCINE 10 UNIT/ML
5-20 VIAL (ML) INTRAVENOUS DAILY PRN
OUTPATIENT
Start: 2024-11-12

## 2024-11-05 RX ORDER — DIPHENHYDRAMINE HYDROCHLORIDE 50 MG/ML
50 INJECTION INTRAMUSCULAR; INTRAVENOUS
Start: 2024-11-12

## 2024-11-05 RX ORDER — ALBUTEROL SULFATE 0.83 MG/ML
2.5 SOLUTION RESPIRATORY (INHALATION)
OUTPATIENT
Start: 2024-11-12

## 2024-11-05 RX ORDER — METHYLPREDNISOLONE SODIUM SUCCINATE 40 MG/ML
40 INJECTION INTRAMUSCULAR; INTRAVENOUS
Start: 2024-11-12

## 2024-11-05 RX ORDER — DIPHENHYDRAMINE HYDROCHLORIDE 50 MG/ML
25 INJECTION INTRAMUSCULAR; INTRAVENOUS
Start: 2024-11-12

## 2024-11-05 RX ORDER — ALBUTEROL SULFATE 90 UG/1
1-2 INHALANT RESPIRATORY (INHALATION)
Start: 2024-11-12

## 2024-11-05 RX ORDER — ACETAMINOPHEN 325 MG/1
650 TABLET ORAL
OUTPATIENT
Start: 2024-11-12

## 2024-11-05 RX ORDER — MEPERIDINE HYDROCHLORIDE 25 MG/ML
25 INJECTION INTRAMUSCULAR; INTRAVENOUS; SUBCUTANEOUS
OUTPATIENT
Start: 2024-11-12

## 2024-11-05 RX ORDER — HEPARIN SODIUM (PORCINE) LOCK FLUSH IV SOLN 100 UNIT/ML 100 UNIT/ML
5 SOLUTION INTRAVENOUS
OUTPATIENT
Start: 2024-11-12

## 2024-11-05 RX ORDER — ZOLEDRONIC ACID 0.05 MG/ML
5 INJECTION, SOLUTION INTRAVENOUS ONCE
Start: 2024-11-12

## 2024-11-05 RX ORDER — EPINEPHRINE 1 MG/ML
0.3 INJECTION, SOLUTION INTRAMUSCULAR; SUBCUTANEOUS EVERY 5 MIN PRN
OUTPATIENT
Start: 2024-11-12

## 2024-11-05 ASSESSMENT — PAIN SCALES - GENERAL: PAINLEVEL_OUTOF10: MODERATE PAIN (4)

## 2024-11-08 ENCOUNTER — THERAPY VISIT (OUTPATIENT)
Dept: PHYSICAL THERAPY | Facility: HOSPITAL | Age: 84
End: 2024-11-08
Attending: FAMILY MEDICINE
Payer: COMMERCIAL

## 2024-11-08 DIAGNOSIS — G89.29 CHRONIC BILATERAL LOW BACK PAIN WITHOUT SCIATICA: Primary | ICD-10-CM

## 2024-11-08 DIAGNOSIS — M54.50 CHRONIC BILATERAL LOW BACK PAIN WITHOUT SCIATICA: Primary | ICD-10-CM

## 2024-11-08 PROCEDURE — 97035 APP MDLTY 1+ULTRASOUND EA 15: CPT | Mod: GP,CQ

## 2024-11-08 PROCEDURE — 97110 THERAPEUTIC EXERCISES: CPT | Mod: GP,CQ

## 2024-11-15 ENCOUNTER — THERAPY VISIT (OUTPATIENT)
Dept: PHYSICAL THERAPY | Facility: HOSPITAL | Age: 84
End: 2024-11-15
Attending: FAMILY MEDICINE
Payer: COMMERCIAL

## 2024-11-15 DIAGNOSIS — G89.29 CHRONIC BILATERAL LOW BACK PAIN WITHOUT SCIATICA: Primary | ICD-10-CM

## 2024-11-15 DIAGNOSIS — M54.50 CHRONIC BILATERAL LOW BACK PAIN WITHOUT SCIATICA: Primary | ICD-10-CM

## 2024-11-15 PROCEDURE — 97140 MANUAL THERAPY 1/> REGIONS: CPT | Mod: GP

## 2024-11-15 PROCEDURE — 97035 APP MDLTY 1+ULTRASOUND EA 15: CPT | Mod: GP

## 2024-11-22 ENCOUNTER — THERAPY VISIT (OUTPATIENT)
Dept: PHYSICAL THERAPY | Facility: HOSPITAL | Age: 84
End: 2024-11-22
Attending: FAMILY MEDICINE
Payer: COMMERCIAL

## 2024-11-22 DIAGNOSIS — G89.29 CHRONIC BILATERAL LOW BACK PAIN WITHOUT SCIATICA: Primary | ICD-10-CM

## 2024-11-22 DIAGNOSIS — M54.50 CHRONIC BILATERAL LOW BACK PAIN WITHOUT SCIATICA: Primary | ICD-10-CM

## 2024-11-22 PROCEDURE — 97140 MANUAL THERAPY 1/> REGIONS: CPT | Mod: GP

## 2024-11-22 PROCEDURE — 97035 APP MDLTY 1+ULTRASOUND EA 15: CPT | Mod: GP

## 2024-11-25 ENCOUNTER — THERAPY VISIT (OUTPATIENT)
Dept: PHYSICAL THERAPY | Facility: HOSPITAL | Age: 84
End: 2024-11-25
Attending: FAMILY MEDICINE
Payer: COMMERCIAL

## 2024-11-25 DIAGNOSIS — G89.29 CHRONIC BILATERAL LOW BACK PAIN WITHOUT SCIATICA: Primary | ICD-10-CM

## 2024-11-25 DIAGNOSIS — M54.50 CHRONIC BILATERAL LOW BACK PAIN WITHOUT SCIATICA: Primary | ICD-10-CM

## 2024-11-25 PROCEDURE — 97035 APP MDLTY 1+ULTRASOUND EA 15: CPT | Mod: GP

## 2024-11-25 PROCEDURE — 97140 MANUAL THERAPY 1/> REGIONS: CPT | Mod: GP

## 2024-12-12 ENCOUNTER — INFUSION THERAPY VISIT (OUTPATIENT)
Dept: INFUSION THERAPY | Facility: OTHER | Age: 84
End: 2024-12-12
Attending: FAMILY MEDICINE
Payer: COMMERCIAL

## 2024-12-12 ENCOUNTER — THERAPY VISIT (OUTPATIENT)
Dept: PHYSICAL THERAPY | Facility: HOSPITAL | Age: 84
End: 2024-12-12
Attending: FAMILY MEDICINE
Payer: COMMERCIAL

## 2024-12-12 ENCOUNTER — LAB (OUTPATIENT)
Dept: LAB | Facility: OTHER | Age: 84
End: 2024-12-12
Attending: FAMILY MEDICINE
Payer: COMMERCIAL

## 2024-12-12 VITALS
SYSTOLIC BLOOD PRESSURE: 148 MMHG | HEART RATE: 87 BPM | DIASTOLIC BLOOD PRESSURE: 83 MMHG | BODY MASS INDEX: 22.58 KG/M2 | HEIGHT: 60 IN | WEIGHT: 115 LBS

## 2024-12-12 DIAGNOSIS — M85.89 OSTEOPENIA OF MULTIPLE SITES: ICD-10-CM

## 2024-12-12 DIAGNOSIS — M15.0 PRIMARY OSTEOARTHRITIS INVOLVING MULTIPLE JOINTS: Primary | ICD-10-CM

## 2024-12-12 DIAGNOSIS — G89.29 CHRONIC BILATERAL LOW BACK PAIN WITHOUT SCIATICA: Primary | ICD-10-CM

## 2024-12-12 DIAGNOSIS — M54.50 CHRONIC BILATERAL LOW BACK PAIN WITHOUT SCIATICA: Primary | ICD-10-CM

## 2024-12-12 LAB
ALBUMIN SERPL BCG-MCNC: 4.5 G/DL (ref 3.5–5.2)
CALCIUM SERPL-MCNC: 9.5 MG/DL (ref 8.8–10.4)
CREAT SERPL-MCNC: 0.91 MG/DL (ref 0.51–0.95)
EGFRCR SERPLBLD CKD-EPI 2021: 62 ML/MIN/1.73M2

## 2024-12-12 PROCEDURE — 250N000011 HC RX IP 250 OP 636: Mod: JZ | Performed by: FAMILY MEDICINE

## 2024-12-12 PROCEDURE — 36415 COLL VENOUS BLD VENIPUNCTURE: CPT | Mod: ZL

## 2024-12-12 PROCEDURE — 258N000003 HC RX IP 258 OP 636: Performed by: FAMILY MEDICINE

## 2024-12-12 PROCEDURE — 97140 MANUAL THERAPY 1/> REGIONS: CPT | Mod: GP

## 2024-12-12 PROCEDURE — 82040 ASSAY OF SERUM ALBUMIN: CPT | Mod: ZL

## 2024-12-12 PROCEDURE — 82565 ASSAY OF CREATININE: CPT | Mod: ZL

## 2024-12-12 PROCEDURE — 96365 THER/PROPH/DIAG IV INF INIT: CPT

## 2024-12-12 PROCEDURE — 999N000104 HC STATISTIC NO CHARGE

## 2024-12-12 PROCEDURE — 82310 ASSAY OF CALCIUM: CPT | Mod: ZL

## 2024-12-12 RX ORDER — DIPHENHYDRAMINE HYDROCHLORIDE 50 MG/ML
50 INJECTION INTRAMUSCULAR; INTRAVENOUS
Start: 2025-12-12

## 2024-12-12 RX ORDER — HEPARIN SODIUM (PORCINE) LOCK FLUSH IV SOLN 100 UNIT/ML 100 UNIT/ML
5 SOLUTION INTRAVENOUS
OUTPATIENT
Start: 2025-12-12

## 2024-12-12 RX ORDER — ACETAMINOPHEN 325 MG/1
650 TABLET ORAL
OUTPATIENT
Start: 2025-12-12

## 2024-12-12 RX ORDER — METHYLPREDNISOLONE SODIUM SUCCINATE 40 MG/ML
40 INJECTION INTRAMUSCULAR; INTRAVENOUS
Start: 2025-12-12

## 2024-12-12 RX ORDER — ALBUTEROL SULFATE 90 UG/1
1-2 INHALANT RESPIRATORY (INHALATION)
Start: 2025-12-12

## 2024-12-12 RX ORDER — EPINEPHRINE 1 MG/ML
0.3 INJECTION, SOLUTION, CONCENTRATE INTRAVENOUS EVERY 5 MIN PRN
OUTPATIENT
Start: 2025-12-12

## 2024-12-12 RX ORDER — MEPERIDINE HYDROCHLORIDE 25 MG/ML
25 INJECTION INTRAMUSCULAR; INTRAVENOUS; SUBCUTANEOUS
OUTPATIENT
Start: 2025-12-12

## 2024-12-12 RX ORDER — ZOLEDRONIC ACID 0.05 MG/ML
5 INJECTION, SOLUTION INTRAVENOUS ONCE
Status: COMPLETED | OUTPATIENT
Start: 2024-12-12 | End: 2024-12-12

## 2024-12-12 RX ORDER — ZOLEDRONIC ACID 0.05 MG/ML
5 INJECTION, SOLUTION INTRAVENOUS ONCE
Start: 2025-12-12

## 2024-12-12 RX ORDER — ALBUTEROL SULFATE 0.83 MG/ML
2.5 SOLUTION RESPIRATORY (INHALATION)
OUTPATIENT
Start: 2025-12-12

## 2024-12-12 RX ORDER — HEPARIN SODIUM,PORCINE 10 UNIT/ML
5-20 VIAL (ML) INTRAVENOUS DAILY PRN
OUTPATIENT
Start: 2025-12-12

## 2024-12-12 RX ORDER — DIPHENHYDRAMINE HYDROCHLORIDE 50 MG/ML
25 INJECTION INTRAMUSCULAR; INTRAVENOUS
Start: 2025-12-12

## 2024-12-12 RX ADMIN — ZOLEDRONIC ACID 5 MG: 5 INJECTION, SOLUTION INTRAVENOUS at 16:33

## 2024-12-12 RX ADMIN — SODIUM CHLORIDE 500 ML: 9 INJECTION, SOLUTION INTRAVENOUS at 16:33

## 2024-12-12 NOTE — PROGRESS NOTES
Infusion Nursing Note:  Demi Narayan presents today for reclast.    Patient seen by provider today: No   present during visit today: Not Applicable.    Note: N/A.      Intravenous Access:  Peripheral IV placed.    Treatment Conditions:  Results reviewed, labs MET treatment parameters, ok to proceed with treatment.      Post Infusion Assessment:  Patient tolerated infusion without incident.       Discharge Plan:   Discharge instructions reviewed with: Patient.      Nahed Dominguez RN

## 2024-12-12 NOTE — PROGRESS NOTES
"   12/12/24 0500   Appointment Info   Signing clinician's name / credentials Clarke Lacy DPT   Total/Authorized Visits 365   Visits Used 8 [facility]   Medical Diagnosis Chronic bilateral low back pain without sciatica (M54.50, G89.29), Other idiopathic scoliosis, thoracolumbar region (M41.25)   PT Tx Diagnosis Lumbar spondylosis with radiating pain and mobility deficits.   Progress Note/Certification   Start of Care Date 10/25/24   Onset of illness/injury or Date of Surgery 09/26/24  (Chronic Hx.)   Therapy Frequency 1x week   Predicted Duration 8 weeks   Certification date from 10/25/24   Certification date to 12/20/24   Progress Note Due Date 11/24/24   Supervision   PT Assistant Visit Number 1       Present No   GOALS   PT Goals 2;3;4   PT Goal 1   Goal Identifier LTG #1   Goal Description Pt. to be independent with correct performance of HEP for lumbar mobility and strenght to begin self management of her condition.   Goal Progress Not met: Pt. not making adequate progress with conservatvie measures. Will Discharge   Target Date 12/20/24   PT Goal 2   Goal Identifier LTG #2   Goal Description Pt. to report 50% or greater increase in duration of standing/walking to improve ability for daily mobility.   Goal Progress Not met: Pt. not making adequate progress with conservatvie measures. Will Discharge   Target Date 12/20/24   PT Goal 3   Goal Identifier LTG #3   Goal Description Pt. to report improved sleep to 5 or more hours consecutively improving restful quality.   Goal Progress Not met: Pt. not making adequate progress with conservatvie measures. Will Discharge   Target Date 12/20/24   PT Goal 4   Goal Identifier STG #1   Goal Description Pt. to report 50% or greater decrease in level of pain at worst.   Goal Progress Not met: Pt. not making adequate progress with conservatvie measures. Will Discharge   Target Date 11/24/24   Subjective Report   Subjective Report \"Danielle\" arrives 10 minutes " late today as she is presenting from lab. Does not feel that therapy to this point has been helping much. Initial improvements with ultrasound have now diminished. She would like to DC from services today.   Treatment Interventions (PT)   Interventions Therapeutic Procedure/Exercise;Manual Therapy   Manual Therapy   Manual Therapy: Mobilization, MFR, MLD, friction massage minutes (33880) 20   Manual Therapy 1 CPA/STM   Manual Therapy 1 - Details CPA in prone lumbar grade I-II for pain, Upa Grade !, STM via theragun o lowest setting with extra care taken to provide bony blocking due to comorbidities.   Skilled Intervention manual   Patient Response/Progress Decreased tension   Education   Learner/Method Patient;Listening;Reading;Demonstration;Pictures/Video;No Barriers to Learning   Plan   Home program Continue current   Plan for next session Pt. not with measurable progress to this point. She has tried comprehensive HEP and therapy in past without success. Will DC at this time.   Total Session Time   Timed Code Treatment Minutes 20   Total Treatment Time (sum of timed and untimed services) 20         DISCHARGE  Reason for Discharge: No further expectation of progress.  Patient chooses to discontinue therapy.    Discharge Plan: Patient to continue home program.    Referring Provider:  Fariba Brown

## 2024-12-30 DIAGNOSIS — S33.8XXD SPRAIN OF OTHER PARTS OF LUMBAR SPINE AND PELVIS, SUBSEQUENT ENCOUNTER: ICD-10-CM

## 2024-12-31 RX ORDER — TRAMADOL HYDROCHLORIDE 50 MG/1
50-100 TABLET ORAL EVERY 6 HOURS PRN
Qty: 120 TABLET | Refills: 2 | Status: SHIPPED | OUTPATIENT
Start: 2024-12-31

## 2024-12-31 NOTE — PROGRESS NOTES
Tramadol  Last signed 11/29 #120, 0 R  Last office visit 11/5  Yareli Castaneda, RN  Care Coordination

## 2025-01-22 NOTE — TELEPHONE ENCOUNTER
tramadol      Last Written Prescription Date:  1/7/20  Last Fill Quantity: 120,   # refills: 0  Last Office Visit: 7/16/19  Future Office visit:       Routing refill request to provider for review/approval because:  Drug not on the [facility], [facility] or [facility] refill protocol or controlled substance    
Previously Declined (within the last year)

## 2025-01-28 ENCOUNTER — OFFICE VISIT (OUTPATIENT)
Dept: UROLOGY | Facility: OTHER | Age: 85
End: 2025-01-28
Attending: FAMILY MEDICINE
Payer: COMMERCIAL

## 2025-01-28 ENCOUNTER — LAB (OUTPATIENT)
Dept: LAB | Facility: OTHER | Age: 85
End: 2025-01-28
Payer: COMMERCIAL

## 2025-01-28 VITALS
OXYGEN SATURATION: 97 % | DIASTOLIC BLOOD PRESSURE: 76 MMHG | HEART RATE: 82 BPM | SYSTOLIC BLOOD PRESSURE: 163 MMHG | RESPIRATION RATE: 16 BRPM

## 2025-01-28 DIAGNOSIS — R39.15 URINARY URGENCY: Primary | ICD-10-CM

## 2025-01-28 LAB
ALBUMIN UR-MCNC: NEGATIVE MG/DL
APPEARANCE UR: CLEAR
BACTERIA #/AREA URNS HPF: ABNORMAL /HPF
BILIRUB UR QL STRIP: NEGATIVE
COLOR UR AUTO: ABNORMAL
GLUCOSE UR STRIP-MCNC: NEGATIVE MG/DL
HGB UR QL STRIP: NEGATIVE
HOLD SPECIMEN: NORMAL
KETONES UR STRIP-MCNC: NEGATIVE MG/DL
LEUKOCYTE ESTERASE UR QL STRIP: ABNORMAL
MUCOUS THREADS #/AREA URNS LPF: PRESENT /LPF
NITRATE UR QL: POSITIVE
PH UR STRIP: 6 [PH] (ref 4.7–8)
RBC URINE: <1 /HPF
SP GR UR STRIP: 1.01 (ref 1–1.03)
SQUAMOUS EPITHELIAL: 3 /HPF
UROBILINOGEN UR STRIP-MCNC: NORMAL MG/DL
WBC URINE: 5 /HPF

## 2025-01-28 PROCEDURE — 87186 SC STD MICRODIL/AGAR DIL: CPT | Mod: ZL | Performed by: UROLOGY

## 2025-01-28 PROCEDURE — 81001 URINALYSIS AUTO W/SCOPE: CPT | Mod: ZL | Performed by: UROLOGY

## 2025-01-28 PROCEDURE — G0463 HOSPITAL OUTPT CLINIC VISIT: HCPCS

## 2025-01-28 ASSESSMENT — PAIN SCALES - GENERAL: PAINLEVEL_OUTOF10: NO PAIN (0)

## 2025-01-28 NOTE — PROGRESS NOTES
"HPI:    Demi Narayan is a 84 year old woman seen today for evaluation of urinary and fecal incontinence of \"many\" years duration.  She is seen at the request of Fariba Brown.    Daily fluid intake: 3 cups of coffee, sips of water with pills.    She uses tissue paper in her underwear to catch urine and stool. Sometimes she has significant fecal incontinence that causes her to change all her clothes. She has a lot of gas when she has a bowel movement and often has pain across the top of her abdomen. Urine control is poor; she leaks with urgency and sometimes without any recognition that she is urinating. No history of recurrent UTI. She takes a fiber supplement (because of liquid stools) and has been on estradiol for years. She also has chronic back pain from spine abnormalities.    She has been evaluated by two different urologists for this problem, starting in 2021; at that time her PVR was 0mL. She was prescribed oxybutynin which did not improve her urinary symptoms. She saw GI at [facility] who recommended anorectal manometry; this was not done. She underwent a course of pelvic floor rehabilitation in 2021.      ROS:   10 point review of systems performed.  Pertinent positives and negatives in HPI.    Past Medical History:   Diagnosis Date    Chronic/recurrent back pain 07/12/2011    Diverticulitis     Diverticulitis, recurrent 02/06/2002    bowel resection    Dyslipidemia 06/20/2006    Fibrocystic breast disease 07/12/2011    Gastro-oesophageal reflux disease     GERD 02/10/2012    Hiatal hernia 02/10/2012    Osteoarthritis        Past Surgical History:   Procedure Laterality Date    ------------OTHER-------------      colonoscopy with biopsy - diverticulitis, hemorrhoids    ------------OTHER-------------  04/19/1994    sigmoidoscopy - abdominal pain, diverticula    ABDOMEN SURGERY      colon removed 2nd to diverticulitis    APPENDECTOMY      ARTHROSCOPY SHOULDER  11/20/2013    Procedure: ARTHROSCOPY " SHOULDER;  Right Shoulder Arthroscopy Sub-Acromial Decompression Rotator Cuff Repair;  Surgeon: Lenny Trammell MD;  Location: [facility]    [facility]    Divert[facility]itis      bowel resection    EGD with biopsy  2011    ENDARTERECTOMY CAROTID Left 10/17/2022    [facility]. Dr. Springer    ENDOSCOPY UPPER WITH PANCREATIC STIMULATION      ENDOSCOPY UPPER, COLONOSCOPY, COMBINED  2014    Procedure: COMBINED ENDOSCOPY UPPER, COLONOSCOPY;  Surgeon: Israel Feliciano MD;  Location: [facility]    ENDOSCOPY UPPER, COLONOSCOPY, COMBINED N/A 10/6/2023    Procedure: Upper Endoscopy with biopsies and Colonoscopy with biopsies;  Surgeon: Carlos Fraser MD;  Location: [facility]    ENT SURGERY      tonsilectomy    ESOPHAGOSCOPY, GASTROSCOPY, DUODENOSCOPY (EGD), COMBINED N/A 2017    Procedure: COMBINED ESOPHAGOSCOPY, GASTROSCOPY, DUODENOSCOPY (EGD);  UPPER ENDOSCOPY WITH BIOPSY AND POLYPECTOMY;  Surgeon: Gallito Alvarado DO;  Location: [facility]    GYN SURGERY      hysterectomy with bladder and rectal repair    IR CONSULTATION FOR IR EXAM  2019    ORTHOPEDIC SURGERY  2013    right rotator cuff surgery    TVH with ovarian preservation         Family History   Problem Relation Age of Onset    Cerebrovascular Disease Father         CVA    Heart Disease Father         heart disease    Hypertension Father     Myocardial Infarction Father         myocardial infarction    Cerebrovascular Disease Mother         CVA    Diabetes Mother     Heart Disease Mother         heart disease    Hypertension Mother     Heart Disease Brother         heart disease    Hypertension Brother         Social History     Tobacco Use    Smoking status: Former     Current packs/day: 0.00     Average packs/day: 0.5 packs/day for 5.3 years (2.7 ttl pk-yrs)     Types: Cigarettes     Start date: 1963     Quit date: 1968     Years since quittin.7    Smokeless tobacco: Never   Substance  Use Topics    Alcohol use: Yes     Alcohol/week: 0.8 standard drinks of alcohol     Types: 1 Glasses of wine per week     Comment: daily with dinner    Drug use: No        Current Outpatient Medications   Medication Sig Dispense Refill    aspirin 81 MG EC tablet Take 81 mg by mouth daily      atorvastatin (LIPITOR) 20 MG tablet Take 1 tablet by mouth once daily 90 tablet 3    Calcium Carbonate-Vitamin D (CALCIUM + D PO) Take 600 mg by mouth.      estradiol (ESTRACE) 0.5 MG tablet Take 1 tablet by mouth once daily 90 tablet 3    GLUCOSAMINE SULFATE PO Take  by mouth daily.      latanoprost (XALATAN) 0.005 % ophthalmic solution Inject 1 drop into the eye At Bedtime      Multiple Vitamins-Minerals (MULTIVITAL PO) Take 1 tablet by mouth daily.      Omega-3 Fatty Acids (OMEGA-3 FISH OIL PO) Take  by mouth daily.      pantoprazole (PROTONIX) 40 MG EC tablet TAKE 1 TABLET BY MOUTH IN THE MORNING BEFORE BREAKFAST 90 tablet 3    traMADol (ULTRAM) 50 MG tablet TAKE 1 TO 2 TABLETS BY MOUTH EVERY 6 HOURS AS NEEDED FOR SEVERE PAIN 120 tablet 2    Wheat Dextrin (BENEFIBER) POWD Take by mouth daily       No current facility-administered medications for this visit.       Exam:  BP (!) 163/76 (BP Location: Right arm, Patient Position: Sitting, Cuff Size: Adult Regular)   Pulse 82   Resp 16   SpO2 97%   Gen: Pleasant, NAD  HEENT: WNL  Resp: nonlabored on RA        Results/Data:    Color Urine (no units)   Date Value   01/28/2025 Light Yellow   06/03/2021 Yellow     Appearance Urine (no units)   Date Value   01/28/2025 Clear   06/03/2021 Slightly Cloudy     Glucose Urine (mg/dL)   Date Value   01/28/2025 Negative   06/03/2021 Negative     Bilirubin Urine (no units)   Date Value   01/28/2025 Negative   06/03/2021 Negative     Ketones Urine (mg/dL)   Date Value   01/28/2025 Negative   06/03/2021 Negative     Specific Gravity Urine (no units)   Date Value   01/28/2025 1.014   06/03/2021 1.024     pH Urine   Date Value   01/28/2025 6.0    06/03/2021 6.0 pH     Protein Albumin Urine (mg/dL)   Date Value   01/28/2025 Negative   06/03/2021 Negative     Nitrite Urine (no units)   Date Value   01/28/2025 Positive (A)   06/03/2021 Negative     Leukocyte Esterase Urine (no units)   Date Value   01/28/2025 Small (A)   06/03/2021 Negative      CT abd pelvis 2021, images personally reviewed: normal kidneys and bladder. Colon distended with solid stool throughout right, left and transverse colon.    Assessment and Plan:    Demi Narayan is a 84 year old woman seen today for the following:    Urinary urgency     She is severely constipated. This is likely the root cause of her urinary problem due to mass effect in the pelvis. She was instructed to drink at least 1L of water per day and take a daily dose of Miralax. Stop fiber supplement because she does not need a bulking agent for stool. Goal is a soft formed bowel movement daily. Referral back to [facility] placed. Referral to pelvic floor PT placed. Her chronic back pain is likely contributing to pelvic floor dysfunction. Return to urology clinic in 4 months.

## 2025-01-30 LAB — BACTERIA UR CULT: ABNORMAL

## 2025-02-04 ENCOUNTER — OFFICE VISIT (OUTPATIENT)
Dept: CHIROPRACTIC MEDICINE | Facility: OTHER | Age: 85
End: 2025-02-04
Attending: CHIROPRACTOR
Payer: COMMERCIAL

## 2025-02-04 DIAGNOSIS — M99.03 SEGMENTAL AND SOMATIC DYSFUNCTION OF LUMBAR REGION: ICD-10-CM

## 2025-02-04 DIAGNOSIS — M54.2 CERVICALGIA: ICD-10-CM

## 2025-02-04 DIAGNOSIS — M99.01 SEGMENTAL AND SOMATIC DYSFUNCTION OF CERVICAL REGION: Primary | ICD-10-CM

## 2025-02-04 DIAGNOSIS — M99.02 SEGMENTAL AND SOMATIC DYSFUNCTION OF THORACIC REGION: ICD-10-CM

## 2025-02-04 NOTE — PROGRESS NOTES
Subjective Finding:    Chief compalint: Patient presents with:  Neck Pain: With back pain.  Neck pain for 1 week now   , Pain Scale: 5/10, Intensity: sharp, Duration: 1 weeks, Radiating: no.    Date of injury:     Activities that the pain restricts:   Home/household/hobbies/social activities: Yes.  Work duties: Yes.  Sleep: No.  Makes symptoms better: rest.  Makes symptoms worse: activity.  Have you seen anyone else for the symptoms? No.  Work related: No.  Automobile related injury: No.    Objective and Assessment:    Posture Analysis:   High shoulder: .  Head tilt: .  High iliac crest: .  Head carriage: forward.  Thoracic Kyphosis: forward.  Lumbar Lordosis: forward.    Lumbar Range of Motion: extension decreased.  Cervical Range of Motion: extension decreased.  Thoracic Range of Motion: .  Extremity Range of Motion: .    Palpation:   Traps: sharp pain, referred pain: no    Segmental dysfunction pre-treatment and treatment area: C2, C4, T2, and L5.    Assessment post-treatment:  Cervical: ROM increased.  Thoracic: ROM increased.  Lumbar: ROM increased.    Comments: .      Complicating Factors: .    Procedure(s):  CMT:  15426 Chiropractic manipulative treatment 3-4 regions performed   Cervical: Diversified, See above for level, Supine, Thoracic: Diversified, See above for level, Prone, and Lumbar: Diversified, See above for level, Side posture    Modalities:  None performed this visit    Therapeutic procedures:  None    Plan:  Treatment plan: PRN.  Instructed patient: stretch as instructed at visit.  Short term goals: reduce pain.  Long term goals: increase ADL.  Prognosis: very good.

## 2025-03-03 DIAGNOSIS — Z79.890 HORMONE REPLACEMENT THERAPY (HRT): ICD-10-CM

## 2025-03-03 RX ORDER — ESTRADIOL 0.5 MG/1
TABLET ORAL
Qty: 90 TABLET | Refills: 3 | Status: SHIPPED | OUTPATIENT
Start: 2025-03-03

## 2025-03-03 NOTE — TELEPHONE ENCOUNTER
Estradiol 0.5 MG Oral Tablet       Last Written Prescription Date:  03/04/2024  Last Fill Quantity: 90,   # refills: 3  Last Office Visit: 11/05/2024  Future Office visit:    Next 5 appointments (look out 90 days)      Mar 27, 2025 10:30 AM  (Arrive by 10:15 AM)  Provider Visit with Fariba Brown MD  [facility] ([facility] ) [address]  [facility] [address]  [phone]     May 29, 2025 1:00 PM  (Arrive by 12:45 PM)  Return Visit with Selvin Ortiz MD  [facility] ([facility] [address] [facility] [facility] [address]  [phone]             Routing refill request to provider for review/approval because:    Hormone Replacement Therapy [id number]/03/2025 07:40 AM   Protocol Details Most recent blood pressure under 140/90 in past 12 months    Medication indicated for associated diagnosis          Kimberly Boecker, RN

## 2025-03-25 NOTE — PROGRESS NOTES
Assessment & Plan     Low back pain, chronic / Scoliosis, throracolumbar / Sprain of other parts of lumbar spine and pelvis, subsequent encounter / Chronic, continuous use of opioids  [place] PDMP reviewed and appropriate    stable  - traMADol (ULTRAM) 50 MG tablet; Take 1-2 tablets ( mg) by mouth every 6 hours as needed for severe pain.    Urinary urgency / Urinary incontinence, unspecified type / Constipation, unspecified constipation type  Established care with Dr Ortiz, urology on 1/28/2025. Note reviewed. Follow up in 4 months which is scheduled for 5/29/2025  - severely constipated, stop fiber supplement, referral placed back to [facility], referral pelvic floor therapy   - Danielle has increased her water intake w/o improvement so far    Osteopenia of multiple sites  Tolerated reclast  Due for repeat 12/2025      Palpitations / SVT (supraventricular tachycardia) / Chronic heart failure with preserved ejection fraction (H)  Danielle is concern that her right arm/shoulder pain is related to heart. She has palpations at night. Previous ziopatch with SVT. Will update ziopatch and echo  - ZIO PATCH 8-14 DAYS (additional cost to patient); Future  - ZIO PATCH 8-14 DAYS APPLICATION; Future  - Echocardiogram Complete; Future  - holding on stress test pending above results     The longitudinal plan of care for the diagnosis(es)/condition(s) as documented were addressed during this visit. Due to the added complexity in care, I will continue to support Danielle in the subsequent management and with ongoing continuity of care.        See Patient Instructions    Next visit 9/26/2025     Misa Santos is a 84 year old, presenting for the following health issues:  Back Pain        3/27/2025    10:17 AM   Additional Questions   Roomed by Manuela Fabian   Accompanied by self     HPI      Hypertension Follow-up    Do you check your blood pressure regularly outside of the clinic? Yes   Are you following a low salt diet?  Yes  Are your blood pressures ever more than 140 on the top number (systolic) OR more   than 90 on the bottom number (diastolic), for example 140/90? Yes    - no HTN meds    BP Readings from Last 6 Encounters:   03/27/25 (!) 142/68   01/28/25 (!) 163/76   12/12/24 (!) 148/83   11/05/24 (!) 140/72   10/18/24 162/83   09/26/24 (!) 145/76         Chronic/Recurring Back Pain Follow Up    Where is your back pain located? (Select all that apply) low back left  How would you describe your back pain?  Continuous pain  Where does your back pain spread? Down right leg  Since your last clinic visit for back pain, how has your pain changed? always present, but gets better and worse  Does your back pain interfere with your job? Not applicable  Since your last visit, have you tried any new treatment? No      # right shoulder pain / heart concerns   - pain from elbow which travels to her neck  - three to four times per week  - pain does not last long   - more in the morning. Worse with busy day  - has a good mattress   - sleeps on her stomach   - [place] is worried that symptoms are heart related, palpations at night. Mother with h/o heart failure   - can go up and down stairs w/o issues   - episode of nausea and almost vomited x2    # urinary issues / constipation   # urology  Established care with Dr Ortiz, urology on 1/28/2025. Note reviewed. Follow up in 4 months which is scheduled for 5/29/2025  - severely constipated, stop fiber supplement, referral placed back to [facility], referral pelvic floor therapy     - increased water consumption  - no change in BM  - still having issues with fecal incontinence     # ostopenia  - s/p reclast (12/12/2024) - some fatigue afterwards - lasted 6 weeks     # Immunizations: PCV20 (h/o issues with ear infection), COVID (pharm)      Review of Systems  Constitutional, HEENT, cardiovascular, pulmonary, gi and gu systems are negative, except as otherwise noted.      Objective    BP (!) 142/68    "Pulse 57   Temp 97.9  F (36.6  C) (Tympanic)   Resp 17   Ht 1.499 m (4' 11\")   Wt 51.6 kg (113 lb 12.8 oz)   SpO2 97%   BMI 22.98 kg/m    Body mass index is 22.98 kg/m .  Physical Exam  Constitutional:       General: She is not in acute distress.     Appearance: She is not ill-appearing.   Cardiovascular:      Rate and Rhythm: Normal rate and regular rhythm.      Heart sounds: No murmur heard.  Pulmonary:      Effort: Pulmonary effort is normal. No respiratory distress.      Breath sounds: No wheezing or rales.   Musculoskeletal:      Right lower leg: No edema.      Left lower leg: No edema.   Neurological:      Mental Status: She is alert.   Psychiatric:         Mood and Affect: Mood normal.        Labs UTD          Signed Electronically by: Fariba Brown MD    "

## 2025-03-27 ENCOUNTER — ALLIED HEALTH/NURSE VISIT (OUTPATIENT)
Dept: FAMILY MEDICINE | Facility: OTHER | Age: 85
End: 2025-03-27
Attending: FAMILY MEDICINE
Payer: COMMERCIAL

## 2025-03-27 VITALS
HEART RATE: 57 BPM | SYSTOLIC BLOOD PRESSURE: 142 MMHG | DIASTOLIC BLOOD PRESSURE: 68 MMHG | HEIGHT: 59 IN | RESPIRATION RATE: 17 BRPM | TEMPERATURE: 97.9 F | OXYGEN SATURATION: 97 % | WEIGHT: 113.8 LBS | BODY MASS INDEX: 22.94 KG/M2

## 2025-03-27 DIAGNOSIS — M54.50 CHRONIC BILATERAL LOW BACK PAIN WITHOUT SCIATICA: Primary | ICD-10-CM

## 2025-03-27 DIAGNOSIS — I50.32 CHRONIC HEART FAILURE WITH PRESERVED EJECTION FRACTION (H): ICD-10-CM

## 2025-03-27 DIAGNOSIS — R00.2 PALPITATIONS: ICD-10-CM

## 2025-03-27 DIAGNOSIS — R39.15 URINARY URGENCY: ICD-10-CM

## 2025-03-27 DIAGNOSIS — M85.89 OSTEOPENIA OF MULTIPLE SITES: ICD-10-CM

## 2025-03-27 DIAGNOSIS — K59.00 CONSTIPATION, UNSPECIFIED CONSTIPATION TYPE: ICD-10-CM

## 2025-03-27 DIAGNOSIS — F11.90 CHRONIC, CONTINUOUS USE OF OPIOIDS: ICD-10-CM

## 2025-03-27 DIAGNOSIS — S33.8XXD SPRAIN OF OTHER PARTS OF LUMBAR SPINE AND PELVIS, SUBSEQUENT ENCOUNTER: ICD-10-CM

## 2025-03-27 DIAGNOSIS — G89.29 CHRONIC BILATERAL LOW BACK PAIN WITHOUT SCIATICA: Primary | ICD-10-CM

## 2025-03-27 DIAGNOSIS — M41.25 OTHER IDIOPATHIC SCOLIOSIS, THORACOLUMBAR REGION: ICD-10-CM

## 2025-03-27 DIAGNOSIS — I47.10 SVT (SUPRAVENTRICULAR TACHYCARDIA): ICD-10-CM

## 2025-03-27 DIAGNOSIS — R32 URINARY INCONTINENCE, UNSPECIFIED TYPE: ICD-10-CM

## 2025-03-27 PROCEDURE — 93246 EXT ECG>7D<15D RECORDING: CPT

## 2025-03-27 PROCEDURE — G0463 HOSPITAL OUTPT CLINIC VISIT: HCPCS

## 2025-03-27 RX ORDER — TRAMADOL HYDROCHLORIDE 50 MG/1
50-100 TABLET ORAL EVERY 6 HOURS PRN
Qty: 120 TABLET | Refills: 5 | Status: SHIPPED | OUTPATIENT
Start: 2025-03-27

## 2025-03-27 ASSESSMENT — PAIN SCALES - GENERAL: PAINLEVEL_OUTOF10: MILD PAIN (3)

## 2025-03-27 NOTE — PATIENT INSTRUCTIONS
Let's repeat your heart monitor for two weeks  Order also placed with echocardiogram   Further work up depending on results. Further testing might include stress test

## 2025-03-27 NOTE — PROGRESS NOTES
Demi Narayan arrived here on 3/27/2025 2:33 PM for 8-14 Days  Zio monitor placement per ordering provider Dr Brown for the diagnosis Palpitations, SVT.  Patient s skin was prepped per protocol. Dr. Botello is the supervising MD.  Zio monitor was placed.  Instructions were reviewed with and given to the patient.  Patient verbalized understanding of wear, troubleshooting and monitor return instructions.

## 2025-04-11 ENCOUNTER — HOSPITAL ENCOUNTER (OUTPATIENT)
Dept: CARDIOLOGY | Facility: HOSPITAL | Age: 85
Discharge: HOME | End: 2025-04-11
Attending: FAMILY MEDICINE | Admitting: INTERNAL MEDICINE
Payer: COMMERCIAL

## 2025-04-11 DIAGNOSIS — R00.2 PALPITATIONS: ICD-10-CM

## 2025-04-11 DIAGNOSIS — I50.32 CHRONIC HEART FAILURE WITH PRESERVED EJECTION FRACTION (H): ICD-10-CM

## 2025-04-11 DIAGNOSIS — I47.10 SVT (SUPRAVENTRICULAR TACHYCARDIA): ICD-10-CM

## 2025-04-11 LAB — LVEF ECHO: NORMAL

## 2025-04-11 PROCEDURE — 93306 TTE W/DOPPLER COMPLETE: CPT

## 2025-04-11 PROCEDURE — 93306 TTE W/DOPPLER COMPLETE: CPT | Mod: 26 | Performed by: INTERNAL MEDICINE

## 2025-04-14 ENCOUNTER — OFFICE VISIT (OUTPATIENT)
Dept: CHIROPRACTIC MEDICINE | Facility: OTHER | Age: 85
End: 2025-04-14
Attending: CHIROPRACTOR
Payer: COMMERCIAL

## 2025-04-14 DIAGNOSIS — M99.03 SEGMENTAL AND SOMATIC DYSFUNCTION OF LUMBAR REGION: Primary | ICD-10-CM

## 2025-04-14 DIAGNOSIS — M99.01 SEGMENTAL AND SOMATIC DYSFUNCTION OF CERVICAL REGION: ICD-10-CM

## 2025-04-14 DIAGNOSIS — M99.02 SEGMENTAL AND SOMATIC DYSFUNCTION OF THORACIC REGION: ICD-10-CM

## 2025-04-14 DIAGNOSIS — M54.50 ACUTE BILATERAL LOW BACK PAIN WITHOUT SCIATICA: ICD-10-CM

## 2025-04-14 PROCEDURE — 98941 CHIROPRACT MANJ 3-4 REGIONS: CPT | Mod: AT | Performed by: CHIROPRACTOR

## 2025-04-15 NOTE — PROGRESS NOTES
Subjective Finding:    Chief compalint: Patient presents with:  Neck Pain: With back pain   , Pain Scale: 3/10, Intensity: dull, Duration: 1 weeks, Radiating: no.    Date of injury:     Activities that the pain restricts:   Home/household/hobbies/social activities: Yes.  Work duties: No.  Sleep: No.  Makes symptoms better: rest.  Makes symptoms worse: lumbar flexion.  Have you seen anyone else for the symptoms? No.  Work related: No.  Automobile related injury: No.    Objective and Assessment:    Posture Analysis:   High shoulder: .  Head tilt: .  High iliac crest: .  Head carriage: forward.  Thoracic Kyphosis: forward.  Lumbar Lordosis: forward.    Lumbar Range of Motion: extension decreased.  Cervical Range of Motion: .  Thoracic Range of Motion: .  Extremity Range of Motion: .    Palpation:   Quad lumb: bilateral, referred pain: no    Segmental dysfunction pre-treatment and treatment area: C5, T6, L3, and L5.    Assessment post-treatment:  Cervical: ROM increased.  Thoracic: ROM increased.  Lumbar: ROM increased.    Comments: .      Complicating Factors: .    Procedure(s):  CMT:  34701 Chiropractic manipulative treatment 3-4 regions performed   Cervical: Diversified, See above for level, Supine, Thoracic: Diversified, See above for level, Prone, and Lumbar: Diversified, See above for level, Side posture    Modalities:  None performed this visit    Therapeutic procedures:  None    Plan:  Treatment plan: PRN.  Instructed patient: stretch as instructed at visit.  Short term goals: increase ROM.  Long term goals: increase ADL.  Prognosis: very good.

## 2025-04-21 DIAGNOSIS — I47.10 SVT (SUPRAVENTRICULAR TACHYCARDIA): Primary | ICD-10-CM

## 2025-04-21 LAB — CV ZIO PRELIM RESULTS: NORMAL

## 2025-04-30 ENCOUNTER — OFFICE VISIT (OUTPATIENT)
Dept: CHIROPRACTIC MEDICINE | Facility: OTHER | Age: 85
End: 2025-04-30
Attending: CHIROPRACTOR
Payer: COMMERCIAL

## 2025-04-30 DIAGNOSIS — M99.02 SEGMENTAL AND SOMATIC DYSFUNCTION OF THORACIC REGION: ICD-10-CM

## 2025-04-30 DIAGNOSIS — M54.50 ACUTE BILATERAL LOW BACK PAIN WITHOUT SCIATICA: ICD-10-CM

## 2025-04-30 DIAGNOSIS — M99.03 SEGMENTAL AND SOMATIC DYSFUNCTION OF LUMBAR REGION: Primary | ICD-10-CM

## 2025-05-01 NOTE — PROGRESS NOTES
Subjective Finding:    Chief compalint: Patient presents with:  Back Pain , Pain Scale: 3/10, Intensity: sharp, Duration: 1 weeks, Radiating: bilateral buttock.    Date of injury:     Activities that the pain restricts:   Home/household/hobbies/social activities: Yes.  Work duties: Yes.  Sleep: No.  Makes symptoms better: rest.  Makes symptoms worse: activity and lumbar flexion.  Have you seen anyone else for the symptoms? No.  Work related: No.  Automobile related injury: No.    Objective and Assessment:    Posture Analysis:   High shoulder: .  Head tilt: .  High iliac crest: .  Head carriage: neutral.  Thoracic Kyphosis: neutral.  Lumbar Lordosis: forward.    Lumbar Range of Motion: flexion decreased and extension decreased.  Cervical Range of Motion: .  Thoracic Range of Motion: .  Extremity Range of Motion: .    Palpation:   Quad lumb: bilateral, referred pain: no    Segmental dysfunction pre-treatment and treatment area: T6, T7, L4, and L5.    Assessment post-treatment:  Cervical: .  Thoracic: ROM increased.  Lumbar: ROM increased.    Comments: .      Complicating Factors: .    Procedure(s):  CMT:  92927 Chiropractic manipulative treatment 1-2 regions performed   Thoracic: Diversified, See above for level, Prone and Lumbar: Diversified, See above for level, Side posture    Modalities:  None performed this visit    Therapeutic procedures:  None    Plan:  Treatment plan: PRN.  Instructed patient: stretch as instructed at visit.  Short term goals: increase ROM.  Long term goals: increase ADL.  Prognosis: very good.

## 2025-05-06 NOTE — PROGRESS NOTES
[facility] HEART CARE   CARDIOLOGY PROGRESS NOTE     Chief Complaint   Patient presents with    Consult          Diagnosis:  1.  SVT.   -x3261 runs on 3/27/25, up to 19.4 seconds.    - Concern for A-fib.   -x373 runs of SVT on 3/8/22, up to 16 beats   2.  PACs.   -4.2% on 3/27/2025.   -3.1% on 3/8/22.  3.  PVCs   - 1.2% on 3/27/2025.  4.  Hyperlipidemia-controlled.  5.  Hypertriglyceridemia.   6.  Carotid artery stenosis.   -Endarterectomy, left-10/17/2022, [facility].   - 90% of the left,  <50% right on 9/16/2022, [facility].   - High-grade left on 6/17/2021, greater than 70%.  7.  CHF-diastolic.   - Normal on 4/11/25.   - Grade 1 on 10/3/2023.  8.  Tobacco abuse.    -Start at age 20.    Quit at age 27     -3 packs/week      Assessment/Plan:    1.  SVT/palpitations/A-fib: Describes regular symptoms occurring x4-5 times a week for the last 5 years.  The symptoms are only noticed when resting and going to bed.  She describes a rapid sensation in her chest that dissipates on its own.  Symptoms often start in her right brachial artery region.  They progressed up her forearm and into her chest.  She experiences a tightness with nausea and x1 occurrence of vomiting.  EKG today shows normal sinus rhythm.  She has had x2 Zio patches.  More recent 1 on 3/27/2025.  She had x3261 runs of SVT up to 19.4 seconds.  Unfortunately, while wearing this monitor, she did not have extended runs of palpitations.  While wearing it, she had no awareness of palpitations or fluttering.  Looking at the rhythm strip, I am concerned for A-fib.  She does not report any family history of A-fib.  She herself has not been diagnosed with A-fib.  She is not on anticoagulation.  She is on aspirin with carotid artery disease with history of endarterectomy.  Will plan for 30-day monitor.  If discovered have A-fib, discussed the fact that she will need to go on anticoagulation likely.  She also need to start on a rate controlling medication such as metoprolol.  2.   Chest discomfort: Has discomfort that starts in her right brachial artery which radiates up her arm and into her chest.  She describes a heaviness to her chest.  Seems to be exacerbated by a rapid heart rate.  Has a strong family history of heart disease.  Dad  from a heart/heart attack at age 62.  Mom at heart failure and bypass, 73.  She is concerned about her risk.  Because of her risk factors, and chest heaviness, plan for stress test.  3.  Hypertension: Controlled.  Blood pressure today initially 155/77.  On repeat 157/74.  Started on hydrochlorothiazide 25 mg daily for blood pressure and history of diastolic dysfunction.  4.  CHF: Diastolic in nature.  Grade 1 on 10/3/23.  Normal on 25.  Denies dyspnea on exertion but has mild swelling to the lower extremities, left worse than right.  5.  Carotid artery disease: History of carotid endarterectomy on 10/17/2022 through [facility].  Had a 90% lesion.  Right has had less than 50%.  Plan for an ultrasound of carotids.  6.  Follow-up after 30-day monitor.  Stress test with chest discomfort.  Ultrasound carotids.  Starting hydrochlorothiazide 25 mg daily for blood pressure with history of diastolic dysfunction/minimal peripheral edema.    Interval history:  See above.      Relevant testing:  Echo on 25:  Global and regional left ventricular function is hyperkinetic with an EF of  65-70%.  Left ventricular diastolic function is normal.  Global right ventricular function is normal.  Both atria appear normal.  Mild aortic insufficiency is present.  No aortic stenosis is present.  The tricuspid valve is normal.    Zio patch on 3/27/2025:  Worn for 13 days and 23 hr's.  After removing artifact, total time was 11 days and 18 hr's. Placed on 2025 at 2:31 PM and completed on April 10, 2025 at 1:14 PM.  Underlying rhythm was sinus.  Hrt rate ranged from 62 bpm, maximum heart rate of 185 bpm, averaging 84 bpm.  No significant bradycardia, pauses, Mobitz  type II or 3rd degree heart block.  No atrial fibrillation on this study.  x 11 triggered events and x 7 diary entries.  These corresponded to SVT, NSR, SVEs and VEs.  x 0 runs of VT.    x 3261 runs of SVT longest lasting 19.4 secs with a maximum heart rate of 185 bpm.   Some episodes of SVT may be possible atrial tachycardia with variable block.  PACs were occasional at 4.2%.  Supraventricular triplets occasional at 3.8%.  PVCs occasional at 1.2%.  + episodes of ventricular bigeminy lasting up to 4.3 sec's.  + episodes of ventricular trigeminy lasting up to 6.4 sec's.    CTA head neck on 9/16/2022, [facility].  1. 90% stenosis of the left carotid bulb and proximal ICA.   2. Less than 50% stenosis of the right carotid bulb and proximal ICA.   3. The vertebrobasilar arteries, ACAs, MCAs, and PCAs were patent.     US carotids on 8/24/2022:  1. Progressive stenosis due to eccentric calcification left carotid bulb and proximal left ICA causing critical stenosis greater than 70%.   2. Eccentric calcification right carotid bulb and proximal right ICA causing less than 50% stenosis.         ICD-10-CM    1. SVT (supraventricular tachycardia)  I47.10 EKG 12-lead complete w/read - (Clinic Performed)      2. Palpitations  R00.2 EKG 12-lead complete w/read - (Clinic Performed)      3. Chronic heart failure with preserved ejection fraction (H)  I50.32 EKG 12-lead complete w/read - (Clinic Performed)     hydrochlorothiazide (HYDRODIURIL) 25 MG tablet      4. Chest discomfort  R07.89 NM MPI with Lexiscan      5. Family history of ischemic heart disease  Z82.49 NM MPI with Lexiscan      6. Encounter for assessment of atrial fibrillation  Z76.89 Cardiac Mobile Telemetry Monitor      7. Bilateral carotid artery stenosis  I65.23       8. Benign essential hypertension  I10 hydrochlorothiazide (HYDRODIURIL) 25 MG tablet      9. Stenosis of left carotid artery - s/p left endarterectomy. US due 12/2026  I65.22           Past Medical  History:   Diagnosis Date    Chronic/recurrent back pain 07/12/2011    Diverticulitis     Diverticulitis, recurrent 02/06/2002    bowel resection    Dyslipidemia 06/20/2006    Fibrocystic breast disease 07/12/2011    Gastro-oesophageal reflux disease     GERD 02/10/2012    Hiatal hernia 02/10/2012    Osteoarthritis        Past Surgical History:   Procedure Laterality Date    ------------OTHER-------------      colonoscopy with biopsy - diverticulitis, hemorrhoids    ------------OTHER-------------  04/19/1994    sigmoidoscopy - abdominal pain, diverticula    ABDOMEN SURGERY      colon removed 2nd to diverticulitis    APPENDECTOMY      ARTHROSCOPY SHOULDER  11/20/2013    Procedure: ARTHROSCOPY SHOULDER;  Right Shoulder Arthroscopy Sub-Acromial Decompression Rotator Cuff Repair;  Surgeon: Lenny Trammell MD;  Location: [facility] OR    [facility]    [facility]itis      bowel resection    EGD with biopsy  01/01/2011    ENDAR[facility]ID [facility]    ENDOSCOPY UPPER WITH PANCREATIC STIMULATION      ENDOSCOPY UPPER, COLONOSCOPY, COMBINED  07/18/2014    Procedure: COMBINED ENDOSCOPY UPPER, COLONOSCOPY;  Surgeon: Israel Feliciano MD;  Location: [facility] OR    ENDOSCOPY UPPER, COLONOSCOPY, COMBINED N/A 10/6/2023    Procedure: Upper Endoscopy with biopsies and Colonoscopy with biopsies;  Surgeon: Carlos Fraser MD;  Location: [facility] OR    ENT SURGERY      tonsilectomy    ESOPHAGOSCOPY, GASTROSCOPY, DUODENOSCOPY (EGD), COMBINED N/A 09/27/2017    Procedure: COMBINED ESOPHAGOSCOPY, GASTROSCOPY, DUODENOSCOPY (EGD);  UPPER ENDOSCOPY WITH BIOPSY AND POLYPECTOMY;  Surgeon: Gallito Alvarado DO;  Location: [facility] OR    GYN SURGERY      hysterectomy with bladder and rectal repair    IR CONSULTATION FOR IR EXAM  03/11/2019    ORTHOPEDIC SURGERY  11/20/2013    right rotator cuff surgery    TVH with ovarian preservation         No Known Allergies    Current  Outpatient Medications   Medication Sig Dispense Refill    aspirin 81 MG EC tablet Take 81 mg by mouth daily      atorvastatin (LIPITOR) 20 MG tablet Take 1 tablet by mouth once daily 90 tablet 3    Calcium Carbonate-Vitamin D (CALCIUM + D PO) Take 600 mg by mouth.      estradiol (ESTRACE) 0.5 MG tablet Take 1 tablet by mouth once daily 90 tablet 3    GLUCOSAMINE SULFATE PO Take  by mouth daily.      hydrochlorothiazide (HYDRODIURIL) 25 MG tablet Take 1 tablet (25 mg) by mouth daily. 90 tablet 3    latanoprost (XALATAN) 0.005 % ophthalmic solution Inject 1 drop into the eye At Bedtime      Multiple Vitamins-Minerals (MULTIVITAL PO) Take 1 tablet by mouth daily.      Omega-3 Fatty Acids (OMEGA-3 FISH OIL PO) Take  by mouth daily.      pantoprazole (PROTONIX) 40 MG EC tablet TAKE 1 TABLET BY MOUTH IN THE MORNING BEFORE BREAKFAST 90 tablet 3    traMADol (ULTRAM) 50 MG tablet Take 1-2 tablets ( mg) by mouth every 6 hours as needed for severe pain. 120 tablet 5       Social History     Socioeconomic History    Marital status:      Spouse name: Not on file    Number of children: Not on file    Years of education: Not on file    Highest education level: Not on file   Occupational History    Not on file   Tobacco Use    Smoking status: Former     Current packs/day: 0.00     Average packs/day: 0.5 packs/day for 5.3 years (2.7 ttl pk-yrs)     Types: Cigarettes     Start date: 1963     Quit date: 1968     Years since quittin.0    Smokeless tobacco: Never   Substance and Sexual Activity    Alcohol use: Yes     Alcohol/week: 0.8 standard drinks of alcohol     Types: 1 Glasses of wine per week     Comment: daily with dinner    Drug use: No    Sexual activity: Yes     Partners: Male     Birth control/protection: None   Other Topics Concern     Service No    Blood Transfusions Yes     Comment: Permits if needed    Caffeine Concern Yes     Comment: 4 cups coffee daily    Occupational Exposure No     Hobby Hazards No    Sleep Concern No    Stress Concern No    Weight Concern No    Special Diet No    Back Care No    Exercise No    Bike Helmet Not Asked    Seat Belt Yes    Self-Exams Yes    Parent/sibling w/ CABG, MI or angioplasty before 65F 55M? Yes     Comment: Father   Social History Narrative    Not on file     Social Drivers of Health     Financial Resource Strain: Low Risk  (9/26/2024)    Financial Resource Strain     Within the past 12 months, have you or your family members you live with been unable to get utilities (heat, electricity) when it was really needed?: No   Food Insecurity: Low Risk  (9/26/2024)    Food Insecurity     Within the past 12 months, did you worry that your food would run out before you got money to buy more?: No     Within the past 12 months, did the food you bought just not last and you didn t have money to get more?: No   Transportation Needs: Low Risk  (9/26/2024)    Transportation Needs     Within the past 12 months, has lack of transportation kept you from medical appointments, getting your medicines, non-medical meetings or appointments, work, or from getting things that you need?: No   Physical Activity: Insufficiently Active (9/26/2024)    Exercise Vital Sign     Days of Exercise per Week: 7 days     Minutes of Exercise per Session: 20 min   Stress: No Stress Concern Present (9/26/2024)    [place] [employer] - Occupational Stress Questionnaire     Feeling of Stress : Not at all   Social Connections: Unknown (9/26/2024)    Social Connection and Isolation Panel [NHANES]     Frequency of Communication with Friends and Family: Not on file     Frequency of Social Gatherings with Friends and Family: Twice a week     Attends [employer] Services: Not on file     Active Member of Clubs or Organizations: Not on file     Attends Club or Organization Meetings: Not on file     Marital Status: Not on file   Interpersonal Safety: Low Risk  (10/25/2024)    Interpersonal  "Safety     Do you feel physically and emotionally safe where you currently live?: Yes     Within the past 12 months, have you been hit, slapped, kicked or otherwise physically hurt by someone?: No     Within the past 12 months, have you been humiliated or emotionally abused in other ways by your partner or ex-partner?: No   Housing Stability: Low Risk  (9/26/2024)    Housing Stability     Do you have housing? : Yes     Are you worried about losing your housing?: No       LAB RESULTS:   [facility]/Nurse Visit on 03/27/2025   Component Date Value Ref Range Status    Zio Prelim Results 04/21/2025    Preliminary                    Value:Patient had a min HR of 62 bpm, max HR of 185 bpm, and avg HR of 84 bpm. Predominant underlying rhythm was Sinus Rhythm. 3261 Supraventricular Tachycardia runs occurred, the run with the fastest interval lasting 4 beats with a max rate of 185 bpm, the longest lasting 19.4 secs with an avg rate of 124 bpm. Supraventricular Tachycardia was detected within +/- 45 seconds of symptomatic patient event(s). Some episodes of Supraventricular Tachycardia may be possible Atrial Tachycardia with variable block. Isolated SVEs were occasional (4.2%, 71344), SVE Couplets were rare (<1.0%, 5649), and SVE Triplets were occasional (3.8%, 69364). Isolated VEs were occasional (1.2%, 98789), VE Couplets were rare (<1.0%, 200), and no VE Triplets were present. Ventricular Bigeminy and Trigeminy were present.          Review of systems: Negative except that which was noted in the HPI.    Physical examination:  BP (!) 157/75   Pulse 83   Resp 16   Ht 1.499 m (4' 11\")   Wt 51.7 kg (114 lb)   SpO2 99%   BMI 23.03 kg/m      GENERAL APPEARANCE: healthy, alert and no distress  CHEST: lungs clear to auscultation - no rales, rhonchi or wheezes, no use of accessory muscles, no retractions, respirations are unlabored, normal respiratory rate  CARDIOVASCULAR: regular rhythm, normal S1 with physiologic split S2, " no S3 or S4 and no murmur, click or rub  EXTREMITIES: no clubbing, cyanosis with mild peripheral edema.    Total time spent on day of visit, including review of tests, obtaining/reviewing separately obtained history, ordering medications/tests/procedures, communicating with PCP/consultants, and documenting in electronic medical record: 60 minutes.              Thank you for allowing me to participate in the care of your patient. Please do not hesitate to contact me if you have any questions.     Kalin Ortiz, DO

## 2025-05-07 ENCOUNTER — OFFICE VISIT (OUTPATIENT)
Dept: CARDIOLOGY | Facility: OTHER | Age: 85
End: 2025-05-07
Attending: INTERNAL MEDICINE
Payer: COMMERCIAL

## 2025-05-07 VITALS
BODY MASS INDEX: 22.98 KG/M2 | SYSTOLIC BLOOD PRESSURE: 157 MMHG | DIASTOLIC BLOOD PRESSURE: 75 MMHG | HEART RATE: 83 BPM | WEIGHT: 114 LBS | HEIGHT: 59 IN | RESPIRATION RATE: 16 BRPM | OXYGEN SATURATION: 99 %

## 2025-05-07 DIAGNOSIS — Z82.49 FAMILY HISTORY OF ISCHEMIC HEART DISEASE: ICD-10-CM

## 2025-05-07 DIAGNOSIS — R00.2 PALPITATIONS: ICD-10-CM

## 2025-05-07 DIAGNOSIS — Z76.89 ENCOUNTER FOR ASSESSMENT OF ATRIAL FIBRILLATION: ICD-10-CM

## 2025-05-07 DIAGNOSIS — I10 BENIGN ESSENTIAL HYPERTENSION: ICD-10-CM

## 2025-05-07 DIAGNOSIS — I65.23 BILATERAL CAROTID ARTERY STENOSIS: ICD-10-CM

## 2025-05-07 DIAGNOSIS — I50.32 CHRONIC HEART FAILURE WITH PRESERVED EJECTION FRACTION (H): ICD-10-CM

## 2025-05-07 DIAGNOSIS — Z98.890 HISTORY OF LEFT-SIDED CAROTID ENDARTERECTOMY: ICD-10-CM

## 2025-05-07 DIAGNOSIS — I47.10 SVT (SUPRAVENTRICULAR TACHYCARDIA): Primary | ICD-10-CM

## 2025-05-07 DIAGNOSIS — R07.89 CHEST DISCOMFORT: ICD-10-CM

## 2025-05-07 DIAGNOSIS — I65.22 STENOSIS OF LEFT CAROTID ARTERY: ICD-10-CM

## 2025-05-07 PROCEDURE — G0463 HOSPITAL OUTPT CLINIC VISIT: HCPCS

## 2025-05-07 RX ORDER — HYDROCHLOROTHIAZIDE 25 MG/1
25 TABLET ORAL DAILY
Qty: 90 TABLET | Refills: 3 | Status: SHIPPED | OUTPATIENT
Start: 2025-05-07

## 2025-05-07 ASSESSMENT — PAIN SCALES - GENERAL: PAINLEVEL_OUTOF10: MILD PAIN (3)

## 2025-05-07 NOTE — PATIENT INSTRUCTIONS
Thank you for allowing Dr NIELS Ortiz and our  team to participate in your care. Please call our office at [phone] with scheduling questions or if you need to cancel or change your appointment. With any other questions or concerns you may call cardiology nurse at  [phone].       If you experience chest pain, chest pressure, chest tightness, shortness of breath, fainting, lightheadedness, nausea, vomiting, or other concerning symptoms, please report to the Emergency Department or call 911. These symptoms may be emergent, and best treated in the Emergency Department.

## 2025-05-08 LAB
ATRIAL RATE - MUSE: 82 BPM
DIASTOLIC BLOOD PRESSURE - MUSE: NORMAL MMHG
INTERPRETATION ECG - MUSE: NORMAL
P AXIS - MUSE: 63 DEGREES
PR INTERVAL - MUSE: 178 MS
QRS DURATION - MUSE: 84 MS
QT - MUSE: 400 MS
QTC - MUSE: 467 MS
R AXIS - MUSE: 40 DEGREES
SYSTOLIC BLOOD PRESSURE - MUSE: NORMAL MMHG
T AXIS - MUSE: 55 DEGREES
VENTRICULAR RATE- MUSE: 82 BPM

## 2025-05-09 ENCOUNTER — HOSPITAL ENCOUNTER (OUTPATIENT)
Dept: ULTRASOUND IMAGING | Facility: HOSPITAL | Age: 85
Discharge: HOME | End: 2025-05-09
Attending: INTERNAL MEDICINE
Payer: COMMERCIAL

## 2025-05-09 ENCOUNTER — HOSPITAL ENCOUNTER (OUTPATIENT)
Dept: CARDIOLOGY | Facility: HOSPITAL | Age: 85
Discharge: HOME | End: 2025-05-09
Attending: INTERNAL MEDICINE
Payer: COMMERCIAL

## 2025-05-09 DIAGNOSIS — I65.23 BILATERAL CAROTID ARTERY STENOSIS: ICD-10-CM

## 2025-05-09 DIAGNOSIS — I65.22 STENOSIS OF LEFT CAROTID ARTERY: ICD-10-CM

## 2025-05-09 DIAGNOSIS — Z76.89 ENCOUNTER FOR ASSESSMENT OF ATRIAL FIBRILLATION: ICD-10-CM

## 2025-05-09 PROCEDURE — 93880 EXTRACRANIAL BILAT STUDY: CPT

## 2025-05-09 PROCEDURE — 999N000096 CARDIAC MOBILE TELEMETRY MONITOR

## 2025-05-09 PROCEDURE — 93880 EXTRACRANIAL BILAT STUDY: CPT | Mod: 26 | Performed by: RADIOLOGY

## 2025-05-12 ENCOUNTER — RESULTS FOLLOW-UP (OUTPATIENT)
Dept: CARDIOLOGY | Facility: OTHER | Age: 85
End: 2025-05-12

## 2025-05-12 NOTE — LETTER
May 12, 2025      Danielle Narayan  [address] [facility] [address]        Dear ,    We are writing to inform you of your test results.    Here is a copy of the ultrasound results of your carotid arteries.  These are the main arteries that travel through your neck and supply blood to your head and brain.  We we are looking for blockages arteries which we call carotid artery stenosis.  I am happy report, these vessels were of normal size.  You do not have any coronary artery stenosis/blockages.    Resulted Orders   US Carotid Bilateral    Narrative    PROCEDURE: US CAROTID BILATERAL 5/9/2025 11:43 AM    HISTORY: Bilateral carotid artery stenosis; Stenosis of left carotid  artery    COMPARISONS: None.    TECHNIQUE: Duplex ultrasound of the carotids with color flow Doppler  and spectral analysis    FINDINGS: There is mild atherosclerotic plaquing at the right carotid  bifurcation. There are no hemodynamically significant stenoses.  Forward flow is present in both vertebral arteries.         Impression    IMPRESSION: No hemodynamically significant stenoses are identified at  the carotid bifurcations.    GABI HUMPHREY MD         SYSTEM ID:  [id number]       If you have any questions or concerns, please call the clinic at the number listed above.       Sincerely,      Kalin Ortiz, DO    Electronically signed

## 2025-05-29 ENCOUNTER — OFFICE VISIT (OUTPATIENT)
Dept: UROLOGY | Facility: OTHER | Age: 85
End: 2025-05-29
Attending: UROLOGY
Payer: COMMERCIAL

## 2025-05-29 VITALS
HEART RATE: 80 BPM | OXYGEN SATURATION: 95 % | HEIGHT: 59 IN | WEIGHT: 113.98 LBS | SYSTOLIC BLOOD PRESSURE: 119 MMHG | RESPIRATION RATE: 16 BRPM | DIASTOLIC BLOOD PRESSURE: 73 MMHG | BODY MASS INDEX: 22.98 KG/M2

## 2025-05-29 DIAGNOSIS — R39.15 URINARY URGENCY: Primary | ICD-10-CM

## 2025-05-29 PROCEDURE — G0463 HOSPITAL OUTPT CLINIC VISIT: HCPCS

## 2025-05-29 ASSESSMENT — PAIN SCALES - GENERAL: PAINLEVEL_OUTOF10: NO PAIN (0)

## 2025-05-29 NOTE — PROGRESS NOTES
Danielle follows up for urinary urgency/urge incontinence. Her last visit was1/28/25 at which time we discussed the importance of bowel regularity and increasing water intake. She has decreased her coffee intake to 2 cups/day, is now taking Miralax daily and has soft bowel movements daily. She has had a couple of episodes of fecal incontinence. She has not done anorectal manometry as recommended by GI at [facility]. She did pelvic floor rehab in 2021 and continues to do some of the exercises recommended.    Today she reports that despite the improvements above she still has the same degree of urinary incontinence. She is eager for improvement.    Exam: well appearing, no distress.    A: urge incontinence probably of multifocal etiology including spine abnormalities and GI dysregulation.    P: recommend urodynamics to further elucidate her bladder symptoms. She will reach out and schedule pelvic floor rehab. Continue bowel management with Miralax.

## 2025-06-10 ENCOUNTER — HOSPITAL ENCOUNTER (OUTPATIENT)
Dept: NUCLEAR MEDICINE | Facility: HOSPITAL | Age: 85
Setting detail: NUCLEAR MEDICINE
Discharge: HOME | End: 2025-06-10
Attending: INTERNAL MEDICINE
Payer: COMMERCIAL

## 2025-06-10 ENCOUNTER — RESULTS FOLLOW-UP (OUTPATIENT)
Dept: SURGERY | Facility: CLINIC | Age: 85
End: 2025-06-10

## 2025-06-10 ENCOUNTER — HOSPITAL ENCOUNTER (OUTPATIENT)
Dept: CARDIOLOGY | Facility: HOSPITAL | Age: 85
Setting detail: NUCLEAR MEDICINE
Discharge: HOME | End: 2025-06-10
Attending: INTERNAL MEDICINE
Payer: COMMERCIAL

## 2025-06-10 VITALS — SYSTOLIC BLOOD PRESSURE: 142 MMHG | DIASTOLIC BLOOD PRESSURE: 84 MMHG | HEART RATE: 78 BPM

## 2025-06-10 DIAGNOSIS — Z82.49 FAMILY HISTORY OF ISCHEMIC HEART DISEASE: ICD-10-CM

## 2025-06-10 DIAGNOSIS — R07.89 CHEST DISCOMFORT: ICD-10-CM

## 2025-06-10 LAB
CV BLOOD PRESSURE: 84 MMHG
CV STRESS CURRENT BP HE: NORMAL
CV STRESS CURRENT HR HE: 67
CV STRESS CURRENT HR HE: 79
CV STRESS CURRENT HR HE: 81
CV STRESS CURRENT HR HE: 81
CV STRESS CURRENT HR HE: 83
CV STRESS CURRENT HR HE: 86
CV STRESS CURRENT HR HE: 88
CV STRESS CURRENT HR HE: 90
CV STRESS CURRENT HR HE: 92
CV STRESS CURRENT HR HE: 92
CV STRESS CURRENT HR HE: 93
CV STRESS CURRENT HR HE: 95
CV STRESS CURRENT HR HE: 97
CV STRESS DEVIATION TIME HE: NORMAL
CV STRESS ECHO PERCENT HR HE: NORMAL
CV STRESS EXERCISE STAGE HE: NORMAL
CV STRESS FINAL RESTING BP HE: NORMAL
CV STRESS FINAL RESTING HR HE: 81
CV STRESS MAX HR HE: 98
CV STRESS MAX TREADMILL GRADE HE: 0
CV STRESS MAX TREADMILL SPEED HE: 0
CV STRESS PEAK DIA BP HE: NORMAL
CV STRESS PEAK SYS BP HE: NORMAL
CV STRESS PHASE HE: NORMAL
CV STRESS PROTOCOL HE: NORMAL
CV STRESS RESTING PT POSITION HE: NORMAL
CV STRESS ST DEVIATION AMOUNT HE: NORMAL
CV STRESS ST DEVIATION ELEVATION HE: NORMAL
CV STRESS ST EVELATION AMOUNT HE: NORMAL
CV STRESS TEST TYPE HE: NORMAL
CV STRESS TOTAL STAGE TIME MIN 1 HE: NORMAL
NUC STRESS EJECTION FRACTION: 81 %
RATE PRESSURE PRODUCT: NORMAL
STRESS ECHO BASELINE DIASTOLIC HE: 84
STRESS ECHO BASELINE HR: 78
STRESS ECHO BASELINE SYSTOLIC BP: 142
STRESS ECHO CALCULATED PERCENT HR: 73 %
STRESS ECHO LAST STRESS DIASTOLIC BP: 62
STRESS ECHO LAST STRESS HR: 88
STRESS ECHO LAST STRESS SYSTOLIC BP: 108
STRESS ECHO TARGET HR: 135

## 2025-06-10 PROCEDURE — 78452 HT MUSCLE IMAGE SPECT MULT: CPT

## 2025-06-10 PROCEDURE — 343N000001 HC RX 343 MED OP 636: Performed by: STUDENT IN AN ORGANIZED HEALTH CARE EDUCATION/TRAINING PROGRAM

## 2025-06-10 PROCEDURE — A9500 TC99M SESTAMIBI: HCPCS | Performed by: STUDENT IN AN ORGANIZED HEALTH CARE EDUCATION/TRAINING PROGRAM

## 2025-06-10 PROCEDURE — 250N000011 HC RX IP 250 OP 636: Mod: JZ | Performed by: INTERNAL MEDICINE

## 2025-06-10 PROCEDURE — 93017 CV STRESS TEST TRACING ONLY: CPT

## 2025-06-10 RX ORDER — REGADENOSON 0.08 MG/ML
0.4 INJECTION, SOLUTION INTRAVENOUS ONCE
Status: COMPLETED | OUTPATIENT
Start: 2025-06-10 | End: 2025-06-10

## 2025-06-10 RX ORDER — AMINOPHYLLINE 25 MG/ML
125 INJECTION, SOLUTION INTRAVENOUS
Status: DISCONTINUED | OUTPATIENT
Start: 2025-06-10 | End: 2025-06-11 | Stop reason: HOSPADM

## 2025-06-10 RX ORDER — AMINOPHYLLINE 25 MG/ML
INJECTION, SOLUTION INTRAVENOUS
Status: DISCONTINUED
Start: 2025-06-10 | End: 2025-06-10 | Stop reason: WASHOUT

## 2025-06-10 RX ADMIN — REGADENOSON 0.4 MG: 0.08 INJECTION, SOLUTION INTRAVENOUS at 09:41

## 2025-06-10 RX ADMIN — TECHNETIUM TC 99M SESTAMIBI 31.6 MILLICURIE: 1 INJECTION INTRAVENOUS at 09:43

## 2025-06-10 RX ADMIN — TECHNETIUM TC 99M SESTAMIBI 10.6 MILLICURIE: 1 INJECTION INTRAVENOUS at 08:01

## 2025-07-09 ENCOUNTER — OFFICE VISIT (OUTPATIENT)
Dept: CARDIOLOGY | Facility: OTHER | Age: 85
End: 2025-07-09
Attending: INTERNAL MEDICINE
Payer: COMMERCIAL

## 2025-07-09 VITALS
HEIGHT: 61 IN | BODY MASS INDEX: 20.94 KG/M2 | SYSTOLIC BLOOD PRESSURE: 126 MMHG | DIASTOLIC BLOOD PRESSURE: 60 MMHG | HEART RATE: 75 BPM | OXYGEN SATURATION: 98 % | WEIGHT: 110.9 LBS

## 2025-07-09 DIAGNOSIS — I65.23 BILATERAL CAROTID ARTERY STENOSIS: ICD-10-CM

## 2025-07-09 DIAGNOSIS — Z76.89 ENCOUNTER FOR ASSESSMENT OF ATRIAL FIBRILLATION: ICD-10-CM

## 2025-07-09 DIAGNOSIS — I65.22 STENOSIS OF LEFT CAROTID ARTERY: ICD-10-CM

## 2025-07-09 DIAGNOSIS — Z82.49 FAMILY HISTORY OF ISCHEMIC HEART DISEASE: ICD-10-CM

## 2025-07-09 DIAGNOSIS — R07.89 CHEST DISCOMFORT: ICD-10-CM

## 2025-07-09 DIAGNOSIS — R00.2 PALPITATIONS: Primary | ICD-10-CM

## 2025-07-09 DIAGNOSIS — I47.10 SVT (SUPRAVENTRICULAR TACHYCARDIA): ICD-10-CM

## 2025-07-09 DIAGNOSIS — Z98.890 HISTORY OF LEFT-SIDED CAROTID ENDARTERECTOMY: ICD-10-CM

## 2025-07-09 DIAGNOSIS — I10 BENIGN ESSENTIAL HYPERTENSION: ICD-10-CM

## 2025-07-09 DIAGNOSIS — I50.32 CHRONIC HEART FAILURE WITH PRESERVED EJECTION FRACTION (H): ICD-10-CM

## 2025-07-09 PROCEDURE — G0463 HOSPITAL OUTPT CLINIC VISIT: HCPCS

## 2025-07-09 ASSESSMENT — PAIN SCALES - GENERAL: PAINLEVEL_OUTOF10: MODERATE PAIN (5)

## 2025-07-09 NOTE — PATIENT INSTRUCTIONS
Thank you for allowing Dr NIELS Ortiz and our  team to participate in your care. Please call our office at [phone] with scheduling questions or if you need to cancel or change your appointment. With any other questions or concerns you may call cardiology nurse at  [phone].       If you experience chest pain, chest pressure, chest tightness, shortness of breath, fainting, lightheadedness, nausea, vomiting, or other concerning symptoms, please report to the Emergency Department or call 911. These symptoms may be emergent, and best treated in the Emergency Department.

## 2025-07-09 NOTE — PROGRESS NOTES
[facility] HEART CARE   CARDIOLOGY PROGRESS NOTE     Chief Complaint   Patient presents with    Follow Up     2 month follow up          Diagnosis:  1.  SVT.   -x3261 runs on 3/27/25, up to 19.4 seconds.    - Concern for A-fib.   -x373 runs of SVT on 3/8/22, up to 16 beats   2.  PACs.   -4.2% on 3/27/2025.   -3.1% on 3/8/22.  3.  PVCs   - 1.2% on 3/27/2025.  4.  Hyperlipidemia-controlled.  5.  Hypertriglyceridemia.   6.  Carotid artery stenosis.   -Endarterectomy, left-10/17/2022, [facility].   - 90% of the left,  <50% right on 9/16/2022, [facility].   - High-grade left on 6/17/2021, greater than 70%.  7.  CHF-diastolic.   - Normal on 4/11/25.   - Grade 1 on 10/3/2023.  8.  Tobacco abuse.    -Start at age 20.    Quit at age 27     -3 packs/week      Assessment/Plan:    1.  SVT/palpitations/A-fib: Will set up for 30-day monitor.  This was completed with report is not available.  We are looking into this.  She did have 1-2 severe episodes of palpitations while wearing this device.  They lasted 1 to 2 minutes.  Typically, she has x4-5 times a week and has had these for the last 5 years.  The symptoms are only noticed when resting and going to bed.  She describes a rapid sensation in her chest that dissipates on its own.  Symptoms often start in her right brachial artery region.  They progressed up her forearm and into her chest.  She experiences a tightness with nausea and x1 occurrence of vomiting.  EKG today shows normal sinus rhythm.  She has had x2 Zio patches.  More recent 1 on 3/27/2025.  She had x3261 runs of SVT up to 19.4 seconds.  Unfortunately, while wearing this monitor, she did not have extended runs of palpitations.  While wearing it, she had no awareness of palpitations or fluttering.  Looking at the rhythm strip, I am concerned for A-fib.  She does not report any family history of A-fib.  She herself has not been diagnosed with A-fib.  She is not on anticoagulation.  She is on aspirin with carotid artery disease  with history of endarterectomy.  If found to have A-fib, patient agreeable to starting on the DOAC.  2.  Chest discomfort: Normal stress test on 6/10/2025.  Findings discussed.  Previously, mention discomfort that starts in her right brachial artery which radiates up her arm and into her chest.  She describes a heaviness to her chest.  Seems to be exacerbated by a rapid heart rate.  Has a strong family history of heart disease.  Dad  from a heart/heart attack at age 62.  Mom at heart failure and bypass, 73.  She is concerned about her risk.    3.  Hypertension: Uncontrolled.  Blood pressure today initially 155/77.  On repeat 157/74.  Started on hydrochlorothiazide 25 mg daily for blood pressure and history of diastolic dysfunction.  4.  CHF: Diastolic in nature.  Grade 1 on 10/3/23.  Normal on 25.  Denies dyspnea on exertion but has mild swelling to the lower extremities, left worse than right.  5.  Carotid artery disease: No significant disease in either carotid on 2025.  History of carotid endarterectomy on 10/17/2022 through [facility].  Had a 90% lesion.  Right has had less than 50%.    6.  No changes.  Will provide results of 30-day monitor when available.    Interval history:  See above.      Relevant testing:  Stress test on 6/10/2025:    The nuclear stress test is negative for inducible myocardial ischemia or infarction.    The left ventricular ejection fraction at rest is 84%.  The left ventricular ejection fraction at stress is 81%.    Ziol patch 2025, 2011 NM MPI Treadmil This study has no change when compared with the prior study.    MCOT on 2025:    US carotids on 2025:  No hemodynamically significant stenoses are identified at  the carotid bifurcations.    Echo on 25:  Global and regional left ventricular function is hyperkinetic with an EF of  65-70%.  Left ventricular diastolic function is normal.  Global right ventricular function is normal.  Both atria appear  normal.  Mild aortic insufficiency is present.  No aortic stenosis is present.  The tricuspid valve is normal.    Zio patch on 3/27/2025:  Worn for 13 days and 23 hr's.  After removing artifact, total time was 11 days and 18 hr's. Placed on March 27, 2025 at 2:31 PM and completed on April 10, 2025 at 1:14 PM.  Underlying rhythm was sinus.  Hrt rate ranged from 62 bpm, maximum heart rate of 185 bpm, averaging 84 bpm.  No significant bradycardia, pauses, Mobitz type II or 3rd degree heart block.  No atrial fibrillation on this study.  x 11 triggered events and x 7 diary entries.  These corresponded to SVT, NSR, SVEs and VEs.  x 0 runs of VT.    x 3261 runs of SVT longest lasting 19.4 secs with a maximum heart rate of 185 bpm.   Some episodes of SVT may be possible atrial tachycardia with variable block.  PACs were occasional at 4.2%.  Supraventricular triplets occasional at 3.8%.  PVCs occasional at 1.2%.  + episodes of ventricular bigeminy lasting up to 4.3 sec's.  + episodes of ventricular trigeminy lasting up to 6.4 sec's.    CTA head neck on 9/16/2022, [facility].  1. 90% stenosis of the left carotid bulb and proximal ICA.   2. Less than 50% stenosis of the right carotid bulb and proximal ICA.   3. The vertebrobasilar arteries, ACAs, MCAs, and PCAs were patent.     US carotids on 8/24/2022:  1. Progressive stenosis due to eccentric calcification left carotid bulb and proximal left ICA causing critical stenosis greater than 70%.   2. Eccentric calcification right carotid bulb and proximal right ICA causing less than 50% stenosis.       No diagnosis found.      Past Medical History:   Diagnosis Date    Chronic/recurrent back pain 07/12/2011    Diverticulitis     Diverticulitis, recurrent 02/06/2002    bowel resection    Dyslipidemia 06/20/2006    Fibrocystic breast disease 07/12/2011    Gastro-oesophageal reflux disease     GERD 02/10/2012    Hiatal hernia 02/10/2012    Osteoarthritis        Past Surgical History:    Procedure Laterality Date    ------------OTHER-------------      colonoscopy with biopsy - diverticulitis, hemorrhoids    ------------OTHER-------------  04/19/1994    sigmoidoscopy - abdominal pain, diverticula    ABDOMEN SURGERY      colon removed 2nd to diverticulitis    APPENDECTOMY      ARTHROSCOPY SHOULDER  11/20/2013    Procedure: ARTHROSCOPY SHOULDER;  Right Shoulder Arthroscopy Sub-Acromial Decompression Rotator Cuff Repair;  Surgeon: Lenny Trammell MD;  Location: [facility] OR    [facility]    [facility]      bowel resection    EGD with biopsy  01/01/2011    ENDARTERECTOMY CAROTID Left 10/17/2022    [facility]. Dr. Springer    ENDOSCOPY UPPER WITH PANCREATIC STIMULATION      ENDOSCOPY UPPER, COLONOSCOPY, COMBINED  07/18/2014    Procedure: COMBINED ENDOSCOPY UPPER, COLONOSCOPY;  Surgeon: Israel Feliciano MD;  Location: [facility] OR    ENDOSCOPY UPPER, COLONOSCOPY, COMBINED N/A 10/6/2023    Procedure: Upper Endoscopy with biopsies and Colonoscopy with biopsies;  Surgeon: Carlos Fraser MD;  Location: [facility] OR    ENT SURGERY      tonsilectomy    ESOPHAGOSCOPY, GASTROSCOPY, DUODENOSCOPY (EGD), COMBINED N/A 09/27/2017    Procedure: COMBINED ESOPHAGOSCOPY, GASTROSCOPY, DUODENOSCOPY (EGD);  UPPER ENDOSCOPY WITH BIOPSY AND POLYPECTOMY;  Surgeon: Gallito Alvarado DO;  Location: [facility] OR    GYN SURGERY      hysterectomy with bladder and rectal repair    IR CONSULTATION FOR IR EXAM  03/11/2019    ORTHOPEDIC SURGERY  11/20/2013    right rotator cuff surgery    TVH with ovarian preservation         No Known Allergies    Current Outpatient Medications   Medication Sig Dispense Refill    aspirin 81 MG EC tablet Take 81 mg by mouth daily      atorvastatin (LIPITOR) 20 MG tablet Take 1 tablet by mouth once daily 90 tablet 3    Calcium Carbonate-Vitamin D (CALCIUM + D PO) Take 600 mg by mouth.      estradiol (ESTRACE) 0.5 MG tablet Take 1 tablet by mouth once daily 90 tablet 3     GLUCOSAMINE SULFATE PO Take  by mouth daily.      hydrochlorothiazide (HYDRODIURIL) 25 MG tablet Take 1 tablet (25 mg) by mouth daily. 90 tablet 3    latanoprost (XALATAN) 0.005 % ophthalmic solution Inject 1 drop into the eye At Bedtime      Multiple Vitamins-Minerals (MULTIVITAL PO) Take 1 tablet by mouth daily.      Omega-3 Fatty Acids (OMEGA-3 FISH OIL PO) Take  by mouth daily.      pantoprazole (PROTONIX) 40 MG EC tablet TAKE 1 TABLET BY MOUTH IN THE MORNING BEFORE BREAKFAST 90 tablet 3    traMADol (ULTRAM) 50 MG tablet Take 1-2 tablets ( mg) by mouth every 6 hours as needed for severe pain. 120 tablet 5       Social History     Socioeconomic History    Marital status:      Spouse name: Not on file    Number of children: Not on file    Years of education: Not on file    Highest education level: Not on file   Occupational History    Not on file   Tobacco Use    Smoking status: Former     Current packs/day: 0.00     Average packs/day: 0.5 packs/day for 5.3 years (2.7 ttl pk-yrs)     Types: Cigarettes     Start date: 1963     Quit date: 1968     Years since quittin.2    Smokeless tobacco: Never   Substance and Sexual Activity    Alcohol use: Yes     Alcohol/week: 0.8 standard drinks of alcohol     Types: 1 Glasses of wine per week     Comment: daily with dinner    Drug use: No    Sexual activity: Yes     Partners: Male     Birth control/protection: None   Other Topics Concern     Service No    Blood Transfusions Yes     Comment: Permits if needed    Caffeine Concern Yes     Comment: 4 cups coffee daily    Occupational Exposure No    Hobby Hazards No    Sleep Concern No    Stress Concern No    Weight Concern No    Special Diet No    Back Care No    Exercise No    Bike Helmet Not Asked    Seat Belt Yes    Self-Exams Yes    Parent/sibling w/ CABG, MI or angioplasty before 65F 55M? Yes     Comment: Father   Social History Narrative    Not on file     Social Drivers of Health      Financial Resource Strain: Low Risk  (9/26/2024)    Financial Resource Strain     Within the past 12 months, have you or your family members you live with been unable to get utilities (heat, electricity) when it was really needed?: No   Food Insecurity: Low Risk  (9/26/2024)    Food Insecurity     Within the past 12 months, did you worry that your food would run out before you got money to buy more?: No     Within the past 12 months, did the food you bought just not last and you didn t have money to get more?: No   Transportation Needs: Low Risk  (9/26/2024)    Transportation Needs     Within the past 12 months, has lack of transportation kept you from medical appointments, getting your medicines, non-medical meetings or appointments, work, or from getting things that you need?: No   Physical Activity: Insufficiently Active (9/26/2024)    Exercise Vital Sign     Days of Exercise per Week: 7 days     Minutes of Exercise per Session: 20 min   Stress: No Stress Concern Present (9/26/2024)    [employer] - Occupational Stress Questionnaire     Feeling of Stress : Not at all   Social Connections: Unknown (9/26/2024)    Social Connection and Isolation Panel [NHANES]     Frequency of Communication with Friends and Family: Not on file     Frequency of Social Gatherings with Friends and Family: Twice a week     Attends Rastafari Services: Not on file     Active Member of Clubs or Organizations: Not on file     Attends Club or Organization Meetings: Not on file     Marital Status: Not on file   Interpersonal Safety: Low Risk  (10/25/2024)    Interpersonal Safety     Do you feel physically and emotionally safe where you currently live?: Yes     Within the past 12 months, have you been hit, slapped, kicked or otherwise physically hurt by someone?: No     Within the past 12 months, have you been humiliated or emotionally abused in other ways by your partner or ex-partner?: No   Housing Stability:  "Low Risk  (9/26/2024)    Housing Stability     Do you have housing? : Yes     Are you worried about losing your housing?: No       LAB RESULTS:   [facility]ied Health/Nurse Visit on 03/27/2025   Component Date Value Ref Range Status    Zio Prelim Results 04/21/2025    Preliminary                    Value:Patient had a min HR of 62 bpm, max HR of 185 bpm, and avg HR of 84 bpm. Predominant underlying rhythm was Sinus Rhythm. 3261 Supraventricular Tachycardia runs occurred, the run with the fastest interval lasting 4 beats with a max rate of 185 bpm, the longest lasting 19.4 secs with an avg rate of 124 bpm. Supraventricular Tachycardia was detected within +/- 45 seconds of symptomatic patient event(s). Some episodes of Supraventricular Tachycardia may be possible Atrial Tachycardia with variable block. Isolated SVEs were occasional (4.2%, 42397), SVE Couplets were rare (<1.0%, 5649), and SVE Triplets were occasional (3.8%, 01785). Isolated VEs were occasional (1.2%, 68379), VE Couplets were rare (<1.0%, 200), and no VE Triplets were present. Ventricular Bigeminy and Trigeminy were present.          Review of systems: Negative except that which was noted in the HPI.    Physical examination:  /60 (BP Location: Left arm, Patient Position: Sitting, Cuff Size: Adult Regular)   Pulse 75   Ht 1.549 m (5' 1\")   Wt 50.3 kg (110 lb 14.4 oz)   SpO2 98%   BMI 20.95 kg/m      GENERAL APPEARANCE: healthy, alert and no distress  CHEST: lungs clear to auscultation - no rales, rhonchi or wheezes, no use of accessory muscles, no retractions, respirations are unlabored, normal respiratory rate  CARDIOVASCULAR: regular rhythm, normal S1 with physiologic split S2, no S3 or S4 and no murmur, click or rub  EXTREMITIES: no clubbing, cyanosis with mild peripheral edema.              Thank you for allowing me to participate in the care of your patient. Please do not hesitate to contact me if you have any questions.     Kalin Ortiz, " DO

## 2025-07-14 DIAGNOSIS — R39.15 URINARY URGENCY: Primary | ICD-10-CM

## 2025-08-27 ENCOUNTER — ALLIED HEALTH/NURSE VISIT (OUTPATIENT)
Dept: UROLOGY | Facility: OTHER | Age: 85
End: 2025-08-27
Attending: UROLOGY
Payer: COMMERCIAL

## 2025-08-27 ENCOUNTER — LAB (OUTPATIENT)
Dept: LAB | Facility: OTHER | Age: 85
End: 2025-08-27
Attending: UROLOGY
Payer: COMMERCIAL

## 2025-08-27 VITALS
WEIGHT: 110.89 LBS | HEIGHT: 61 IN | SYSTOLIC BLOOD PRESSURE: 122 MMHG | BODY MASS INDEX: 20.94 KG/M2 | DIASTOLIC BLOOD PRESSURE: 63 MMHG | RESPIRATION RATE: 16 BRPM

## 2025-08-27 DIAGNOSIS — R39.15 URINARY URGENCY: Primary | ICD-10-CM

## 2025-08-27 PROCEDURE — G0463 HOSPITAL OUTPT CLINIC VISIT: HCPCS | Mod: 25

## 2025-08-27 ASSESSMENT — PAIN SCALES - GENERAL: PAINLEVEL_OUTOF10: MODERATE PAIN (4)

## 2025-09-01 DIAGNOSIS — Z78.0 POST-MENOPAUSE: ICD-10-CM

## 2025-09-01 DIAGNOSIS — M41.35 THORACOGENIC SCOLIOSIS OF THORACOLUMBAR REGION: ICD-10-CM

## 2025-09-02 RX ORDER — PANTOPRAZOLE SODIUM 40 MG/1
40 TABLET, DELAYED RELEASE ORAL
Qty: 90 TABLET | Refills: 3 | Status: SHIPPED | OUTPATIENT
Start: 2025-09-02

## (undated) DEVICE — TUBING SUCTION 20FT N620A

## (undated) DEVICE — SOL WATER IRRIG 1000ML BOTTLE 2F7114

## (undated) DEVICE — FORCEP-COLON BIOPSY STD W/NEEDLE 160CM

## (undated) DEVICE — FORCEPS BIOPSY RADIAL JAW 4 LARGE W/NEEDLE 240CM M00513332

## (undated) DEVICE — TUBING-SUCTION 20FT

## (undated) DEVICE — CONNECTOR ERBEFLO 2 PORT 20325-215

## (undated) DEVICE — IRRIGATION-H2O 1000ML

## (undated) DEVICE — SYRINGE-30CC SLIP TIP

## (undated) DEVICE — CANISTER-SUCTION 2000CC

## (undated) DEVICE — LUBRICANT JELLY 2OZ. TUBE

## (undated) DEVICE — CANISTER SUCTION MEDI-VAC GUARDIAN 2000ML 90D 65651-220

## (undated) DEVICE — MOUTHPIECE W/GUARD FOR ENDOSCOPY

## (undated) RX ORDER — FENTANYL CITRATE-0.9 % NACL/PF 10 MCG/ML
PLASTIC BAG, INJECTION (ML) INTRAVENOUS
Status: DISPENSED
Start: 2023-10-06

## (undated) RX ORDER — FENTANYL CITRATE 50 UG/ML
INJECTION, SOLUTION INTRAMUSCULAR; INTRAVENOUS
Status: DISPENSED
Start: 2017-09-27

## (undated) RX ORDER — PROPOFOL 10 MG/ML
INJECTION, EMULSION INTRAVENOUS
Status: DISPENSED
Start: 2023-10-06

## (undated) RX ORDER — LIDOCAINE HYDROCHLORIDE 20 MG/ML
INJECTION, SOLUTION EPIDURAL; INFILTRATION; INTRACAUDAL; PERINEURAL
Status: DISPENSED
Start: 2017-09-27

## (undated) RX ORDER — EPHEDRINE SULFATE 50 MG/ML
INJECTION, SOLUTION INTRAMUSCULAR; INTRAVENOUS; SUBCUTANEOUS
Status: DISPENSED
Start: 2023-10-06

## (undated) RX ORDER — PROPOFOL 10 MG/ML
INJECTION, EMULSION INTRAVENOUS
Status: DISPENSED
Start: 2017-09-27